# Patient Record
Sex: FEMALE | Race: WHITE | NOT HISPANIC OR LATINO | Employment: OTHER | URBAN - METROPOLITAN AREA
[De-identification: names, ages, dates, MRNs, and addresses within clinical notes are randomized per-mention and may not be internally consistent; named-entity substitution may affect disease eponyms.]

---

## 2017-01-04 ENCOUNTER — OFFICE VISIT (OUTPATIENT)
Dept: UROLOGY | Facility: CLINIC | Age: 52
End: 2017-01-04
Payer: MEDICARE

## 2017-01-04 VITALS
DIASTOLIC BLOOD PRESSURE: 72 MMHG | OXYGEN SATURATION: 100 % | SYSTOLIC BLOOD PRESSURE: 118 MMHG | RESPIRATION RATE: 18 BRPM | HEART RATE: 71 BPM

## 2017-01-04 DIAGNOSIS — R31.29 MICROSCOPIC HEMATURIA: Primary | ICD-10-CM

## 2017-01-04 LAB
ALBUMIN UR-MCNC: 10 MG/DL
APPEARANCE UR: ABNORMAL
BILIRUB UR QL STRIP: NEGATIVE
COLOR UR AUTO: YELLOW
GLUCOSE UR STRIP-MCNC: NEGATIVE MG/DL
HGB UR QL STRIP: ABNORMAL
KETONES UR STRIP-MCNC: 5 MG/DL
LEUKOCYTE ESTERASE UR QL STRIP: NEGATIVE
NITRATE UR QL: NEGATIVE
NON-SQ EPI CELLS #/AREA URNS LPF: ABNORMAL /LPF
PH UR STRIP: 6 PH (ref 5–7)
RBC #/AREA URNS AUTO: ABNORMAL /HPF (ref 0–2)
SP GR UR STRIP: 1.02 (ref 1–1.03)
URN SPEC COLLECT METH UR: ABNORMAL
UROBILINOGEN UR STRIP-MCNC: 4 MG/DL (ref 0–2)
WBC #/AREA URNS AUTO: ABNORMAL /HPF (ref 0–2)

## 2017-01-04 PROCEDURE — 99207 ZZC NO CHARGE LOS: CPT | Performed by: UROLOGY

## 2017-01-04 PROCEDURE — 81001 URINALYSIS AUTO W/SCOPE: CPT | Performed by: UROLOGY

## 2017-01-04 PROCEDURE — 52000 CYSTOURETHROSCOPY: CPT | Performed by: UROLOGY

## 2017-01-04 ASSESSMENT — PAIN SCALES - GENERAL: PAINLEVEL: NO PAIN (0)

## 2017-01-04 NOTE — MR AVS SNAPSHOT
After Visit Summary   1/4/2017    Kareen Hernandez    MRN: [id number]           Patient Information     Date Of Birth          1965        Visit Information        Provider Department      1/4/2017 3:30 PM Blair Garner MD [facility]        Today's Diagnoses     Microscopic hematuria    -  1       Care Instructions    It is recommended for you to have a repeat Urine sample with your primary doctor in 6 months. Call the Urology Clinic at [phone] with any questions.    After Your Cystoscopy    What happens after the exam?    You may go back to your normal diet and activity as you feel ready, unless your doctor tells you not to.    For the next two days, you may notice:    Some blood in your urine  Some burning when you urinate (use the toilet)  An urge to urinate more often  Bladder spasms    These are normal after the procedure and should go away after a day or two.  To relieve these problems drink 6 to 8 large glasses of water each day (includes drinks at meals) as this will help clear the urine.  Take warm baths to relieve pain and bladder spasms.  Do not add anything to the bath water.  You may also take Tylenol (acetaminophen) for pain if needed.    When should I call my doctor?    A fever over 100F (38C) for more than a day. (Before you call the doctor, check your temperature under your tongue)  Chills  Failure to urinate: No urine comes out when you try to use the toilet. (Try soaking in a bathtub full of warm water. If still no urine, call your doctor)  A lot of blood in the urine, or blood clots larger than a nickel  Pain in the back or belly area (abdomen)  Pain or spasms that are not relieved by warm tub baths and pain medicine  Severe pain, burning or other problems while passing urine  Pain that gets worse after two days                    Follow-ups after your visit        Who to contact     If you have questions or need follow up information about today's  clinic visit or your schedule please contact [facility] directly at [phone].  Normal or non-critical lab and imaging results will be communicated to you by SimplyCasthart, letter or phone within 4 business days after the clinic has received the results. If you do not hear from us within 7 days, please contact the clinic through SimplyCasthart or phone. If you have a critical or abnormal lab result, we will notify you by phone as soon as possible.  Submit refill requests through Conjectur or call your pharmacy and they will forward the refill request to us. Please allow 3 business days for your refill to be completed.          Additional Information About Your Visit        Conjectur Information     Conjectur gives you secure access to your electronic health record. If you see a primary care provider, you can also send messages to your care team and make appointments. If you have questions, please call your primary care clinic.  If you do not have a primary care provider, please call [phone] and they will assist you.      Conjectur is an electronic gateway that provides easy, online access to your medical records. With Conjectur, you can request a clinic appointment, read your test results, renew a prescription or communicate with your care team.     To access your existing account, please contact your [facility] or call [phone] for assistance.        Care EveryWhere ID     This is your Care EveryWhere ID. This could be used by other organizations to access your [place] medical records  [id number]        Your Vitals Were     Pulse Respirations Pulse Oximetry             71 18 100%          Blood Pressure from Last 3 Encounters:   01/04/17 118/72   12/12/16 125/73   11/29/16 120/76    Weight from Last 3 Encounters:   11/29/16 66.271 kg (146 lb 1.6 oz)   08/08/16 65.091 kg (143 lb 8 oz)   05/03/16 66.134 kg (145 lb 12.8 oz)              We Performed the Following     UA reflex  to Microscopic and Culture     Urine Microscopic        Primary Care Provider Office Phone # Fax #    Jessi Favian Lozano [phone]       [address] [address]        Thank you!     Thank you for choosing [facility]  for your care. Our goal is always to provide you with excellent care. Hearing back from our patients is one way we can continue to improve our services. Please take a few minutes to complete the written survey that you may receive in the mail after your visit with us. Thank you!             Your Updated Medication List - Protect others around you: Learn how to safely use, store and throw away your medicines at www.disposemymeds.org.          This list is accurate as of: 1/4/17  3:50 PM.  Always use your most recent med list.                   Brand Name Dispense Instructions for use    clonazePAM 0.5 MG tablet    klonoPIN    20 tablet    TAKE 1 TABLET BY MOUTH 3 TIMES A DAY AS NEEDED FOR ANXIETY       cyanocobalamin 1000 MCG/ML injection    VITAMIN B12    1 mL    Inject 1 mL (1,000 mcg) into the muscle every 30 days       diclofenac 75 MG EC tablet    VOLTAREN    60 tablet    Take 1 tablet (75 mg) by mouth 2 times daily as needed (For pain)       FLUoxetine 20 MG capsule    PROzac    90 capsule    TAKE 3 CAPS BY MOUTH DAILY       lamoTRIgine 100 MG tablet    LaMICtal    90 tablet    Take 3 tablets (300 mg) by mouth daily       mometasone 50 MCG/ACT spray    NASONEX    3 Box    Spray 2 sprays into both nostrils daily       naproxen sodium 550 MG tablet    ANAPROX    60 tablet    Take 1 tablet (550 mg) by mouth 2 times daily as needed for moderate pain       nicotine 14 MG/24HR 24 hr patch    NICODERM CQ    30 patch    Place 1 patch onto the skin every 24 hours       omeprazole 20 MG CR capsule    priLOSEC    90 capsule    Take 1 capsule (20 mg) by mouth daily       SUMAtriptan 100 MG tablet    IMITREX    9 tablet     Take 1 tablet (100 mg) by mouth at onset of headache for migraine May repeat in 2 hours if needed: max 2/day; average number of headaches monthly 3       triamcinolone 0.1 % ointment    KENALOG    30 g    Apply to nail beds twice daily       vitamin D 53534 UNIT capsule    ERGOCALCIFEROL    4 capsule    TAKE 1 CAPSULE BY MOUTH EVERY 7 DAYS       zolpidem 10 MG tablet    AMBIEN    30 tablet    TAKE 1 TABLET BY MOUTH EVERY EVENING AS NEEDED

## 2017-01-04 NOTE — PROGRESS NOTES
Pre-procedure diagnosis:  Micro-hematuria   Post-procedure diagnosis: Normal cystoscopy  Procedure performed:  Cystourethroscopy  Surgeon:    Juana TIAN   Anesthesia:    Local    Indications for procedure:   Kareen Hernandez is a 51 year old female with a history of micro-hematuria, kidney stones.  Urinary cytology was no evidence of malignancy. CT-urogram showed bilateral nonobstructing kidney stones, little change from CT 2-3 years prior.    Description of procedure:   After fully informed, voluntary consent was obtained, the patient was brought into the procedure room, identified and placed in a supine position on the cysto table.  The vagina/introitus were prepped and draped in a sterile fashion with betadine.  A 15F flexible cystoscope was inserted into the urethra and the bladder and urethra examined in a systematic manner.  The patient tolerated the procedure well and there were no complications.      Cystoscopic findings:  The urethra was normal without strictures. Normal external genitalia. The bladder was completely surveyed.  There was no trabeculation.  There were no tumors, stones, or diverticula identifed.  The ureteric orifices were normal in position and number and effluxing clear urine.    Assessment/Plan:   Kareen Hernandez is a 51 year old female with a history of micro-hematuria , now with normal  findings on cystoscopy.    Advised UA and urinary cytology in 6 months with PCP.  -discussed option of ureteroscopy for stones, she declines now.  - if symptomatic from stones return to clinic to discuss treatment options.

## 2017-01-04 NOTE — NURSING NOTE
"Kareen Hernandez's goals for this visit include:   Chief Complaint   Patient presents with     Cystoscopy       She requests these members of her care team be copied on today's visit information: Yes, PCP    PCP: Jessi Lozano    Referring Provider:  No referring provider defined for this encounter.    Chief Complaint   Patient presents with     Cystoscopy       Initial There were no vitals taken for this visit. Estimated body mass index is 22.53 kg/(m^2) as calculated from the following:    Height as of 12/12/16: 1.715 m (5' 7.5\").    Weight as of 11/29/16: 66.271 kg (146 lb 1.6 oz).  BP completed using cuff size: regular    Do you need any medication refills at today's visit? None    Maria Acevedo  General Surgery - Urology RN      "

## 2017-01-04 NOTE — PATIENT INSTRUCTIONS
It is recommended for you to have a repeat Urine sample with your primary doctor in 6 months. Call the Urology Clinic at [phone] with any questions.    After Your Cystoscopy    What happens after the exam?    You may go back to your normal diet and activity as you feel ready, unless your doctor tells you not to.    For the next two days, you may notice:    Some blood in your urine  Some burning when you urinate (use the toilet)  An urge to urinate more often  Bladder spasms    These are normal after the procedure and should go away after a day or two.  To relieve these problems drink 6 to 8 large glasses of water each day (includes drinks at meals) as this will help clear the urine.  Take warm baths to relieve pain and bladder spasms.  Do not add anything to the bath water.  You may also take Tylenol (acetaminophen) for pain if needed.    When should I call my doctor?    A fever over 100F (38C) for more than a day. (Before you call the doctor, check your temperature under your tongue)  Chills  Failure to urinate: No urine comes out when you try to use the toilet. (Try soaking in a bathtub full of warm water. If still no urine, call your doctor)  A lot of blood in the urine, or blood clots larger than a nickel  Pain in the back or belly area (abdomen)  Pain or spasms that are not relieved by warm tub baths and pain medicine  Severe pain, burning or other problems while passing urine  Pain that gets worse after two days

## 2017-01-20 DIAGNOSIS — E55.9 VITAMIN D DEFICIENCY DISEASE: ICD-10-CM

## 2017-01-20 DIAGNOSIS — F31.9 BIPOLAR DISORDER, UNSPECIFIED (H): Primary | ICD-10-CM

## 2017-01-23 NOTE — TELEPHONE ENCOUNTER
lamoTRIgine       Last Written Prescription Date:  5/3/16  Last Fill Quantity: 90,   # refills: 5  Last Office Visit with [facility] or  [facility] prescribing provider: 11/29/16  Future Office visit:       Routing refill request to provider for review/approval because:  Drug not on the [facility], [facility] or  [facility] refill protocol or controlled substance    vitamin D      Last Written Prescription Date: 11/15/16  Last Fill Quantity: 4,  # refills: 1   Last Office Visit with [facility], [facility] or  [facility] prescribing provider: 11/29/16

## 2017-01-25 RX ORDER — ERGOCALCIFEROL 1.25 MG/1
CAPSULE, LIQUID FILLED ORAL
Qty: 4 CAPSULE | Refills: 1 | Status: SHIPPED | OUTPATIENT
Start: 2017-01-25 | End: 2017-11-20

## 2017-01-25 RX ORDER — LAMOTRIGINE 100 MG/1
TABLET ORAL
Qty: 90 TABLET | Refills: 5 | Status: SHIPPED | OUTPATIENT
Start: 2017-01-25 | End: 2017-08-20

## 2017-02-28 ENCOUNTER — ALLIED HEALTH/NURSE VISIT (OUTPATIENT)
Dept: NURSING | Facility: CLINIC | Age: 52
End: 2017-02-28
Payer: MEDICARE

## 2017-02-28 DIAGNOSIS — Z98.84 GASTRIC BYPASS STATUS FOR OBESITY: Primary | ICD-10-CM

## 2017-02-28 PROCEDURE — 96372 THER/PROPH/DIAG INJ SC/IM: CPT

## 2017-02-28 PROCEDURE — 99207 ZZC NO CHARGE NURSE ONLY: CPT

## 2017-04-29 DIAGNOSIS — G47.00 PERSISTENT DISORDER OF INITIATING OR MAINTAINING SLEEP: ICD-10-CM

## 2017-05-01 RX ORDER — ZOLPIDEM TARTRATE 10 MG/1
TABLET ORAL
Qty: 30 TABLET | Refills: 0 | Status: SHIPPED | OUTPATIENT
Start: 2017-05-01 | End: 2017-12-07

## 2017-05-01 NOTE — TELEPHONE ENCOUNTER
zolpidem (AMBIEN) 10 MG tablet      Last Written Prescription Date:  10/17/16  Last Fill Quantity: 30,   # refills: 2  Last Office Visit with [facility], [facility] or [facility] prescribing provider: 1/4/17 Dr. Lozano    Future Office visit:       Routing refill request to provider for review/approval because:  Drug not on the [facility], [facility] or [facility] refill protocol or controlled substance    Jocelynn Robertson

## 2017-05-08 DIAGNOSIS — F41.9 ANXIETY: ICD-10-CM

## 2017-05-08 RX ORDER — CLONAZEPAM 0.5 MG/1
0.5 TABLET ORAL 3 TIMES DAILY
Qty: 20 TABLET | Refills: 0 | Status: SHIPPED | OUTPATIENT
Start: 2017-05-08 | End: 2018-08-03

## 2017-05-08 NOTE — TELEPHONE ENCOUNTER
Klonopin 0.5 mg      Last Written Prescription Date: 08/19/16  Last Fill Quantity: 20,  # refills: 0   Last Office Visit with [facility], [facility] or [facility] prescribing provider: 11/29/16    Routing refill request to provider for review/approval because:  Drug not on the [facility] refill protocol   Sirena Bueno RN

## 2017-05-08 NOTE — TELEPHONE ENCOUNTER
Reason for Call:  Medication or medication refill:    Do you use a [facility]?  Name of the pharmacy and phone number for the current request:  [facility] - [phone]    Name of the medication requested: Clonazepam (KLONOPIN) 0.5 MG tablet    Can we leave a detailed message on this number? YES    Phone number patient can be reached at: Home number on file [phone] (home)    Best Time: Anytime    Call taken on 5/8/2017 at 1:44 PM by Vinny Lopes

## 2017-06-07 DIAGNOSIS — F31.9 BIPOLAR DISORDER, UNSPECIFIED (H): ICD-10-CM

## 2017-06-08 NOTE — TELEPHONE ENCOUNTER
FLUoxetine (PROZAC) 20 MG capsule     Last Written Prescription Date: 11/15/16  Last Fill Quantity: 90, # refills: 5  Last Office Visit with [facility] primary care provider:  1/4/17 Dr. Lozano          Last PHQ-9 score on record=   PHQ-9 SCORE 5/3/2016   Total Score -   Total Score 10

## 2017-06-09 NOTE — TELEPHONE ENCOUNTER
Routing refill request to provider for review/approval because:  Drug not on the [facility] refill protocol with associated diagnosis  Sirena Bueno RN

## 2017-06-15 ENCOUNTER — TELEPHONE (OUTPATIENT)
Dept: FAMILY MEDICINE | Facility: CLINIC | Age: 52
End: 2017-06-15

## 2017-06-15 DIAGNOSIS — F31.9 BIPOLAR DISORDER, UNSPECIFIED (H): ICD-10-CM

## 2017-06-15 NOTE — TELEPHONE ENCOUNTER
FLUoxetine (PROZAC) 20 MG capsule not covered by insurance. Please advise.      Neelima Hensley, CMA

## 2017-06-16 NOTE — TELEPHONE ENCOUNTER
This is doubtful since fluoxetine is the oldest and cheapest of these meds.  Maybe a qty override?

## 2017-06-16 NOTE — TELEPHONE ENCOUNTER
Spoke with the pharmacist and he says pt picked up rx 6-13-17 paying a couple bucks for it.    Disregard encounter    Chloe Reese MA

## 2017-06-26 DIAGNOSIS — K21.9 GASTROESOPHAGEAL REFLUX DISEASE WITHOUT ESOPHAGITIS: ICD-10-CM

## 2017-06-26 NOTE — TELEPHONE ENCOUNTER
omeprazole (PRILOSEC) 20 MG capsule      Last Written Prescription Date: 5/23/16  Last Fill Quantity: 90,  # refills: 1   Last Office Visit with [facility], [facility] or [facility] prescribing provider: 11/29/16

## 2017-07-08 DIAGNOSIS — F31.9 BIPOLAR DISORDER, UNSPECIFIED (H): ICD-10-CM

## 2017-07-10 NOTE — TELEPHONE ENCOUNTER
FLUoxetine (PROZAC) 20 MG capsule     Last Written Prescription Date: 6/9/17  Last Fill Quantity: 90, # refills: 1  Last Office Visit with [facility] primary care provider:  11/29/16        Last PHQ-9 score on record=   PHQ-9 SCORE 5/3/2016   Total Score -   Total Score 10

## 2017-07-26 ENCOUNTER — OFFICE VISIT (OUTPATIENT)
Dept: FAMILY MEDICINE | Facility: CLINIC | Age: 52
End: 2017-07-26
Payer: MEDICARE

## 2017-07-26 VITALS
BODY MASS INDEX: 22.6 KG/M2 | RESPIRATION RATE: 16 BRPM | SYSTOLIC BLOOD PRESSURE: 126 MMHG | HEIGHT: 68 IN | WEIGHT: 149.1 LBS | TEMPERATURE: 98.3 F | OXYGEN SATURATION: 96 % | HEART RATE: 86 BPM | DIASTOLIC BLOOD PRESSURE: 82 MMHG

## 2017-07-26 DIAGNOSIS — Z12.31 VISIT FOR SCREENING MAMMOGRAM: ICD-10-CM

## 2017-07-26 DIAGNOSIS — R59.0 POSTERIOR CERVICAL ADENOPATHY: Primary | ICD-10-CM

## 2017-07-26 DIAGNOSIS — E53.8 VITAMIN B12 DEFICIENCY DISEASE: ICD-10-CM

## 2017-07-26 PROCEDURE — 99214 OFFICE O/P EST MOD 30 MIN: CPT | Mod: 25 | Performed by: FAMILY MEDICINE

## 2017-07-26 PROCEDURE — 96372 THER/PROPH/DIAG INJ SC/IM: CPT | Performed by: FAMILY MEDICINE

## 2017-07-26 RX ORDER — CYANOCOBALAMIN 1000 UG/ML
1 INJECTION, SOLUTION INTRAMUSCULAR; SUBCUTANEOUS
Qty: 1 ML | Refills: 11 | Status: SHIPPED | OUTPATIENT
Start: 2017-07-26 | End: 2018-08-01

## 2017-07-26 RX ORDER — CEPHALEXIN 500 MG/1
500 CAPSULE ORAL 3 TIMES DAILY
Qty: 21 CAPSULE | Refills: 0 | Status: SHIPPED | OUTPATIENT
Start: 2017-07-26 | End: 2017-08-02

## 2017-07-26 ASSESSMENT — PAIN SCALES - GENERAL: PAINLEVEL: MODERATE PAIN (5)

## 2017-07-26 NOTE — MR AVS SNAPSHOT
After Visit Summary   7/26/2017    Kareen Hernandez    MRN: [id number]           Patient Information     Date Of Birth          1965        Visit Information        Provider Department      7/26/2017 2:00 PM Jessi Lozano MD [facility]        Today's Diagnoses     Posterior cervical adenopathy    -  1    Vitamin B12 deficiency disease        Visit for screening mammogram           Follow-ups after your visit        Follow-up notes from your care team     Return if symptoms worsen or fail to improve.      Future tests that were ordered for you today     Open Future Orders        Priority Expected Expires Ordered    MA SCREENING DIGITAL BILAT - Future  (s+30) Routine  7/26/2018 7/26/2017            Who to contact     If you have questions or need follow up information about today's clinic visit or your schedule please contact [facility] directly at [phone].  Normal or non-critical lab and imaging results will be communicated to you by iConTexthart, letter or phone within 4 business days after the clinic has received the results. If you do not hear from us within 7 days, please contact the clinic through iConTexthart or phone. If you have a critical or abnormal lab result, we will notify you by phone as soon as possible.  Submit refill requests through StoryWorth or call your pharmacy and they will forward the refill request to us. Please allow 3 business days for your refill to be completed.          Additional Information About Your Visit        MyChart Information     StoryWorth gives you secure access to your electronic health record. If you see a primary care provider, you can also send messages to your care team and make appointments. If you have questions, please call your primary care clinic.  If you do not have a primary care provider, please call [phone] and they will assist you.        Care EveryWhere ID     This is your Care EveryWhere ID. This could be used  "by other organizations to access your [place] medical records  [id number]        Your Vitals Were     Pulse Temperature Respirations Height Pulse Oximetry BMI (Body Mass Index)    86 98.3  F (36.8  C) (Oral) 16 1.715 m (5' 7.5\") 96% 23.01 kg/m2       Blood Pressure from Last 3 Encounters:   07/26/17 126/82   01/04/17 118/72   12/12/16 125/73    Weight from Last 3 Encounters:   07/26/17 67.6 kg (149 lb 1.6 oz)   11/29/16 66.3 kg (146 lb 1.6 oz)   08/08/16 65.1 kg (143 lb 8 oz)              We Performed the Following     INJECTION INTRAMUSCULAR OR SUB-Q          Today's Medication Changes          These changes are accurate as of: 7/26/17  3:22 PM.  If you have any questions, ask your nurse or doctor.               Start taking these medicines.        Dose/Directions    cephALEXin 500 MG capsule   Commonly known as:  KEFLEX   Used for:  Posterior cervical adenopathy   Started by:  Jessi Lozano MD        Dose:  500 mg   Take 1 capsule (500 mg) by mouth 3 times daily for 7 days   Quantity:  21 capsule   Refills:  0            Where to get your medicines      These medications were sent to [facility] [address] [place] [id number] [facility] AT [facility] [address]  [address] [facility], [facility] [address]     Phone:  [phone]     cephALEXin 500 MG capsule    cyanocobalamin 1000 MCG/ML injection                Primary Care Provider Office Phone # Fax #    Jessi Lozano [phone]       [address] [address]        Equal Access to Services     [facility] MIKI AH: Hadii rgoer barkley Socarmenza, waaxda luqadaha, qaybta kaalmada adeegyada, bon barbosa. So [facility] [phone].    ATENCIÓN: Si habla español, tiene a lang disposición servicios gratuitos de asistencia lingüística. Llame al [phone].    We comply with applicable federal civil rights laws and [place] laws. We do not discriminate " on the basis of race, color, national origin, age, disability sex, sexual orientation or gender identity.            Thank you!     Thank you for choosing [facility]  for your care. Our goal is always to provide you with excellent care. Hearing back from our patients is one way we can continue to improve our services. Please take a few minutes to complete the written survey that you may receive in the mail after your visit with us. Thank you!             Your Updated Medication List - Protect others around you: Learn how to safely use, store and throw away your medicines at www.disposemymeds.org.          This list is accurate as of: 7/26/17  3:22 PM.  Always use your most recent med list.                   Brand Name Dispense Instructions for use Diagnosis    cephALEXin 500 MG capsule    KEFLEX    21 capsule    Take 1 capsule (500 mg) by mouth 3 times daily for 7 days    Posterior cervical adenopathy       clonazePAM 0.5 MG tablet    klonoPIN    20 tablet    Take 1 tablet (0.5 mg) by mouth 3 times daily AS NEEDED FOR ANXIETY    Anxiety       cyanocobalamin 1000 MCG/ML injection    VITAMIN B12    1 mL    Inject 1 mL (1,000 mcg) into the muscle every 30 days    Vitamin B12 deficiency disease       diclofenac 75 MG EC tablet    VOLTAREN    60 tablet    Take 1 tablet (75 mg) by mouth 2 times daily as needed (For pain)    Lateral epicondylitis of right elbow, History of kidney stones       FLUoxetine 20 MG capsule    PROzac    90 capsule    TAKE 3 CAPSULES BY MOUTH EVERY DAY    Bipolar disorder, unspecified (H)       lamoTRIgine 100 MG tablet    LaMICtal    90 tablet    TAKE 3 TABS BY MOUTH DAILY    Bipolar disorder, unspecified (H)       mometasone 50 MCG/ACT spray    NASONEX    3 Box    Spray 2 sprays into both nostrils daily    Allergic rhinitis, unspecified allergic rhinitis type       naproxen sodium 550 MG tablet    ANAPROX    60 tablet    Take 1 tablet (550 mg) by mouth 2 times daily as needed for  moderate pain    Right flank pain       nicotine 14 MG/24HR 24 hr patch    NICODERM CQ    30 patch    Place 1 patch onto the skin every 24 hours    Tobacco use disorder       omeprazole 20 MG CR capsule    priLOSEC    90 capsule    Take 1 capsule (20 mg) by mouth daily    Gastroesophageal reflux disease without esophagitis       SUMAtriptan 100 MG tablet    IMITREX    9 tablet    Take 1 tablet (100 mg) by mouth at onset of headache for migraine May repeat in 2 hours if needed: max 2/day; average number of headaches monthly 3    Chronic migraine without aura without status migrainosus, not intractable       triamcinolone 0.1 % ointment    KENALOG    30 g    Apply to nail beds twice daily    Dystrophic nail       vitamin D 79114 UNIT capsule    ERGOCALCIFEROL    4 capsule    TAKE 1 CAPSULE BY MOUTH EVERY 7 DAYS    Vitamin D deficiency disease       zolpidem 10 MG tablet    AMBIEN    30 tablet    TAKE 1 TABLET BY MOUTH EVERY EVENING AS NEEDED    Persistent disorder of initiating or maintaining sleep

## 2017-07-26 NOTE — PROGRESS NOTES
"  SUBJECTIVE:                                                    Kareen Hernandez is a 52 year old female who presents to clinic today for the following health issues:      1. Lump on neck near R jaw -    - noticed 2 days ago   - some pain noted, causing HA's, ear ache  2. B12     SUBJECTIVE:  Here today with the above symptoms. Noted this incidentally couple of days ago when she was looking in the mirror. The lump is mildly tender. Doesn't seem to be changing in size. She has noted some incidental pain around her left ear and some slight fullness. No significant nasal congestion. No cough or recent illness. Doing well on monthly B-12 injection    Review of systems otherwise negative.  Past medical, family, and social history reviewed and updated in chart.    OBJECTIVE:  /82 (BP Location: Right arm, Patient Position: Right side, Cuff Size: Adult Regular)  Pulse 86  Temp 98.3  F (36.8  C) (Oral)  Resp 16  Ht 1.715 m (5' 7.5\")  Wt 67.6 kg (149 lb 1.6 oz)  SpO2 96%  BMI 23.01 kg/m2  Alert, pleasant, upbeat, and in no apparent discomfort.  Left tympanic membrane clear  Right tympanic membrane mildly bulging with a slightly clouded fluid behind  Nose clear  Oropharynx clear  Neck supple and there is a mildly enlarged posterior cervical lymph node on the right  S1 and S2 normal, no murmurs, clicks, gallops or rubs. Regular rate and rhythm. Chest is clear; no wheezes or rales. No edema or JVD.  Past labs reviewed with the patient.     ASSESSMENT / PLAN:  (R59.0) Posterior cervical adenopathy  (primary encounter diagnosis)  Comment: Discussed with patient that this is likely a reactive lymph node. Will treat empirically with a course of antibiotics and expect resolution. If not consider further evaluation  Plan: cephALEXin (KEFLEX) 500 MG capsule        Discussed mechanism of action of the proposed medication, as well as potential effects, both good and bad.  Patient expressed understanding and agreed with " treatment.     (E53.8) Vitamin B12 deficiency disease  Comment:   Plan: cyanocobalamin (VITAMIN B12) 1000 MCG/ML         injection        Refilled     (Z12.31) Visit for screening mammogram  Comment:   Plan: MA SCREENING DIGITAL BILAT - Future  (s+30)            Follow up as needed   SBhumika Lozano MD    (Chart documentation completed in part with Dragon voice-recognition software.  Even though reviewed some grammatical, spelling, and word errors may remain.)

## 2017-07-26 NOTE — NURSING NOTE
"Chief Complaint   Patient presents with     Derm Problem       Initial /82 (BP Location: Right arm, Patient Position: Right side, Cuff Size: Adult Regular)  Pulse 86  Temp 98.3  F (36.8  C) (Oral)  Resp 16  Ht 1.715 m (5' 7.5\")  Wt 67.6 kg (149 lb 1.6 oz)  SpO2 96%  BMI 23.01 kg/m2 Estimated body mass index is 23.01 kg/(m^2) as calculated from the following:    Height as of this encounter: 1.715 m (5' 7.5\").    Weight as of this encounter: 67.6 kg (149 lb 1.6 oz).  Medication Reconciliation: complete     Will Lefty MADISON      "

## 2017-08-04 ENCOUNTER — OFFICE VISIT (OUTPATIENT)
Dept: FAMILY MEDICINE | Facility: CLINIC | Age: 52
End: 2017-08-04
Payer: MEDICARE

## 2017-08-04 ENCOUNTER — TELEPHONE (OUTPATIENT)
Dept: FAMILY MEDICINE | Facility: CLINIC | Age: 52
End: 2017-08-04

## 2017-08-04 VITALS
TEMPERATURE: 97.9 F | OXYGEN SATURATION: 99 % | WEIGHT: 155.8 LBS | HEART RATE: 72 BPM | SYSTOLIC BLOOD PRESSURE: 134 MMHG | DIASTOLIC BLOOD PRESSURE: 84 MMHG | BODY MASS INDEX: 24.45 KG/M2 | HEIGHT: 67 IN

## 2017-08-04 DIAGNOSIS — Z23 NEED FOR PROPHYLACTIC VACCINATION WITH TETANUS-DIPHTHERIA (TD): ICD-10-CM

## 2017-08-04 DIAGNOSIS — S01.91XA LACERATION OF HEAD WITHOUT FOREIGN BODY, UNSPECIFIED PART OF HEAD, INITIAL ENCOUNTER: Primary | ICD-10-CM

## 2017-08-04 PROCEDURE — 90471 IMMUNIZATION ADMIN: CPT | Performed by: NURSE PRACTITIONER

## 2017-08-04 PROCEDURE — 90715 TDAP VACCINE 7 YRS/> IM: CPT | Performed by: NURSE PRACTITIONER

## 2017-08-04 PROCEDURE — 99213 OFFICE O/P EST LOW 20 MIN: CPT | Mod: 25 | Performed by: NURSE PRACTITIONER

## 2017-08-04 ASSESSMENT — PAIN SCALES - GENERAL: PAINLEVEL: SEVERE PAIN (7)

## 2017-08-04 NOTE — PROGRESS NOTES
SUBJECTIVE:                                                    Kareen Hernandez is a 52 year old female who presents to clinic today for the following health issues:      Concern - laceration on forehead  Onset: 3 days    Description:   Hit head on camper window (metal)    Intensity: mild, moderate    Progression of Symptoms:  same    Accompanying Signs & Symptoms:  NONE    Previous history of similar problem:   NO     Precipitating factors:   Worsened by: N/A    Alleviating factors:  Improved by: N/A    Therapies Tried and outcome: antibiotic ointment    No fever/chills. Washed out with hydrogen peroxide. Denies discharge or pus from laceration.    Has appointment Tuesday for mammogram.       Problem list and histories reviewed & adjusted, as indicated.  Additional history: as documented    Patient Active Problem List   Diagnosis     Fibromyalgia     Gastric bypass status for obesity     CARDIOVASCULAR SCREENING; LDL GOAL LESS THAN 130     Anxiety     Vitamin D deficiency disease     Vitamin B12 deficiency disease     Migraine headache     Insomnia     Right maxillary sinusitis, chronic     Bipolar disorder (H)     History of hypertension     Gastroesophageal reflux disease without esophagitis     Chronic migraine without aura without status migrainosus, not intractable     Major depressive disorder, recurrent episode, moderate (H)     History of kidney stones     Tobacco use disorder     Past Surgical History:   Procedure Laterality Date     ABDOMINOPLASTY  12/2/2013     C GASTRIC BYPASS,OBESE<100CM DAYAN-EN-Y  3-25-09    [facility]     C REMOVAL OF KIDNEY STONE      With kidney tents x 5 to 6 procedures.     CL AFF SURGICAL PATHOLOGY  Feb 2007    Redman's neuroma  - Right Foot     LITHOTRIPSY       SURGICAL PATHOLOGY EXAM      Lipoma Removal on her back       Social History   Substance Use Topics     Smoking status: Current Every Day Smoker     Packs/day: 0.50     Types: Cigarettes     Last attempt to quit:  9/22/2013     Smokeless tobacco: Never Used      Comment: 2/6/08     Alcohol use No     Family History   Problem Relation Age of Onset     Asthma Mother      Hypertension Mother      C.A.D. Mother      Genitourinary Problems Mother      kidney failure     DIABETES Father      Hypertension Father      C.A.D. Father      Genitourinary Problems Sister      kidney stones     CEREBROVASCULAR DISEASE No family hx of      Breast Cancer No family hx of      Cancer - colorectal No family hx of      Prostate Cancer No family hx of          Current Outpatient Prescriptions   Medication Sig Dispense Refill     cyanocobalamin (VITAMIN B12) 1000 MCG/ML injection Inject 1 mL (1,000 mcg) into the muscle every 30 days 1 mL 11     FLUoxetine (PROZAC) 20 MG capsule TAKE 3 CAPSULES BY MOUTH EVERY DAY 90 capsule 0     omeprazole (PRILOSEC) 20 MG CR capsule Take 1 capsule (20 mg) by mouth daily 90 capsule 1     clonazePAM (KLONOPIN) 0.5 MG tablet Take 1 tablet (0.5 mg) by mouth 3 times daily AS NEEDED FOR ANXIETY 20 tablet 0     zolpidem (AMBIEN) 10 MG tablet TAKE 1 TABLET BY MOUTH EVERY EVENING AS NEEDED 30 tablet 0     vitamin D (ERGOCALCIFEROL) 61156 UNIT capsule TAKE 1 CAPSULE BY MOUTH EVERY 7 DAYS 4 capsule 1     lamoTRIgine (LAMICTAL) 100 MG tablet TAKE 3 TABS BY MOUTH DAILY 90 tablet 5     diclofenac (VOLTAREN) 75 MG EC tablet Take 1 tablet (75 mg) by mouth 2 times daily as needed (For pain) 60 tablet 0     triamcinolone (KENALOG) 0.1 % ointment Apply to nail beds twice daily 30 g 0     nicotine (NICODERM CQ) 14 MG/24HR patch 2h hr Place 1 patch onto the skin every 24 hours 30 patch 2     mometasone (NASONEX) 50 MCG/ACT nasal spray Spray 2 sprays into both nostrils daily 3 Box 3     naproxen sodium (ANAPROX) 550 MG tablet Take 1 tablet (550 mg) by mouth 2 times daily as needed for moderate pain 60 tablet 0     SUMAtriptan (IMITREX) 100 MG tablet Take 1 tablet (100 mg) by mouth at onset of headache for migraine May repeat in 2  "hours if needed: max 2/day; average number of headaches monthly 3 9 tablet 12     Allergies   Allergen Reactions     Lurasidone      Diarrhea     Morphine Rash         Reviewed and updated as needed this visit by clinical staffTobacco  Allergies  Meds  Med Hx  Surg Hx  Fam Hx  Soc Hx      Reviewed and updated as needed this visit by Provider         ROS:  Constitutional, skin systems are negative, except as otherwise noted.      OBJECTIVE:   /84 (BP Location: Right arm, Patient Position: Chair, Cuff Size: Adult Regular)  Pulse 72  Temp 97.9  F (36.6  C) (Oral)  Ht 1.706 m (5' 7.16\")  Wt 70.7 kg (155 lb 12.8 oz)  SpO2 99%  Breastfeeding? No  BMI 24.28 kg/m2  Body mass index is 24.28 kg/(m^2).  GENERAL: healthy, alert and no distress  SKIN: mid-forehead with vertical laceration-approximated, no drainage  PSYCH: mentation appears normal, affect normal/bright    Diagnostic Test Results:  none     ASSESSMENT/PLAN:         1. Laceration of head without foreign body, unspecified part of head, initial encounter  Use bacitracin BID topically. Should heal on own. Does not appear infected, if you develop fever/chills or pus/drainage call-will need antibiotic at that time.     2. Need for prophylactic vaccination with tetanus-diphtheria (TD)  given  - TDAP VACCINE (ADACEL)  - ADMIN 1st VACCINE    FUTURE APPOINTMENTS:       - Follow-up visit prn    MESHA Padgett, NP-C  [facility]    "

## 2017-08-04 NOTE — TELEPHONE ENCOUNTER
This writer attempted to contact Kareen on 08/04/17      Reason for call need a tetanus and left detailed message.      When patient calls back, please Relay message advised patient last tetanus on file was 2007 and informed she is due . Was window old, dirty? Did it break the skin? , (read verbatim), document that pt called and close encounter.          Sirena Raoms RN

## 2017-08-04 NOTE — MR AVS SNAPSHOT
"              After Visit Summary   8/4/2017    Kareen Hernandez    MRN: [id number]           Patient Information     Date Of Birth          1965        Visit Information        Provider Department      8/4/2017 4:20 PM Apryl Childs NP [facility]        Today's Diagnoses     Laceration of head without foreign body, unspecified part of head, initial encounter    -  1    Need for prophylactic vaccination with tetanus-diphtheria (TD)          Care Instructions    Use Bacitracin twice a day for 5 days. If you develop fever/chills, pus out of laceration: call so we can start an antibiotic.           Follow-ups after your visit        Your next 10 appointments already scheduled     Aug 08, 2017 10:00 AM CDT   MA SCREENING DIGITAL BILATERAL with MGMA1, MG MA TECH   [facility]    [id number] [place] [id number] [phone]           Do not use any powder, lotion or deodorant under your arms or on your breast. If you do, we will ask you to remove it before your exam.  Wear comfortable, two-piece clothing.  If you have any allergies, tell your care team.  Bring any previous mammograms from other facilities or have them mailed to the breast center. Three-dimensional (3D) mammograms are available at [place] locations in [facility], and [place]. [facility] locations include [place] [facility] in [place]. Benefits of 3D mammograms include: - Improved rate of cancer detection - Decreases your chance of having to go back for more tests, which means fewer: - \"False-positive\" results (This means that there is an abnormal area but it isn't cancer.) - Invasive testing procedures, such as a biopsy or surgery - Can provide clearer images of the breast if you have dense breast tissue. 3D mammography is an optional exam that anyone can have with a 2D mammogram. It doesn't replace or take the " "place of a 2D mammogram. 2D mammograms remain an effective screening test for all women.  Not all insurance companies cover the cost of a 3D mammogram. Check with your insurance.              Who to contact     If you have questions or need follow up information about today's clinic visit or your schedule please contact [facility] directly at [phone].  Normal or non-critical lab and imaging results will be communicated to you by YottaMarkhart, letter or phone within 4 business days after the clinic has received the results. If you do not hear from us within 7 days, please contact the clinic through Crowdvancet or phone. If you have a critical or abnormal lab result, we will notify you by phone as soon as possible.  Submit refill requests through Nascentric or call your pharmacy and they will forward the refill request to us. Please allow 3 business days for your refill to be completed.          Additional Information About Your Visit        YottaMarkharSnabboteket Information     Nascentric gives you secure access to your electronic health record. If you see a primary care provider, you can also send messages to your care team and make appointments. If you have questions, please call your primary care clinic.  If you do not have a primary care provider, please call [phone] and they will assist you.        Care EveryWhere ID     This is your Care EveryWhere ID. This could be used by other organizations to access your [facility] medical records  [id number]        Your Vitals Were     Pulse Temperature Height Pulse Oximetry Breastfeeding? BMI (Body Mass Index)    72 97.9  F (36.6  C) (Oral) 1.706 m (5' 7.16\") 99% No 24.28 kg/m2       Blood Pressure from Last 3 Encounters:   08/04/17 134/84   07/26/17 126/82   01/04/17 118/72    Weight from Last 3 Encounters:   08/04/17 70.7 kg (155 lb 12.8 oz)   07/26/17 67.6 kg (149 lb 1.6 oz)   11/29/16 66.3 kg (146 lb 1.6 oz)              We Performed the Following     TDAP VACCINE " (ADACEL)        Primary Care Provider Office Phone # Fax #    Jessi Favian Lozano [phone]       [address] [address]        Equal Access to Services     TRAVIS LIVINGSTON : Hadii roger ang hadgretao Soomaali, waaxda luqadaha, qaybta kaalmada adeegyada, waxdeisy suzannein hayaan deaconvonda calderonismael barbosa. So [facility] [phone].    ATENCIÓN: Si habla español, tiene a lang disposición servicios gratuitos de asistencia lingüística. Llame al [phone].    We comply with applicable federal civil rights laws and [place] laws. We do not discriminate on the basis of race, color, national origin, age, disability sex, sexual orientation or gender identity.            Thank you!     Thank you for choosing [facility]  for your care. Our goal is always to provide you with excellent care. Hearing back from our patients is one way we can continue to improve our services. Please take a few minutes to complete the written survey that you may receive in the mail after your visit with us. Thank you!             Your Updated Medication List - Protect others around you: Learn how to safely use, store and throw away your medicines at www.disposemymeds.org.          This list is accurate as of: 8/4/17  4:39 PM.  Always use your most recent med list.                   Brand Name Dispense Instructions for use Diagnosis    clonazePAM 0.5 MG tablet    klonoPIN    20 tablet    Take 1 tablet (0.5 mg) by mouth 3 times daily AS NEEDED FOR ANXIETY    Anxiety       cyanocobalamin 1000 MCG/ML injection    VITAMIN B12    1 mL    Inject 1 mL (1,000 mcg) into the muscle every 30 days    Vitamin B12 deficiency disease       diclofenac 75 MG EC tablet    VOLTAREN    60 tablet    Take 1 tablet (75 mg) by mouth 2 times daily as needed (For pain)    Lateral epicondylitis of right elbow, History of kidney stones       FLUoxetine 20 MG capsule    PROzac    90 capsule    TAKE 3 CAPSULES BY  MOUTH EVERY DAY    Bipolar disorder, unspecified (H)       lamoTRIgine 100 MG tablet    LaMICtal    90 tablet    TAKE 3 TABS BY MOUTH DAILY    Bipolar disorder, unspecified (H)       mometasone 50 MCG/ACT spray    NASONEX    3 Box    Spray 2 sprays into both nostrils daily    Allergic rhinitis, unspecified allergic rhinitis type       naproxen sodium 550 MG tablet    ANAPROX    60 tablet    Take 1 tablet (550 mg) by mouth 2 times daily as needed for moderate pain    Right flank pain       nicotine 14 MG/24HR 24 hr patch    NICODERM CQ    30 patch    Place 1 patch onto the skin every 24 hours    Tobacco use disorder       omeprazole 20 MG CR capsule    priLOSEC    90 capsule    Take 1 capsule (20 mg) by mouth daily    Gastroesophageal reflux disease without esophagitis       SUMAtriptan 100 MG tablet    IMITREX    9 tablet    Take 1 tablet (100 mg) by mouth at onset of headache for migraine May repeat in 2 hours if needed: max 2/day; average number of headaches monthly 3    Chronic migraine without aura without status migrainosus, not intractable       triamcinolone 0.1 % ointment    KENALOG    30 g    Apply to nail beds twice daily    Dystrophic nail       vitamin D 72071 UNIT capsule    ERGOCALCIFEROL    4 capsule    TAKE 1 CAPSULE BY MOUTH EVERY 7 DAYS    Vitamin D deficiency disease       zolpidem 10 MG tablet    AMBIEN    30 tablet    TAKE 1 TABLET BY MOUTH EVERY EVENING AS NEEDED    Persistent disorder of initiating or maintaining sleep

## 2017-08-04 NOTE — TELEPHONE ENCOUNTER
Reason for Call:  Other     Detailed comments: patient had hit her forehead on a corner of a metal window and asking if she would need to get a tetanus shot for this?    Phone Number Patient can be reached at: Home number on file [phone] (home)    Best Time: any    Can we leave a detailed message on this number? YES    Call taken on 8/4/2017 at 8:35 AM by Lorenza Byrd

## 2017-08-04 NOTE — NURSING NOTE
"Chief Complaint   Patient presents with     Laceration     forehead       Initial /84 (BP Location: Right arm, Patient Position: Chair, Cuff Size: Adult Regular)  Pulse 72  Temp 97.9  F (36.6  C) (Oral)  Ht 1.706 m (5' 7.16\")  Wt 70.7 kg (155 lb 12.8 oz)  SpO2 99%  Breastfeeding? No  BMI 24.28 kg/m2 Estimated body mass index is 24.28 kg/(m^2) as calculated from the following:    Height as of this encounter: 1.706 m (5' 7.16\").    Weight as of this encounter: 70.7 kg (155 lb 12.8 oz).  Medication Reconciliation: complete     PAU Campbell MA        "

## 2017-08-04 NOTE — PATIENT INSTRUCTIONS
Use Bacitracin twice a day for 5 days. If you develop fever/chills, pus out of laceration: call so we can start an antibiotic.

## 2017-08-07 NOTE — TELEPHONE ENCOUNTER
This writer attempted to contact Kareen on 08/07/17      Reason for call needs a tetanus shot and left detailed message.      When patient calls back, please Relay message needs a tetanus shot. Last one 2007, (read verbatim), document that pt called and close encounter.          Sirena Ramos RN

## 2017-08-20 DIAGNOSIS — G43.709 CHRONIC MIGRAINE WITHOUT AURA WITHOUT STATUS MIGRAINOSUS, NOT INTRACTABLE: ICD-10-CM

## 2017-08-20 DIAGNOSIS — F31.9 BIPOLAR DISORDER, UNSPECIFIED (H): ICD-10-CM

## 2017-08-21 NOTE — TELEPHONE ENCOUNTER
SUMAtriptan (IMITREX) 100 MG tablet      Last Written Prescription Date: 7/18/16  Last Fill Quantity: 9 tablet, # refills: 12  Last Office Visit with [facility] or [facility] prescribing provider: 8/4/17 Arpyl Childs NP       BP Readings from Last 3 Encounters:   08/04/17 134/84   07/26/17 126/82   01/04/17 118/72         lamoTRIgine (LAMICTAL) 100 MG tablet      Last Written Prescription Date:  1/25/17  Last Fill Quantity: 90,   # refills: 5  Last Office Visit with [facility] or [facility] prescribing provider: 8/4/17 Apryl Childs NP  Future Office visit:       Routing refill request to provider for review/approval because:  Drug not on the [facility], [facility] or [facility] refill protocol or controlled substance

## 2017-08-22 RX ORDER — LAMOTRIGINE 100 MG/1
TABLET ORAL
Qty: 90 TABLET | Refills: 0 | Status: SHIPPED | OUTPATIENT
Start: 2017-08-22 | End: 2017-09-25

## 2017-08-22 RX ORDER — SUMATRIPTAN 100 MG/1
TABLET, FILM COATED ORAL
Qty: 9 TABLET | Refills: 6 | Status: SHIPPED | OUTPATIENT
Start: 2017-08-22 | End: 2018-09-26

## 2017-08-22 NOTE — TELEPHONE ENCOUNTER
Imitrex - Prescription approved per [facility] Refill Protocol.    Routing Lamictal to provider for review. Diagnosis associated with medication not noted at office visit since 8/2016

## 2017-08-23 DIAGNOSIS — F31.9 BIPOLAR DISORDER, UNSPECIFIED (H): ICD-10-CM

## 2017-08-23 NOTE — TELEPHONE ENCOUNTER
FLUoxetine (PROZAC) 20 MG capsule     Last Written Prescription Date: 07/11/17  Last Fill Quantity: 90, # refills: 0  Last Office Visit with [facility] primary care provider:  08/04/17 Apryl Childs NP        Last PHQ-9 score on record=   PHQ-9 SCORE 5/3/2016   Total Score -   Total Score 10

## 2017-08-28 NOTE — TELEPHONE ENCOUNTER
MA- please update PHQ 9, please make depression follow up appointment.  Sirena Ramos RN.     Routing refill request to provider for review/approval because:  PHQ 9 out of range:  10 (5/2016)  Sirena Ramos RN.

## 2017-09-15 ENCOUNTER — TELEPHONE (OUTPATIENT)
Dept: FAMILY MEDICINE | Facility: CLINIC | Age: 52
End: 2017-09-15

## 2017-09-15 NOTE — TELEPHONE ENCOUNTER
9/15/2017    Call Regarding Preventive Health Screening Mammogram    Attempt 1    Message on voicemail     Comments:       Outreach   Melba Dasilva

## 2017-09-25 DIAGNOSIS — F31.9 BIPOLAR DISORDER, UNSPECIFIED (H): ICD-10-CM

## 2017-09-26 NOTE — TELEPHONE ENCOUNTER
lamoTRIgine (LAMICTAL) 100 MG tablet      Last Written Prescription Date:  08/22/17  Last Fill Quantity: 90,   # refills: 0  Last Office Visit with [facility] or [facility] prescribing provider: 08/24/17 Apryl Childs NP  Future Office visit:       Routing refill request to provider for review/approval because:  Drug not on the [facility], [facility] or [facility] refill protocol or controlled substance

## 2017-09-27 RX ORDER — LAMOTRIGINE 100 MG/1
TABLET ORAL
Qty: 90 TABLET | Refills: 0 | Status: SHIPPED | OUTPATIENT
Start: 2017-09-27 | End: 2017-10-26

## 2017-10-26 DIAGNOSIS — F31.9 BIPOLAR AFFECTIVE DISORDER, REMISSION STATUS UNSPECIFIED (H): ICD-10-CM

## 2017-10-26 NOTE — TELEPHONE ENCOUNTER
lamoTRIgine (LAMICTAL) 100 MG tablet      Last Written Prescription Date: 9/27/17  Last Fill Quantity: 90,  # refills: 0   Last Office Visit with [facility], [facility] or [facility] prescribing provider: 8/4/17

## 2017-11-03 RX ORDER — LAMOTRIGINE 100 MG/1
TABLET ORAL
Qty: 90 TABLET | Refills: 2 | Status: SHIPPED | OUTPATIENT
Start: 2017-11-03 | End: 2018-02-22

## 2017-11-03 NOTE — TELEPHONE ENCOUNTER
Last PHQ-9 score on record=   PHQ-9 SCORE 5/3/2016   Total Score -   Total Score 10             Routing refill request to provider for review/approval because:  PHQ-9 over 4  Sirena Bueno RN

## 2017-11-13 ENCOUNTER — TELEPHONE (OUTPATIENT)
Dept: FAMILY MEDICINE | Facility: CLINIC | Age: 52
End: 2017-11-13

## 2017-11-13 ENCOUNTER — OFFICE VISIT (OUTPATIENT)
Dept: FAMILY MEDICINE | Facility: CLINIC | Age: 52
End: 2017-11-13
Payer: MEDICARE

## 2017-11-13 VITALS
TEMPERATURE: 98.6 F | HEART RATE: 68 BPM | RESPIRATION RATE: 18 BRPM | WEIGHT: 152 LBS | BODY MASS INDEX: 23.86 KG/M2 | DIASTOLIC BLOOD PRESSURE: 80 MMHG | OXYGEN SATURATION: 99 % | SYSTOLIC BLOOD PRESSURE: 128 MMHG | HEIGHT: 67 IN

## 2017-11-13 DIAGNOSIS — F31.9 BIPOLAR DISORDER (H): ICD-10-CM

## 2017-11-13 DIAGNOSIS — Z12.11 SPECIAL SCREENING FOR MALIGNANT NEOPLASMS, COLON: ICD-10-CM

## 2017-11-13 DIAGNOSIS — Z23 NEED FOR PROPHYLACTIC VACCINATION AND INOCULATION AGAINST INFLUENZA: ICD-10-CM

## 2017-11-13 DIAGNOSIS — J01.10 ACUTE NON-RECURRENT FRONTAL SINUSITIS: Primary | ICD-10-CM

## 2017-11-13 PROCEDURE — 90686 IIV4 VACC NO PRSV 0.5 ML IM: CPT | Performed by: NURSE PRACTITIONER

## 2017-11-13 PROCEDURE — 99213 OFFICE O/P EST LOW 20 MIN: CPT | Mod: 25 | Performed by: NURSE PRACTITIONER

## 2017-11-13 PROCEDURE — G0008 ADMIN INFLUENZA VIRUS VAC: HCPCS | Performed by: NURSE PRACTITIONER

## 2017-11-13 RX ORDER — AZITHROMYCIN 250 MG/1
TABLET, FILM COATED ORAL
Qty: 6 TABLET | Refills: 0 | Status: SHIPPED | OUTPATIENT
Start: 2017-11-13 | End: 2017-11-20

## 2017-11-13 ASSESSMENT — PAIN SCALES - GENERAL: PAINLEVEL: EXTREME PAIN (9)

## 2017-11-13 NOTE — TELEPHONE ENCOUNTER
Reason for Call:  Medication or medication refill:    Do you use a [facility]?  Name of the pharmacy and phone number for the current request:      Name of the medication requested: patient states her pharmacy has not received her depression medication yet, and asking for it to be sent. Patient did not know the name of medication    Other request:     Can we leave a detailed message on this number? YES    Phone number patient can be reached at: Home number on file [phone] (home)    Best Time: any    Call taken on 11/13/2017 at 4:03 PM by Lorenza Byrd

## 2017-11-13 NOTE — MR AVS SNAPSHOT
After Visit Summary   11/13/2017    Kareen Hernandez    MRN: [id number]           Patient Information     Date Of Birth          1965        Visit Information        Provider Department      11/13/2017 1:40 PM Apryl Childs NP [facility]        Today's Diagnoses     Special screening for malignant neoplasms, colon    -  1    Acute non-recurrent frontal sinusitis          Care Instructions    At [facility], we strive to deliver an exceptional experience to you, every time we see you.  If you receive a survey in the mail, please send us back your thoughts. We really do value your feedback.    Based on your medical history, these are the current health maintenance/preventive care services that you are due for (some may have been done at this visit.)  Health Maintenance Due   Topic Date Due     URINE DRUG SCREEN Q1 YR  04/17/1982     MEDICARE ANNUAL WELLNESS VISIT  04/17/1985     MAMMO Q2 YR  11/06/2014     DEPRESSION ACTION PLAN Q1 YR  02/24/2016     PHQ-9 Q6 MONTHS  11/03/2016     COLON CANCER SCREEN (SYSTEM ASSIGNED)  04/17/2017     INFLUENZA VACCINE (SYSTEM ASSIGNED)  09/01/2017         Suggested websites for health information:  [employer] : Up to date and easily searchable information on multiple topics.  Www.medlineplus.gov : medication info, interactive tutorials, watch real surgeries online  Www.familydoctor.org : good info from the Academy of Family Physicians  Www.cdc.gov : public health info, travel advisories, epidemics (H1N1)  Www.aap.org : children's health info, normal development, vaccinations  Www.health.[facility].[place].us : [place] dept of health, public health issues in [place], N1N1    Your care team:     Family Medicine   BHAVIN Hardin MD Emily Bunt, APRN CNP   S. MD Sheri Saenz MD Angela Wermerskirchen, MD         Clinic hours: Monday - Wednesday 7 am-7 pm   Thursdays and Fridays 7 am-5 pm.     [facility]: Monday - Friday 11 am-9 pm,   Saturday and Sunday 9 am-5 pm.    [facility]: Monday -Thursday 8 am-8 pm; Friday 8 am-6 pm; Saturday and Sunday 9 am-5 pm.     [facility]: Monday - Thursday 8 am - 7 pm; Friday 8 am - 6 pm    Clinic: [phone]   [facility]: [phone]   [facility]: [phone]            Follow-ups after your visit        Additional Services     GASTROENTEROLOGY ADULT REF PROCEDURE ONLY       Last Lab Result: Creatinine (mg/dL)       Date                     Value                 12/15/2016               0.82             ----------  Body mass index is 23.63 kg/(m^2).     Needed:  No  Language:  English    Patient will be contacted to schedule procedure.     Please be aware that coverage of these services is subject to the terms and limitations of your health insurance plan.  Call member services at your health plan with any benefit or coverage questions.  Any procedures must be performed at a [facility] facility OR coordinated by your clinic's referral office.    Please bring the following with you to your appointment:    (1) Any X-Rays, CTs or MRIs which have been performed.  Contact the facility where they were done to arrange for  prior to your scheduled appointment.    (2) List of current medications   (3) This referral request   (4) Any documents/labs given to you for this referral                  Who to contact     If you have questions or need follow up information about today's clinic visit or your schedule please contact [facility] directly at [phone].  Normal or non-critical lab and imaging results will be communicated to you by MyChart, letter or phone within 4 business days after the clinic has received the results. If you do not hear from us within 7 days, please contact the clinic through MyChart or phone. If you have a critical or abnormal lab result, we will  "notify you by phone as soon as possible.  Submit refill requests through "[employer] or call your pharmacy and they will forward the refill request to us. Please allow 3 business days for your refill to be completed.          Additional Information About Your Visit        MakeblockharNovaPlanner Information     "[employer] gives you secure access to your electronic health record. If you see a primary care provider, you can also send messages to your care team and make appointments. If you have questions, please call your primary care clinic.  If you do not have a primary care provider, please call [phone] and they will assist you.        Care EveryWhere ID     This is your Care EveryWhere ID. This could be used by other organizations to access your [facility] medical records  [id number]        Your Vitals Were     Pulse Temperature Respirations Height Last Period Pulse Oximetry    68 98.6  F (37  C) (Oral) 18 1.708 m (5' 7.25\") (LMP Unknown) 99%    Breastfeeding? BMI (Body Mass Index)                No 23.63 kg/m2           Blood Pressure from Last 3 Encounters:   11/13/17 128/80   08/04/17 134/84   07/26/17 126/82    Weight from Last 3 Encounters:   11/13/17 68.9 kg (152 lb)   08/04/17 70.7 kg (155 lb 12.8 oz)   07/26/17 67.6 kg (149 lb 1.6 oz)              We Performed the Following     GASTROENTEROLOGY ADULT REF PROCEDURE ONLY          Today's Medication Changes          These changes are accurate as of: 11/13/17  2:00 PM.  If you have any questions, ask your nurse or doctor.               Start taking these medicines.        Dose/Directions    azithromycin 250 MG tablet   Commonly known as:  ZITHROMAX   Used for:  Acute non-recurrent frontal sinusitis   Started by:  Apryl Childs NP        Two tablets first day, then one tablet daily for four days.   Quantity:  6 tablet   Refills:  0            Where to get your medicines      These medications were sent to [facility] [address] [place], [place] - [address] [facility] of " [facility] [address]  [address] [address] [facility] [address], [facility] [address]     Phone:  [phone]     azithromycin 250 MG tablet                Primary Care Provider Office Phone # Fax #    Jessi Favian Lozano [phone] [address] [facility] [address]        Equal Access to Services     [facility]: Hadii aad ku hadasho Soomaali, waaxda luqadaha, qaybta kaalmada adeegyada, waxay idiin hayaan adeeg kharash la'aan . So [facility] [phone].    ATENCIÓN: Si habla español, tiene a lang disposición servicios gratuitos de asistencia lingüística. Robert al [phone].    We comply with applicable federal civil rights laws and [place] laws. We do not discriminate on the basis of race, color, national origin, age, disability, sex, sexual orientation, or gender identity.            Thank you!     Thank you for choosing [facility]  for your care. Our goal is always to provide you with excellent care. Hearing back from our patients is one way we can continue to improve our services. Please take a few minutes to complete the written survey that you may receive in the mail after your visit with us. Thank you!             Your Updated Medication List - Protect others around you: Learn how to safely use, store and throw away your medicines at www.disposemymeds.org.          This list is accurate as of: 11/13/17  2:00 PM.  Always use your most recent med list.                   Brand Name Dispense Instructions for use Diagnosis    azithromycin 250 MG tablet    ZITHROMAX    6 tablet    Two tablets first day, then one tablet daily for four days.    Acute non-recurrent frontal sinusitis       clonazePAM 0.5 MG tablet    klonoPIN    20 tablet    Take 1 tablet (0.5 mg) by mouth 3 times daily AS NEEDED FOR ANXIETY    Anxiety       cyanocobalamin 1000 MCG/ML injection    VITAMIN B12    1 mL    Inject 1 mL (1,000 mcg) into the muscle every 30 days    Vitamin B12 deficiency disease       diclofenac  75 MG EC tablet    VOLTAREN    60 tablet    Take 1 tablet (75 mg) by mouth 2 times daily as needed (For pain)    Lateral epicondylitis of right elbow, History of kidney stones       FLUoxetine 20 MG capsule    PROzac    90 capsule    TAKE 3 CAPSULES BY MOUTH EVERY DAY    Bipolar disorder, unspecified       lamoTRIgine 100 MG tablet    LaMICtal    90 tablet    TAKE THREE TABLETS BY MOUTH DAILY    Bipolar affective disorder, remission status unspecified (H)       mometasone 50 MCG/ACT spray    NASONEX    3 Box    Spray 2 sprays into both nostrils daily    Allergic rhinitis, unspecified allergic rhinitis type       naproxen sodium 550 MG tablet    ANAPROX    60 tablet    Take 1 tablet (550 mg) by mouth 2 times daily as needed for moderate pain    Right flank pain       nicotine 14 MG/24HR 24 hr patch    NICODERM CQ    30 patch    Place 1 patch onto the skin every 24 hours    Tobacco use disorder       omeprazole 20 MG CR capsule    priLOSEC    90 capsule    Take 1 capsule (20 mg) by mouth daily    Gastroesophageal reflux disease without esophagitis       SUMAtriptan 100 MG tablet    IMITREX    9 tablet    TAKE 1 TABLET BY MOUTH ON THE ONSET OF HEADACHE, MAY REPEAT IN 2 HOURS..*MAX 2 TABLETS DAILY*    Chronic migraine without aura without status migrainosus, not intractable       triamcinolone 0.1 % ointment    KENALOG    30 g    Apply to nail beds twice daily    Dystrophic nail       vitamin D 74608 UNIT capsule    ERGOCALCIFEROL    4 capsule    TAKE 1 CAPSULE BY MOUTH EVERY 7 DAYS    Vitamin D deficiency disease       zolpidem 10 MG tablet    AMBIEN    30 tablet    TAKE 1 TABLET BY MOUTH EVERY EVENING AS NEEDED    Persistent disorder of initiating or maintaining sleep

## 2017-11-13 NOTE — PATIENT INSTRUCTIONS
At [facility], we strive to deliver an exceptional experience to you, every time we see you.  If you receive a survey in the mail, please send us back your thoughts. We really do value your feedback.    Based on your medical history, these are the current health maintenance/preventive care services that you are due for (some may have been done at this visit.)  Health Maintenance Due   Topic Date Due     URINE DRUG SCREEN Q1 YR  04/17/1982     MEDICARE ANNUAL WELLNESS VISIT  04/17/1985     MAMMO Q2 YR  11/06/2014     DEPRESSION ACTION PLAN Q1 YR  02/24/2016     PHQ-9 Q6 MONTHS  11/03/2016     COLON CANCER SCREEN (SYSTEM ASSIGNED)  04/17/2017     INFLUENZA VACCINE (SYSTEM ASSIGNED)  09/01/2017         Suggested websites for health information:  Www.ClearAccess.org : Up to date and easily searchable information on multiple topics.  Www.[employer]gov : medication info, interactive tutorials, watch real surgeries online  Www.familydoctor.org : good info from the Academy of Family Physicians  Www.cdc.gov : public health info, travel advisories, epidemics (H1N1)  Www.aap.org : children's health info, normal development, vaccinations  Www.health.[facility].mn.us : [place] dept of health, public health issues in [place], N1N1    Your care team:     Family Medicine   BHAVIN Hardin MD Emily Bunt, APRN CNP   S. MD Sheri Saenz MD Angela Wermerskirchen, MD         Clinic hours: Monday - Wednesday 7 am-7 pm   Thursdays and Fridays 7 am-5 pm.     [facility]: Monday - Friday 11 am-9 pm,   Saturday and Sunday 9 am-5 pm.    [facility]: Monday -Thursday 8 am-8 pm; Friday 8 am-6 pm; Saturday and Sunday 9 am-5 pm.     [facility]: Monday - Thursday 8 am - 7 pm; Friday 8 am - 6 pm    Clinic: [phone]   [facility]: [phone]   [facility]: [phone]

## 2017-11-13 NOTE — PROGRESS NOTES
SUBJECTIVE:   Kareen Hernandez is a 52 year old female who presents to clinic today for the following health issues:      Acute Illness   Acute illness concerns: sinus  Onset: 1 week    Fever: no    Chills/Sweats: YES    Headache (location?): YES    Sinus Pressure:YES    Conjunctivitis:  no    Ear Pain: yes    Rhinorrhea: YES    Congestion: YES    Sore Throat: YES     Cough: YES-productive of clear sputum    Wheeze: YES    Decreased Appetite: YES    Nausea: no    Vomiting: no    Diarrhea:  no    Dysuria/Freq.: no    Fatigue/Achiness: YES    Sick/Strep Exposure: no     Therapies Tried and outcome: aleve-not helping    Right nostril plugged, feels like symptoms worsening.      Not using nasonex-nose hurts too much.     Problem list and histories reviewed & adjusted, as indicated.  Additional history: as documented    Patient Active Problem List   Diagnosis     Fibromyalgia     Gastric bypass status for obesity     CARDIOVASCULAR SCREENING; LDL GOAL LESS THAN 130     Anxiety     Vitamin D deficiency disease     Vitamin B12 deficiency disease     Migraine headache     Insomnia     Right maxillary sinusitis, chronic     Bipolar disorder (H)     History of hypertension     Gastroesophageal reflux disease without esophagitis     Chronic migraine without aura without status migrainosus, not intractable     Major depressive disorder, recurrent episode, moderate (H)     History of kidney stones     Tobacco use disorder     Past Surgical History:   Procedure Laterality Date     ABDOMINOPLASTY  12/2/2013     C GASTRIC BYPASS,OBESE<100CM DAYAN-EN-Y  3-25-09    [facility]     C REMOVAL OF KIDNEY STONE      With kidney tents x 5 to 6 procedures.     CL AFF SURGICAL PATHOLOGY  Feb 2007    Redman's neuroma  - Right Foot     LITHOTRIPSY       SURGICAL PATHOLOGY EXAM      Lipoma Removal on her back       Social History   Substance Use Topics     Smoking status: Current Every Day Smoker     Packs/day: 0.50     Types: Cigarettes     Last  attempt to quit: 9/22/2013     Smokeless tobacco: Never Used      Comment: 2/6/08     Alcohol use No     Family History   Problem Relation Age of Onset     Asthma Mother      Hypertension Mother      C.A.D. Mother      Genitourinary Problems Mother      kidney failure     DIABETES Father      Hypertension Father      C.A.D. Father      Genitourinary Problems Sister      kidney stones     CEREBROVASCULAR DISEASE No family hx of      Breast Cancer No family hx of      Cancer - colorectal No family hx of      Prostate Cancer No family hx of          Current Outpatient Prescriptions   Medication Sig Dispense Refill     azithromycin (ZITHROMAX) 250 MG tablet Two tablets first day, then one tablet daily for four days. 6 tablet 0     lamoTRIgine (LAMICTAL) 100 MG tablet TAKE THREE TABLETS BY MOUTH DAILY 90 tablet 2     FLUoxetine (PROZAC) 20 MG capsule TAKE 3 CAPSULES BY MOUTH EVERY DAY 90 capsule 0     SUMAtriptan (IMITREX) 100 MG tablet TAKE 1 TABLET BY MOUTH ON THE ONSET OF HEADACHE, MAY REPEAT IN 2 HOURS..*MAX 2 TABLETS DAILY* 9 tablet 6     cyanocobalamin (VITAMIN B12) 1000 MCG/ML injection Inject 1 mL (1,000 mcg) into the muscle every 30 days 1 mL 11     omeprazole (PRILOSEC) 20 MG CR capsule Take 1 capsule (20 mg) by mouth daily 90 capsule 1     clonazePAM (KLONOPIN) 0.5 MG tablet Take 1 tablet (0.5 mg) by mouth 3 times daily AS NEEDED FOR ANXIETY 20 tablet 0     zolpidem (AMBIEN) 10 MG tablet TAKE 1 TABLET BY MOUTH EVERY EVENING AS NEEDED 30 tablet 0     vitamin D (ERGOCALCIFEROL) 31399 UNIT capsule TAKE 1 CAPSULE BY MOUTH EVERY 7 DAYS 4 capsule 1     diclofenac (VOLTAREN) 75 MG EC tablet Take 1 tablet (75 mg) by mouth 2 times daily as needed (For pain) 60 tablet 0     triamcinolone (KENALOG) 0.1 % ointment Apply to nail beds twice daily 30 g 0     nicotine (NICODERM CQ) 14 MG/24HR patch 2h hr Place 1 patch onto the skin every 24 hours 30 patch 2     mometasone (NASONEX) 50 MCG/ACT nasal spray Spray 2 sprays into  "both nostrils daily 3 Box 3     naproxen sodium (ANAPROX) 550 MG tablet Take 1 tablet (550 mg) by mouth 2 times daily as needed for moderate pain 60 tablet 0     Allergies   Allergen Reactions     Lurasidone      Diarrhea     Morphine Rash         Reviewed and updated as needed this visit by clinical staffTobacco  Allergies  Meds  Problems  Med Hx  Surg Hx  Fam Hx  Soc Hx        Reviewed and updated as needed this visit by Provider  Allergies  Meds  Problems         ROS:  Constitutional, HEENT, cardiovascular, pulmonary systems are negative, except as otherwise noted.      OBJECTIVE:   /80 (BP Location: Right arm, Patient Position: Chair, Cuff Size: Adult Regular)  Pulse 68  Temp 98.6  F (37  C) (Oral)  Resp 18  Ht 1.708 m (5' 7.25\")  Wt 68.9 kg (152 lb)  LMP  (LMP Unknown)  SpO2 99%  Breastfeeding? No  BMI 23.63 kg/m2  Body mass index is 23.63 kg/(m^2).  GENERAL: ill appearing, tired, alert and no distress  EYES: Eyes grossly normal to inspection, PERRL and conjunctivae and sclerae normal  HENT: ear canals and TM's normal, nose and mouth without ulcers or lesions. Posterior pharynx slight erythema, right nostril turbinates quite swollen and red, +sinus pressure in maxillary and frontal  NECK: no adenopathy, no asymmetry, masses, or scars and thyroid normal to palpation  RESP: lungs clear to diminished  auscultation - no rales, rhonchi or wheezes  CV: regular rate and rhythm, normal S1 S2, no S3 or S4, no murmur, click or rub  MS: no gross musculoskeletal defects noted, no edema    Diagnostic Test Results:  none     ASSESSMENT/PLAN:       1. Acute non-recurrent frontal sinusitis  Treat with antibiotic.   - azithromycin (ZITHROMAX) 250 MG tablet; Two tablets first day, then one tablet daily for four days.  Dispense: 6 tablet; Refill: 0    2. Special screening for malignant neoplasms, colon  screen  - GASTROENTEROLOGY ADULT REF PROCEDURE ONLY    3. Need for prophylactic vaccination and " inoculation against influenza  given  - FLU VAC, SPLIT VIRUS IM > 3 YO (QUADRIVALENT) [30772]  - Vaccine Administration, Initial [21133]    FUTURE APPOINTMENTS:       - Follow-up visit if not improving    MESHA Padgett, NP-C  [facility]

## 2017-11-13 NOTE — NURSING NOTE
"Chief Complaint   Patient presents with     Sinus Problem       Initial /80 (BP Location: Right arm, Patient Position: Chair, Cuff Size: Adult Regular)  Pulse 68  Temp 98.6  F (37  C) (Oral)  Resp 18  Ht 1.708 m (5' 7.25\")  Wt 68.9 kg (152 lb)  LMP  (LMP Unknown)  SpO2 99%  Breastfeeding? No  BMI 23.63 kg/m2 Estimated body mass index is 23.63 kg/(m^2) as calculated from the following:    Height as of this encounter: 1.708 m (5' 7.25\").    Weight as of this encounter: 68.9 kg (152 lb).  Medication Reconciliation: complete     Kellie Bradley MA       "

## 2017-11-13 NOTE — TELEPHONE ENCOUNTER
lamoTRIgine (LAMICTAL) 100 MG tablet      Last Written Prescription Date: 11/3/17  Last Fill Quantity: 90,  # refills: 2   Last Office Visit with [facility], [facility] or [facility] prescribing provider: 11/13/17

## 2017-11-13 NOTE — TELEPHONE ENCOUNTER
lamoTRIgine (LAMICTAL) 100 MG tablet 90 tablet 2 11/3/2017  No      Sig: TAKE THREE TABLETS BY MOUTH DAILY     Class: E-Prescribe     Order: [id number]     E-Prescribing Status: Receipt confirmed by pharmacy (11/3/2017  9:43 AM CDT)       Printout Tracking      External Result Report       Pharmacy      [facility] [address] [place] [address] [id number] AT [facility] OF  [address]         Team: please advise Rx was sent per above and patient may need to call her pharmacy and directly speak with them to look for the Rx

## 2017-11-13 NOTE — PROGRESS NOTES
Injectable Influenza Immunization Documentation    1.  Is the person to be vaccinated sick today?  Ok per provider    2. Does the person to be vaccinated have an allergy to a component   of the vaccine?   No  Egg Allergy Algorithm Link    3. Has the person to be vaccinated ever had a serious reaction   to influenza vaccine in the past?   No    4. Has the person to be vaccinated ever had Guillain-Barré syndrome?   No    Form completed by : MICHELLE Bradley MA

## 2017-11-14 NOTE — TELEPHONE ENCOUNTER
Spoke to pharmacist and they did not receive the script, gave verbal to fill rx.  Notified pt that rx was sent over.  Yumiko Gaston MA

## 2017-11-16 RX ORDER — LAMOTRIGINE 100 MG/1
TABLET ORAL
Qty: 90 TABLET | Refills: 0 | OUTPATIENT
Start: 2017-11-16

## 2017-11-20 ENCOUNTER — OFFICE VISIT (OUTPATIENT)
Dept: FAMILY MEDICINE | Facility: CLINIC | Age: 52
End: 2017-11-20
Payer: MEDICARE

## 2017-11-20 VITALS
BODY MASS INDEX: 23.48 KG/M2 | HEIGHT: 67 IN | SYSTOLIC BLOOD PRESSURE: 114 MMHG | TEMPERATURE: 98.5 F | OXYGEN SATURATION: 96 % | DIASTOLIC BLOOD PRESSURE: 60 MMHG | HEART RATE: 76 BPM | RESPIRATION RATE: 16 BRPM | WEIGHT: 149.6 LBS

## 2017-11-20 DIAGNOSIS — F31.9 BIPOLAR AFFECTIVE DISORDER, REMISSION STATUS UNSPECIFIED (H): Primary | ICD-10-CM

## 2017-11-20 DIAGNOSIS — F33.1 MAJOR DEPRESSIVE DISORDER, RECURRENT EPISODE, MODERATE (H): ICD-10-CM

## 2017-11-20 DIAGNOSIS — E55.9 VITAMIN D DEFICIENCY DISEASE: ICD-10-CM

## 2017-11-20 DIAGNOSIS — M79.7 FIBROMYALGIA: ICD-10-CM

## 2017-11-20 PROCEDURE — 99214 OFFICE O/P EST MOD 30 MIN: CPT | Performed by: FAMILY MEDICINE

## 2017-11-20 RX ORDER — ERGOCALCIFEROL 1.25 MG/1
CAPSULE, LIQUID FILLED ORAL
Qty: 12 CAPSULE | Refills: 1 | Status: SHIPPED | OUTPATIENT
Start: 2017-11-20 | End: 2018-11-19

## 2017-11-20 RX ORDER — CYCLOBENZAPRINE HCL 10 MG
10 TABLET ORAL 3 TIMES DAILY PRN
Qty: 60 TABLET | Refills: 2 | Status: SHIPPED | OUTPATIENT
Start: 2017-11-20 | End: 2018-03-08

## 2017-11-20 ASSESSMENT — PATIENT HEALTH QUESTIONNAIRE - PHQ9
SUM OF ALL RESPONSES TO PHQ QUESTIONS 1-9: 16
5. POOR APPETITE OR OVEREATING: MORE THAN HALF THE DAYS

## 2017-11-20 ASSESSMENT — ANXIETY QUESTIONNAIRES
GAD7 TOTAL SCORE: 12
2. NOT BEING ABLE TO STOP OR CONTROL WORRYING: MORE THAN HALF THE DAYS
5. BEING SO RESTLESS THAT IT IS HARD TO SIT STILL: MORE THAN HALF THE DAYS
3. WORRYING TOO MUCH ABOUT DIFFERENT THINGS: MORE THAN HALF THE DAYS
6. BECOMING EASILY ANNOYED OR IRRITABLE: MORE THAN HALF THE DAYS
7. FEELING AFRAID AS IF SOMETHING AWFUL MIGHT HAPPEN: NOT AT ALL
IF YOU CHECKED OFF ANY PROBLEMS ON THIS QUESTIONNAIRE, HOW DIFFICULT HAVE THESE PROBLEMS MADE IT FOR YOU TO DO YOUR WORK, TAKE CARE OF THINGS AT HOME, OR GET ALONG WITH OTHER PEOPLE: SOMEWHAT DIFFICULT
1. FEELING NERVOUS, ANXIOUS, OR ON EDGE: MORE THAN HALF THE DAYS

## 2017-11-20 ASSESSMENT — PAIN SCALES - GENERAL: PAINLEVEL: NO PAIN (0)

## 2017-11-20 NOTE — MR AVS SNAPSHOT
After Visit Summary   11/20/2017    Kareen Hernandez    MRN: [id number]           Patient Information     Date Of Birth          1965        Visit Information        Provider Department      11/20/2017 1:00 PM Jessi Lozano MD [facility]        Today's Diagnoses     Bipolar affective disorder, remission status unspecified (H)    -  1    Major depressive disorder, recurrent episode, moderate (H)        Vitamin D deficiency disease        Fibromyalgia           Follow-ups after your visit        Follow-up notes from your care team     Return in about 3 months (around 2/20/2018).      Who to contact     If you have questions or need follow up information about today's clinic visit or your schedule please contact [facility] directly at [phone].  Normal or non-critical lab and imaging results will be communicated to you by MyChart, letter or phone within 4 business days after the clinic has received the results. If you do not hear from us within 7 days, please contact the clinic through [employer] or phone. If you have a critical or abnormal lab result, we will notify you by phone as soon as possible.  Submit refill requests through [employer] or call your pharmacy and they will forward the refill request to us. Please allow 3 business days for your refill to be completed.          Additional Information About Your Visit        MyChart Information     [employer] gives you secure access to your electronic health record. If you see a primary care provider, you can also send messages to your care team and make appointments. If you have questions, please call your primary care clinic.  If you do not have a primary care provider, please call [phone] and they will assist you.        Care EveryWhere ID     This is your Care EveryWhere ID. This could be used by other organizations to access your [place] medical records  [id number]        Your Vitals Were     Pulse  "Temperature Respirations Height Last Period Pulse Oximetry    76 98.5  F (36.9  C) (Oral) 16 1.708 m (5' 7.25\") (LMP Unknown) 96%    BMI (Body Mass Index)                   23.26 kg/m2            Blood Pressure from Last 3 Encounters:   11/20/17 114/60   11/13/17 128/80   08/04/17 134/84    Weight from Last 3 Encounters:   11/20/17 67.9 kg (149 lb 9.6 oz)   11/13/17 68.9 kg (152 lb)   08/04/17 70.7 kg (155 lb 12.8 oz)              We Performed the Following     DEPRESSION ACTION PLAN (DAP)     Vitamin D Deficiency          Today's Medication Changes          These changes are accurate as of: 11/20/17  1:51 PM.  If you have any questions, ask your nurse or doctor.               Start taking these medicines.        Dose/Directions    cyclobenzaprine 10 MG tablet   Commonly known as:  FLEXERIL   Used for:  Fibromyalgia   Started by:  Jessi Lozano MD        Dose:  10 mg   Take 1 tablet (10 mg) by mouth 3 times daily as needed (Fibromyalgia)   Quantity:  60 tablet   Refills:  2       FLUoxetine 20 MG capsule   Commonly known as:  PROzac   Used for:  Bipolar affective disorder, remission status unspecified (H), Major depressive disorder, recurrent episode, moderate (H)   Started by:  Jessi Lozano MD        Dose:  80 mg   Take 4 capsules (80 mg) by mouth daily   Quantity:  120 capsule   Refills:  5         These medicines have changed or have updated prescriptions.        Dose/Directions    vitamin D 29221 UNIT capsule   Commonly known as:  ERGOCALCIFEROL   This may have changed:  See the new instructions.   Used for:  Vitamin D deficiency disease   Changed by:  Jessi Lozano MD        TAKE 1 CAPSULE BY MOUTH EVERY 7 DAYS   Quantity:  12 capsule   Refills:  1            Where to get your medicines      These medications were sent to [facility] [address] [facility] [address] [address] [facility] [address]  [address] [address], [facility] [address]     Phone:  [phone]    "  cyclobenzaprine 10 MG tablet    FLUoxetine 20 MG capsule    vitamin D 19102 UNIT capsule                Primary Care Provider Office Phone # Fax #    Jessi Favian Lozano [phone] [address] [facility] [address]        Equal Access to Services     TRAVIS LIVINGSTON : Hadii roger ku hadgretao Soomaali, waaxda luqadaha, qaybta kaalmada adeegyada, waxdeisy erica haylissyrubens lynn kvngismael barbosa. So [facility] [phone].    ATENCIÓN: Si habla español, tiene a lang disposición servicios gratuitos de asistencia lingüística. Llame al [phone].    We comply with applicable federal civil rights laws and [place] laws. We do not discriminate on the basis of race, color, national origin, age, disability, sex, sexual orientation, or gender identity.            Thank you!     Thank you for choosing [facility]  for your care. Our goal is always to provide you with excellent care. Hearing back from our patients is one way we can continue to improve our services. Please take a few minutes to complete the written survey that you may receive in the mail after your visit with us. Thank you!             Your Updated Medication List - Protect others around you: Learn how to safely use, store and throw away your medicines at www.disposemymeds.org.          This list is accurate as of: 11/20/17  1:51 PM.  Always use your most recent med list.                   Brand Name Dispense Instructions for use Diagnosis    clonazePAM 0.5 MG tablet    klonoPIN    20 tablet    Take 1 tablet (0.5 mg) by mouth 3 times daily AS NEEDED FOR ANXIETY    Anxiety       cyanocobalamin 1000 MCG/ML injection    VITAMIN B12    1 mL    Inject 1 mL (1,000 mcg) into the muscle every 30 days    Vitamin B12 deficiency disease       cyclobenzaprine 10 MG tablet    FLEXERIL    60 tablet    Take 1 tablet (10 mg) by mouth 3 times daily as needed (Fibromyalgia)    Fibromyalgia       diclofenac 75 MG EC tablet    VOLTAREN    60  tablet    Take 1 tablet (75 mg) by mouth 2 times daily as needed (For pain)    Lateral epicondylitis of right elbow, History of kidney stones       FLUoxetine 20 MG capsule    PROzac    120 capsule    Take 4 capsules (80 mg) by mouth daily    Bipolar affective disorder, remission status unspecified (H), Major depressive disorder, recurrent episode, moderate (H)       lamoTRIgine 100 MG tablet    LaMICtal    90 tablet    TAKE THREE TABLETS BY MOUTH DAILY    Bipolar affective disorder, remission status unspecified (H)       mometasone 50 MCG/ACT spray    NASONEX    3 Box    Spray 2 sprays into both nostrils daily    Allergic rhinitis, unspecified allergic rhinitis type       naproxen sodium 550 MG tablet    ANAPROX    60 tablet    Take 1 tablet (550 mg) by mouth 2 times daily as needed for moderate pain    Right flank pain       nicotine 14 MG/24HR 24 hr patch    NICODERM CQ    30 patch    Place 1 patch onto the skin every 24 hours    Tobacco use disorder       omeprazole 20 MG CR capsule    priLOSEC    90 capsule    Take 1 capsule (20 mg) by mouth daily    Gastroesophageal reflux disease without esophagitis       SUMAtriptan 100 MG tablet    IMITREX    9 tablet    TAKE 1 TABLET BY MOUTH ON THE ONSET OF HEADACHE, MAY REPEAT IN 2 HOURS..*MAX 2 TABLETS DAILY*    Chronic migraine without aura without status migrainosus, not intractable       triamcinolone 0.1 % ointment    KENALOG    30 g    Apply to nail beds twice daily    Dystrophic nail       vitamin D 55260 UNIT capsule    ERGOCALCIFEROL    12 capsule    TAKE 1 CAPSULE BY MOUTH EVERY 7 DAYS    Vitamin D deficiency disease       zolpidem 10 MG tablet    AMBIEN    30 tablet    TAKE 1 TABLET BY MOUTH EVERY EVENING AS NEEDED    Persistent disorder of initiating or maintaining sleep

## 2017-11-20 NOTE — PROGRESS NOTES
SUBJECTIVE:   Kareen Hernandez is a 52 year old female who presents to clinic today for the following health issues:      Hypertension Follow-up      Outpatient blood pressures are not being checked.    Low Salt Diet: low salt    Depression and Anxiety Follow-Up    Status since last visit: same - maybe slight worse    Other associated symptoms:panic, manic, irritable, trouble sleeping    Complicating factors:     Significant life event: No     Current substance abuse: None    PHQ-9 Score and MyChart F/U Questions 5/3/2016   Total Score 10   Q9: Suicide Ideation Not at all     CHARAN-7 SCORE 3/3/2015 8/17/2015 5/3/2016   Total Score - 16 -   Total Score - - 12   Total Score 20 - -       PHQ-9  English  PHQ-9   Any Language  GAD7  Suicide Assessment Five-step Evaluation and Treatment (SAFE-T)        Amount of exercise or physical activity: None    Problems taking medications regularly: No    Medication side effects: none  Diet: regular (no restrictions)    SUBJECTIVE:  Here today in follow-up of depression and bipolar. Patient says she is doing reasonably well despite all of the stressors going on. Feels medication could be a little bit stronger from a depression standpoint.  She and her daughter still do not have a permanent. They have bounced between relatives, and a trailer park. Looking for permanent housing. She says she does have social workers helping her out with this. Last time she saw Dr. Aviles was a couple of years ago and he is the one that recommended her current regimen as well as some vitamin D. Had been fairly low and he thought this might help her feel a little bit better. She's been out of it for a number of months and says that seems to contribute to feeling crappy as well. This all flares up her fibromyalgia and wonders if she can use Flexeril periodically for this    Review of systems otherwise negative.  Past medical, family, and social history reviewed and updated in chart.    OBJECTIVE:  /60  "(BP Location: Right arm, Patient Position: Right side, Cuff Size: Adult Regular)  Pulse 76  Temp 98.5  F (36.9  C) (Oral)  Resp 16  Ht 1.708 m (5' 7.25\")  Wt 67.9 kg (149 lb 9.6 oz)  LMP  (LMP Unknown)  SpO2 96%  BMI 23.26 kg/m2  Alert and pleasant. Well-groomed and well-nourished  Not displaying any manic affect  S1 and S2 normal, no murmurs, clicks, gallops or rubs. Regular rate and rhythm. Chest is clear; no wheezes or rales. No edema or JVD.  Past labs reviewed with the patient.     ASSESSMENT / PLAN:  (F31.9) Bipolar affective disorder, remission status unspecified (H)  (primary encounter diagnosis)  Comment: We will increase her fluoxetine up to a maximum of 80 mg daily. Continue with Lamictal as scheduled  Plan: FLUoxetine (PROZAC) 20 MG capsule,         DISCONTINUED: FLUoxetine (PROZAC) 20 MG capsule            (F33.1) Major depressive disorder, recurrent episode, moderate (H)  Comment: As above  Plan: FLUoxetine (PROZAC) 20 MG capsule,         DISCONTINUED: FLUoxetine (PROZAC) 20 MG capsule            (E55.9) Vitamin D deficiency disease  Comment: Continue with this medication. We'll recheck vitamin D as a baseline  Plan: vitamin D (ERGOCALCIFEROL) 94517 UNIT capsule,         Vitamin D Deficiency            (M79.7) Fibromyalgia  Comment: Discussed mechanism of action of the proposed medication, as well as potential effects, both good and bad.  Patient expressed understanding and agreed with treatment.   Plan: cyclobenzaprine (FLEXERIL) 10 MG tablet            Follow up 2-3 months  PAU Lozano MD    (Chart documentation completed in part with Dragon voice-recognition software.  Even though reviewed some grammatical, spelling, and word errors may remain.)      "

## 2017-11-20 NOTE — NURSING NOTE
"Chief Complaint   Patient presents with     Recheck Medication       Initial /60 (BP Location: Right arm, Patient Position: Right side, Cuff Size: Adult Regular)  Pulse 76  Temp 98.5  F (36.9  C) (Oral)  Resp 16  Ht 1.708 m (5' 7.25\")  Wt 67.9 kg (149 lb 9.6 oz)  LMP  (LMP Unknown)  SpO2 96%  BMI 23.26 kg/m2 Estimated body mass index is 23.26 kg/(m^2) as calculated from the following:    Height as of this encounter: 1.708 m (5' 7.25\").    Weight as of this encounter: 67.9 kg (149 lb 9.6 oz).  Medication Reconciliation: complete     Will Lefty MADISON      "

## 2017-11-21 ASSESSMENT — ANXIETY QUESTIONNAIRES: GAD7 TOTAL SCORE: 12

## 2017-12-01 ENCOUNTER — TELEPHONE (OUTPATIENT)
Dept: FAMILY MEDICINE | Facility: CLINIC | Age: 52
End: 2017-12-01

## 2017-12-01 NOTE — TELEPHONE ENCOUNTER
Called patient -     Pt states letter is for her getting an apartment/housing - through her social security.     Florentino Olea MA

## 2017-12-01 NOTE — LETTER
December 4, 2017        Kareen Hernandez  [address]  [facility] [address]          To whom it may concern:    RE: Kareen Hernandez    I'm the primary physician for Ms. Kareen Hernandez.  She suffers from bipolar depression and anxiety. I feel she would strongly benefit from a rep payee to assist her in obtaining adequate house.    Please contact me for questions or concerns.      Sincerely,        Jessi Lozano MD

## 2017-12-01 NOTE — TELEPHONE ENCOUNTER
Reason for Call:  Other LETTER    Detailed comments:  Needs letter to be faxed to [phone],  to her  Nedra Tirado about depression and housing. If more information is needed please call patient.    Phone Number Patient can be reached at: Home number on file [phone] (home)    Best Time: any    Can we leave a detailed message on this number? YES    Call taken on 12/1/2017 at 10:51 AM by Lorenza Byrd

## 2017-12-04 NOTE — TELEPHONE ENCOUNTER
More than happy to write a letter. But I'm not certain what it is supposed to say.  Should I give a list of her diagnoses or she looking for specific housing or what?

## 2017-12-04 NOTE — TELEPHONE ENCOUNTER
.Reason for Call:  Other Letter    Detailed comments: Patient called again and wondering if the letter was faxed to Claire Tirado to obtain housing. Patient stated that she would like to be notified when the letter is faxed.    Phone Number Patient can be reached at: Cell number on file:    Telephone Information:   Mobile [phone]       Best Time: any    Can we leave a detailed message on this number? YES    Call taken on 12/4/2017 at 1:19 PM by Coleman Bradley

## 2017-12-04 NOTE — TELEPHONE ENCOUNTER
Spoke with pt and she Is looking for an apt in [facility] so yes housing.    Needs to say that pt would benefit from a payee to obtain housing.       Chloe Reese MA

## 2017-12-07 DIAGNOSIS — G47.00 PERSISTENT DISORDER OF INITIATING OR MAINTAINING SLEEP: ICD-10-CM

## 2017-12-07 DIAGNOSIS — F33.1 MAJOR DEPRESSIVE DISORDER, RECURRENT EPISODE, MODERATE (H): ICD-10-CM

## 2017-12-07 DIAGNOSIS — F17.200 TOBACCO USE DISORDER: ICD-10-CM

## 2017-12-07 DIAGNOSIS — F31.9 BIPOLAR AFFECTIVE DISORDER, REMISSION STATUS UNSPECIFIED (H): ICD-10-CM

## 2017-12-07 RX ORDER — ZOLPIDEM TARTRATE 10 MG/1
TABLET ORAL
Qty: 30 TABLET | Refills: 0 | Status: SHIPPED | OUTPATIENT
Start: 2017-12-07 | End: 2018-08-21

## 2017-12-07 NOTE — TELEPHONE ENCOUNTER
nicotine (NICODERM CQ) 14 MG/24HR patch 2h hr     Last Written Prescription Date: 11/29/16  Last Fill Quantity: 30, # refills: 2  Last Office Visit with [facility], [facility] or [facility] prescribing provider: 11/20/17        BP Readings from Last 3 Encounters:   11/20/17 114/60   11/13/17 128/80   08/04/17 134/84       zolpidem (AMBIEN) 10 MG tablet      Last Written Prescription Date:  5/1/17  Last Fill Quantity: 30,   # refills: 0  Last Office Visit: 11/20/17  Future Office visit:       Routing refill request to provider for review/approval because:  Drug not on the [facility], [facility] or [facility] refill protocol or controlled substance        FLUoxetine (PROZAC) 20 MG capsule     Last Written Prescription Date: 11/20/17  Last Fill Quantity: 120, # refills: 5  Last Office Visit with [facility] primary care provider:  11/20/17        Last PHQ-9 score on record=   PHQ-9 SCORE 11/20/2017   Total Score -   Total Score 16

## 2018-02-22 DIAGNOSIS — F41.9 ANXIETY: ICD-10-CM

## 2018-02-22 DIAGNOSIS — F31.9 BIPOLAR AFFECTIVE DISORDER, REMISSION STATUS UNSPECIFIED (H): ICD-10-CM

## 2018-02-22 DIAGNOSIS — F33.1 MAJOR DEPRESSIVE DISORDER, RECURRENT EPISODE, MODERATE (H): ICD-10-CM

## 2018-02-22 RX ORDER — CLONAZEPAM 0.5 MG/1
0.5 TABLET ORAL 3 TIMES DAILY
Qty: 20 TABLET | Refills: 0 | Status: CANCELLED | OUTPATIENT
Start: 2018-02-22

## 2018-02-22 RX ORDER — LAMOTRIGINE 100 MG/1
TABLET ORAL
Qty: 90 TABLET | Refills: 2 | Status: SHIPPED | OUTPATIENT
Start: 2018-02-22 | End: 2018-04-04

## 2018-02-22 NOTE — TELEPHONE ENCOUNTER
"    Lamitctal  Last Written Prescription Date:  11/3/17  Last Fill Quantity: 90,  # refills: 2   Last office visit: 11/20/2017 with prescribing provider:  11/20/17  Future Office Visit:      Requested Prescriptions   Pending Prescriptions Disp Refills     lamoTRIgine (LAMICTAL) 100 MG tablet [Pharmacy Med Name: LAMOTRIGINE 100MG TABLETS] 90 tablet 0     Sig: TAKE 3 TABLETS BY MOUTH DAILY    Anticonvulsants Protocol  Failed    2/22/2018  2:58 PM       Failed - Review Authorizing provider's last note.     Refer to last progress notes: confirm request is for original authorizing provider (cannot be through other providers).         Passed - No active pregnancy on record       Passed - No positive pregnancy test in last 12 months       Passed - Recent or future visit with authorizing provider    Patient had office visit in the last 6 months or has a visit in the next 30 days with authorizing provider.  See \"Patient Info\" tab in inbasket, or \"Choose Columns\" in Meds & Orders section of the refill encounter.            clonazePAM (KLONOPIN) 0.5 MG tablet 20 tablet 0     Sig: Take 1 tablet (0.5 mg) by mouth 3 times daily AS NEEDED FOR ANXIETY    There is no refill protocol information for this order        Routing refill request to provider for review/approval because:  Protocol failed    Sammie Hale RN          "

## 2018-02-22 NOTE — TELEPHONE ENCOUNTER
Reason for Call:  Other prescription    Detailed comments: All out of medication for depression please call when approved.    Phone Number Patient can be reached at: Cell number on file:    Telephone Information:   Mobile [phone]       Best Time: any    Can we leave a detailed message on this number? YES    Call taken on 2/22/2018 at 2:55 PM by Stephie Vincent

## 2018-03-08 DIAGNOSIS — M79.7 FIBROMYALGIA: ICD-10-CM

## 2018-03-08 NOTE — TELEPHONE ENCOUNTER
Requested Prescriptions   Pending Prescriptions Disp Refills     cyclobenzaprine (FLEXERIL) 10 MG tablet [Pharmacy Med Name: CYCLOBENZAPRINE 10MG TABLETS] 60 tablet 0     Sig: TAKE 1 TABLET BY MOUTH THREE TIMES DAILY AS NEEDED    There is no refill protocol information for this order        cyclobenzaprine (FLEXERIL) 10 MG tablet      Last Written Prescription Date:  11/20/17  Last Fill Quantity: 60,   # refills: 2  Last Office Visit: 11/20/17  Future Office visit:       Routing refill request to provider for review/approval because:  Drug not on the [facility], [facility] or [facility] refill protocol or controlled substance

## 2018-03-09 RX ORDER — CYCLOBENZAPRINE HCL 10 MG
TABLET ORAL
Qty: 60 TABLET | Refills: 0 | Status: SHIPPED | OUTPATIENT
Start: 2018-03-09 | End: 2018-05-06

## 2018-04-02 ENCOUNTER — TELEPHONE (OUTPATIENT)
Dept: FAMILY MEDICINE | Facility: CLINIC | Age: 53
End: 2018-04-02

## 2018-04-02 NOTE — TELEPHONE ENCOUNTER
Reason for Call:  Same Day Appointment, Requested Provider:  Jessi Lozano M.D.    PCP: Jessi Lozano    Reason for visit: back pain     Duration of symptoms: on going    Have you been treated for this in the past? Yes    Additional comments: Pt is experiencing a pain that is shooting from her waist down to her ankle and it is not improving.  She would like to see if Dr. Lozano can fit her into his 04/04/18 schedule     Can we leave a detailed message on this number? YES    Phone number patient can be reached at: Home number on file [phone] (home)    Best Time: anytime    Call taken on 4/2/2018 at 9:44 AM by Vinny Lopes

## 2018-04-02 NOTE — TELEPHONE ENCOUNTER
Called and spoke with patient to discuss symptoms. She reports that four days ago she started having a shooting pain on her right side from her low back/waist down to her ankle. She denies any injury or event occurred that precipitated symptoms, they started randomly for her. She reports that when she is sitting with her legs crossed the pain is relieved. But when she uncrosses legs, is in any other position, or when walking this pain occurs again. She reports that she is able to walk well and put weight on both lower extremities, just that there is pain during ambulation. She denies tingling or numbness. She denies swelling. She denies a history of nerve pain/sciatica. She reports that she has not been using any oral medication to relieve symptoms but she has been using Muscle Rub ointment and a heating pad to the area, she reports that this is not bringing much relief.     Patient is scheduled for an appointment with Dr. Lozano on Wednesday 4/4/18. Patient instructed to call with any worsening symptoms or concerns before appointment. She states understanding.     Amirah Poon RN

## 2018-04-04 ENCOUNTER — TELEPHONE (OUTPATIENT)
Dept: FAMILY MEDICINE | Facility: CLINIC | Age: 53
End: 2018-04-04

## 2018-04-04 ENCOUNTER — OFFICE VISIT (OUTPATIENT)
Dept: FAMILY MEDICINE | Facility: CLINIC | Age: 53
End: 2018-04-04
Payer: MEDICARE

## 2018-04-04 VITALS
HEART RATE: 74 BPM | WEIGHT: 157 LBS | DIASTOLIC BLOOD PRESSURE: 80 MMHG | TEMPERATURE: 97.9 F | RESPIRATION RATE: 18 BRPM | SYSTOLIC BLOOD PRESSURE: 116 MMHG | HEIGHT: 68 IN | BODY MASS INDEX: 23.79 KG/M2 | OXYGEN SATURATION: 96 %

## 2018-04-04 DIAGNOSIS — F31.9 BIPOLAR AFFECTIVE DISORDER, REMISSION STATUS UNSPECIFIED (H): ICD-10-CM

## 2018-04-04 DIAGNOSIS — G57.01 PIRIFORMIS SYNDROME, RIGHT: ICD-10-CM

## 2018-04-04 DIAGNOSIS — R30.0 DYSURIA: ICD-10-CM

## 2018-04-04 DIAGNOSIS — G57.01 PIRIFORMIS SYNDROME, RIGHT: Primary | ICD-10-CM

## 2018-04-04 DIAGNOSIS — F33.1 MAJOR DEPRESSIVE DISORDER, RECURRENT EPISODE, MODERATE (H): ICD-10-CM

## 2018-04-04 LAB
ALBUMIN UR-MCNC: ABNORMAL MG/DL
APPEARANCE UR: CLEAR
BACTERIA #/AREA URNS HPF: ABNORMAL /HPF
BILIRUB UR QL STRIP: ABNORMAL
COLOR UR AUTO: YELLOW
GLUCOSE UR STRIP-MCNC: NEGATIVE MG/DL
HGB UR QL STRIP: ABNORMAL
KETONES UR STRIP-MCNC: NEGATIVE MG/DL
LEUKOCYTE ESTERASE UR QL STRIP: ABNORMAL
MUCOUS THREADS #/AREA URNS LPF: PRESENT /LPF
NITRATE UR QL: NEGATIVE
NON-SQ EPI CELLS #/AREA URNS LPF: ABNORMAL /LPF
PH UR STRIP: 6.5 PH (ref 5–7)
RBC #/AREA URNS AUTO: ABNORMAL /HPF
SOURCE: ABNORMAL
SP GR UR STRIP: 1.02 (ref 1–1.03)
UROBILINOGEN UR STRIP-ACNC: 2 EU/DL (ref 0.2–1)
WBC #/AREA URNS AUTO: ABNORMAL /HPF

## 2018-04-04 PROCEDURE — 99214 OFFICE O/P EST MOD 30 MIN: CPT | Performed by: FAMILY MEDICINE

## 2018-04-04 PROCEDURE — 81001 URINALYSIS AUTO W/SCOPE: CPT | Performed by: FAMILY MEDICINE

## 2018-04-04 RX ORDER — MELOXICAM 15 MG/1
15 TABLET ORAL DAILY
Qty: 30 TABLET | Refills: 1 | Status: SHIPPED | OUTPATIENT
Start: 2018-04-04 | End: 2018-04-04

## 2018-04-04 RX ORDER — CEPHALEXIN 500 MG/1
500 CAPSULE ORAL 3 TIMES DAILY
Qty: 9 CAPSULE | Refills: 0 | Status: SHIPPED | OUTPATIENT
Start: 2018-04-04 | End: 2018-04-07

## 2018-04-04 RX ORDER — TRAMADOL HYDROCHLORIDE 50 MG/1
50 TABLET ORAL EVERY 6 HOURS PRN
Qty: 30 TABLET | Refills: 0 | Status: SHIPPED | OUTPATIENT
Start: 2018-04-04 | End: 2018-05-14 | Stop reason: ALTCHOICE

## 2018-04-04 ASSESSMENT — ANXIETY QUESTIONNAIRES
6. BECOMING EASILY ANNOYED OR IRRITABLE: MORE THAN HALF THE DAYS
IF YOU CHECKED OFF ANY PROBLEMS ON THIS QUESTIONNAIRE, HOW DIFFICULT HAVE THESE PROBLEMS MADE IT FOR YOU TO DO YOUR WORK, TAKE CARE OF THINGS AT HOME, OR GET ALONG WITH OTHER PEOPLE: SOMEWHAT DIFFICULT
GAD7 TOTAL SCORE: 14
5. BEING SO RESTLESS THAT IT IS HARD TO SIT STILL: MORE THAN HALF THE DAYS
3. WORRYING TOO MUCH ABOUT DIFFERENT THINGS: MORE THAN HALF THE DAYS
2. NOT BEING ABLE TO STOP OR CONTROL WORRYING: MORE THAN HALF THE DAYS
7. FEELING AFRAID AS IF SOMETHING AWFUL MIGHT HAPPEN: MORE THAN HALF THE DAYS
1. FEELING NERVOUS, ANXIOUS, OR ON EDGE: MORE THAN HALF THE DAYS

## 2018-04-04 ASSESSMENT — PATIENT HEALTH QUESTIONNAIRE - PHQ9: 5. POOR APPETITE OR OVEREATING: MORE THAN HALF THE DAYS

## 2018-04-04 ASSESSMENT — PAIN SCALES - GENERAL: PAINLEVEL: EXTREME PAIN (9)

## 2018-04-04 NOTE — TELEPHONE ENCOUNTER
Plan does not cover nabumetone 750mg tablets  Per RX benefit plan alternative meds include: MELOXICAM, FLURBIPROFEN, IBUPROFEN.  Please fax back with approval.    Jocelynn Robertson

## 2018-04-04 NOTE — PROGRESS NOTES
"  SUBJECTIVE:   Kareen Hernandez is a 52 year old female who presents to clinic today for the following health issues:      Joint Pain    Onset: 1 week ago     Description:   Location: right ankle, right leg, right lower back   Character: Sharp    Intensity: severe    Progression of Symptoms: same    Accompanying Signs & Symptoms:  Other symptoms: none    History:   Previous similar pain: no       Precipitating factors:   Trauma or overuse: no     Alleviating factors:  Improved by: rest/inactivity and crossing of the legs     Therapies Tried and outcome: Medications, not helping     SUBJECTIVE:  Here today with the above symptoms that started about a week ago without any injury.  Patient first noted an aching in her low back that began shooting down the back of her right leg to about the right knee, occasional lateral right calf.  It feels tight in the back of her leg but not swollen.  Her back itself does not seem sore, it seems more in the leg.  Not numb or tingling.  Not week.  Interestingly she notes most relief by crossing her right leg over her left.  Has not had this in the past.  She is also noting some urinary frequency and burning and thinks she might have a urinary infection.  No fevers or chills or nausea.  Urine just seems dark.    Review of systems otherwise negative.  Past medical, family, and social history reviewed and updated in chart.    OBJECTIVE:  /80 (BP Location: Right arm, Patient Position: Sitting, Cuff Size: Adult Regular)  Pulse 74  Temp 97.9  F (36.6  C) (Oral)  Resp 18  Ht 1.715 m (5' 7.52\")  Wt 71.2 kg (157 lb)  LMP  (LMP Unknown)  SpO2 96%  BMI 24.21 kg/m2  Alert, pleasant, upbeat, and in no apparent discomfort.  S1 and S2 normal, no murmurs, clicks, gallops or rubs. Regular rate and rhythm. Chest is clear; no wheezes or rales. No edema or JVD.  BACK - Good range of motion with straight leg raising negative bilaterally.  No significant tenderness to palpation.  CMS intact " lower extremities bilaterally.  Normal deep tendon reflexes bilaterally.   Past labs reviewed with the patient.     UA RESULTS:  Recent Labs   Lab Test  04/04/18   0806   COLOR  Yellow   APPEARANCE  Clear   URINEGLC  Negative   URINEBILI  Small*   URINEKETONE  Negative   SG  1.020   UBLD  Trace*   URINEPH  6.5   PROTEIN  Trace*   UROBILINOGEN  2.0*   NITRITE  Negative   LEUKEST  Trace*   RBCU  5-10*   WBCU  5-10*         ASSESSMENT / PLAN:  (G57.01) Piriformis syndrome, right  (primary encounter diagnosis)  Comment: We discussed how the stretch of her piriformis is helping relieve some of the pressure.  Provided some additional exercises.  Suggested walking as a way to loosen things up as well.  Discussed mechanism of action of the proposed medication, as well as potential effects, both good and bad.  Patient expressed understanding and agreed with treatment.   Plan: Urine Microscopic, nabumetone (RELAFEN) 750 MG         tablet, traMADol (ULTRAM) 50 MG tablet        I counseled the patient on expected course, recovery, and potential setbacks.  I encouraged activity as tolerated once analgesia onboard.  Suggested heat, ice, stretch, massage - whatever it takes to increase motion.  Patient will contact me as needed throughout recovery course.     (R30.0) Dysuria  Comment: Borderline urinalysis.  Will start empiric therapy while awaiting culture  Plan: *UA reflex to Microscopic and Culture ([place]         and [facility] (except [facility] and         [place]), cephALEXin (KEFLEX) 500 MG capsule,         CANCELED: UA reflex to Microscopic and Culture            Follow up based upon results and progress  PAU Lozano MD    (Chart documentation completed in part with Dragon voice-recognition software.  Even though reviewed some grammatical, spelling, and word errors may remain.)

## 2018-04-04 NOTE — MR AVS SNAPSHOT
"              After Visit Summary   4/4/2018    Kareen Hernandez    MRN: [id number]           Patient Information     Date Of Birth          1965        Visit Information        Provider Department      4/4/2018 7:40 AM Jessi Lozano MD [facility]        Today's Diagnoses     Piriformis syndrome, right    -  1    Dysuria           Follow-ups after your visit        Follow-up notes from your care team     Return if symptoms worsen or fail to improve.      Who to contact     If you have questions or need follow up information about today's clinic visit or your schedule please contact [facility] directly at [phone].  Normal or non-critical lab and imaging results will be communicated to you by MyChart, letter or phone within 4 business days after the clinic has received the results. If you do not hear from us within 7 days, please contact the clinic through [facility] or phone. If you have a critical or abnormal lab result, we will notify you by phone as soon as possible.  Submit refill requests through [employer] or call your pharmacy and they will forward the refill request to us. Please allow 3 business days for your refill to be completed.          Additional Information About Your Visit        MyChart Information     [facility] gives you secure access to your electronic health record. If you see a primary care provider, you can also send messages to your care team and make appointments. If you have questions, please call your primary care clinic.  If you do not have a primary care provider, please call [phone] and they will assist you.        Care EveryWhere ID     This is your Care EveryWhere ID. This could be used by other organizations to access your [facility] medical records  [id number]        Your Vitals Were     Pulse Temperature Respirations Height Last Period Pulse Oximetry    74 97.9  F (36.6  C) (Oral) 18 1.715 m (5' 7.52\") (LMP Unknown) 96%    BMI (Body Mass " Index)                   24.21 kg/m2            Blood Pressure from Last 3 Encounters:   04/04/18 116/80   11/20/17 114/60   11/13/17 128/80    Weight from Last 3 Encounters:   04/04/18 71.2 kg (157 lb)   11/20/17 67.9 kg (149 lb 9.6 oz)   11/13/17 68.9 kg (152 lb)              We Performed the Following     *UA reflex to Microscopic and Culture ([place] and [facility] (except [place] and [place])     Urine Microscopic          Today's Medication Changes          These changes are accurate as of 4/4/18  8:35 AM.  If you have any questions, ask your nurse or doctor.               Start taking these medicines.        Dose/Directions    cephALEXin 500 MG capsule   Commonly known as:  KEFLEX   Used for:  Dysuria   Started by:  Jessi Lozano MD        Dose:  500 mg   Take 1 capsule (500 mg) by mouth 3 times daily for 3 days   Quantity:  9 capsule   Refills:  0       nabumetone 750 MG tablet   Commonly known as:  RELAFEN   Used for:  Piriformis syndrome, right   Started by:  Jessi Lozano MD        Dose:  750 mg   Take 1 tablet (750 mg) by mouth 2 times daily as needed for moderate pain   Quantity:  30 tablet   Refills:  1       traMADol 50 MG tablet   Commonly known as:  ULTRAM   Used for:  Piriformis syndrome, right   Started by:  Jessi Lozano MD        Dose:  50 mg   Take 1 tablet (50 mg) by mouth every 6 hours as needed for moderate pain or moderate to severe pain   Quantity:  30 tablet   Refills:  0         Stop taking these medicines if you haven't already. Please contact your care team if you have questions.     diclofenac 75 MG EC tablet   Commonly known as:  VOLTAREN   Stopped by:  Jessi Lozano MD                Where to get your medicines      These medications were sent to [id number] - [place] - [address] AT [facility] & [address]  [address] [place] [address]     Phone:  [phone]     cephALEXin 500 MG  capsule    nabumetone 750 MG tablet         Some of these will need a paper prescription and others can be bought over the counter.  Ask your nurse if you have questions.     Bring a paper prescription for each of these medications     traMADol 50 MG tablet                Primary Care Provider Office Phone # Fax #    Jessi Favian Lozano [phone] [place] [address]        Equal Access to Services     [facility] : Hadii aad ku hadasho Soomaali, waaxda luqadaha, qaybta kaalmada adeegyada, waxay idiin hayaan adeeg kharash laKrisjonelle . So [facility] [phone].    ATENCIÓN: Si sweetie partida, tiene a lang disposición servicios gratuitos de asistencia lingüística. Llame al [phone].    We comply with applicable federal civil rights laws and [place] laws. We do not discriminate on the basis of race, color, national origin, age, disability, sex, sexual orientation, or gender identity.            Thank you!     Thank you for choosing [facility]  for your care. Our goal is always to provide you with excellent care. Hearing back from our patients is one way we can continue to improve our services. Please take a few minutes to complete the written survey that you may receive in the mail after your visit with us. Thank you!             Your Updated Medication List - Protect others around you: Learn how to safely use, store and throw away your medicines at www.disposemymeds.org.          This list is accurate as of 4/4/18  8:35 AM.  Always use your most recent med list.                   Brand Name Dispense Instructions for use Diagnosis    cephALEXin 500 MG capsule    KEFLEX    9 capsule    Take 1 capsule (500 mg) by mouth 3 times daily for 3 days    Dysuria       clonazePAM 0.5 MG tablet    klonoPIN    20 tablet    Take 1 tablet (0.5 mg) by mouth 3 times daily AS NEEDED FOR ANXIETY    Anxiety       cyanocobalamin 1000 MCG/ML injection    VITAMIN B12    1 mL     Inject 1 mL (1,000 mcg) into the muscle every 30 days    Vitamin B12 deficiency disease       cyclobenzaprine 10 MG tablet    FLEXERIL    60 tablet    TAKE 1 TABLET BY MOUTH THREE TIMES DAILY AS NEEDED    Fibromyalgia       FLUoxetine 20 MG capsule    PROzac    120 capsule    Take 4 capsules (80 mg) by mouth daily    Bipolar affective disorder, remission status unspecified (H), Major depressive disorder, recurrent episode, moderate (H)       lamoTRIgine 100 MG tablet    LaMICtal    90 tablet    TAKE 3 TABLETS BY MOUTH DAILY    Bipolar affective disorder, remission status unspecified (H)       mometasone 50 MCG/ACT spray    NASONEX    3 Box    Spray 2 sprays into both nostrils daily    Allergic rhinitis, unspecified allergic rhinitis type       nabumetone 750 MG tablet    RELAFEN    30 tablet    Take 1 tablet (750 mg) by mouth 2 times daily as needed for moderate pain    Piriformis syndrome, right       naproxen sodium 550 MG tablet    ANAPROX    60 tablet    Take 1 tablet (550 mg) by mouth 2 times daily as needed for moderate pain    Right flank pain       NICOTINE STEP 2 14 MG/24HR 24 hr patch   Generic drug:  nicotine     28 patch    PLACE 1 PATCH ONTO THE SKIN EVERY 24 HOURS    Tobacco use disorder       omeprazole 20 MG CR capsule    priLOSEC    90 capsule    Take 1 capsule (20 mg) by mouth daily    Gastroesophageal reflux disease without esophagitis       SUMAtriptan 100 MG tablet    IMITREX    9 tablet    TAKE 1 TABLET BY MOUTH ON THE ONSET OF HEADACHE, MAY REPEAT IN 2 HOURS..*MAX 2 TABLETS DAILY*    Chronic migraine without aura without status migrainosus, not intractable       traMADol 50 MG tablet    ULTRAM    30 tablet    Take 1 tablet (50 mg) by mouth every 6 hours as needed for moderate pain or moderate to severe pain    Piriformis syndrome, right       triamcinolone 0.1 % ointment    KENALOG    30 g    Apply to nail beds twice daily    Dystrophic nail       vitamin D 16651 UNIT capsule    ERGOCALCIFEROL     12 capsule    TAKE 1 CAPSULE BY MOUTH EVERY 7 DAYS    Vitamin D deficiency disease       zolpidem 10 MG tablet    AMBIEN    30 tablet    TAKE 1 TABLET BY MOUTH EVERY EVENING AS NEEDED    Persistent disorder of initiating or maintaining sleep

## 2018-04-04 NOTE — TELEPHONE ENCOUNTER
PA for Nabumetone 750mg tablets    Phone# [phone]    ID# [id number]    PA or alternative    Jocelynn Robertson

## 2018-04-05 ASSESSMENT — ANXIETY QUESTIONNAIRES: GAD7 TOTAL SCORE: 14

## 2018-04-05 ASSESSMENT — PATIENT HEALTH QUESTIONNAIRE - PHQ9: SUM OF ALL RESPONSES TO PHQ QUESTIONS 1-9: 13

## 2018-04-10 RX ORDER — LAMOTRIGINE 100 MG/1
TABLET ORAL
Qty: 270 TABLET | Refills: 1 | Status: SHIPPED | OUTPATIENT
Start: 2018-04-10 | End: 2018-09-17

## 2018-04-10 NOTE — TELEPHONE ENCOUNTER
PHQ-9 SCORE 5/3/2016 11/20/2017 4/4/2018   Total Score - - -   Total Score 10 16 13     Routing refill request to provider for review/approval because:  PHQ-9 is over 5.  Patient requesting 90 day supply of meloxicam-please advise if appropriate at this time. Medication was just started on 4/4/18.    Alexander Yusuf RN, BSN

## 2018-04-11 RX ORDER — MELOXICAM 15 MG/1
TABLET ORAL
Qty: 90 TABLET | Refills: 0 | Status: SHIPPED | OUTPATIENT
Start: 2018-04-11 | End: 2018-05-14

## 2018-04-20 ENCOUNTER — TELEPHONE (OUTPATIENT)
Dept: FAMILY MEDICINE | Facility: CLINIC | Age: 53
End: 2018-04-20

## 2018-04-20 NOTE — TELEPHONE ENCOUNTER
Reason for call:  Patient reporting a symptom    Symptom or request: Wants cortisone shot in leg for sciatica     Duration (how long have symptoms been present): 2 weeks    Have you been treated for this before? Yes    Additional comments: Needs call back asap    Phone Number patient can be reached at:  Cell number on file:    Telephone Information:   Mobile [phone]     Best Time:  any    Can we leave a detailed message on this number:  YES    Call taken on 4/20/2018 at 9:00 AM by Stephie Vincent

## 2018-04-20 NOTE — TELEPHONE ENCOUNTER
Called and spoke with patient. She reports that she was in to see Dr. Lozano for an OV on 4/4/18 and since then her sciatic nerve pain has increased, she also reports lumbar/flank pain and believes she may have a kidney infection. Writer began to attempt to collect more information on symptoms but then patient said she just arrived to the ER with her daughter who drove her and said she was going to get off of the phone. Closing encounter.     Amirah Poon RN

## 2018-04-24 ENCOUNTER — TELEPHONE (OUTPATIENT)
Dept: FAMILY MEDICINE | Facility: CLINIC | Age: 53
End: 2018-04-24

## 2018-04-24 DIAGNOSIS — M79.604 PAIN OF RIGHT LOWER EXTREMITY: Primary | ICD-10-CM

## 2018-04-24 NOTE — TELEPHONE ENCOUNTER
...Reason for Call:  Other     Detailed comments: Patient want to know if a referral can be given for a MRI due to her pinched nerve, please call to discuss.    Phone Number Patient can be reached at: Home number on file [phone] (home)    Best Time: anytime    Can we leave a detailed message on this number? YES    Call taken on 4/24/2018 at 10:14 AM by Aminata Jones

## 2018-04-24 NOTE — TELEPHONE ENCOUNTER
Patient went to the ER for increased pain. It was a 10/10 and she was given prednisone and percocet. She is good when she has pain medication other wise she can not put any pressure on the leg.     She is wanting MRI imaging done to further assess the pain. Patient informed that she may need an appointment for reassessment. Also informed Dr Lozano is not in office today.    Maria Pandya RN, [facility] Triage

## 2018-05-06 DIAGNOSIS — M79.7 FIBROMYALGIA: ICD-10-CM

## 2018-05-06 DIAGNOSIS — F31.9 BIPOLAR AFFECTIVE DISORDER, REMISSION STATUS UNSPECIFIED (H): ICD-10-CM

## 2018-05-06 DIAGNOSIS — F33.1 MAJOR DEPRESSIVE DISORDER, RECURRENT EPISODE, MODERATE (H): ICD-10-CM

## 2018-05-07 ENCOUNTER — TELEPHONE (OUTPATIENT)
Dept: FAMILY MEDICINE | Facility: CLINIC | Age: 53
End: 2018-05-07

## 2018-05-07 ENCOUNTER — NURSE TRIAGE (OUTPATIENT)
Dept: NURSING | Facility: CLINIC | Age: 53
End: 2018-05-07

## 2018-05-07 DIAGNOSIS — M54.40 ACUTE LOW BACK PAIN WITH SCIATICA, SCIATICA LATERALITY UNSPECIFIED, UNSPECIFIED BACK PAIN LATERALITY: Primary | ICD-10-CM

## 2018-05-07 RX ORDER — OXYCODONE AND ACETAMINOPHEN 5; 325 MG/1; MG/1
1 TABLET ORAL 3 TIMES DAILY PRN
Qty: 10 TABLET | Refills: 0 | Status: SHIPPED | OUTPATIENT
Start: 2018-05-07 | End: 2018-05-09

## 2018-05-07 NOTE — TELEPHONE ENCOUNTER
Reason for call:  Patient reporting a symptom    Symptom or request: Needs stronger pain med for sciatic pain MRI tomorrow. Sent High priority due to extreme pain.    Duration (how long have symptoms been present): month and half    Have you been treated for this before? Yes    Additional comments: please call back asap may need to go to ER     Phone Number patient can be reached at:  Cell number on file:    Telephone Information:   Mobile [phone]     Best Time:  any    Can we leave a detailed message on this number:  YES    Call taken on 5/7/2018 at 12:00 PM by Stephie Vincent

## 2018-05-07 NOTE — TELEPHONE ENCOUNTER
"Requested Prescriptions   Pending Prescriptions Disp Refills     cyclobenzaprine (FLEXERIL) 10 MG tablet [Pharmacy Med Name: CYCLOBENZAPRINE 10MG TABLETS] 60 tablet 0     Sig: TAKE 1 TABLET BY MOUTH THREE TIMES DAILY AS NEEDED    There is no refill protocol information for this order        FLUoxetine (PROZAC) 20 MG capsule [Pharmacy Med Name: FLUOXETINE 20MG CAPSULES] 120 capsule 0     Sig: TAKE 4 CAPSULES BY MOUTH DAILY    SSRIs Protocol Failed    5/6/2018 10:17 AM       Failed - PHQ-9 score less than 5 in past 6 months    Please review last PHQ-9 score.          Passed - Patient is age 18 or older       Passed - No active pregnancy on record       Passed - No positive pregnancy test in last 12 months       Passed - Recent (6 mo) or future (30 days) visit within the authorizing provider's specialty    Patient had office visit in the last 6 months or has a visit in the next 30 days with authorizing provider or within the authorizing provider's specialty.  See \"Patient Info\" tab in inbasket, or \"Choose Columns\" in Meds & Orders section of the refill encounter.            cyclobenzaprine (FLEXERIL) 10 MG tablet      Last Written Prescription Date:  3/9/18  Last Fill Quantity: 60,   # refills: 0  Last Office Visit: 4/4/18  Future Office visit:       Routing refill request to provider for review/approval because:  Drug not on the [facility] or [facility] refill protocol or controlled substance      FLUoxetine (PROZAC) 20 MG capsule  Last Written Prescription Date:  4/11/18  Last Fill Quantity: 360,  # refills: 0   Last office visit: 4/4/2018 with prescribing provider:  Dr. Lozano   Future Office Visit:      PHQ-9 SCORE 5/3/2016 11/20/2017 4/4/2018   Total Score - - -   Total Score 10 16 13     CHARAN-7 SCORE 5/3/2016 11/20/2017 4/4/2018   Total Score - - -   Total Score 12 12 14   Total Score - - -             "

## 2018-05-07 NOTE — TELEPHONE ENCOUNTER
Patient said that tramadol is not effective with sciatic pain. Rating it 8/10 and no relieve with the medication.  Patient said that is taking 2 tablets every 4 hours with no relieve.  Patient is requesting stronger pain medication. She has couple of tramadol tablets left.    Sammie Hale RN

## 2018-05-08 ENCOUNTER — TELEPHONE (OUTPATIENT)
Dept: FAMILY MEDICINE | Facility: CLINIC | Age: 53
End: 2018-05-08

## 2018-05-08 ENCOUNTER — RADIANT APPOINTMENT (OUTPATIENT)
Dept: MRI IMAGING | Facility: CLINIC | Age: 53
End: 2018-05-08
Attending: FAMILY MEDICINE
Payer: MEDICARE

## 2018-05-08 DIAGNOSIS — M79.604 PAIN OF RIGHT LOWER EXTREMITY: ICD-10-CM

## 2018-05-08 DIAGNOSIS — M54.40 ACUTE LOW BACK PAIN WITH SCIATICA, SCIATICA LATERALITY UNSPECIFIED, UNSPECIFIED BACK PAIN LATERALITY: ICD-10-CM

## 2018-05-08 DIAGNOSIS — M54.41 ACUTE RIGHT-SIDED LOW BACK PAIN WITH RIGHT-SIDED SCIATICA: Primary | ICD-10-CM

## 2018-05-08 PROCEDURE — 72148 MRI LUMBAR SPINE W/O DYE: CPT | Performed by: RADIOLOGY

## 2018-05-08 NOTE — TELEPHONE ENCOUNTER
Caller states she has been waiting all day for PCP to call her regarding stronger pain medication  Reviewed  Of EMR reveals message routed to PCP  @ 1830 today  Caller informed that  PCP will not be able to call in RX for opioid medications   Caller states she will go to the ED  Advised to proceed but have someone else drive  Reason for Disposition    [1] SEVERE back pain (e.g., excruciating, unable to do any normal activities) AND [2] not improved 2 hours after pain medicine    Protocols used: BACK PAIN-ADULT-AH

## 2018-05-08 NOTE — TELEPHONE ENCOUNTER
Reason for Call:  Other appointment    Detailed comments: Kareen called today to make an appointment with Dr Lozano to follow up on her MRI she just had done. His first opening was 06/11. She said she could not wait that long and asked his nurse to call her back.  Please call her and let her know if she is able to be seen sooner.  Thank you     Phone Number Patient can be reached at: Home number on file [phone] (home)    Best Time: Any    Can we leave a detailed message on this number? YES    Call taken on 5/8/2018 at 4:35 PM by Brendon Turner

## 2018-05-09 ENCOUNTER — TELEPHONE (OUTPATIENT)
Dept: FAMILY MEDICINE | Facility: CLINIC | Age: 53
End: 2018-05-09

## 2018-05-09 RX ORDER — CYCLOBENZAPRINE HCL 10 MG
TABLET ORAL
Qty: 60 TABLET | Refills: 0 | Status: SHIPPED | OUTPATIENT
Start: 2018-05-09 | End: 2018-08-03

## 2018-05-09 RX ORDER — OXYCODONE AND ACETAMINOPHEN 5; 325 MG/1; MG/1
1 TABLET ORAL 3 TIMES DAILY PRN
Qty: 20 TABLET | Refills: 0 | Status: SHIPPED | OUTPATIENT
Start: 2018-05-09 | End: 2018-05-16

## 2018-05-09 NOTE — TELEPHONE ENCOUNTER
Reason for Call:  Other prescription    Detailed comments: Pt attempted to put in a refill request for Percocet but was notified that the only way Pt can receive a refill is if Dr. Lozano changes the Rx and she would like a call back for further explanation.    Phone Number Patient can be reached at: Home number on file [phone] (home)    Best Time: anytime    Can we leave a detailed message on this number? YES    Call taken on 5/9/2018 at 3:44 PM by Vinny Lopes

## 2018-05-09 NOTE — TELEPHONE ENCOUNTER
Please call the patient and assist in scheduling an appointment for a medication adjustment. She could also do this via an e-visit also.    Maria Pandya RN, [facility]

## 2018-05-09 NOTE — TELEPHONE ENCOUNTER
Patient  returned call    Best number to reach caller: Home number on file [phone] (home)    Is it ok to leave a detailed message: Not Applicable Patient transferred to       The name of the neurosurgeon - Tania Miles Dr [place]ard  Appointment is at 3 Pm on Thurs 05-10-18

## 2018-05-09 NOTE — TELEPHONE ENCOUNTER
Patient is inquiring Dr. Lozano about her medication PROZAC 20 MG capsule whether she can take 2 a day.    Please advise.

## 2018-05-09 NOTE — TELEPHONE ENCOUNTER
Patient notified about order.   5 Oxycodone tabs left; requesting for refill.   Patient is taking 1 tab every four hours in a 24 hour day.   Rx written on 5/7/18 for 10 tabs.   She reports pain med is not helping as much. She may have to go to the ER due to pain & get IV Dilaudid, which she has done so in the past.     Hernán Tejeda, Medical Assistant

## 2018-05-09 NOTE — TELEPHONE ENCOUNTER
Routing refill request to provider for review/approval because: Flexeril  Drug not on the [facility] refill protocol     Medication denied patient has requested medication to soon- FLUoxetine (PROZAC) 20 MG capsule  Amie Ritter RN

## 2018-05-09 NOTE — PROGRESS NOTES
Kareen Roberts,  Well, that MRI does, indeed, show a couple of large herniated disks.  I think the thing to do at this oint is get you in with a back specialist - I will have our schedulers contact you to get set up.  PAU Lozano M.D.

## 2018-05-09 NOTE — TELEPHONE ENCOUNTER
Patient does not know the name of the neurosurgeon. She will call back with information. Awaiting response.     Hernán Tejeda, Medical Assistant

## 2018-05-09 NOTE — TELEPHONE ENCOUNTER
MA to call and find out name of the neurosurgeon she is being sent to. Please route to Dr Lozano to address after that.    Maria Pandya RN, [facility]

## 2018-05-09 NOTE — TELEPHONE ENCOUNTER
Patient notified. There's another encounter open for this patient regarding pain med refill.   She would like a response on that by the end of the day. JOVAN Tejeda, Medical Assistant

## 2018-05-09 NOTE — TELEPHONE ENCOUNTER
Reason for Call:  Other appointment    Detailed comments: In ER sent her to Neurosurgeon please call asap. Sent High priority due to needs to know if this neurosurgeon is the one you would recommend.    Phone Number Patient can be reached at: Cell number on file:    Telephone Information:   Mobile [phone]     Best Time: any    Can we leave a detailed message on this number? YES    Call taken on 5/9/2018 at 9:11 AM by Stephie Vincent

## 2018-05-09 NOTE — TELEPHONE ENCOUNTER
I called patient to clarify what was going on. Patient said that the pharmacy wouldn't allow her to fill the script today.   (Allthough she still has 5 tabs left from the previous script). She said that if the pharmacy doesn't allow her to fill the script today she is going to ask another provider to write her a new script. I said, no - she hasn't even picked up the script that Dr. Lozano wrote today. She cannot ask another provider to write out another script for her. Pharmacy advised her to fill Rx in a day or two. She said she will wait to fill Rx per pharmacy. JOVAN Tejeda, Medical Assistant

## 2018-05-10 ENCOUNTER — MEDICAL CORRESPONDENCE (OUTPATIENT)
Dept: HEALTH INFORMATION MANAGEMENT | Facility: CLINIC | Age: 53
End: 2018-05-10

## 2018-05-10 ENCOUNTER — TRANSFERRED RECORDS (OUTPATIENT)
Dept: HEALTH INFORMATION MANAGEMENT | Facility: CLINIC | Age: 53
End: 2018-05-10

## 2018-05-11 NOTE — TELEPHONE ENCOUNTER
...Reason for Call:  Other     Detailed comments: Patient called need to get in right away for a pre-op, first availble she need sooner.    Phone Number Patient can be reached at: Home number on file [phone] (home)    Best Time: anytime    Can we leave a detailed message on this number? YES    Call taken on 5/11/2018 at 10:18 AM by Aminata Jones

## 2018-05-11 NOTE — TELEPHONE ENCOUNTER
Spoke with pt and she insisted her neurosurgeon said pt had to be seen by her pcp and no one else    I informed pt that she can see any provider due to rosana's schedule being full. She was upset.    I told her I would call her neurology clinic to verify if pt has to be seen by only Dr. Lozano for preop.    L/M with [facility] to call back with info [phone].      Chloe Reese MA

## 2018-05-11 NOTE — TELEPHONE ENCOUNTER
Esmer, [facility], [phone] called to advise that patient can see any provider for pre-op.      Thank you,    Arielle Mackay

## 2018-05-14 ENCOUNTER — TELEPHONE (OUTPATIENT)
Dept: FAMILY MEDICINE | Facility: CLINIC | Age: 53
End: 2018-05-14

## 2018-05-14 ENCOUNTER — OFFICE VISIT (OUTPATIENT)
Dept: FAMILY MEDICINE | Facility: CLINIC | Age: 53
End: 2018-05-14
Payer: MEDICARE

## 2018-05-14 VITALS
HEIGHT: 67 IN | DIASTOLIC BLOOD PRESSURE: 82 MMHG | BODY MASS INDEX: 24.8 KG/M2 | WEIGHT: 158 LBS | SYSTOLIC BLOOD PRESSURE: 138 MMHG | TEMPERATURE: 99.1 F | HEART RATE: 82 BPM | OXYGEN SATURATION: 98 %

## 2018-05-14 DIAGNOSIS — K21.9 GASTROESOPHAGEAL REFLUX DISEASE WITHOUT ESOPHAGITIS: ICD-10-CM

## 2018-05-14 DIAGNOSIS — G43.709 CHRONIC MIGRAINE WITHOUT AURA WITHOUT STATUS MIGRAINOSUS, NOT INTRACTABLE: ICD-10-CM

## 2018-05-14 DIAGNOSIS — F31.9 BIPOLAR AFFECTIVE DISORDER, REMISSION STATUS UNSPECIFIED (H): ICD-10-CM

## 2018-05-14 DIAGNOSIS — M51.16 LUMBAR DISC HERNIATION WITH RADICULOPATHY: ICD-10-CM

## 2018-05-14 DIAGNOSIS — E53.8 VITAMIN B12 DEFICIENCY DISEASE: ICD-10-CM

## 2018-05-14 DIAGNOSIS — Z01.818 PREOP GENERAL PHYSICAL EXAM: Primary | ICD-10-CM

## 2018-05-14 LAB
ANION GAP SERPL CALCULATED.3IONS-SCNC: 3 MMOL/L (ref 3–14)
BUN SERPL-MCNC: 10 MG/DL (ref 7–30)
CALCIUM SERPL-MCNC: 8.9 MG/DL (ref 8.5–10.1)
CHLORIDE SERPL-SCNC: 105 MMOL/L (ref 94–109)
CO2 SERPL-SCNC: 33 MMOL/L (ref 20–32)
CREAT SERPL-MCNC: 0.8 MG/DL (ref 0.52–1.04)
ERYTHROCYTE [DISTWIDTH] IN BLOOD BY AUTOMATED COUNT: 12.5 % (ref 10–15)
GFR SERPL CREATININE-BSD FRML MDRD: 76 ML/MIN/1.7M2
GLUCOSE SERPL-MCNC: 124 MG/DL (ref 70–99)
HCT VFR BLD AUTO: 40.6 % (ref 35–47)
HGB BLD-MCNC: 13.6 G/DL (ref 11.7–15.7)
INR PPP: 1 (ref 0.86–1.14)
MCH RBC QN AUTO: 32.3 PG (ref 26.5–33)
MCHC RBC AUTO-ENTMCNC: 33.5 G/DL (ref 31.5–36.5)
MCV RBC AUTO: 96 FL (ref 78–100)
PLATELET # BLD AUTO: 217 10E9/L (ref 150–450)
POTASSIUM SERPL-SCNC: 3.8 MMOL/L (ref 3.4–5.3)
RBC # BLD AUTO: 4.21 10E12/L (ref 3.8–5.2)
SODIUM SERPL-SCNC: 141 MMOL/L (ref 133–144)
WBC # BLD AUTO: 6.5 10E9/L (ref 4–11)

## 2018-05-14 PROCEDURE — 93000 ELECTROCARDIOGRAM COMPLETE: CPT | Performed by: FAMILY MEDICINE

## 2018-05-14 PROCEDURE — 85027 COMPLETE CBC AUTOMATED: CPT | Performed by: FAMILY MEDICINE

## 2018-05-14 PROCEDURE — 85610 PROTHROMBIN TIME: CPT | Performed by: FAMILY MEDICINE

## 2018-05-14 PROCEDURE — 36415 COLL VENOUS BLD VENIPUNCTURE: CPT | Performed by: FAMILY MEDICINE

## 2018-05-14 PROCEDURE — 99214 OFFICE O/P EST MOD 30 MIN: CPT | Performed by: FAMILY MEDICINE

## 2018-05-14 PROCEDURE — 80048 BASIC METABOLIC PNL TOTAL CA: CPT | Performed by: FAMILY MEDICINE

## 2018-05-14 RX ORDER — PREDNISONE 20 MG/1
TABLET ORAL
COMMUNITY
Start: 2018-05-09 | End: 2018-05-23

## 2018-05-14 ASSESSMENT — PAIN SCALES - GENERAL: PAINLEVEL: EXTREME PAIN (8)

## 2018-05-14 NOTE — PATIENT INSTRUCTIONS
Proceed with surgery as planned  Labs today    Refill omeprazole for regular use.        Attempt to discontinue smoking prior to surgery to assist with healing process.        Before Your Surgery      Call your surgeon if there is any change in your health. This includes signs of a cold or flu (such as a sore throat, runny nose, cough, rash or fever).    Do not smoke, drink alcohol or take over the counter medicine (unless your surgeon or primary care doctor tells you to) for the 24 hours before and after surgery.    If you take prescribed drugs: Follow your doctor s orders about which medicines to take and which to stop until after surgery.    Eating and drinking prior to surgery: follow the instructions from your surgeon    Take a shower or bath the night before surgery. Use the soap your surgeon gave you to gently clean your skin. If you do not have soap from your surgeon, use your regular soap. Do not shave or scrub the surgery site.  Wear clean pajamas and have clean sheets on your bed.

## 2018-05-14 NOTE — PROGRESS NOTES
[address] [address]  [phone]  Dept: [phone]    PRE-OP EVALUATION:  Today's date: 2018    Kareen Hernandez (: 1965) presents for pre-operative evaluation assessment as requested by Dr. Clark.  She requires evaluation and anesthesia risk assessment prior to undergoing surgery/procedure for treatment of L5-S1 large right subarticular disc extrusion // Right L5-S1 Hemilaminectomy & Microdisectomy    Proposed Surgery/ Procedure: Right L5-S1 Hemilaminectomy & Microdisectomy  Date of Surgery/ Procedure: 18  Time of Surgery/ Procedure: 10:00AM  Hospital/Surgical Facility: [facility]  Fax number for surgical facility: [phone]  Primary Physician: Jessi Lozano  Type of Anesthesia Anticipated: General    Patient has a Health Care Directive or Living Will:  NO    1. NO - Do you have a history of heart attack, stroke, stent, bypass or surgery on an artery in the head, neck, heart or legs?  2. NO - Do you ever have any pain or discomfort in your chest?  3. NO - Do you have a history of  Heart Failure?  4. NO - Are you troubled by shortness of breath when: walking on the level, up a slight hill or at night?  5. NO - Do you currently have a cold, bronchitis or other respiratory infection?  6. NO - Do you have a cough, shortness of breath or wheezing?  7. NO - Do you sometimes get pains in the calves of your legs when you walk?  8. NO - Do you or anyone in your family have previous history of blood clots?  9. NO - Do you or does anyone in your family have a serious bleeding problem such as prolonged bleeding following surgeries or cuts?  10. YES - HAVE YOU EVER HAD PROBLEMS WITH ANEMIA OR BEEN TOLD TO TAKE IRON PILLS? Not currently treated.    11. NO - Have you had any abnormal blood loss such as black, tarry or bloody stools, or abnormal vaginal bleeding?  12. NO - Have you ever had a blood transfusion?  13. NO - Have you  or any of your relatives ever had problems with anesthesia?  14. NO - Do you have sleep apnea, excessive snoring or daytime drowsiness?  15. NO - Do you have any prosthetic heart valves?  16. NO - Do you have prosthetic joints?  17. NO - Is there any chance that you may be pregnant?      HPI:     HPI related to upcoming procedure: 53 year old with progressive worsening of back pain with right sided sciatica found to have L5-S1 large right subarticular disc extrusion with nerve impingement.        See problem list for active medical problems.  Problems all longstanding and stable, except as noted/documented.  See ROS for pertinent symptoms related to these conditions.                                                                                                                                                          .    MEDICAL HISTORY:     Patient Active Problem List    Diagnosis Date Noted     Acute right-sided low back pain with right-sided sciatica 05/08/2018     Priority: Medium     Bipolar affective disorder, remission status unspecified (H) 11/20/2017     Priority: Medium     History of kidney stones 11/29/2016     Priority: Medium     Tobacco use disorder 11/29/2016     Priority: Medium     Major depressive disorder, recurrent episode, moderate (H) 08/08/2016     Priority: Medium     Chronic migraine without aura without status migrainosus, not intractable 07/18/2016     Priority: Medium     Gastroesophageal reflux disease without esophagitis 05/23/2016     Priority: Medium     History of hypertension 05/03/2016     Priority: Medium     resolved with weight loss 2016       Right maxillary sinusitis, chronic      Priority: Medium     Insomnia      Priority: Medium     Migraine headache      Priority: Medium     Vitamin B12 deficiency disease 08/14/2013     Priority: Medium     Vitamin D deficiency disease 08/13/2013     Priority: Medium     Anxiety 03/21/2012     Priority: Medium     CARDIOVASCULAR  SCREENING; LDL GOAL LESS THAN 130 10/31/2010     Priority: Medium     Gastric bypass status for obesity 04/16/2009     Priority: Medium     Fibromyalgia 11/30/2006     Priority: Medium      Past Medical History:   Diagnosis Date     Anxiety 3/21/2012     Calculus of kidney     Multiple     CARDIOVASCULAR SCREENING; LDL GOAL LESS THAN 130 10/31/2010     CARDIOVASCULAR SCREENING; LDL GOAL LESS THAN 160 10/31/2010     Fibromyalgia 11/30/2006     Gastric bypass status for obesity 4/16/2009     Hypertension goal BP (blood pressure) < 140/90 2/24/2015     Insomnia      Major depressive disorder, single episode, severe, specified as with psychotic behavior      Migraine headache      Moderate major depression (H) 11/30/2006     Myalgia and myositis, unspecified     FIBROMYALGIA     Right maxillary sinusitis, chronic      Tobacco use disorder 7/14/2011    Quit infnrtr94/01/2013     Vitamin B12 deficiency disease 8/14/2013     Vitamin D deficiency disease 8/13/2013     Past Surgical History:   Procedure Laterality Date     ABDOMINOPLASTY  12/2/2013     C GASTRIC BYPASS,OBESE<100CM DAYAN-EN-Y  3-25-09    [facility]     C REMOVAL OF KIDNEY STONE      With kidney tents x 5 to 6 procedures.     CL AFF SURGICAL PATHOLOGY  Feb 2007    Redman's neuroma  - Right Foot     LITHOTRIPSY       SURGICAL PATHOLOGY EXAM      Lipoma Removal on her back     Current Outpatient Prescriptions   Medication Sig Dispense Refill     cyanocobalamin (VITAMIN B12) 1000 MCG/ML injection Inject 1 mL (1,000 mcg) into the muscle every 30 days 1 mL 11     cyclobenzaprine (FLEXERIL) 10 MG tablet TAKE 1 TABLET BY MOUTH THREE TIMES DAILY AS NEEDED 60 tablet 0     FLUoxetine (PROZAC) 20 MG capsule TAKE 4 CAPSULES BY MOUTH DAILY 360 capsule 0     lamoTRIgine (LAMICTAL) 100 MG tablet TAKE 3 TABLETS BY MOUTH DAILY 270 tablet 1     mometasone (NASONEX) 50 MCG/ACT nasal spray Spray 2 sprays into both nostrils daily 3 Box 3     omeprazole (PRILOSEC) 20 MG CR capsule  Take 1 capsule (20 mg) by mouth daily 90 capsule 1     oxyCODONE-acetaminophen (PERCOCET) 5-325 MG per tablet Take 1 tablet by mouth 3 times daily as needed for severe pain 20 tablet 0     predniSONE (DELTASONE) 20 MG tablet Take 3 tabs daily for 4 days, then take 2 tabs daily for 3 days, then take 1 tab daily for 3 days, then take 1/2 tabs daily for 3 days, then stop.       SUMAtriptan (IMITREX) 100 MG tablet TAKE 1 TABLET BY MOUTH ON THE ONSET OF HEADACHE, MAY REPEAT IN 2 HOURS..*MAX 2 TABLETS DAILY* 9 tablet 6     vitamin D (ERGOCALCIFEROL) 32718 UNIT capsule TAKE 1 CAPSULE BY MOUTH EVERY 7 DAYS 12 capsule 1     zolpidem (AMBIEN) 10 MG tablet TAKE 1 TABLET BY MOUTH EVERY EVENING AS NEEDED 30 tablet 0     clonazePAM (KLONOPIN) 0.5 MG tablet Take 1 tablet (0.5 mg) by mouth 3 times daily AS NEEDED FOR ANXIETY (Patient not taking: Reported on 5/14/2018) 20 tablet 0     NICOTINE STEP 2 14 MG/24HR 24 hr patch PLACE 1 PATCH ONTO THE SKIN EVERY 24 HOURS (Patient not taking: Reported on 5/14/2018) 28 patch 0     triamcinolone (KENALOG) 0.1 % ointment Apply to nail beds twice daily (Patient not taking: Reported on 5/14/2018) 30 g 0     OTC products: None, except as noted above    Allergies   Allergen Reactions     Lurasidone      Diarrhea     Morphine Rash      Latex Allergy: NO    Social History   Substance Use Topics     Smoking status: Current Every Day Smoker     Packs/day: 0.50     Types: Cigarettes     Last attempt to quit: 9/22/2013     Smokeless tobacco: Never Used      Comment: 2/6/08     Alcohol use No     History   Drug Use     Yes     Comment: Marijuana - 3 times a week       REVIEW OF SYSTEMS:   CONSTITUTIONAL: NEGATIVE for fever, chills, change in weight  INTEGUMENTARY/SKIN: NEGATIVE for worrisome rashes, moles or lesions  EYES: NEGATIVE for vision changes or irritation  ENT/MOUTH: NEGATIVE for ear, mouth and throat problems  RESP: NEGATIVE for significant cough or SOB  BREAST: NEGATIVE for masses, tenderness  "or discharge  CV: NEGATIVE for chest pain, palpitations or peripheral edema  GI: NEGATIVE for nausea, abdominal pain, heartburn, or change in bowel habits  : NEGATIVE for frequency, dysuria, or hematuria  MUSCULOSKELETAL:see HPI  NEURO: NEGATIVE for weakness, dizziness or paresthesias  ENDOCRINE: NEGATIVE for temperature intolerance, skin/hair changes  HEME: NEGATIVE for bleeding problems  PSYCHIATRIC: NEGATIVE for changes in mood or affect    EXAM:   /82 (BP Location: Right arm, Patient Position: Chair, Cuff Size: Adult Regular)  Pulse 82  Temp 99.1  F (37.3  C) (Oral)  Ht 1.702 m (5' 7\")  Wt 71.7 kg (158 lb)  LMP  (LMP Unknown)  SpO2 98%  Breastfeeding? No  BMI 24.75 kg/m2    GENERAL APPEARANCE: alert, no distress and moderately uncomfortable while seated in chair.      EYES: EOMI, PERRL     HENT: ear canals and TM's normal, nose and mouth without ulcers or lesions and edentulous - dentures upper in place.     NECK: no adenopathy, no asymmetry, masses, or scars and thyroid normal to palpation     RESP: lungs clear to auscultation - no rales, rhonchi or wheezes     CV: regular rates and rhythm, normal S1 S2, no S3 or S4 and no murmur, click or rub     ABDOMEN:  soft, nontender, no HSM or masses and bowel sounds normal     MS: extremities normal- no gross deformities noted and tender to palpation paravertebral muscles lumbar spine, positive SLR right      SKIN: no suspicious lesions or rashes     NEURO: Normal strength and tone, sensory exam grossly normal, mentation intact and speech normal     PSYCH: mentation appears normal. and affect normal/bright     LYMPHATICS: No cervical adenopathy    DIAGNOSTICS:     EKG: appears normal, NSR, normal axis, short NV intervals, no acute ST/T changes c/w ischemia, no LVH by voltage criteria, unchanged from previous tracings.  Labs Resulted Today:   Results for orders placed or performed in visit on 05/14/18   CBC with platelets   Result Value Ref Range    WBC " 6.5 4.0 - 11.0 10e9/L    RBC Count 4.21 3.8 - 5.2 10e12/L    Hemoglobin 13.6 11.7 - 15.7 g/dL    Hematocrit 40.6 35.0 - 47.0 %    MCV 96 78 - 100 fl    MCH 32.3 26.5 - 33.0 pg    MCHC 33.5 31.5 - 36.5 g/dL    RDW 12.5 10.0 - 15.0 %    Platelet Count 217 150 - 450 10e9/L   Basic metabolic panel   Result Value Ref Range    Sodium 141 133 - 144 mmol/L    Potassium 3.8 3.4 - 5.3 mmol/L    Chloride 105 94 - 109 mmol/L    Carbon Dioxide 33 (H) 20 - 32 mmol/L    Anion Gap 3 3 - 14 mmol/L    Glucose 124 (H) 70 - 99 mg/dL    Urea Nitrogen 10 7 - 30 mg/dL    Creatinine 0.80 0.52 - 1.04 mg/dL    GFR Estimate 76 >60 mL/min/1.7m2    GFR Estimate If Black >90 >60 mL/min/1.7m2    Calcium 8.9 8.5 - 10.1 mg/dL   INR   Result Value Ref Range    INR 1.00 0.86 - 1.14       Recent Labs   Lab Test  12/15/16   1319  07/21/15   1528  08/05/14   1006   12/02/13   1444   HGB   --   12.5   --    --   12.6   PLT   --   187   --    --   205   NA  140   --   141   < >   --    POTASSIUM  4.1   --   4.2   < >   --    CR  0.82   --   0.85   < >   --     < > = values in this interval not displayed.        IMPRESSION:   Reason for surgery/procedure: L5-S1 large right subarticular disc extrusion // Right L5-S1 Hemilaminectomy & Microdisectomy  Diagnosis/reason for consult: surgical clearance    The proposed surgical procedure is considered INTERMEDIATE risk.    REVISED CARDIAC RISK INDEX  The patient has the following serious cardiovascular risks for perioperative complications such as (MI, PE, VFib and 3  AV Block):  No serious cardiac risks  INTERPRETATION: 0 risks: Class I (very low risk - 0.4% complication rate)    The patient has the following additional risks for perioperative complications:  No identified additional risks      ICD-10-CM    1. Preop general physical exam Z01.818 EKG 12-lead complete w/read - Clinics     CBC with platelets     Basic metabolic panel     INR   2. Lumbar disc herniation with radiculopathy M51.16 INR   3.  Gastroesophageal reflux disease without esophagitis K21.9 omeprazole (PRILOSEC) 20 MG CR capsule   4. Chronic migraine without aura without status migrainosus, not intractable G43.709    5. Bipolar affective disorder, remission status unspecified (H) F31.9    6. Vitamin B12 deficiency disease E53.8        RECOMMENDATIONS:       Pulmonary Risk  Advised smoking cessation.       --Patient is to take all scheduled medications on the day of surgery     APPROVAL GIVEN to proceed with proposed procedure, without further diagnostic evaluation       Signed Electronically by: Sheri Mccarthy MD    Copy of this evaluation report is provided to requesting physician.    [facility] Preop Guidelines    Revised Cardiac Risk Index

## 2018-05-14 NOTE — TELEPHONE ENCOUNTER
...Reason for Call:  Other     Detailed comments:Silvana called from [facility] he said the patient will be having surgery please fax pre-op to [phone]    Phone Number Patient can be reached [phone]    Best Time: anytime    Can we leave a detailed message on this number? YES    Call taken on 5/14/2018 at 10:54 AM by Aminata Jones

## 2018-05-14 NOTE — LETTER
[address]  [address]  [phone]    May 16, 2018      Kareen Hernandez  [address] [facility] [address]        Dear Kareen    Attached you will find a copy of your recent laboratory report.   Your blood sugar is normal considering you were not fasting for your appointment   Your kidney function is normal.   Your blood clotting testing is normal.   Your blood cell counts are normal.   Please call or MyChart message me if you have any questions.     Sincerely,         Sheri Mccarthy MD

## 2018-05-14 NOTE — MR AVS SNAPSHOT
After Visit Summary   5/14/2018    Kareen Hernandez    MRN: [id number]           Patient Information     Date Of Birth          1965        Visit Information        Provider Department      5/14/2018 12:00 PM Sheri Mccarthy MD [facility]        Today's Diagnoses     Preop general physical exam    -  1    Lumbar disc herniation with radiculopathy        Gastroesophageal reflux disease without esophagitis        Chronic migraine without aura without status migrainosus, not intractable        Bipolar affective disorder, remission status unspecified (H)        Vitamin B12 deficiency disease          Care Instructions    Proceed with surgery as planned  Labs today    Refill omeprazole for regular use.        Attempt to discontinue smoking prior to surgery to assist with healing process.        Before Your Surgery      Call your surgeon if there is any change in your health. This includes signs of a cold or flu (such as a sore throat, runny nose, cough, rash or fever).    Do not smoke, drink alcohol or take over the counter medicine (unless your surgeon or primary care doctor tells you to) for the 24 hours before and after surgery.    If you take prescribed drugs: Follow your doctor s orders about which medicines to take and which to stop until after surgery.    Eating and drinking prior to surgery: follow the instructions from your surgeon    Take a shower or bath the night before surgery. Use the soap your surgeon gave you to gently clean your skin. If you do not have soap from your surgeon, use your regular soap. Do not shave or scrub the surgery site.  Wear clean pajamas and have clean sheets on your bed.           Follow-ups after your visit        Who to contact     If you have questions or need follow up information about today's clinic visit or your schedule please contact [facility] directly at [phone].  Normal or non-critical lab and imaging results will be  "communicated to you by [employer], letter or phone within 4 business days after the clinic has received the results. If you do not hear from us within 7 days, please contact the clinic through [employer] or phone. If you have a critical or abnormal lab result, we will notify you by phone as soon as possible.  Submit refill requests through [employer] or call your pharmacy and they will forward the refill request to us. Please allow 3 business days for your refill to be completed.          Additional Information About Your Visit        [employer] Information     [employer] lets you send messages to your doctor, view your test results, renew your prescriptions, schedule appointments and more. To sign up, go to [email or web address] . Click on \"Log in\" on the left side of the screen, which will take you to the Welcome page. Then click on \"Sign up Now\" on the right side of the page.     You will be asked to enter the access code listed below, as well as some personal information. Please follow the directions to create your username and password.     Your access code is: [email or web address]  Expires: 2018  1:09 PM     Your access code will  in 90 days. If you need help or a new code, please call your [facility] clinic or [phone].        Care EveryWhere ID     This is your Care EveryWhere ID. This could be used by other organizations to access your [facility] medical records  [id number]        Your Vitals Were     Pulse Temperature Height Last Period Pulse Oximetry Breastfeeding?    82 99.1  F (37.3  C) (Oral) 1.702 m (5' 7\") (LMP Unknown) 98% No    BMI (Body Mass Index)                   24.75 kg/m2            Blood Pressure from Last 3 Encounters:   18 138/82   18 116/80   17 114/60    Weight from Last 3 Encounters:   18 71.7 kg (158 lb)   18 71.2 kg (157 lb)   17 67.9 kg (149 lb 9.6 oz)              We Performed the Following     Basic metabolic panel     CBC with platelets     EKG 12-lead " complete w/read - Clinics     INR          Today's Medication Changes          These changes are accurate as of 5/14/18  1:09 PM.  If you have any questions, ask your nurse or doctor.               Stop taking these medicines if you haven't already. Please contact your care team if you have questions.     meloxicam 15 MG tablet   Commonly known as:  MOBIC   Stopped by:  Sheri Mccarthy MD           traMADol 50 MG tablet   Commonly known as:  ULTRAM   Stopped by:  Sheri Mccarthy MD                Where to get your medicines      [facility] [id number] [place] [address] [address] [facility] [address] [place]  [address] [facility] [address]     Phone:  [phone]     omeprazole 20 MG CR capsule                Primary Care Provider Office Phone # Fax #    Jessi Favian Lozano [phone] [address] [facility] [address]        Equal Access to Services     [facility]: Hadii aad ku hadasho Soomaali, waaxda luqadaha, qaybta kaalmada adeegyada, waxay idiin hayaan adeeg kharash la'jonelle . So [facility] [phone].    ATENCIÓN: Si habla español, tiene a lang disposición servicios gratuitos de asistencia lingüística. [facility] [phone].    We comply with applicable federal civil rights laws and [place] laws. We do not discriminate on the basis of race, color, national origin, age, disability, sex, sexual orientation, or gender identity.            Thank you!     Thank you for choosing [facility]  for your care. Our goal is always to provide you with excellent care. Hearing back from our patients is one way we can continue to improve our services. Please take a few minutes to complete the written survey that you may receive in the mail after your visit with us. Thank you!             Your Updated Medication List - Protect others around you: Learn how to safely use, store and throw away your medicines at  www.disposemymeds.org.          This list is accurate as of 5/14/18  1:09 PM.  Always use your most recent med list.                   Brand Name Dispense Instructions for use Diagnosis    clonazePAM 0.5 MG tablet    klonoPIN    20 tablet    Take 1 tablet (0.5 mg) by mouth 3 times daily AS NEEDED FOR ANXIETY    Anxiety       cyanocobalamin 1000 MCG/ML injection    VITAMIN B12    1 mL    Inject 1 mL (1,000 mcg) into the muscle every 30 days    Vitamin B12 deficiency disease       cyclobenzaprine 10 MG tablet    FLEXERIL    60 tablet    TAKE 1 TABLET BY MOUTH THREE TIMES DAILY AS NEEDED    Fibromyalgia       FLUoxetine 20 MG capsule    PROzac    360 capsule    TAKE 4 CAPSULES BY MOUTH DAILY    Bipolar affective disorder, remission status unspecified (H), Major depressive disorder, recurrent episode, moderate (H)       lamoTRIgine 100 MG tablet    LaMICtal    270 tablet    TAKE 3 TABLETS BY MOUTH DAILY    Bipolar affective disorder, remission status unspecified (H)       mometasone 50 MCG/ACT spray    NASONEX    3 Box    Spray 2 sprays into both nostrils daily    Allergic rhinitis, unspecified allergic rhinitis type       NICOTINE STEP 2 14 MG/24HR 24 hr patch   Generic drug:  nicotine     28 patch    PLACE 1 PATCH ONTO THE SKIN EVERY 24 HOURS    Tobacco use disorder       omeprazole 20 MG CR capsule    priLOSEC    90 capsule    Take 1 capsule (20 mg) by mouth daily    Gastroesophageal reflux disease without esophagitis       oxyCODONE-acetaminophen 5-325 MG per tablet    PERCOCET    20 tablet    Take 1 tablet by mouth 3 times daily as needed for severe pain    Acute low back pain with sciatica, sciatica laterality unspecified, unspecified back pain laterality       predniSONE 20 MG tablet    DELTASONE     Take 3 tabs daily for 4 days, then take 2 tabs daily for 3 days, then take 1 tab daily for 3 days, then take 1/2 tabs daily for 3 days, then stop.        SUMAtriptan 100 MG tablet    IMITREX    9 tablet    TAKE 1  TABLET BY MOUTH ON THE ONSET OF HEADACHE, MAY REPEAT IN 2 HOURS..*MAX 2 TABLETS DAILY*    Chronic migraine without aura without status migrainosus, not intractable       triamcinolone 0.1 % ointment    KENALOG    30 g    Apply to nail beds twice daily    Dystrophic nail       vitamin D 02666 UNIT capsule    ERGOCALCIFEROL    12 capsule    TAKE 1 CAPSULE BY MOUTH EVERY 7 DAYS    Vitamin D deficiency disease       zolpidem 10 MG tablet    AMBIEN    30 tablet    TAKE 1 TABLET BY MOUTH EVERY EVENING AS NEEDED    Persistent disorder of initiating or maintaining sleep

## 2018-05-16 ENCOUNTER — NURSE TRIAGE (OUTPATIENT)
Dept: NURSING | Facility: CLINIC | Age: 53
End: 2018-05-16

## 2018-05-16 ENCOUNTER — TELEPHONE (OUTPATIENT)
Dept: FAMILY MEDICINE | Facility: CLINIC | Age: 53
End: 2018-05-16

## 2018-05-16 DIAGNOSIS — M54.40 ACUTE LOW BACK PAIN WITH SCIATICA, SCIATICA LATERALITY UNSPECIFIED, UNSPECIFIED BACK PAIN LATERALITY: ICD-10-CM

## 2018-05-16 NOTE — PROGRESS NOTES
Please print letter and attach results.  PSK    Attached you will find a copy of your recent laboratory report.  Your blood sugar is normal considering you were not fasting for your appointment   Your kidney function is normal.  Your blood clotting testing is normal.  Your blood cell counts are normal.  Please call or MyChart message me if you have any questions.    Sincerely,         Sheri Mccarthy MD

## 2018-05-16 NOTE — TELEPHONE ENCOUNTER
Requested Prescriptions   Pending Prescriptions Disp Refills     oxyCODONE-acetaminophen (PERCOCET) 5-325 MG per tablet 20 tablet 0     Sig: Take 1 tablet by mouth 3 times daily as needed for severe pain    There is no refill protocol information for this order        Routing refill request to provider for review/approval because:  Drug not on the [facility] refill protocol     Alexander uYsuf RN, BSN

## 2018-05-16 NOTE — TELEPHONE ENCOUNTER
Reason for Call:  Medication or medication refill:    Do you use a [facility]?  Name of the pharmacy and phone number for the current request:  Pt will come to clinic to pick  up hard copy of Rx    Name of the medication requested: oxyCODONE-acetaminophen (PERCOCET) 5-325 MG per tablets - Pt calling for she is in excruciating pain, out of medication, and would like for Dr. Lozano to send in Rx's to  as soon as possible.     Can we leave a detailed message on this number? YES    Phone number patient can be reached at: Home number on file [phone] (home)    Best Time: anytime    Call taken on 5/16/2018 at 3:14 PM by Vinny Lopes

## 2018-05-16 NOTE — TELEPHONE ENCOUNTER
Clinic Action Needed:Yes  Reason for Call: Patient says due to increased pain she has been using six pain pills a day and has only one left.  Is requesting a new prescription for her Oxycodone and is willing to come pick it up today before clinic closes at 7pm.  Please call her at [phone].  Routed to: MG SHAMA Max RN  [facility]

## 2018-05-16 NOTE — TELEPHONE ENCOUNTER
Reason for Call:  Other Pre-Op    Detailed comments: [facility] calling for they would also like any additional labs and EKG's faxed with Pre-Op to fax # [phone] as soon as possible.     Phone NumberCharley can be reached at: Other phone number:  [phone]    Best Time: anytime    Can we leave a detailed message on this number? YES    Call taken on 5/16/2018 at 9:24 AM by Vinny Lopes

## 2018-05-16 NOTE — TELEPHONE ENCOUNTER
Reason for Disposition    Caller requesting a NON-URGENT new prescription or refill and triager unable to refill per unit policy    Protocols used: MEDICATION QUESTION CALL-ADULT-  Message routed to provider.  Ayala Max RN  [facility]

## 2018-05-17 ENCOUNTER — TELEPHONE (OUTPATIENT)
Dept: FAMILY MEDICINE | Facility: CLINIC | Age: 53
End: 2018-05-17

## 2018-05-17 RX ORDER — OXYCODONE AND ACETAMINOPHEN 5; 325 MG/1; MG/1
1-2 TABLET ORAL 3 TIMES DAILY PRN
Qty: 20 TABLET | Refills: 0 | Status: SHIPPED | OUTPATIENT
Start: 2018-05-17 | End: 2018-08-03

## 2018-05-17 NOTE — TELEPHONE ENCOUNTER
Patient calling back, getting more impatient and is stating she needs a call back today.  She went to ER last night and was given a shot for pain as she had no pain meds    She cannot go through the weekend with no pain medication    Can you review medication list as the directions are different than her bottle stated

## 2018-05-17 NOTE — TELEPHONE ENCOUNTER
Reason for Call:  Other     Detailed comments: [facility] surgery department called needs to have Pre-op eval and results sent to fax [phone] surgery is 5/21/2018.    Phone Number [facility] can be reached at: [phone]    Best Time: any    Can we leave a detailed message on this number? YES    Call taken on 5/17/2018 at 8:47 AM by Stephie Vincent

## 2018-05-17 NOTE — TELEPHONE ENCOUNTER
Patient walked in to the clinic asking for pain medication.  She is out of her medication and does not think she can get through the weekend without anything.  Surgery is scheduled for Monday.

## 2018-05-17 NOTE — TELEPHONE ENCOUNTER
Reason for Call:  Other prescription    Detailed comments: Kareen calling again still waiting for her oxyCODONE-acetaminophen (PERCOCET) 5-325 MG per tablet.  Refer to previous messages below. She is also needing you to clarify the dosing on this she says her med list has it one way and the prescription bottles have dosing differently.  She needs someone to call her today.  She said she is having surgery on Monday and needs this right away.   Please call her.  Thank you     Phone Number Patient can be reached at: Home number on file [phone] (home)    Best Time: Any    Can we leave a detailed message on this number? YES    Call taken on 5/17/2018 at 2:17 PM by Brendon Turner

## 2018-05-18 NOTE — TELEPHONE ENCOUNTER
Mark is stating they did not get this fax  Can you refax as soon as possible  Thank you    Same number    Silvana [phone]  Call him when done so he can watch for it    Surgery is first thing Monday morning

## 2018-06-04 ENCOUNTER — OFFICE VISIT (OUTPATIENT)
Dept: FAMILY MEDICINE | Facility: CLINIC | Age: 53
End: 2018-06-04
Payer: MEDICARE

## 2018-06-04 VITALS
HEART RATE: 79 BPM | DIASTOLIC BLOOD PRESSURE: 70 MMHG | TEMPERATURE: 98.8 F | RESPIRATION RATE: 16 BRPM | HEIGHT: 67 IN | SYSTOLIC BLOOD PRESSURE: 112 MMHG | WEIGHT: 161 LBS | OXYGEN SATURATION: 98 % | BODY MASS INDEX: 25.27 KG/M2

## 2018-06-04 DIAGNOSIS — Z98.890 S/P LUMBAR LAMINECTOMY: Primary | ICD-10-CM

## 2018-06-04 DIAGNOSIS — Z12.11 SPECIAL SCREENING FOR MALIGNANT NEOPLASMS, COLON: ICD-10-CM

## 2018-06-04 PROCEDURE — 99214 OFFICE O/P EST MOD 30 MIN: CPT | Performed by: FAMILY MEDICINE

## 2018-06-04 RX ORDER — OXYCODONE AND ACETAMINOPHEN 5; 325 MG/1; MG/1
1 TABLET ORAL EVERY 6 HOURS PRN
Qty: 20 TABLET | Refills: 0 | Status: SHIPPED | OUTPATIENT
Start: 2018-06-04 | End: 2018-08-03

## 2018-06-04 RX ORDER — CEPHALEXIN 500 MG/1
500 CAPSULE ORAL 3 TIMES DAILY
Qty: 21 CAPSULE | Refills: 0 | Status: SHIPPED | OUTPATIENT
Start: 2018-06-04 | End: 2018-06-11

## 2018-06-04 NOTE — MR AVS SNAPSHOT
"              After Visit Summary   6/4/2018    Kareen Hernandez    MRN: [id number]           Patient Information     Date Of Birth          1965        Visit Information        Provider Department      6/4/2018 5:40 PM Jessi Lozano MD [facility]        Today's Diagnoses     S/P lumbar laminectomy    -  1    Special screening for malignant neoplasms, colon           Follow-ups after your visit        Follow-up notes from your care team     Return if symptoms worsen or fail to improve.      Future tests that were ordered for you today     Open Future Orders        Priority Expected Expires Ordered    Fecal colorectal cancer screen (FIT) Routine 6/25/2018 8/27/2018 6/4/2018            Who to contact     If you have questions or need follow up information about today's clinic visit or your schedule please contact [facility] directly at [phone].  Normal or non-critical lab and imaging results will be communicated to you by [email or web address], letter or phone within 4 business days after the clinic has received the results. If you do not hear from us within 7 days, please contact the clinic through Firm[employer] or phone. If you have a critical or abnormal lab result, we will notify you by phone as soon as possible.  Submit refill requests through [employer] or call your pharmacy and they will forward the refill request to us. Please allow 3 business days for your refill to be completed.          Additional Information About Your Visit        MyChart Information     [employer] lets you send messages to your doctor, view your test results, renew your prescriptions, schedule appointments and more. To sign up, go to [email or web address] . Click on \"Log in\" on the left side of the screen, which will take you to the Welcome page. Then click on \"Sign up Now\" on the right side of the page.     You will be asked to enter the access code listed below, as well as some personal information. Please " "follow the directions to create your username and password.     Your access code is: [email or web address]  Expires: 2018  1:09 PM     Your access code will  in 90 days. If you need help or a new code, please call your [facility] clinic or [phone].        Care EveryWhere ID     This is your Care EveryWhere ID. This could be used by other organizations to access your [facility] medical records  [id number]        Your Vitals Were     Pulse Temperature Respirations Height Last Period Pulse Oximetry    79 98.8  F (37.1  C) (Oral) 16 1.702 m (5' 7\") (LMP Unknown) 98%    BMI (Body Mass Index)                   25.22 kg/m2            Blood Pressure from Last 3 Encounters:   18 112/70   18 138/82   18 116/80    Weight from Last 3 Encounters:   18 73 kg (161 lb)   18 71.7 kg (158 lb)   18 71.2 kg (157 lb)                 Today's Medication Changes          These changes are accurate as of 18  6:37 PM.  If you have any questions, ask your nurse or doctor.               Start taking these medicines.        Dose/Directions    cephALEXin 500 MG capsule   Commonly known as:  KEFLEX   Used for:  S/P lumbar laminectomy   Started by:  Jessi Lozano MD        Dose:  500 mg   Take 1 capsule (500 mg) by mouth 3 times daily for 7 days   Quantity:  21 capsule   Refills:  0         These medicines have changed or have updated prescriptions.        Dose/Directions    * oxyCODONE-acetaminophen 5-325 MG per tablet   Commonly known as:  PERCOCET   This may have changed:  Another medication with the same name was added. Make sure you understand how and when to take each.   Used for:  Acute low back pain with sciatica, sciatica laterality unspecified, unspecified back pain laterality   Changed by:  Jessi Lozano MD        Dose:  1-2 tablet   Take 1-2 tablets by mouth 3 times daily as needed for severe pain Max 6 tabs per day   Quantity:  20 tablet   Refills:  0       * " oxyCODONE-acetaminophen 5-325 MG per tablet   Commonly known as:  PERCOCET   This may have changed:  You were already taking a medication with the same name, and this prescription was added. Make sure you understand how and when to take each.   Used for:  S/P lumbar laminectomy   Changed by:  Jessi Lozano MD        Dose:  1 tablet   Take 1 tablet by mouth every 6 hours as needed for moderate to severe pain   Quantity:  20 tablet   Refills:  0       * Notice:  This list has 2 medication(s) that are the same as other medications prescribed for you. Read the directions carefully, and ask your doctor or other care provider to review them with you.         Where to get your medicines      These medications were sent to [email or web address] [id number]  [place] - [address] AT [facility] & [place]  [address] [facility] [place] [address]     Phone:  [phone]     cephALEXin 500 MG capsule         Some of these will need a paper prescription and others can be bought over the counter.  Ask your nurse if you have questions.     Bring a paper prescription for each of these medications     oxyCODONE-acetaminophen 5-325 MG per tablet               Information about OPIOIDS     PRESCRIPTION OPIOIDS: WHAT YOU NEED TO KNOW   You have a prescription for an opioid (narcotic) pain medicine. Opioids can cause addiction. If you have a history of chemical dependency of any type, you are at a higher risk of becoming addicted to opioids. Only take this medicine after all other options have been tried. Take it for as short a time and as few doses as possible.     Do not:    Drive. If you drive while taking these medicines, you could be arrested for driving under the influence (DUI).    Operate heavy machinery    Do any other dangerous activities while taking these medicines.     Drink any alcohol while taking these medicines.      Take with any other medicines that contain acetaminophen. Read all  labels carefully. Look for the word  acetaminophen  or  Tylenol.  Ask your pharmacist if you have questions or are unsure.    Store your pills in a secure place, locked if possible. We will not replace any lost or stolen medicine. If you don t finish your medicine, please throw away (dispose) as directed by your pharmacist. The [place] Pollution Control Agency has more information about safe disposal: https://www.pca.[facility].us/living-green/managing-unwanted-medications    All opioids tend to cause constipation. Drink plenty of water and eat foods that have a lot of fiber, such as fruits, vegetables, prune juice, apple juice and high-fiber cereal. Take a laxative (Miralax, milk of magnesia, Colace, Senna) if you don t move your bowels at least every other day.          Primary Care Provider Office Phone # Fax #    Jessi Favian Lozano [phone] [address] [facility] [address]        Equal Access to Services     [facility] Center[facility]ZEL : Hadii aad ku hadasho Soomaali, waaxda luqadaha, qaybta kaalmada adeegyada, waxay idiin haylissyn shane carballo . So [facility] [phone].    ATENCIÓN: Si habla español, tiene a lang disposición servicios gratuitos de asistencia lingüística. Llame al [phone].    We comply with applicable federal civil rights laws and [place] laws. We do not discriminate on the basis of race, color, national origin, age, disability, sex, sexual orientation, or gender identity.            Thank you!     Thank you for choosing [facility]  for your care. Our goal is always to provide you with excellent care. Hearing back from our patients is one way we can continue to improve our services. Please take a few minutes to complete the written survey that you may receive in the mail after your visit with us. Thank you!             Your Updated Medication List - Protect others around you: Learn how to safely use, store and throw away your medicines at  www.disposemymeds.org.          This list is accurate as of 6/4/18  6:37 PM.  Always use your most recent med list.                   Brand Name Dispense Instructions for use Diagnosis    cephALEXin 500 MG capsule    KEFLEX    21 capsule    Take 1 capsule (500 mg) by mouth 3 times daily for 7 days    S/P lumbar laminectomy       clonazePAM 0.5 MG tablet    klonoPIN    20 tablet    Take 1 tablet (0.5 mg) by mouth 3 times daily AS NEEDED FOR ANXIETY    Anxiety       cyanocobalamin 1000 MCG/ML injection    VITAMIN B12    1 mL    Inject 1 mL (1,000 mcg) into the muscle every 30 days    Vitamin B12 deficiency disease       cyclobenzaprine 10 MG tablet    FLEXERIL    60 tablet    TAKE 1 TABLET BY MOUTH THREE TIMES DAILY AS NEEDED    Fibromyalgia       FLUoxetine 20 MG capsule    PROzac    360 capsule    TAKE 4 CAPSULES BY MOUTH DAILY    Bipolar affective disorder, remission status unspecified (H), Major depressive disorder, recurrent episode, moderate (H)       lamoTRIgine 100 MG tablet    LaMICtal    270 tablet    TAKE 3 TABLETS BY MOUTH DAILY    Bipolar affective disorder, remission status unspecified (H)       mometasone 50 MCG/ACT spray    NASONEX    3 Box    Spray 2 sprays into both nostrils daily    Allergic rhinitis, unspecified allergic rhinitis type       NICOTINE STEP 2 14 MG/24HR 24 hr patch   Generic drug:  nicotine     28 patch    PLACE 1 PATCH ONTO THE SKIN EVERY 24 HOURS    Tobacco use disorder       omeprazole 20 MG CR capsule    priLOSEC    90 capsule    Take 1 capsule (20 mg) by mouth daily    Gastroesophageal reflux disease without esophagitis       * oxyCODONE-acetaminophen 5-325 MG per tablet    PERCOCET    20 tablet    Take 1-2 tablets by mouth 3 times daily as needed for severe pain Max 6 tabs per day    Acute low back pain with sciatica, sciatica laterality unspecified, unspecified back pain laterality       * oxyCODONE-acetaminophen 5-325 MG per tablet    PERCOCET    20 tablet    Take 1 tablet by  mouth every 6 hours as needed for moderate to severe pain    S/P lumbar laminectomy       SUMAtriptan 100 MG tablet    IMITREX    9 tablet    TAKE 1 TABLET BY MOUTH ON THE ONSET OF HEADACHE, MAY REPEAT IN 2 HOURS..*MAX 2 TABLETS DAILY*    Chronic migraine without aura without status migrainosus, not intractable       triamcinolone 0.1 % ointment    KENALOG    30 g    Apply to nail beds twice daily    Dystrophic nail       vitamin D 33139 UNIT capsule    ERGOCALCIFEROL    12 capsule    TAKE 1 CAPSULE BY MOUTH EVERY 7 DAYS    Vitamin D deficiency disease       zolpidem 10 MG tablet    AMBIEN    30 tablet    TAKE 1 TABLET BY MOUTH EVERY EVENING AS NEEDED    Persistent disorder of initiating or maintaining sleep       * Notice:  This list has 2 medication(s) that are the same as other medications prescribed for you. Read the directions carefully, and ask your doctor or other care provider to review them with you.

## 2018-06-04 NOTE — PROGRESS NOTES
SUBJECTIVE:   Kareen Hernandez is a 53 year old female who presents to clinic today for the following health issues:          Hospital Follow-up Visit:    Hospital/Nursing Home/ Rehab Facility: [facility]  Date of Admission: 5/21/18  Date of Discharge: 5/22/18  Reason(s) for Admission: Back/Spine surgery             Problems taking medications regularly:  None       Medication changes since discharge: None       Problems adhering to non-medication therapy:  None    Summary of hospitalization:  CareEverywhere information obtained and reviewed  Diagnostic Tests/Treatments reviewed.  Follow up needed: none  Other Healthcare Providers Involved in Patient s Care:         None  Update since discharge: improved.     Post Discharge Medication Reconciliation: discharge medications reconciled and changed, per note/orders (see AVS).  Plan of care communicated with patient     Coding guidelines for this visit:  Type of Medical   Decision Making Face-to-Face Visit       within 7 Days of discharge Face-to-Face Visit        within 14 days of discharge   Moderate Complexity [id number]   High Complexity [id number]          SUBJECTIVE:  Here today in follow-up of recent lumbar laminectomy performed on May 21.  L5-S1 right-sided disc.  Patient's follow-up with her surgeon is not for another week or 2 and is concerned because she has a large tender lump over her scar.  No fluid drainage.  The more she is on her feet the bigger it gets.  Is quite painful.  Still having some numbness into her right foot and lateral calf.  No weakness.  No bowel or bladder symptoms.  Full records reviewed through care everywhere including follow-up ER visit a week ago.    Review of systems otherwise negative.  Past medical, family, and social history reviewed and updated in chart.    OBJECTIVE:  /70 (BP Location: Right arm, Patient Position: Sitting, Cuff Size: Adult Regular)  Pulse 79  Temp 98.8  F (37.1  C) (Oral)  Resp 16  Ht  "1.702 m (5' 7\")  Wt 73 kg (161 lb)  LMP  (LMP Unknown)  SpO2 98%  BMI 25.22 kg/m2  Alert, pleasant, upbeat, and in no apparent discomfort.  S1 and S2 normal, no murmurs, clicks, gallops or rubs. Regular rate and rhythm. Chest is clear; no wheezes or rales. No edema or JVD.  Back -there is a healed incision over her lower lumbar vertebrae.  Quite a bit of soft tissue swelling without obvious fluid collection.  The lower half of the incision is somewhat erythematous and warm  Lower extremities -straight leg raising negative bilaterally.  Normal muscle mass, tone, deep tendon reflexes  Past labs reviewed with the patient.     ASSESSMENT / PLAN:  (Z98.890) S/P lumbar laminectomy  (primary encounter diagnosis)  Comment: I think this is most likely just some postoperative swelling that is somewhat dependent on how much she is on her feet or moves.  I do not really suspect a seroma or anything that would require drainage.  The patient is concerned that something more is going on and I think would be reasonable to treat with a course of antibiotics somewhat presumptively and may gain a little bit of anti-inflammatory effect.  But I discussed with the patient and her daughter what to watch out for.  Plan: cephALEXin (KEFLEX) 500 MG capsule,         oxyCODONE-acetaminophen (PERCOCET) 5-325 MG per        Tablet    Counseled patient that she is due for upcoming screening for Pap smear, mammogram, colon cancer            Follow up as needed   S. Favian Lozano MD    (Chart documentation completed in part with Dragon voice-recognition software.  Even though reviewed some grammatical, spelling, and word errors may remain.)     "

## 2018-06-18 DIAGNOSIS — F33.1 MAJOR DEPRESSIVE DISORDER, RECURRENT EPISODE, MODERATE (H): ICD-10-CM

## 2018-06-18 DIAGNOSIS — F31.9 BIPOLAR AFFECTIVE DISORDER, REMISSION STATUS UNSPECIFIED (H): ICD-10-CM

## 2018-06-19 NOTE — TELEPHONE ENCOUNTER
Routing refill request to provider for review/approval because:  For Lamictal: Labs not current:  ALT, AST    For fluoxetine: PHQ-9 is 5 or more. Per protocol, RN cannot refill if PHQ-9 is over 4.    Alexander Yusuf RN, BSN

## 2018-06-19 NOTE — TELEPHONE ENCOUNTER
"Requested Prescriptions   Pending Prescriptions Disp Refills     FLUoxetine (PROZAC) 20 MG capsule [Pharmacy Med Name: FLUOXETINE 20MG CAPSULES]  Last Written Prescription Date:  4/11/18  Last Fill Quantity: 360 capsule,  # refills: 0   Last office visit: 6/4/2018 with prescribing provider:  Dr. Lozano   Future Office Visit:   120 capsule 0     Sig: TAKE 4 CAPSULES BY MOUTH DAILY    SSRIs Protocol Failed    6/18/2018  7:41 PM       Failed - PHQ-9 score less than 5 in past 6 months    Please review last PHQ-9 score.   PHQ-9 SCORE 5/3/2016 11/20/2017 4/4/2018   Total Score - - -   Total Score 10 16 13     CHARAN-7 SCORE 5/3/2016 11/20/2017 4/4/2018   Total Score - - -   Total Score 12 12 14   Total Score - - -          Passed - Patient is age 18 or older       Passed - No active pregnancy on record       Passed - No positive pregnancy test in last 12 months       Passed - Recent (6 mo) or future (30 days) visit within the authorizing provider's specialty    Patient had office visit in the last 6 months or has a visit in the next 30 days with authorizing provider or within the authorizing provider's specialty.  See \"Patient Info\" tab in inbasket, or \"Choose Columns\" in Meds & Orders section of the refill encounter.                  lamoTRIgine (LAMICTAL) 100 MG tablet [Pharmacy Med Name: LAMOTRIGINE 100MG TABLETS]  Last Written Prescription Date:  4/10/18  Last Fill Quantity: 270 tablet,  # refills: 1   Last office visit: 6/4/2018 with prescribing provider:  Dr. Lozano  Future Office Visit:   90 tablet 0     Sig: TAKE 3 TABLETS BY MOUTH DAILY    Anti-Seizure Meds Protocol  Failed    6/18/2018  7:41 PM       Failed - Review Authorizing provider's last note.     Refer to last progress notes: confirm request is for original authorizing provider (cannot be through other providers).         Failed - Normal ALT or AST on file in past 26 months    Recent Labs   Lab Test  08/05/14   1006   ALT  27     Recent Labs   Lab Test  " "08/05/14   1006   AST  23            Passed - Recent (12 mo) or future (30 days) visit within the authorizing provider's specialty    Patient had office visit in the last 12 months or has a visit in the next 30 days with authorizing provider or within the authorizing provider's specialty.  See \"Patient Info\" tab in inbasket, or \"Choose Columns\" in Meds & Orders section of the refill encounter.           Passed - Normal CBC on file in past 26 months    Recent Labs   Lab Test  05/14/18   1305   WBC  6.5   RBC  4.21   HGB  13.6   HCT  40.6   PLT  217       For [facility] ONLY: [id number] = WBC, [id number] = RBC         Passed - Normal platelet count on file in past 26 months    Recent Labs   Lab Test  05/14/18   1305   PLT  217              Passed - No active pregnancy on record       Passed - No positive pregnancy test in last 12 months          "

## 2018-06-20 RX ORDER — LAMOTRIGINE 100 MG/1
TABLET ORAL
Qty: 90 TABLET | Refills: 0 | Status: SHIPPED | OUTPATIENT
Start: 2018-06-20 | End: 2018-07-26

## 2018-06-28 ENCOUNTER — TRANSFERRED RECORDS (OUTPATIENT)
Dept: HEALTH INFORMATION MANAGEMENT | Facility: CLINIC | Age: 53
End: 2018-06-28

## 2018-07-26 DIAGNOSIS — F33.1 MAJOR DEPRESSIVE DISORDER, RECURRENT EPISODE, MODERATE (H): ICD-10-CM

## 2018-07-26 DIAGNOSIS — F31.9 BIPOLAR AFFECTIVE DISORDER, REMISSION STATUS UNSPECIFIED (H): ICD-10-CM

## 2018-07-26 NOTE — LETTER
August 1, 2018      Kareen Hernandez  [address] [facility] [address]        Dear Kareen,     Please complete the attached form regarding your Anxiety and Depression assessment.   Call us or mail form back to update. I have given you a refill on your Fluoxetine.       Sincerely,        Jessi Lozano MD

## 2018-07-26 NOTE — TELEPHONE ENCOUNTER
"Requested Prescriptions   Pending Prescriptions Disp Refills     lamoTRIgine (LAMICTAL) 100 MG tablet [Pharmacy Med Name: LAMOTRIGINE 100MG TABLETS]  Last Written Prescription Date:  6/20/18  Last Fill Quantity: 90 tablet,  # refills: 0   Last office visit: 6/4/2018 with prescribing provider:  Dr. Lozano   Future Office Visit:   90 tablet 0     Sig: TAKE 3 TABLETS BY MOUTH DAILY    Anti-Seizure Meds Protocol  Failed    7/26/2018 10:26 AM       Failed - Review Authorizing provider's last note.     Refer to last progress notes: confirm request is for original authorizing provider (cannot be through other providers).         Failed - Normal ALT or AST on file in past 26 months    Recent Labs   Lab Test  08/05/14   1006   ALT  27     Recent Labs   Lab Test  08/05/14   1006   AST  23            Passed - Recent (12 mo) or future (30 days) visit within the authorizing provider's specialty    Patient had office visit in the last 12 months or has a visit in the next 30 days with authorizing provider or within the authorizing provider's specialty.  See \"Patient Info\" tab in inbasket, or \"Choose Columns\" in Meds & Orders section of the refill encounter.           Passed - Normal CBC on file in past 26 months    Recent Labs   Lab Test  05/14/18   1305   WBC  6.5   RBC  4.21   HGB  13.6   HCT  40.6   PLT  217       For [facility] ONLY: [id number] = WBC, [id number] = RBC         Passed - Normal platelet count on file in past 26 months    Recent Labs   Lab Test  05/14/18   1305   PLT  217              Passed - No active pregnancy on record       Passed - No positive pregnancy test in last 12 months          "

## 2018-07-26 NOTE — TELEPHONE ENCOUNTER
"Requested Prescriptions   Pending Prescriptions Disp Refills     FLUoxetine (PROZAC) 20 MG capsule [Pharmacy Med Name: FLUOXETINE 20MG CAPSULES]  Last Written Prescription Date:  6/20/18  Last Fill Quantity: 120 capsule,  # refills: 0   Last office visit: 6/4/2018 with prescribing provider:  Dr. Lozano   Future Office Visit:   120 capsule 0     Sig: TAKE 4 CAPSULES BY MOUTH DAILY    SSRIs Protocol Failed    7/26/2018  2:01 PM       Failed - PHQ-9 score less than 5 in past 6 months    Please review last PHQ-9 score.   PHQ-9 SCORE 5/3/2016 11/20/2017 4/4/2018   Total Score - - -   Total Score 10 16 13     CHARAN-7 SCORE 5/3/2016 11/20/2017 4/4/2018   Total Score - - -   Total Score 12 12 14   Total Score - - -          Passed - Patient is age 18 or older       Passed - No active pregnancy on record       Passed - No positive pregnancy test in last 12 months       Passed - Recent (6 mo) or future (30 days) visit within the authorizing provider's specialty    Patient had office visit in the last 6 months or has a visit in the next 30 days with authorizing provider or within the authorizing provider's specialty.  See \"Patient Info\" tab in inbasket, or \"Choose Columns\" in Meds & Orders section of the refill encounter.              "

## 2018-07-30 NOTE — TELEPHONE ENCOUNTER
Routing refill request to provider for review/approval because:  Last PHQ9 greater than 4  Amie Ritter RN

## 2018-07-30 NOTE — TELEPHONE ENCOUNTER
Routing refill request to provider for review/approval because:  Labs not current:  ALT, AST  Amie Ritter RN

## 2018-07-31 RX ORDER — LAMOTRIGINE 100 MG/1
TABLET ORAL
Qty: 90 TABLET | Refills: 2 | Status: SHIPPED | OUTPATIENT
Start: 2018-07-31 | End: 2018-09-17

## 2018-08-01 ENCOUNTER — TELEPHONE (OUTPATIENT)
Dept: FAMILY MEDICINE | Facility: CLINIC | Age: 53
End: 2018-08-01

## 2018-08-01 DIAGNOSIS — E53.8 VITAMIN B12 DEFICIENCY DISEASE: ICD-10-CM

## 2018-08-01 RX ORDER — CYANOCOBALAMIN 1000 UG/ML
1 INJECTION, SOLUTION INTRAMUSCULAR; SUBCUTANEOUS
Qty: 1 ML | Refills: 11 | OUTPATIENT
Start: 2018-08-01 | End: 2018-08-03

## 2018-08-01 ASSESSMENT — ANXIETY QUESTIONNAIRES
GAD7 TOTAL SCORE: 14
5. BEING SO RESTLESS THAT IT IS HARD TO SIT STILL: MORE THAN HALF THE DAYS
7. FEELING AFRAID AS IF SOMETHING AWFUL MIGHT HAPPEN: MORE THAN HALF THE DAYS
3. WORRYING TOO MUCH ABOUT DIFFERENT THINGS: MORE THAN HALF THE DAYS
6. BECOMING EASILY ANNOYED OR IRRITABLE: MORE THAN HALF THE DAYS
1. FEELING NERVOUS, ANXIOUS, OR ON EDGE: MORE THAN HALF THE DAYS
2. NOT BEING ABLE TO STOP OR CONTROL WORRYING: MORE THAN HALF THE DAYS
IF YOU CHECKED OFF ANY PROBLEMS ON THIS QUESTIONNAIRE, HOW DIFFICULT HAVE THESE PROBLEMS MADE IT FOR YOU TO DO YOUR WORK, TAKE CARE OF THINGS AT HOME, OR GET ALONG WITH OTHER PEOPLE: VERY DIFFICULT

## 2018-08-01 ASSESSMENT — PATIENT HEALTH QUESTIONNAIRE - PHQ9: 5. POOR APPETITE OR OVEREATING: MORE THAN HALF THE DAYS

## 2018-08-01 NOTE — TELEPHONE ENCOUNTER
This writer attempted to contact 1 on 08/01/18      Reason for call Rx refill and left detailed message.      If patient calls back: NEED TO OBTAIN PHQ9    Patient contacted by [facility] Team (MA/TC). Inform patient that someone from the team will contact them, document that pt called and route to care team.     Hernán Tejeda, Medical Assistant

## 2018-08-02 ASSESSMENT — ANXIETY QUESTIONNAIRES: GAD7 TOTAL SCORE: 14

## 2018-08-02 ASSESSMENT — PATIENT HEALTH QUESTIONNAIRE - PHQ9: SUM OF ALL RESPONSES TO PHQ QUESTIONS 1-9: 15

## 2018-08-03 ENCOUNTER — OFFICE VISIT (OUTPATIENT)
Dept: FAMILY MEDICINE | Facility: CLINIC | Age: 53
End: 2018-08-03
Payer: MEDICARE

## 2018-08-03 VITALS
OXYGEN SATURATION: 97 % | DIASTOLIC BLOOD PRESSURE: 74 MMHG | BODY MASS INDEX: 24.92 KG/M2 | TEMPERATURE: 98.7 F | RESPIRATION RATE: 18 BRPM | HEIGHT: 67 IN | WEIGHT: 158.8 LBS | SYSTOLIC BLOOD PRESSURE: 115 MMHG | HEART RATE: 82 BPM

## 2018-08-03 DIAGNOSIS — F31.9 BIPOLAR AFFECTIVE DISORDER, REMISSION STATUS UNSPECIFIED (H): ICD-10-CM

## 2018-08-03 DIAGNOSIS — R07.0 THROAT PAIN: ICD-10-CM

## 2018-08-03 DIAGNOSIS — J01.00 ACUTE MAXILLARY SINUSITIS, RECURRENCE NOT SPECIFIED: Primary | ICD-10-CM

## 2018-08-03 DIAGNOSIS — F41.9 ANXIETY: ICD-10-CM

## 2018-08-03 DIAGNOSIS — F33.1 MAJOR DEPRESSIVE DISORDER, RECURRENT EPISODE, MODERATE (H): ICD-10-CM

## 2018-08-03 DIAGNOSIS — E53.8 VITAMIN B12 DEFICIENCY DISEASE: ICD-10-CM

## 2018-08-03 LAB
DEPRECATED S PYO AG THROAT QL EIA: NORMAL
SPECIMEN SOURCE: NORMAL

## 2018-08-03 PROCEDURE — 96372 THER/PROPH/DIAG INJ SC/IM: CPT | Performed by: PHYSICIAN ASSISTANT

## 2018-08-03 PROCEDURE — 99214 OFFICE O/P EST MOD 30 MIN: CPT | Mod: 25 | Performed by: PHYSICIAN ASSISTANT

## 2018-08-03 PROCEDURE — 87880 STREP A ASSAY W/OPTIC: CPT | Performed by: PHYSICIAN ASSISTANT

## 2018-08-03 PROCEDURE — 87081 CULTURE SCREEN ONLY: CPT | Performed by: PHYSICIAN ASSISTANT

## 2018-08-03 RX ORDER — MOMETASONE FUROATE MONOHYDRATE 50 UG/1
2 SPRAY, METERED NASAL DAILY
Qty: 1 BOX | Refills: 3 | Status: SHIPPED | OUTPATIENT
Start: 2018-08-03 | End: 2018-08-03

## 2018-08-03 RX ORDER — MOMETASONE FUROATE MONOHYDRATE 50 UG/1
2 SPRAY, METERED NASAL DAILY
Qty: 1 BOX | Refills: 3 | Status: SHIPPED | OUTPATIENT
Start: 2018-08-03 | End: 2019-05-28

## 2018-08-03 RX ORDER — CYANOCOBALAMIN 1000 UG/ML
1 INJECTION, SOLUTION INTRAMUSCULAR; SUBCUTANEOUS
Qty: 1 ML | Refills: 11 | Status: SHIPPED | OUTPATIENT
Start: 2018-08-03 | End: 2019-10-09

## 2018-08-03 ASSESSMENT — PAIN SCALES - GENERAL: PAINLEVEL: MODERATE PAIN (5)

## 2018-08-03 NOTE — PATIENT INSTRUCTIONS
Take augmentin twice a day for 10 days  Use nasonex nasal spray  Return urgently if any change in symptoms like fever, increasing cough, or other change in symptoms.   Keep follow up with Dr. Lozano  As previously scheduled  Resume the prozac and lamictal wean back up to previous dose

## 2018-08-03 NOTE — MR AVS SNAPSHOT
After Visit Summary   8/3/2018    Kareen Hernandez    MRN: [id number]           Patient Information     Date Of Birth          1965        Visit Information        Provider Department      8/3/2018 1:00 PM Elen Gentile PA-C [facility]        Today's Diagnoses     Throat pain    -  1    Acute maxillary sinusitis, recurrence not specified          Care Instructions    Take augmentin twice a day for 10 days  Use nasonex nasal spray  Return urgently if any change in symptoms like fever, increasing cough, or other change in symptoms.   Keep follow up with Dr. Lozano  As previously scheduled  Resume the prozac and lamictal wean back up to previous dose           Follow-ups after your visit        Additional Services     OTOLARYNGOLOGY REFERRAL       Your provider has referred you to: [facility]: [facility] [phone]   [email or web address]    Please be aware that coverage of these services is subject to the terms and limitations of your health insurance plan.  Call member services at your health plan with any benefit or coverage questions.      Please bring the following with you to your appointment:    (1) Any X-Rays, CTs or MRIs which have been performed.  Contact the facility where they were done to arrange for  prior to your scheduled appointment.   (2) List of current medications  (3) This referral request   (4) Any documents/labs given to you for this referral                  Your next 10 appointments already scheduled     Sep 17, 2018 12:20 PM CDT   [facility] with Jessi [facility] MD   [facility]    [address] [address] [phone]           Bring a current list of meds and any records pertaining to this visit. For Physicals, please bring immunization records and any forms needing to be filled out.  "Please arrive 10 minutes early to complete paperwork.              Who to contact     If you have questions or need follow up information about today's clinic visit or your schedule please contact [facility] directly at [phone].  Normal or non-critical lab and imaging results will be communicated to you by MyChart, letter or phone within 4 business days after the clinic has received the results. If you do not hear from us within 7 days, please contact the clinic through MyChart or phone. If you have a critical or abnormal lab result, we will notify you by phone as soon as possible.  Submit refill requests through [facility] or call your pharmacy and they will forward the refill request to us. Please allow 3 business days for your refill to be completed.          Additional Information About Your Visit        [employer] Information     [facility] lets you send messages to your doctor, view your test results, renew your prescriptions, schedule appointments and more. To sign up, go to [email or web address] . Click on \"Log in\" on the left side of the screen, which will take you to the Welcome page. Then click on \"Sign up Now\" on the right side of the page.     You will be asked to enter the access code listed below, as well as some personal information. Please follow the directions to create your username and password.     Your access code is: [email or web address]  Expires: 2018  1:09 PM     Your access code will  in 90 days. If you need help or a new code, please call your [facility] or [phone].        Care EveryWhere ID     This is your Care EveryWhere ID. This could be used by other organizations to access your [place] medical records  [id number]        Your Vitals Were     Pulse Temperature Respirations Height Last Period Pulse Oximetry    82 98.7  F (37.1  C) (Oral) 18 1.702 m (5' 7\") (LMP Unknown) 97%    BMI (Body Mass Index)                   24.87 kg/m2            Blood Pressure " from Last 3 Encounters:   08/03/18 115/74   06/04/18 112/70   05/14/18 138/82    Weight from Last 3 Encounters:   08/03/18 72 kg (158 lb 12.8 oz)   06/04/18 73 kg (161 lb)   05/14/18 71.7 kg (158 lb)              We Performed the Following     Beta strep group A culture     OTOLARYNGOLOGY REFERRAL     Strep, Rapid Screen          Today's Medication Changes          These changes are accurate as of 8/3/18  1:44 PM.  If you have any questions, ask your nurse or doctor.               Start taking these medicines.        Dose/Directions    amoxicillin-clavulanate 875-125 MG per tablet   Commonly known as:  AUGMENTIN   Used for:  Acute maxillary sinusitis, recurrence not specified   Started by:  Elen Gentile PA-C        Dose:  1 tablet   Take 1 tablet by mouth 2 times daily   Quantity:  20 tablet   Refills:  0            Where to get your medicines      These medications were sent to [facility] [address] [place] [address] AT [facility] & [address] [facility] [address]     Phone:  [phone]     amoxicillin-clavulanate 875-125 MG per tablet    mometasone 50 MCG/ACT spray                Primary Care Provider Office Phone # Fax #    Jessi Favian Lozano [phone] [address] [place] [address]        Equal Access to Services     [facility] AH: Hadii aad ku hadasho Soomaali, waaxda luqadaha, qaybta kaalmada adeegyada, bon maldonado hayjonelle barbosa. So [facility] [phone].    ATENCIÓN: Si habla español, tiene a lang disposición servicios gratuitos de asistencia lingüística. Robert al [phone].    We comply with applicable federal civil rights laws and [place] laws. We do not discriminate on the basis of race, color, national origin, age, disability, sex, sexual orientation, or gender identity.            Thank you!     Thank you for choosing [facility]  for your care. Our goal is always to  provide you with excellent care. Hearing back from our patients is one way we can continue to improve our services. Please take a few minutes to complete the written survey that you may receive in the mail after your visit with us. Thank you!             Your Updated Medication List - Protect others around you: Learn how to safely use, store and throw away your medicines at www.disposemymeds.org.          This list is accurate as of 8/3/18  1:44 PM.  Always use your most recent med list.                   Brand Name Dispense Instructions for use Diagnosis    amoxicillin-clavulanate 875-125 MG per tablet    AUGMENTIN    20 tablet    Take 1 tablet by mouth 2 times daily    Acute maxillary sinusitis, recurrence not specified       cyanocobalamin 1000 MCG/ML injection    VITAMIN B12    1 mL    Inject 1 mL (1,000 mcg) into the muscle every 30 days    Vitamin B12 deficiency disease       FLUoxetine 20 MG capsule    PROzac    120 capsule    TAKE 4 CAPSULES BY MOUTH DAILY    Bipolar affective disorder, remission status unspecified (H), Major depressive disorder, recurrent episode, moderate (H)       * lamoTRIgine 100 MG tablet    LaMICtal    270 tablet    TAKE 3 TABLETS BY MOUTH DAILY    Bipolar affective disorder, remission status unspecified (H)       * lamoTRIgine 100 MG tablet    LaMICtal    90 tablet    TAKE 3 TABLETS BY MOUTH DAILY    Bipolar affective disorder, remission status unspecified (H)       mometasone 50 MCG/ACT spray    NASONEX    1 Box    Spray 2 sprays into both nostrils daily    Acute maxillary sinusitis, recurrence not specified       omeprazole 20 MG CR capsule    priLOSEC    90 capsule    Take 1 capsule (20 mg) by mouth daily    Gastroesophageal reflux disease without esophagitis       SUMAtriptan 100 MG tablet    IMITREX    9 tablet    TAKE 1 TABLET BY MOUTH ON THE ONSET OF HEADACHE, MAY REPEAT IN 2 HOURS..*MAX 2 TABLETS DAILY*    Chronic migraine without aura without status migrainosus, not  intractable       vitamin D 49253 UNIT capsule    ERGOCALCIFEROL    12 capsule    TAKE 1 CAPSULE BY MOUTH EVERY 7 DAYS    Vitamin D deficiency disease       zolpidem 10 MG tablet    AMBIEN    30 tablet    TAKE 1 TABLET BY MOUTH EVERY EVENING AS NEEDED    Persistent disorder of initiating or maintaining sleep       * Notice:  This list has 2 medication(s) that are the same as other medications prescribed for you. Read the directions carefully, and ask your doctor or other care provider to review them with you.

## 2018-08-03 NOTE — PROGRESS NOTES
SUBJECTIVE:   Kareen Hernandez is a 53 year old female who presents to clinic today for the following health issues:    Acute Illness   Acute illness concerns: sinus infection   Onset: 3 weeks ago     Fever: no    Chills/Sweats: YES    Headache (location?): YES    Sinus Pressure:YES    Conjunctivitis:  no    Ear Pain: YES: both but mostly right     Rhinorrhea: no    Congestion: YES    Sore Throat: YES     Cough: YES-productive of green sputum    Wheeze: no    Decreased Appetite: no    Nausea: no    Vomiting: no    Diarrhea:  no    Dysuria/Freq.: no    Fatigue/Achiness: YES    Sick/Strep Exposure: no     Therapies Tried and outcome: aleve; no changes       Reports she has Horrible depression, mom  recently- she had kidney failure and stopped dialysis 80 year old  Reports mom was her Best friend     Medications not working so stopped taking them.  I know I need b12 shot.  Only taking ambien to sleep at night.  Is interested in restarting prozac  Still has some lamictal - plans to start that as well.     Picked up one med called in yesterday- figured should start taking again    Just plans to do those two and B12 shot and   Vitamin D   And then follow up with Dr. Lozano  - klonopin doesn't work for my anxiety so no sense in taking it if not going to do something  Sleeps with ambien    Sinuses bothering her for 3 weeks.   Using ecig.  No smoking.  Sinuses and right ear plugged for 3 weeks.    Right worse than left. Cough productive with green sputum.  Chest hurts.  Has some shortness of breath.       Problem list and histories reviewed & adjusted, as indicated.  Additional history: as documented    Patient Active Problem List   Diagnosis     Fibromyalgia     Gastric bypass status for obesity     CARDIOVASCULAR SCREENING; LDL GOAL LESS THAN 130     Anxiety     Vitamin D deficiency disease     Vitamin B12 deficiency disease     Migraine headache     Insomnia     Right maxillary sinusitis, chronic     History of  hypertension     Gastroesophageal reflux disease without esophagitis     Chronic migraine without aura without status migrainosus, not intractable     Major depressive disorder, recurrent episode, moderate (H)     History of kidney stones     Tobacco use disorder     Bipolar affective disorder, remission status unspecified (H)     Acute right-sided low back pain with right-sided sciatica     S/P lumbar laminectomy     Past Surgical History:   Procedure Laterality Date     ABDOMINOPLASTY  2013     C GASTRIC BYPASS,OBESE<100CM DAYAN-EN-Y  3-25-09    [facility]     C REMOVAL OF KIDNEY STONE      With kidney tents x 5 to 6 procedures.     CL AFF SURGICAL PATHOLOGY  2007    Feroz's neuroma  - Right Foot     LITHOTRIPSY       SURGICAL PATHOLOGY EXAM      Lipoma Removal on her back       Social History   Substance Use Topics     Smoking status: Former Smoker     Packs/day: 0.50     Types: Cigarettes     Quit date: 2013     Smokeless tobacco: Current User      Comment: Vape e-cig     Alcohol use No     Family History   Problem Relation Age of Onset     Asthma Mother      Hypertension Mother      C.A.D. Mother      Genitourinary Problems Mother      kidney failure   age 80     Chronic Obstructive Pulmonary Disease Mother      Diabetes Father      Hypertension Father      C.A.D. Father      Genitourinary Problems Sister      kidney stones     Cerebrovascular Disease No family hx of      Breast Cancer No family hx of      Cancer - colorectal No family hx of      Prostate Cancer No family hx of          Current Outpatient Prescriptions   Medication Sig Dispense Refill            cyanocobalamin (VITAMIN B12) 1000 MCG/ML injection Inject 1 mL (1,000 mcg) into the muscle every 30 days 1 mL 11     mometasone (NASONEX) 50 MCG/ACT spray Spray 2 sprays into both nostrils daily 1 Box 3     FLUoxetine (PROZAC) 20 MG capsule TAKE 4 CAPSULES BY MOUTH DAILY 120 capsule 2     lamoTRIgine (LAMICTAL) 100 MG tablet TAKE 3  "TABLETS BY MOUTH DAILY 90 tablet 2     lamoTRIgine (LAMICTAL) 100 MG tablet TAKE 3 TABLETS BY MOUTH DAILY 270 tablet 1     omeprazole (PRILOSEC) 20 MG CR capsule Take 1 capsule (20 mg) by mouth daily 90 capsule 1     SUMAtriptan (IMITREX) 100 MG tablet TAKE 1 TABLET BY MOUTH ON THE ONSET OF HEADACHE, MAY REPEAT IN 2 HOURS..*MAX 2 TABLETS DAILY* 9 tablet 6     vitamin D (ERGOCALCIFEROL) 02358 UNIT capsule TAKE 1 CAPSULE BY MOUTH EVERY 7 DAYS 12 capsule 1     zolpidem (AMBIEN) 10 MG tablet TAKE 1 TABLET BY MOUTH EVERY EVENING AS NEEDED 30 tablet 0     Allergies   Allergen Reactions     Lurasidone Diarrhea     Diarrhea     Morphine Rash       Reviewed and updated as needed this visit by clinical staff  Tobacco  Allergies  Meds  Problems  Med Hx  Surg Hx  Fam Hx  Soc Hx        Reviewed and updated as needed this visit by Provider  Tobacco  Allergies  Meds  Problems  Med Hx  Surg Hx  Fam Hx  Soc Hx          ROS:  Constitutional, HEENT, cardiovascular, pulmonary, gi and gu systems are negative, except as otherwise noted.    OBJECTIVE:     /74 (BP Location: Left arm, Patient Position: Sitting, Cuff Size: Adult Regular)  Pulse 82  Temp 98.7  F (37.1  C) (Oral)  Resp 18  Ht 1.702 m (5' 7\")  Wt 72 kg (158 lb 12.8 oz)  LMP  (LMP Unknown)  SpO2 97%  BMI 24.87 kg/m2  Body mass index is 24.87 kg/(m^2).  GENERAL: healthy, alert and no distress  EYES: Eyes grossly normal to inspection, PERRL and conjunctivae and sclerae normal  HENT: normal cephalic/atraumatic, ear canals and TM's normal, nose and mouth without ulcers or lesions, oropharynx clear, oral mucous membranes moist and sinuses: maxillary tenderness on bilaterally  NECK: no adenopathy, no asymmetry, masses, or scars and thyroid normal to palpation  RESP: lungs clear to auscultation - no rales, rhonchi or wheezes  CV: regular rate and rhythm, normal S1 S2, no S3 or S4, no murmur, click or rub, no peripheral edema and peripheral pulses strong  MS: " no gross musculoskeletal defects noted, no edema  PSYCH: mentation appears normal, affect flat, judgement and insight intact and appearance well groomed    Diagnostic Test Results:  none     ASSESSMENT/PLAN:             1. Acute maxillary sinusitis, recurrence not specified  Will treat with augmentin.  Lungs clear  - amoxicillin-clavulanate (AUGMENTIN) 875-125 MG per tablet; Take 1 tablet by mouth 2 times daily  Dispense: 20 tablet; Refill: 0  - OTOLARYNGOLOGY REFERRAL  - mometasone (NASONEX) 50 MCG/ACT spray; Spray 2 sprays into both nostrils daily  Dispense: 1 Box; Refill: 3    2. Throat pain  Negative strep test   - Strep, Rapid Screen  - Beta strep group A culture    3. Vitamin B12 deficiency disease  Requests her monthly B12   - cyanocobalamin (VITAMIN B12) 1000 MCG/ML injection; Inject 1 mL (1,000 mcg) into the muscle every 30 days  Dispense: 1 mL; Refill: 11  - B12 - 1000 MCG  - INJECTION INTRAMUSCULAR OR SUB-Q    3.major depressive disorder recurrent moderate  Worse since mom's death Plans to restart her antidepressant (prozac) and lamictal.  Has follow up with PCP scheduled 9/17/18    4.  Bipolar affective disorder  Currently depressed. No bruna. Plans to restart her lamictal and follow up with PCP 9/17/18    5. Anxiety  Not controlled. Klonopin not effective.  Plans to restart prozac and lamictal    Elen Gentile PA-C  [facility]

## 2018-08-04 LAB
BACTERIA SPEC CULT: NORMAL
SPECIMEN SOURCE: NORMAL

## 2018-08-17 ENCOUNTER — TELEPHONE (OUTPATIENT)
Dept: FAMILY MEDICINE | Facility: CLINIC | Age: 53
End: 2018-08-17

## 2018-08-17 NOTE — TELEPHONE ENCOUNTER
PT WOULD LIKE A REFERRAL FOR ENT  WOULD ALSO LIKE A MEDICATION FOR PLUGGED EARS LAST MED DIDN'T WORK

## 2018-08-20 NOTE — TELEPHONE ENCOUNTER
This writer attempted to contact Kareen on 08/20/18      Reason for call triage plugged ears/pt requesting ENT referral it appears and left message.      If patient calls back:   Patient contacted by a Registered Nurse. Inform patient that someone from the RN group will contact them, document that pt called and route to P DYAD 3 RN POOL [[id number]]        Amirah Poon RN

## 2018-08-21 ENCOUNTER — TELEPHONE (OUTPATIENT)
Dept: FAMILY MEDICINE | Facility: CLINIC | Age: 53
End: 2018-08-21

## 2018-08-21 DIAGNOSIS — G47.00 PERSISTENT DISORDER OF INITIATING OR MAINTAINING SLEEP: ICD-10-CM

## 2018-08-21 DIAGNOSIS — M79.7 FIBROMYALGIA: ICD-10-CM

## 2018-08-21 RX ORDER — ZOLPIDEM TARTRATE 10 MG/1
TABLET ORAL
Qty: 30 TABLET | Refills: 0 | Status: SHIPPED | OUTPATIENT
Start: 2018-08-21 | End: 2018-09-17

## 2018-08-21 RX ORDER — CYCLOBENZAPRINE HCL 10 MG
TABLET ORAL
Qty: 60 TABLET | Refills: 0 | Status: SHIPPED | OUTPATIENT
Start: 2018-08-21 | End: 2018-11-19

## 2018-08-21 NOTE — TELEPHONE ENCOUNTER
ambien      Last Written Prescription Date:  12-7-17  Last Fill Quantity: 30,   # refills: 0  Last Office Visit: 8-3-18  Future Office visit:    Next 5 appointments (look out 90 days)     Sep 17, 2018 12:20 PM CDT   Office [facility] with Jessi [facility]ano MD   [facility]    [address] [place] [address]   [phone]                   Routing refill request to provider for review/approval because:  Drug not on the [facility], [facility] or [facility] refill protocol or controlled substance    Flexeril      Last Written Prescription Date:  HISt  Last Fill Quantity: hist,   # refills: hist  Last Office Visit: 8-3-18  Future Office visit:    Next 5 appointments (look out 90 days)     Sep 17, 2018 12:20 PM CDT   Office Visit with Jessi Lozano MD   [facility]    [address] [place] [id number]   [phone]

## 2018-08-21 NOTE — TELEPHONE ENCOUNTER
Patient was in for office visit on 8/3/18 for Acute maxillary sinusitis, recurrence not specified. Patient is now calling for a referral for ENT and stated in phone message that ears are plugged and last medication did not work.  RN tried contacting patient by phone 2 x to triage with no success. Upon Chart review plan states to follow up with Dr. Lozano and patient is scheduled for follow up appointment on 9/17/18.    Provider please review and advise on request for ENT referral.  Amie Ritter RN

## 2018-08-21 NOTE — TELEPHONE ENCOUNTER
returned call    Best number to reach caller: Cell number on file:    Telephone Information:   Mobile [phone]       Is it ok to leave a detailed message: YES

## 2018-08-21 NOTE — TELEPHONE ENCOUNTER
See messages below- patient called back    Looks like YAIMA Whitfield had given referral to ent at [facility]- please give this info

## 2018-09-17 ENCOUNTER — OFFICE VISIT (OUTPATIENT)
Dept: FAMILY MEDICINE | Facility: CLINIC | Age: 53
End: 2018-09-17
Payer: MEDICARE

## 2018-09-17 VITALS
TEMPERATURE: 98.5 F | BODY MASS INDEX: 25.58 KG/M2 | OXYGEN SATURATION: 97 % | HEIGHT: 67 IN | RESPIRATION RATE: 16 BRPM | SYSTOLIC BLOOD PRESSURE: 112 MMHG | HEART RATE: 72 BPM | DIASTOLIC BLOOD PRESSURE: 78 MMHG | WEIGHT: 163 LBS

## 2018-09-17 DIAGNOSIS — Z12.31 VISIT FOR SCREENING MAMMOGRAM: ICD-10-CM

## 2018-09-17 DIAGNOSIS — Z12.11 SCREEN FOR COLON CANCER: ICD-10-CM

## 2018-09-17 DIAGNOSIS — G47.00 PERSISTENT DISORDER OF INITIATING OR MAINTAINING SLEEP: ICD-10-CM

## 2018-09-17 DIAGNOSIS — F33.1 MAJOR DEPRESSIVE DISORDER, RECURRENT EPISODE, MODERATE (H): ICD-10-CM

## 2018-09-17 DIAGNOSIS — F31.9 BIPOLAR AFFECTIVE DISORDER, REMISSION STATUS UNSPECIFIED (H): Primary | ICD-10-CM

## 2018-09-17 DIAGNOSIS — F31.9 BIPOLAR AFFECTIVE DISORDER, REMISSION STATUS UNSPECIFIED (H): ICD-10-CM

## 2018-09-17 DIAGNOSIS — Z23 NEED FOR PROPHYLACTIC VACCINATION AND INOCULATION AGAINST INFLUENZA: ICD-10-CM

## 2018-09-17 PROCEDURE — G0008 ADMIN INFLUENZA VIRUS VAC: HCPCS | Performed by: FAMILY MEDICINE

## 2018-09-17 PROCEDURE — 96372 THER/PROPH/DIAG INJ SC/IM: CPT | Mod: 59 | Performed by: FAMILY MEDICINE

## 2018-09-17 PROCEDURE — 99214 OFFICE O/P EST MOD 30 MIN: CPT | Mod: 25 | Performed by: FAMILY MEDICINE

## 2018-09-17 PROCEDURE — 90686 IIV4 VACC NO PRSV 0.5 ML IM: CPT | Performed by: FAMILY MEDICINE

## 2018-09-17 RX ORDER — LORAZEPAM 1 MG/1
1 TABLET ORAL 2 TIMES DAILY PRN
Qty: 20 TABLET | Refills: 0 | Status: SHIPPED | OUTPATIENT
Start: 2018-09-17 | End: 2018-12-19

## 2018-09-17 RX ORDER — LAMOTRIGINE 100 MG/1
TABLET ORAL
Qty: 270 TABLET | Refills: 1 | Status: SHIPPED | OUTPATIENT
Start: 2018-09-17 | End: 2019-05-28

## 2018-09-17 RX ORDER — ARIPIPRAZOLE 5 MG/1
5 TABLET ORAL AT BEDTIME
Qty: 30 TABLET | Refills: 0 | Status: SHIPPED | OUTPATIENT
Start: 2018-09-17 | End: 2018-09-17

## 2018-09-17 RX ORDER — ZOLPIDEM TARTRATE 10 MG/1
TABLET ORAL
Qty: 30 TABLET | Refills: 0 | Status: SHIPPED | OUTPATIENT
Start: 2018-09-17 | End: 2018-12-26

## 2018-09-17 ASSESSMENT — PAIN SCALES - GENERAL: PAINLEVEL: NO PAIN (0)

## 2018-09-17 NOTE — PATIENT INSTRUCTIONS
-Continue the Lamictal and fluoxetine as you currently are taking them.  (We may make adjustments on this over time)    - Start Abilify 1 tablet at bedtime.  Let me know in a few weeks how this is working.  This is a low dose and we can always increase it        At [facility], we strive to deliver an exceptional experience to you, every time we see you.  If you receive a survey in the mail, please send us back your thoughts. We really do value your feedback.    Based on your medical history, these are the current health maintenance/preventive care services that you are due for (some may have been done at this visit.)  Health Maintenance Due   Topic Date Due     MEDICARE ANNUAL WELLNESS VISIT  04/17/1985     HIV SCREEN (SYSTEM ASSIGNED)  04/17/1985     MAMMO Q2 YR  11/06/2014     COLON CANCER SCREEN (SYSTEM ASSIGNED)  04/17/2017     INFLUENZA VACCINE (1) 09/01/2018         Suggested websites for health information:  Www.[place].org : Up to date and easily searchable information on multiple topics.  Www.medlineplus.gov : medication info, interactive tutorials, watch real surgeries online  Www.familydoctor.org : good info from the Academy of Family Physicians  Www.cdc.gov : public health info, travel advisories, epidemics (H1N1)  Www.aap.org : children's health info, normal development, vaccinations  Www.health.[facility].us : [place] dept of health, public health issues in [place], N1N1    Your care team:     Family Medicine   BHAVIN Hardin MD Emily Bunt, APRN CNP   S. MD Sheri Saenz MD Angela Wermerskirchen, MD         Clinic hours: Monday - Wednesday 7 am-7 pm   Thursdays and Fridays 7 am-5 pm.     [facility]: Monday - Friday 11 am-9 pm,   Saturday and Sunday 9 am-5 pm.    [facility]: Monday -Thursday 8 am-8 pm; Friday 8 am-6 pm; Saturday and Sunday 9 am-5 pm.     [facility]: Monday - Thursday 8 am - 7 pm; Friday 8  am - 6 pm    Clinic: [phone]   [facility]: [phone]   [facility]: [phone]

## 2018-09-17 NOTE — PROGRESS NOTES

## 2018-09-17 NOTE — PROGRESS NOTES
"  SUBJECTIVE:   Kareen Hernandez is a 53 year old female who presents to clinic today for the following health issues:      Medication Followup of All    Taking Medication as prescribed: yes    Side Effects:  None    Medication Helping Symptoms:  NO     SUBJECTIVE:  Here today in follow-up of bipolar disorder and depression.  Patient thinks it is time to change her medicines around.  Is just feeling very down and depressed.  No self harmful ideation.  Mother passed away earlier this summer and she realizes that factors into it but thinks this is beyond that.  She has been on a variety of antidepressants in the past and at one point was even on Latuda.  Her daughter is with her and feels that her bipolar is stable from the standpoint of that she is not manic at all and we discussed that oftentimes folks with bipolar disorder have very difficult to treat depression and we need some other strategies other than simply antidepressants.  I discussed her need for mammogram and colon cancer screening and she agrees to set these up.    Review of systems otherwise negative.  Past medical, family, and social history reviewed and updated in chart.    OBJECTIVE:  /78 (BP Location: Right arm, Patient Position: Chair, Cuff Size: Adult Regular)  Pulse 72  Temp 98.5  F (36.9  C) (Oral)  Resp 16  Ht 1.702 m (5' 7\")  Wt 73.9 kg (163 lb)  LMP  (LMP Unknown)  SpO2 97%  Breastfeeding? No  BMI 25.53 kg/m2  Alert, pleasant, upbeat, and in no apparent discomfort.  S1 and S2 normal, no murmurs, clicks, gallops or rubs. Regular rate and rhythm. Chest is clear; no wheezes or rales. No edema or JVD.  Past labs reviewed with the patient.     ASSESSMENT / PLAN:  (F31.9) Bipolar affective disorder, remission status unspecified (H)  (primary encounter diagnosis)  Comment: We will continue her Prozac at her current dosage as well as her Lamictal and add Abilify 5 mg at bedtime.  Plan to follow-up within the month and can maintain or " increase dosage.  Plan: lamoTRIgine (LAMICTAL) 100 MG tablet,         FLUoxetine (PROZAC) 20 MG capsule, ARIPiprazole        (ABILIFY) 5 MG tablet        Discussed mechanism of action of the proposed medication, as well as potential effects, both good and bad.  Patient expressed understanding and agreed with treatment.     (F33.1) Major depressive disorder, recurrent episode, moderate (H)  Comment: as above   Plan: FLUoxetine (PROZAC) 20 MG capsule            (G47.00) Persistent disorder of initiating or maintaining sleep  Comment: Stable.  Refilled.  Plan: zolpidem (AMBIEN) 10 MG tablet            (Z12.11) Screen for colon cancer  Comment: Patient has a FIT kit and needs to turn this in  Plan:     (Z12.31) Visit for screening mammogram  Comment: Patient will set this up  Plan:     Follow up as above   PAU Lozano MD    (Chart documentation completed in part with Dragon voice-recognition software.  Even though reviewed some grammatical, spelling, and word errors may remain.)

## 2018-09-17 NOTE — NURSING NOTE
The following medication was given:     MEDICATION: Vitamin B12  1000mcg  ROUTE: IM  SITE: Vastus Lateralis - Right  DOSE: 1 mL  LOT #: [id number]  :  [place]ie  EXPIRATION DATE:  10/2019  NDC#: 9674-8818-75    Prior to injection verified patient identity using patient's name and date of birth.  Due to injection administration, patient instructed to remain in clinic for 15 minutes  afterwards, and to report any adverse reaction to me immediately.    Diamante Black MA

## 2018-09-17 NOTE — MR AVS SNAPSHOT
After Visit Summary   9/17/2018    Kareen Hernandez    MRN: [id number]           Patient Information     Date Of Birth          1965        Visit Information        Provider Department      9/17/2018 12:20 PM Jessi Lozano MD [facility]        Today's Diagnoses     Bipolar affective disorder, remission status unspecified (H)    -  1    Major depressive disorder, recurrent episode, moderate (H)        Persistent disorder of initiating or maintaining sleep        Screen for colon cancer        Visit for screening mammogram          Care Instructions    -Continue the Lamictal and fluoxetine as you currently are taking them.  (We may make adjustments on this over time)    - Start Abilify 1 tablet at bedtime.  Let me know in a few weeks how this is working.  This is a low dose and we can always increase it        At [facility], we strive to deliver an exceptional experience to you, every time we see you.  If you receive a survey in the mail, please send us back your thoughts. We really do value your feedback.    Based on your medical history, these are the current health maintenance/preventive care services that you are due for (some may have been done at this visit.)  Health Maintenance Due   Topic Date Due     MEDICARE ANNUAL WELLNESS VISIT  04/17/1985     HIV SCREEN (SYSTEM ASSIGNED)  04/17/1985     MAMMO Q2 YR  11/06/2014     COLON CANCER SCREEN (SYSTEM ASSIGNED)  04/17/2017     INFLUENZA VACCINE (1) 09/01/2018         Suggested websites for health information:  Www.IntelliWheels.org : Up to date and easily searchable information on multiple topics.  Www.medlineplus.gov : medication info, interactive tutorials, watch real surgeries online  Www.familydoctor.org : good info from the Academy of Family Physicians  Www.cdc.gov : public health info, travel advisories, epidemics (H1N1)  Www.aap.org : children's health info, normal development,  vaccinations  Www.health.[facility].[place].us : [place] dept of health, public health issues in [place], N1N1    Your care team:     Family Medicine   BHAVIN Hardin MD Emily Bunt, APRN CNP S. Matthew Hockett, MD Pamela Kolacz, MD Angela Wermerskirchen, MD         Clinic hours: Monday - Wednesday 7 am-7 pm   Thursdays and Fridays 7 am-5 pm.     [facility]: Monday - Friday 11 am-9 pm,   Saturday and Sunday 9 am-5 pm.    [facility]: Monday -Thursday 8 am-8 pm; Friday 8 am-6 pm; Saturday and Sunday 9 am-5 pm.     [facility]: Monday - Thursday 8 am - 7 pm; Friday 8 am - 6 pm    Clinic: [phone]   [facility]: [phone]   [facility]: [phone]                  Follow-ups after your visit        Follow-up notes from your care team     Return in about 1 month (around 10/17/2018) for Contact me with progress.      Who to contact     If you have questions or need follow up information about today's clinic visit or your schedule please contact [facility] directly at [phone].  Normal or non-critical lab and imaging results will be communicated to you by MyChart, letter or phone within 4 business days after the clinic has received the results. If you do not hear from us within 7 days, please contact the clinic through MyChart or phone. If you have a critical or abnormal lab result, we will notify you by phone as soon as possible.  Submit refill requests through [employer] or call your pharmacy and they will forward the refill request to us. Please allow 3 business days for your refill to be completed.          Additional Information About Your Visit        Care EveryWhere ID     This is your Care EveryWhere ID. This could be used by other organizations to access your [place] medical records  [id number]        Your Vitals Were     Pulse Temperature Respirations Height Last Period Pulse Oximetry     "72 98.5  F (36.9  C) (Oral) 16 1.702 m (5' 7\") (LMP Unknown) 97%    Breastfeeding? BMI (Body Mass Index)                No 25.53 kg/m2           Blood Pressure from Last 3 Encounters:   09/17/18 112/78   08/03/18 115/74   06/04/18 112/70    Weight from Last 3 Encounters:   09/17/18 73.9 kg (163 lb)   08/03/18 72 kg (158 lb 12.8 oz)   06/04/18 73 kg (161 lb)              Today, you had the following     No orders found for display         Today's Medication Changes          These changes are accurate as of 9/17/18  1:18 PM.  If you have any questions, ask your nurse or doctor.               Start taking these medicines.        Dose/Directions    ARIPiprazole 5 MG tablet   Commonly known as:  ABILIFY   Used for:  Bipolar affective disorder, remission status unspecified (H)   Started by:  Jessi Lozano MD        Dose:  5 mg   Take 1 tablet (5 mg) by mouth At Bedtime   Quantity:  30 tablet   Refills:  0         These medicines have changed or have updated prescriptions.        Dose/Directions    lamoTRIgine 100 MG tablet   Commonly known as:  LaMICtal   This may have changed:  Another medication with the same name was removed. Continue taking this medication, and follow the directions you see here.   Used for:  Bipolar affective disorder, remission status unspecified (H)   Changed by:  Jessi Lozano MD        TAKE 3 TABLETS BY MOUTH DAILY   Quantity:  270 tablet   Refills:  1         Stop taking these medicines if you haven't already. Please contact your care team if you have questions.     amoxicillin-clavulanate 875-125 MG per tablet   Commonly known as:  AUGMENTIN   Stopped by:  Jessi Lozano MD                Where to get your medicines      These medications were sent to [id number] - [place], [place] - [address] AT [place] [address], [facility] [address]     Phone:  [phone]     ARIPiprazole 5 MG tablet    FLUoxetine 20 MG capsule    " lamoTRIgine 100 MG tablet         Some of these will need a paper prescription and others can be bought over the counter.  Ask your nurse if you have questions.     Bring a paper prescription for each of these medications     zolpidem 10 MG tablet                Primary Care Provider Office Phone # Fax #    Jessi Favian Lozano [phone] [address] [facility] [address]        Equal Access to Services     TRAVIS LIVINGSTON : Hadii aad ku hadasho Soomaali, waaxda luqadaha, qaybta kaalmada adeegyada, waxay idiin hayaan adeeg khromainesh laarmani . So [facility] [phone].    ATENCIÓN: Si gaylela espkyara, tiene a lang disposición servicios gratuitos de asistencia lingüística. Llame al [phone].    We comply with applicable federal civil rights laws and [place] laws. We do not discriminate on the basis of race, color, national origin, age, disability, sex, sexual orientation, or gender identity.            Thank you!     Thank you for choosing [facility]  for your care. Our goal is always to provide you with excellent care. Hearing back from our patients is one way we can continue to improve our services. Please take a few minutes to complete the written survey that you may receive in the mail after your visit with us. Thank you!             Your Updated Medication List - Protect others around you: Learn how to safely use, store and throw away your medicines at www.disposemymeds.org.          This list is accurate as of 9/17/18  1:18 PM.  Always use your most recent med list.                   Brand Name Dispense Instructions for use Diagnosis    ARIPiprazole 5 MG tablet    ABILIFY    30 tablet    Take 1 tablet (5 mg) by mouth At Bedtime    Bipolar affective disorder, remission status unspecified (H)       cyanocobalamin 1000 MCG/ML injection    VITAMIN B12    1 mL    Inject 1 mL (1,000 mcg) into the muscle every 30 days    Vitamin B12 deficiency disease       cyclobenzaprine  10 MG tablet    FLEXERIL    60 tablet    TAKE 1 TABLET BY MOUTH THREE TIMES DAILY AS NEEDED    Fibromyalgia       FLUoxetine 20 MG capsule    PROzac    360 capsule    TAKE 4 CAPSULES BY MOUTH DAILY    Bipolar affective disorder, remission status unspecified (H), Major depressive disorder, recurrent episode, moderate (H)       lamoTRIgine 100 MG tablet    LaMICtal    270 tablet    TAKE 3 TABLETS BY MOUTH DAILY    Bipolar affective disorder, remission status unspecified (H)       mometasone 50 MCG/ACT spray    NASONEX    1 Box    Spray 2 sprays into both nostrils daily    Acute maxillary sinusitis, recurrence not specified       omeprazole 20 MG CR capsule    priLOSEC    90 capsule    Take 1 capsule (20 mg) by mouth daily    Gastroesophageal reflux disease without esophagitis       SUMAtriptan 100 MG tablet    IMITREX    9 tablet    TAKE 1 TABLET BY MOUTH ON THE ONSET OF HEADACHE, MAY REPEAT IN 2 HOURS..*MAX 2 TABLETS DAILY*    Chronic migraine without aura without status migrainosus, not intractable       vitamin D 21601 UNIT capsule    ERGOCALCIFEROL    12 capsule    TAKE 1 CAPSULE BY MOUTH EVERY 7 DAYS    Vitamin D deficiency disease       zolpidem 10 MG tablet    AMBIEN    30 tablet    TAKE 1 TABLET BY MOUTH EVERY EVENING AS NEEDED    Persistent disorder of initiating or maintaining sleep

## 2018-09-18 RX ORDER — ARIPIPRAZOLE 5 MG/1
TABLET ORAL
Qty: 90 TABLET | Refills: 0 | Status: SHIPPED | OUTPATIENT
Start: 2018-09-18 | End: 2019-05-28

## 2018-09-18 NOTE — TELEPHONE ENCOUNTER
Requested Prescriptions   Pending Prescriptions Disp Refills     ARIPiprazole (ABILIFY) 5 MG tablet [Pharmacy Med Name: ARIPIPRAZOLE 5MG TABLETS]  May be duplicate request. Filled on 9/17/18.  Last office visit: 9/17/2018 with prescribing provider:  Dr. Lozano   Future Office Visit:   90 tablet 0     Sig: TAKE 1 TABLET BY MOUTH EVERY NIGHT AT BEDTIME    Antipsychotic Medications Failed    9/17/2018  1:45 PM       Failed - Lipid panel on file within the past 12 months    Recent Labs   Lab Test  08/05/14   1006   CHOL  170   TRIG  80   HDL  88   LDL  66   VLDL  16   CHOLHDLRATIO  1.9              Passed - Blood pressure under 140/90 in past 12 months    BP Readings from Last 3 Encounters:   09/17/18 112/78   08/03/18 115/74   06/04/18 112/70                Passed - Patient is 12 years of age or older       Passed - CBC on file in past 12 months    Recent Labs   Lab Test  05/14/18   1305   WBC  6.5   RBC  4.21   HGB  13.6   HCT  40.6   PLT  217       For [facility] ONLY: SHTN475 = WBC, MXUS829 = RBC         Passed - Heart Rate on file within past 12 months    Pulse Readings from Last 3 Encounters:   09/17/18 72   08/03/18 82   06/04/18 79              Passed - A1c or Glucose on file in past 12 months    Recent Labs   Lab Test  05/14/18   1305   GLC  124*       Please review patients last 3 weights. If a weight gain of >10 lbs exists, you may refill the prescription once after instructing the patient to schedule an appointment within the next 30 days.    Wt Readings from Last 3 Encounters:   09/17/18 73.9 kg (163 lb)   08/03/18 72 kg (158 lb 12.8 oz)   06/04/18 73 kg (161 lb)            Passed - Patient is not pregnant       Passed - No positve pregnancy test on file in past 12 months       Passed - Recent (6 mo) or future (30 days) visit within the authorizing provider's specialty    Patient had office visit in the last 6 months or has a visit in the next 30 days with authorizing provider or within the authorizing  "provider's specialty.  See \"Patient Info\" tab in inbasket, or \"Choose Columns\" in Meds & Orders section of the refill encounter.              "

## 2018-09-26 DIAGNOSIS — G43.709 CHRONIC MIGRAINE WITHOUT AURA WITHOUT STATUS MIGRAINOSUS, NOT INTRACTABLE: ICD-10-CM

## 2018-09-26 NOTE — TELEPHONE ENCOUNTER
"Requested Prescriptions   Pending Prescriptions Disp Refills     SUMAtriptan (IMITREX) 100 MG tablet 9 tablet 6    Serotonin Agonists Failed    9/26/2018  3:38 PM       Failed - Serotonin Agonist request needs review.    Please review patient's record. If patient has had 8 or more treatments in the past month, please forward to provider.         Passed - Blood pressure under 140/90 in past 12 months    BP Readings from Last 3 Encounters:   09/17/18 112/78   08/03/18 115/74   06/04/18 112/70                Passed - Recent (12 mo) or future (30 days) visit within the authorizing provider's specialty    Patient had office visit in the last 12 months or has a visit in the next 30 days with authorizing provider or within the authorizing provider's specialty.  See \"Patient Info\" tab in inbasket, or \"Choose Columns\" in Meds & Orders section of the refill encounter.           Passed - Patient is age 18 or older       Passed - No active pregnancy on record       Passed - No positive pregnancy test in past 12 months      SUMAtriptan (IMITREX) 100 MG tablet  Last Written Prescription Date:  8/22/17  Last Fill Quantity: 9,  # refills: 6   Last office visit: 9/17/2018 with prescribing provider:  Dr. Lozano   Future Office Visit:      "

## 2018-09-26 NOTE — TELEPHONE ENCOUNTER
Reason for Call:  Medication or medication refill:    Do you use a [facility]?  Name of the pharmacy and phone number for the current request:  [facility] [address] [place] [address] AVE [address] AT [address]. [phone]    Name of the medication requested: SUMAtriptan (IMITREX) 100 MG tablet    Can we leave a detailed message on this number? YES    Phone number patient can be reached at: Home number on file [phone] (home)    Best Time: anytime    Call taken on 9/26/2018 at 3:36 PM by Vinny Lopes

## 2018-09-27 RX ORDER — SUMATRIPTAN 100 MG/1
TABLET, FILM COATED ORAL
Qty: 9 TABLET | Refills: 6 | Status: SHIPPED | OUTPATIENT
Start: 2018-09-27 | End: 2019-05-28

## 2018-09-27 NOTE — TELEPHONE ENCOUNTER
Prescription approved per [facility] Refill Protocol.    Yasmine Tejada RN  [facility]

## 2018-10-22 DIAGNOSIS — F31.9 BIPOLAR AFFECTIVE DISORDER, REMISSION STATUS UNSPECIFIED (H): ICD-10-CM

## 2018-10-22 RX ORDER — ARIPIPRAZOLE 5 MG/1
TABLET ORAL
Qty: 30 TABLET | Refills: 0 | OUTPATIENT
Start: 2018-10-22

## 2018-11-07 DIAGNOSIS — E53.8 VITAMIN B12 DEFICIENCY DISEASE: ICD-10-CM

## 2018-11-07 NOTE — TELEPHONE ENCOUNTER
"Requested Prescriptions   Pending Prescriptions Disp Refills     cyanocobalamin (VITAMIN B12) 1000 MCG/ML injection [Pharmacy Med Name: CYANOCOBALAMIN 1000MCG/ML INJ, 1ML] 1 mL 0     Sig: ADMINISTER 1 ML(1000 MCG) IN THE MUSCLE EVERY 30 DAYS    Vitamin Supplements (Adult) Protocol Passed    11/7/2018  3:01 PM       Passed - High dose Vitamin D not ordered       Passed - Recent (12 mo) or future (30 days) visit within the authorizing provider's specialty    Patient had office visit in the last 12 months or has a visit in the next 30 days with authorizing provider or within the authorizing provider's specialty.  See \"Patient Info\" tab in inbasket, or \"Choose Columns\" in Meds & Orders section of the refill encounter.              cyanocobalamin (VITAMIN B12) 1000 MCG/ML injection  Last Written Prescription Date:  8/3/18  Last Fill Quantity: 1ml,  # refills: 11   Last office visit: 9/17/2018 with prescribing provider:  Dr. Lozano   Future Office Visit:      "

## 2018-11-09 ENCOUNTER — TELEPHONE (OUTPATIENT)
Dept: FAMILY MEDICINE | Facility: CLINIC | Age: 53
End: 2018-11-09

## 2018-11-09 DIAGNOSIS — E53.8 VITAMIN B12 DEFICIENCY DISEASE: ICD-10-CM

## 2018-11-09 RX ORDER — CYANOCOBALAMIN 1000 UG/ML
INJECTION, SOLUTION INTRAMUSCULAR; SUBCUTANEOUS
Qty: 1 ML | Refills: 0 | OUTPATIENT
Start: 2018-11-09

## 2018-11-09 RX ORDER — SYRINGE-NEEDLE,INSULIN,0.5 ML 27GX1/2"
SYRINGE, EMPTY DISPOSABLE MISCELLANEOUS
Qty: 10 EACH | Refills: 1 | Status: SHIPPED | OUTPATIENT
Start: 2018-11-09 | End: 2020-08-17

## 2018-11-09 NOTE — TELEPHONE ENCOUNTER
Patient advised to call preferred pharmacy and have B12 tansfer. Patient needs supplies for B12 injections.   Requesting Rx for needles/syringe.   LXIONG3, MEDICAL ASSISTANT

## 2018-11-09 NOTE — TELEPHONE ENCOUNTER
"Requested Prescriptions   Pending Prescriptions Disp Refills     cyanocobalamin (VITAMIN B12) 1000 MCG/ML injection [Pharmacy Med Name: CYANOCOBALAMIN 1000MCG/ML INJ, 1ML]  Last Written Prescription Date:  8/3/18  Last Fill Quantity: 1 mL,  # refills: 11   Last office visit: 9/17/2018 with prescribing provider:Dr. Lozano  Future Office Visit:   1 mL 0     Sig: ADMINISTER 1 ML(1000 MCG) IN THE MUSCLE EVERY 30 DAYS    Vitamin Supplements (Adult) Protocol Passed    11/9/2018  1:48 PM       Passed - High dose Vitamin D not ordered       Passed - Recent (12 mo) or future (30 days) visit within the authorizing provider's specialty    Patient had office visit in the last 12 months or has a visit in the next 30 days with authorizing provider or within the authorizing provider's specialty.  See \"Patient Info\" tab in inbasket, or \"Choose Columns\" in Meds & Orders section of the refill encounter.                "

## 2018-11-09 NOTE — TELEPHONE ENCOUNTER
Medication Detail      Disp Refills Start End JASON   cyanocobalamin (VITAMIN B12) 1000 MCG/ML injection 1 mL 11 8/3/2018  No   Sig - Route: Inject 1 mL (1,000 mcg) into the muscle every 30 days - Intramuscular   Class: E-Prescribe   Order: [id number]   E-Prescribing Status: Receipt confirmed by pharmacy (8/3/2018  1:47 PM CDT)     A year supply sent in August 2018 to [facility]. Team, please contact patient and advise them that if they wish to have their Rx filled elsewhere they will need to call preferred pharmacy to request transfer of Rx.     Amirah Poon RN

## 2018-11-09 NOTE — TELEPHONE ENCOUNTER
Routing refill request to provider for review/approval because:  Drug not active on patient's medication list-supplies monthly for B-12 injections      Alexander Yusuf RN, BSN

## 2018-11-13 RX ORDER — CYANOCOBALAMIN 1000 UG/ML
INJECTION, SOLUTION INTRAMUSCULAR; SUBCUTANEOUS
Qty: 1 ML | Refills: 0 | OUTPATIENT
Start: 2018-11-13

## 2018-11-13 NOTE — TELEPHONE ENCOUNTER
This writer attempted to contact pt on 11/13/18      Reason for call vit b12 and left detailed message.      If patient calls back:   Inform pt of message below per RN        Chloe Reese, CMA

## 2018-11-13 NOTE — TELEPHONE ENCOUNTER
Medication Detail      Disp Refills Start End JASON   cyanocobalamin (VITAMIN B12) 1000 MCG/ML injection 1 mL 11 8/3/2018  No   Sig - Route: Inject 1 mL (1,000 mcg) into the muscle every 30 days - Intramuscular   Class: E-Prescribe   Order: [id number]   E-Prescribing Status: Receipt confirmed by pharmacy (8/3/2018  1:47 PM CDT)     A year supply of Rx sent to [facility] in [place] in August 2018.     Team, please contact patient and advise if she wishes to have Rx filled elsewhere she will need to call preferred pharmacy and request a transfer of Rx.     Amirah Poon RN

## 2018-11-19 DIAGNOSIS — E55.9 VITAMIN D DEFICIENCY DISEASE: ICD-10-CM

## 2018-11-19 DIAGNOSIS — M79.7 FIBROMYALGIA: ICD-10-CM

## 2018-11-20 RX ORDER — CYCLOBENZAPRINE HCL 10 MG
TABLET ORAL
Qty: 60 TABLET | Refills: 0 | Status: SHIPPED | OUTPATIENT
Start: 2018-11-20 | End: 2018-12-19

## 2018-11-20 RX ORDER — ERGOCALCIFEROL 1.25 MG/1
CAPSULE, LIQUID FILLED ORAL
Qty: 12 CAPSULE | Refills: 0 | Status: SHIPPED | OUTPATIENT
Start: 2018-11-20 | End: 2019-10-06

## 2018-11-20 NOTE — TELEPHONE ENCOUNTER
Requested Prescriptions   Pending Prescriptions Disp Refills     vitamin D2 (ERGOCALCIFEROL) 50079 UNIT capsule [Pharmacy Med Name: VITAMIN D2 50,000IU (ERGO) CAP RX] 12 capsule 0     Sig: TAKE 1 CAPSULE BY MOUTH EVERY 7 DAYS    There is no refill protocol information for this order        cyclobenzaprine (FLEXERIL) 10 MG tablet [Pharmacy Med Name: CYCLOBENZAPRINE 10MG TABLETS] 60 tablet 0     Sig: TAKE 1 TABLET BY MOUTH THREE TIMES DAILY AS NEEDED    There is no refill protocol information for this order        vitamin D (ERGOCALCIFEROL) 02775 UNIT capsule  Last Written Prescription Date:  11/20/17  Last Fill Quantity: 12,  # refills: 1   Last office visit: 9/17/2018 with prescribing provider:  Dr. Lozano   Future Office Visit:      cyclobenzaprine (FLEXERIL) 10 MG tablet      Last Written Prescription Date:  8/21/18  Last Fill Quantity: 60,   # refills: 0  Last Office Visit: 9/17/18  Future Office visit:       Routing refill request to provider for review/approval because:  Drug not on the [facility], P or [facility] refill protocol or controlled substance

## 2018-12-19 DIAGNOSIS — M79.7 FIBROMYALGIA: ICD-10-CM

## 2018-12-19 DIAGNOSIS — K21.9 GASTROESOPHAGEAL REFLUX DISEASE WITHOUT ESOPHAGITIS: ICD-10-CM

## 2018-12-19 DIAGNOSIS — F31.9 BIPOLAR AFFECTIVE DISORDER, REMISSION STATUS UNSPECIFIED (H): ICD-10-CM

## 2018-12-20 NOTE — TELEPHONE ENCOUNTER
Requested Prescriptions         LORazepam (ATIVAN) 1 MG tablet      Last Written Prescription Date:  9/17/18  Last Fill Quantity: 20,   # refills: 0  Last Office Visit: Blake Melchor Office visit:       Routing refill request to provider for review/approval because:  Drug not on the [facility], [facility] or [facility] refill protocol or controlled substance                     cyclobenzaprine (FLEXERIL) 10 MG tablet [Pharmacy Med Name: CYCLOBENZAPRINE 10MG TABLETS]  Last Written Prescription Date:  11/20/18  Last Fill Quantity: 60,  # refills: 0   Last office visit: 9/17/2018 with prescribing provider:  Blake   Future Office Visit:     60 tablet 0     Sig: TAKE 1 TABLET BY MOUTH THREE TIMES DAILY AS NEEDED    There is no refill protocol information for this order        ARIPiprazole (ABILIFY) 5 MG tablet [Pharmacy Med Name: ARIPIPRAZOLE 5MG TABLETS]  Last Written Prescription Date:  9/18/18  Last Fill Quantity: 90,  # refills: 0   Last office visit: 9/17/2018 with prescribing provider:  Blake   Future Office Visit:     30 tablet 0     Sig: TAKE 1 TABLET BY MOUTH EVERY NIGHT AT BEDTIME    Antipsychotic Medications Failed - 12/19/2018  8:04 PM       Failed - Lipid panel on file within the past 12 months    Recent Labs   Lab Test 08/05/14  1006   CHOL 170   TRIG 80   HDL 88   LDL 66   VLDL 16   CHOLHDLRATIO 1.9              Passed - Blood pressure under 140/90 in past 12 months    BP Readings from Last 3 Encounters:   09/17/18 112/78   08/03/18 115/74   06/04/18 112/70                Passed - Patient is 12 years of age or older       Passed - CBC on file in past 12 months    Recent Labs   Lab Test 05/14/18  1305   WBC 6.5   RBC 4.21   HGB 13.6   HCT 40.6                   Passed - Heart Rate on file within past 12 months    Pulse Readings from Last 3 Encounters:   09/17/18 72   08/03/18 82   06/04/18 79              Passed - A1c or Glucose on file in past 12 months    Recent Labs   Lab Test 05/14/18  1305   *  "      Please review patients last 3 weights. If a weight gain of >10 lbs exists, you may refill the prescription once after instructing the patient to schedule an appointment within the next 30 days.    Wt Readings from Last 3 Encounters:   09/17/18 73.9 kg (163 lb)   08/03/18 72 kg (158 lb 12.8 oz)   06/04/18 73 kg (161 lb)            Passed - Patient is not pregnant       Passed - No positve pregnancy test on file in past 12 months       Passed - Recent (6 mo) or future (30 days) visit within the authorizing provider's specialty    Patient had office visit in the last 6 months or has a visit in the next 30 days with authorizing provider or within the authorizing provider's specialty.  See \"Patient Info\" tab in inbasket, or \"Choose Columns\" in Meds & Orders section of the refill encounter.              "

## 2018-12-20 NOTE — TELEPHONE ENCOUNTER
"Requested Prescriptions   Pending Prescriptions Disp Refills     omeprazole (PRILOSEC) 20 MG DR capsule [Pharmacy Med Name: OMEPRAZOLE 20MG CAPSULES]  Last Written Prescription Date:  5/14/18  Last Fill Quantity: 90,  # refills: 1   Last office visit: 9/17/2018 with prescribing provider:  Blake   Future Office Visit:     90 capsule 0     Sig: TAKE 1 CAPSULE(20 MG) BY MOUTH DAILY    PPI Protocol Passed - 12/19/2018  8:04 PM       Passed - Not on Clopidogrel (unless Pantoprazole ordered)       Passed - No diagnosis of osteoporosis on record       Passed - Recent (12 mo) or future (30 days) visit within the authorizing provider's specialty    Patient had office visit in the last 12 months or has a visit in the next 30 days with authorizing provider or within the authorizing provider's specialty.  See \"Patient Info\" tab in inbasket, or \"Choose Columns\" in Meds & Orders section of the refill encounter.             Passed - Patient is age 18 or older       Passed - No active pregnacy on record       Passed - No positive pregnancy test in past 12 months          "

## 2018-12-26 DIAGNOSIS — G47.00 PERSISTENT DISORDER OF INITIATING OR MAINTAINING SLEEP: ICD-10-CM

## 2018-12-26 RX ORDER — LORAZEPAM 1 MG/1
TABLET ORAL
Qty: 20 TABLET | Refills: 0 | Status: SHIPPED | OUTPATIENT
Start: 2018-12-26 | End: 2019-05-28

## 2018-12-26 RX ORDER — ARIPIPRAZOLE 5 MG/1
TABLET ORAL
Qty: 30 TABLET | Refills: 0 | Status: SHIPPED | OUTPATIENT
Start: 2018-12-26 | End: 2019-05-28

## 2018-12-26 RX ORDER — CYCLOBENZAPRINE HCL 10 MG
TABLET ORAL
Qty: 60 TABLET | Refills: 0 | Status: SHIPPED | OUTPATIENT
Start: 2018-12-26 | End: 2019-02-02

## 2018-12-26 NOTE — TELEPHONE ENCOUNTER
Routing refill request to provider for review/approval because:  Drug not on the [facility] refill protocol   Labs not current:  Lipid    Maria Pandya RN, [facility]

## 2018-12-26 NOTE — TELEPHONE ENCOUNTER
Prescription approved per [facility] Refill Protocol.    Maria Pandya RN, [facility]

## 2018-12-26 NOTE — TELEPHONE ENCOUNTER
Requested Prescriptions   Pending Prescriptions Disp Refills     zolpidem (AMBIEN) 10 MG tablet [Pharmacy Med Name: ZOLPIDEM 10MG TABLETS] 30 tablet 0     Sig: TAKE 1 TABLET BY MOUTH EVERY EVENING AS NEEDED    There is no refill protocol information for this order          zolpidem (AMBIEN) 10 MG tablet      Last Written Prescription Date:  9/17/18  Last Fill Quantity: 30,   # refills: 0  Last Office Visit: 9/17/18  Future Office visit:       Routing refill request to provider for review/approval because:  Drug not on the [facility], P or [facility] refill protocol or controlled substance

## 2018-12-27 RX ORDER — ZOLPIDEM TARTRATE 10 MG/1
TABLET ORAL
Qty: 30 TABLET | Refills: 0 | Status: SHIPPED | OUTPATIENT
Start: 2018-12-27 | End: 2019-02-28

## 2018-12-27 NOTE — TELEPHONE ENCOUNTER
Routing refill request to provider for review/approval because:  Drug not on the [facility], P or [facility] refill protocol or controlled substance  Amie Ritter RN

## 2019-02-02 DIAGNOSIS — M79.7 FIBROMYALGIA: ICD-10-CM

## 2019-02-03 RX ORDER — CYCLOBENZAPRINE HCL 10 MG
TABLET ORAL
Qty: 60 TABLET | Refills: 0 | Status: SHIPPED | OUTPATIENT
Start: 2019-02-03 | End: 2019-05-28

## 2019-02-03 NOTE — TELEPHONE ENCOUNTER
Routing refill request to provider for review/approval because:  Drug not on the [facility] refill protocol   Macy Pendelton RN

## 2019-02-28 DIAGNOSIS — G47.00 PERSISTENT DISORDER OF INITIATING OR MAINTAINING SLEEP: ICD-10-CM

## 2019-02-28 NOTE — TELEPHONE ENCOUNTER
Requested Prescriptions   Pending Prescriptions Disp Refills     zolpidem (AMBIEN) 10 MG tablet [Pharmacy Med Name: ZOLPIDEM 10MG TABLETS] 30 tablet 0     Sig: TAKE 1 TABLET BY MOUTH EVERY EVENING AS NEEDED    There is no refill protocol information for this order          zolpidem (AMBIEN) 10 MG tablet      Last Written Prescription Date:  12/27/18  Last Fill Quantity: 30,   # refills: 0  Last Office Visit: 9/17/18  Future Office visit:       Routing refill request to provider for review/approval because:  Drug not on the [facility], P or [facility] refill protocol or controlled substance

## 2019-03-01 RX ORDER — ZOLPIDEM TARTRATE 10 MG/1
TABLET ORAL
Qty: 30 TABLET | Refills: 0 | Status: SHIPPED | OUTPATIENT
Start: 2019-03-01 | End: 2019-05-28

## 2019-03-01 NOTE — TELEPHONE ENCOUNTER
Routing refill request to provider for review/approval because:  Drug not on the [facility] refill protocol     Amirah Poon RN

## 2019-04-11 DIAGNOSIS — M79.7 FIBROMYALGIA: ICD-10-CM

## 2019-04-11 DIAGNOSIS — G47.00 PERSISTENT DISORDER OF INITIATING OR MAINTAINING SLEEP: ICD-10-CM

## 2019-04-12 RX ORDER — CYCLOBENZAPRINE HCL 10 MG
TABLET ORAL
Qty: 60 TABLET | Refills: 0 | OUTPATIENT
Start: 2019-04-12

## 2019-04-12 RX ORDER — ZOLPIDEM TARTRATE 10 MG/1
TABLET ORAL
Qty: 30 TABLET | Refills: 0 | OUTPATIENT
Start: 2019-04-12

## 2019-04-12 NOTE — TELEPHONE ENCOUNTER
Refill denied to the pharmacy.   If patient schedules an appointment we can provide a haseeb refill.

## 2019-04-12 NOTE — TELEPHONE ENCOUNTER
Requested Prescriptions   Pending Prescriptions Disp Refills     zolpidem (AMBIEN) 10 MG tablet [Pharmacy Med Name: ZOLPIDEM 10MG TABLETS]  Last Written Prescription Date:  3./1/19  Last Fill Quantity: 30 tablet,  # refills: 0   Last office visit: 9/17/2018 with prescribing provider:  Dr. Lozano   Future Office Visit:      Routing refill request to provider for review/approval because:  Drug not on the [facility], P or  Health refill protocol or controlled substance   30 tablet 0     Sig: TAKE 1 TABLET BY MOUTH AT BEDTIME AS NEEDED.       There is no refill protocol information for this order

## 2019-04-12 NOTE — TELEPHONE ENCOUNTER
Requested Prescriptions   Pending Prescriptions Disp Refills     cyclobenzaprine (FLEXERIL) 10 MG tablet [Pharmacy Med Name: CYCLOBENZAPRINE 10MG TABLETS]  Last Written Prescription Date:  2/3/19  Last Fill Quantity: 60 tablet,  # refills: 0   Last office visit: 9/17/2018 with prescribing provider:  Dr. Mccarthy   Future Office Visit:    Routing refill request to provider for review/approval because:  Drug not on the [facility], P or  Health refill protocol or controlled substance   60 tablet 0     Sig: TAKE 1 TABLET BY MOUTH THREE TIMES DAILY AS NEEDED       There is no refill protocol information for this order

## 2019-04-12 NOTE — TELEPHONE ENCOUNTER
Routing refill request to provider for review/approval because:  Drug not on the [facility] refill protocol     Requested Prescriptions   Pending Prescriptions Disp Refills     cyclobenzaprine (FLEXERIL) 10 MG tablet [Pharmacy Med Name: CYCLOBENZAPRINE 10MG TABLETS] 60 tablet 0     Sig: TAKE 1 TABLET BY MOUTH THREE TIMES DAILY AS NEEDED       There is no refill protocol information for this order        Amirah Poon RN

## 2019-05-28 ENCOUNTER — ANCILLARY PROCEDURE (OUTPATIENT)
Dept: GENERAL RADIOLOGY | Facility: CLINIC | Age: 54
End: 2019-05-28
Attending: NURSE PRACTITIONER
Payer: MEDICARE

## 2019-05-28 ENCOUNTER — OFFICE VISIT (OUTPATIENT)
Dept: FAMILY MEDICINE | Facility: CLINIC | Age: 54
End: 2019-05-28
Payer: MEDICARE

## 2019-05-28 VITALS
HEART RATE: 77 BPM | SYSTOLIC BLOOD PRESSURE: 102 MMHG | WEIGHT: 163 LBS | TEMPERATURE: 98.5 F | DIASTOLIC BLOOD PRESSURE: 68 MMHG | OXYGEN SATURATION: 95 % | HEIGHT: 67 IN | BODY MASS INDEX: 25.58 KG/M2

## 2019-05-28 DIAGNOSIS — J01.00 ACUTE MAXILLARY SINUSITIS, RECURRENCE NOT SPECIFIED: ICD-10-CM

## 2019-05-28 DIAGNOSIS — F33.1 MAJOR DEPRESSIVE DISORDER, RECURRENT EPISODE, MODERATE (H): ICD-10-CM

## 2019-05-28 DIAGNOSIS — M79.644 CHRONIC PAIN OF RIGHT THUMB: ICD-10-CM

## 2019-05-28 DIAGNOSIS — F41.9 ANXIETY: Primary | ICD-10-CM

## 2019-05-28 DIAGNOSIS — F31.9 BIPOLAR AFFECTIVE DISORDER, REMISSION STATUS UNSPECIFIED (H): ICD-10-CM

## 2019-05-28 DIAGNOSIS — M79.7 FIBROMYALGIA: ICD-10-CM

## 2019-05-28 DIAGNOSIS — K21.9 GASTROESOPHAGEAL REFLUX DISEASE WITHOUT ESOPHAGITIS: ICD-10-CM

## 2019-05-28 DIAGNOSIS — G89.29 CHRONIC PAIN OF RIGHT THUMB: ICD-10-CM

## 2019-05-28 DIAGNOSIS — G47.00 PERSISTENT DISORDER OF INITIATING OR MAINTAINING SLEEP: ICD-10-CM

## 2019-05-28 PROCEDURE — 73140 X-RAY EXAM OF FINGER(S): CPT | Mod: RT

## 2019-05-28 PROCEDURE — 99214 OFFICE O/P EST MOD 30 MIN: CPT | Performed by: NURSE PRACTITIONER

## 2019-05-28 RX ORDER — CYCLOBENZAPRINE HCL 10 MG
10 TABLET ORAL 3 TIMES DAILY PRN
Qty: 60 TABLET | Refills: 0 | Status: SHIPPED | OUTPATIENT
Start: 2019-05-28 | End: 2019-07-08

## 2019-05-28 RX ORDER — LAMOTRIGINE 100 MG/1
TABLET ORAL
Qty: 270 TABLET | Refills: 1 | Status: SHIPPED | OUTPATIENT
Start: 2019-05-28 | End: 2019-10-24

## 2019-05-28 RX ORDER — MOMETASONE FUROATE MONOHYDRATE 50 UG/1
2 SPRAY, METERED NASAL DAILY
Qty: 1 BOX | Refills: 3 | Status: SHIPPED | OUTPATIENT
Start: 2019-05-28 | End: 2019-10-24

## 2019-05-28 RX ORDER — ZOLPIDEM TARTRATE 10 MG/1
TABLET ORAL
Qty: 30 TABLET | Refills: 0 | Status: SHIPPED | OUTPATIENT
Start: 2019-05-28 | End: 2019-07-29

## 2019-05-28 RX ORDER — HYDROXYZINE HYDROCHLORIDE 25 MG/1
25-50 TABLET, FILM COATED ORAL 3 TIMES DAILY PRN
Qty: 30 TABLET | Refills: 1 | Status: SHIPPED | OUTPATIENT
Start: 2019-05-28 | End: 2019-10-24

## 2019-05-28 ASSESSMENT — ANXIETY QUESTIONNAIRES
2. NOT BEING ABLE TO STOP OR CONTROL WORRYING: MORE THAN HALF THE DAYS
6. BECOMING EASILY ANNOYED OR IRRITABLE: MORE THAN HALF THE DAYS
GAD7 TOTAL SCORE: 13
3. WORRYING TOO MUCH ABOUT DIFFERENT THINGS: MORE THAN HALF THE DAYS
IF YOU CHECKED OFF ANY PROBLEMS ON THIS QUESTIONNAIRE, HOW DIFFICULT HAVE THESE PROBLEMS MADE IT FOR YOU TO DO YOUR WORK, TAKE CARE OF THINGS AT HOME, OR GET ALONG WITH OTHER PEOPLE: SOMEWHAT DIFFICULT
7. FEELING AFRAID AS IF SOMETHING AWFUL MIGHT HAPPEN: MORE THAN HALF THE DAYS
5. BEING SO RESTLESS THAT IT IS HARD TO SIT STILL: SEVERAL DAYS
1. FEELING NERVOUS, ANXIOUS, OR ON EDGE: MORE THAN HALF THE DAYS

## 2019-05-28 ASSESSMENT — PATIENT HEALTH QUESTIONNAIRE - PHQ9
5. POOR APPETITE OR OVEREATING: MORE THAN HALF THE DAYS
SUM OF ALL RESPONSES TO PHQ QUESTIONS 1-9: 13

## 2019-05-28 ASSESSMENT — MIFFLIN-ST. JEOR: SCORE: 1381.99

## 2019-05-28 NOTE — PROGRESS NOTES
Subjective     Kareen Hernandez is a 54 year old female who presents to clinic today for the following health issues:    HPI   Depression and Anxiety Follow-Up    How are you doing with your depression since your last visit? No change    How are you doing with your anxiety since your last visit?  No change     Are you having other symptoms that might be associated with depression or anxiety? Yes:  fatigue     Have you had a significant life event? No     Do you have any concerns with your use of alcohol or other drugs? No    Social History     Tobacco Use     Smoking status: Former Smoker     Packs/day: 0.50     Types: Cigarettes     Last attempt to quit: 2013     Years since quittin.6     Smokeless tobacco: Current User     Tobacco comment: Vape e-cig   Substance Use Topics     Alcohol use: No     Drug use: Yes     Comment: Marijuana - 3 times a week     PHQ 2018   PHQ-9 Total Score 13 15 13   Q9: Thoughts of better off dead/self-harm past 2 weeks Not at all Not at all Not at all     CHARAN-7 SCORE 2018   Total Score - - -   Total Score 14 14 13   Total Score - - -     No flowsheet data found.  In the past two weeks have you had thoughts of suicide or self-harm?  No.    Do you have concerns about your personal safety or the safety of others?   No    Suicide Assessment Five-step Evaluation and Treatment (SAFE-T)    prozac 80 mg daily, lamictal 300 mg daily  Doesn't do therapy or medication. Has cats she likes and takes for walks.  Needs a refill of her medications.  She states the lorazepam is not helping her anxiety.  She is not tried hydroxyzine since , will try that again.  Advised that it can cause sedation.  She does not drive, her daughter is here and drives her around.          Migraine     Since your last clinic visit, how have your headaches changed?  Improved slightly with help of medication    How often are you getting headaches or migraines? Twice a week  "    Are you able to do normal daily activities when you have a migraine? No    Are you taking rescue/relief medications? (Select all that apply) sumatriptan (Imitrex)    How helpful is your rescue/relief medication?  I get total relief    Are you taking any medications to prevent migraines? (Select all that apply)  No    In the past 4 weeks, how often have you gone to urgent care or the emergency room because of your headaches?  0      Amount of exercise or physical activity: 4-5 days/week for an average of 15-30 minutes    Problems taking medications regularly: No    Medication side effects: Abilify and Lorazepam; patient states they are not working and stopped taking them    Diet: regular (no restrictions)  Migraines are stable.    History of bipolar, on Lamictal, no concerns.    States her right thumb keeps \"clicking\".  It then causes pain.  Has been ongoing for a couple weeks.    She needs refills of all her medications    Patient Active Problem List   Diagnosis     Fibromyalgia     Gastric bypass status for obesity     CARDIOVASCULAR SCREENING; LDL GOAL LESS THAN 130     Anxiety     Vitamin D deficiency disease     Vitamin B12 deficiency disease     Migraine headache     Insomnia     Right maxillary sinusitis, chronic     History of hypertension     Gastroesophageal reflux disease without esophagitis     Chronic migraine without aura without status migrainosus, not intractable     Major depressive disorder, recurrent episode, moderate (H)     History of kidney stones     Tobacco use disorder     Bipolar affective disorder, remission status unspecified (H)     Acute right-sided low back pain with right-sided sciatica     S/P lumbar laminectomy     Past Surgical History:   Procedure Laterality Date     ABDOMINOPLASTY  12/2/2013     C GASTRIC BYPASS,OBESE<100CM DAYAN-EN-Y  3-25-09     [facility]     C REMOVAL OF KIDNEY STONE      With kidney tents x 5 to 6 procedures.     CL AFF SURGICAL PATHOLOGY  Feb 2007    " "Feroz's neuroma  - Right Foot     LITHOTRIPSY       SURGICAL PATHOLOGY EXAM      Lipoma Removal on her back       Social History     Tobacco Use     Smoking status: Former Smoker     Packs/day: 0.50     Types: Cigarettes     Last attempt to quit: 2013     Years since quittin.6     Smokeless tobacco: Current User     Tobacco comment: Vape e-cig   Substance Use Topics     Alcohol use: No     Family History   Problem Relation Age of Onset     Asthma Mother      Hypertension Mother      C.A.D. Mother      Genitourinary Problems Mother         kidney failure   age 80     Chronic Obstructive Pulmonary Disease Mother      Diabetes Father      Hypertension Father      C.A.D. Father      Genitourinary Problems Sister         kidney stones     Cerebrovascular Disease No family hx of      Breast Cancer No family hx of      Cancer - colorectal No family hx of      Prostate Cancer No family hx of          Current Outpatient Medications   Medication Sig Dispense Refill     cyanocobalamin (VITAMIN B12) 1000 MCG/ML injection Inject 1 mL (1,000 mcg) into the muscle every 30 days 1 mL 11     cyclobenzaprine (FLEXERIL) 10 MG tablet Take 1 tablet (10 mg) by mouth 3 times daily as needed for muscle spasms 60 tablet 0     FLUoxetine (PROZAC) 20 MG capsule TAKE 4 CAPSULES BY MOUTH DAILY 360 capsule 1     hydrOXYzine (ATARAX) 25 MG tablet Take 1-2 tablets (25-50 mg) by mouth 3 times daily as needed for anxiety 30 tablet 1     insulin syringe-needle U-100 (BD INSULIN SYRINGE ULTRAFINE) 30G X 1/2\" 1 ML Use one syringe once a month for B-12 injections. 10 each 1     lamoTRIgine (LAMICTAL) 100 MG tablet TAKE 3 TABLETS BY MOUTH DAILY 270 tablet 1     mometasone (NASONEX) 50 MCG/ACT nasal spray Spray 2 sprays into both nostrils daily 1 Box 3     omeprazole (PRILOSEC) 20 MG DR capsule Take 1 capsule (20 mg) by mouth daily 90 capsule 1     order for DME Equipment being ordered: Splint right thumb 1 Units 0     SUMAtriptan (IMITREX) " "100 MG tablet TAKE 1 TABLET BY MOUTH ON THE ONSET OF HEADACHE, MAY REPEAT IN 2 HOURS..*MAX 2 TABLETS DAILY* 9 tablet 3     vitamin D2 (ERGOCALCIFEROL) 19256 UNIT capsule TAKE 1 CAPSULE BY MOUTH EVERY 7 DAYS 12 capsule 0     zolpidem (AMBIEN) 10 MG tablet Take 5-10 mg as needed at night for sleep 30 tablet 0     Allergies   Allergen Reactions     Lurasidone Diarrhea     Diarrhea     Morphine Rash         Reviewed and updated as needed this visit by Provider  Tobacco  Allergies  Meds  Problems  Med Hx  Surg Hx  Fam Hx         Review of Systems   ROS COMP: Constitutional, HEENT, cardiovascular, pulmonary, gi and gu, MS as above, Psych as above, systems are negative, except as otherwise noted.      Objective    /68 (BP Location: Right arm, Patient Position: Chair, Cuff Size: Adult Regular)   Pulse 77   Temp 98.5  F (36.9  C) (Oral)   Ht 1.702 m (5' 7\")   Wt 73.9 kg (163 lb)   LMP  (LMP Unknown)   SpO2 95%   Breastfeeding? No   BMI 25.53 kg/m    Body mass index is 25.53 kg/m .  Physical Exam   GENERAL: appears older than stated age, alert and no distress  EYES: Eyes grossly normal to inspection, PERRL and conjunctivae and sclerae normal  HENT: ear canals and TM's normal, nose and mouth without ulcers or lesions  NECK: no adenopathy, no asymmetry, masses, or scars and thyroid normal to palpation  RESP: lungs clear to auscultation - no rales, rhonchi or wheezes  CV: regular rate and rhythm, normal S1 S2, no S3 or S4, no murmur, click or rub  ABDOMEN: soft, nontender, no hepatosplenomegaly, no masses and bowel sounds normal  MS: Right thumb clicking noted   PSYCH: mentation appears normal, affect normal    Diagnostic Test Results:  Labs reviewed in Epic  Results for orders placed or performed in visit on 05/28/19 (from the past 24 hour(s))   XR Finger Right G/E 2 Views    Narrative    FINGER RIGHT TWO OR MORE VIEWS    5/28/2019 4:33 PM     HISTORY: Chronic pain of right thumb.    COMPARISON: None.       " Impression    IMPRESSION: Two views of the right thumb. No fracture or dislocation.  No evidence of arthritis.    EVERETT DOE MD           Assessment & Plan     1. Anxiety  Lorazepam and Abilify not working for patient.  Has not tried hydroxyzine since 2011 we will try it again.  Advised can cause sedation no driving when first taking.  Patient and daughter verbalized understanding  - hydrOXYzine (ATARAX) 25 MG tablet; Take 1-2 tablets (25-50 mg) by mouth 3 times daily as needed for anxiety  Dispense: 30 tablet; Refill: 1    2. Fibromyalgia  Patient needs refill, will send 1 refill  - cyclobenzaprine (FLEXERIL) 10 MG tablet; Take 1 tablet (10 mg) by mouth 3 times daily as needed for muscle spasms  Dispense: 60 tablet; Refill: 0    3. Bipolar affective disorder, remission status unspecified (H)  Refills given, stable  - FLUoxetine (PROZAC) 20 MG capsule; TAKE 4 CAPSULES BY MOUTH DAILY  Dispense: 360 capsule; Refill: 1  - lamoTRIgine (LAMICTAL) 100 MG tablet; TAKE 3 TABLETS BY MOUTH DAILY  Dispense: 270 tablet; Refill: 1    4. Major depressive disorder, recurrent episode, moderate (H)  Refills given, stable   - FLUoxetine (PROZAC) 20 MG capsule; TAKE 4 CAPSULES BY MOUTH DAILY  Dispense: 360 capsule; Refill: 1    5. Acute maxillary sinusitis, recurrence not specified  Refills given  - mometasone (NASONEX) 50 MCG/ACT nasal spray; Spray 2 sprays into both nostrils daily  Dispense: 1 Box; Refill: 3    6. Gastroesophageal reflux disease without esophagitis  Refilled  - omeprazole (PRILOSEC) 20 MG DR capsule; Take 1 capsule (20 mg) by mouth daily  Dispense: 90 capsule; Refill: 1    7. Persistent disorder of initiating or maintaining sleep  Refilled, but no additional refills.  Patient states she sometimes seems much more sleepy in the morning.  Advised that she should be taking 5 mg instead of the 10.  We will adjust this prescription to say 5-10.  Patient and daughter verbalized understanding  - zolpidem (AMBIEN) 10 MG  "tablet; Take 5-10 mg as needed at night for sleep  Dispense: 30 tablet; Refill: 0    8. Chronic pain of right thumb  X-rays negative for thumb.  Likely trigger finger.  Splint given, use throughout the day, pain should improve over the next couple weeks.  Could also consider sending to sports medicine as needed for possible steroid injection if not improving  - XR Finger Right G/E 2 Views  - order for DME; Equipment being ordered: Splint right thumb  Dispense: 1 Units; Refill: 0     BMI:   Estimated body mass index is 25.53 kg/m  as calculated from the following:    Height as of this encounter: 1.702 m (5' 7\").    Weight as of this encounter: 73.9 kg (163 lb).       Return in about 3 months (around 8/28/2019).      Will also send to PCP as MESHA Recio, NP-C  [facility]    This chart was documented by provider using a voice activated software called Dragon in addition to manual typing. There may be vocabulary errors or other grammatical errors due to this.       "

## 2019-05-28 NOTE — LETTER
[address]  [address]  [phone]    May 29, 2019      Kareen Hernandez  [address] [facility] [address]        Ms. Hernandez,     The x-ray of your  thumb was negative.  No arthritis.  It is likely trigger finger, use the brace for the next couple weeks, call if things are not improving.     Please contact the clinic if you have additional questions.  Thank you.     Sincerely,     MESHA Padgett, NP-C   [facility]

## 2019-05-29 ASSESSMENT — ANXIETY QUESTIONNAIRES: GAD7 TOTAL SCORE: 13

## 2019-07-08 DIAGNOSIS — E53.8 VITAMIN B12 DEFICIENCY DISEASE: ICD-10-CM

## 2019-07-08 DIAGNOSIS — M79.7 FIBROMYALGIA: ICD-10-CM

## 2019-07-08 RX ORDER — CYCLOBENZAPRINE HCL 10 MG
10 TABLET ORAL 3 TIMES DAILY PRN
Qty: 60 TABLET | Refills: 3 | Status: SHIPPED | OUTPATIENT
Start: 2019-07-08 | End: 2019-10-24

## 2019-07-08 NOTE — TELEPHONE ENCOUNTER
Routing refill request to provider for review/approval because:  Drug not on the [facility] refill protocol   Amie Ritter RN

## 2019-07-08 NOTE — TELEPHONE ENCOUNTER
Requested Prescriptions   Pending Prescriptions Disp Refills     cyclobenzaprine (FLEXERIL) 10 MG tablet 60 tablet 0     Sig: Take 1 tablet (10 mg) by mouth 3 times daily as needed for muscle spasms       There is no refill protocol information for this order        cyclobenzaprine (FLEXERIL) 10 MG tablet      Last Written Prescription Date:  5/28/19  Last Fill Quantity: 60,   # refills: 0  Last Office Visit: 5/28/19  Future Office visit:       Routing refill request to provider for review/approval because:  Drug not on the G, P or [facility] refill protocol or controlled substance

## 2019-07-11 DIAGNOSIS — K21.9 GASTROESOPHAGEAL REFLUX DISEASE WITHOUT ESOPHAGITIS: ICD-10-CM

## 2019-07-11 NOTE — TELEPHONE ENCOUNTER
"Requested Prescriptions   Pending Prescriptions Disp Refills     omeprazole (PRILOSEC) 20 MG DR capsule [Pharmacy Med Name: OMEPRAZOLE 20MG CAPSULES] 90 capsule 0     Sig: TAKE 1 CAPSULE(20 MG) BY MOUTH DAILY       PPI Protocol Passed - 7/11/2019  5:12 AM        Passed - Not on Clopidogrel (unless Pantoprazole ordered)        Passed - No diagnosis of osteoporosis on record        Passed - Recent (12 mo) or future (30 days) visit within the authorizing provider's specialty     Patient had office visit in the last 12 months or has a visit in the next 30 days with authorizing provider or within the authorizing provider's specialty.  See \"Patient Info\" tab in inbasket, or \"Choose Columns\" in Meds & Orders section of the refill encounter.              Passed - Medication is active on med list        Passed - Patient is age 18 or older        Passed - No active pregnacy on record        Passed - No positive pregnancy test in past 12 months        omeprazole (PRILOSEC) 20 MG DR capsule  Last Written Prescription Date:  5/28/19  Last Fill Quantity: 90,  # refills: 1   Last office visit: 5/28/2019 with prescribing provider:  Dr. Mccarthy   Future Office Visit:      "

## 2019-07-12 NOTE — TELEPHONE ENCOUNTER
Outpatient Medication Detail      Disp Refills Start End JASON   omeprazole (PRILOSEC) 20 MG DR capsule 90 capsule 1 5/28/2019  No   Sig - Route: Take 1 capsule (20 mg) by mouth daily - Oral   Sent to pharmacy as: omeprazole (PRILOSEC) 20 MG DR capsule   Class: E-Prescribe   Order: [id number]   E-Prescribing Status: Receipt confirmed by pharmacy (5/28/2019  4:39 PM CDT)   Printout Tracking     External Result Report   Pharmacy     [facility] [place] [address] AT [facility] [address]     Rx filled for a 6 month supply in May 2019 to requesting pharmacy. Too soon to refill.    Amirah Hinojosa, BSN, RN, PHN

## 2019-07-29 DIAGNOSIS — G47.00 PERSISTENT DISORDER OF INITIATING OR MAINTAINING SLEEP: ICD-10-CM

## 2019-07-29 RX ORDER — ZOLPIDEM TARTRATE 10 MG/1
TABLET ORAL
Qty: 30 TABLET | Refills: 0 | Status: SHIPPED | OUTPATIENT
Start: 2019-07-29 | End: 2019-10-24

## 2019-07-29 NOTE — TELEPHONE ENCOUNTER
Script printed, please fax. Patient due for follow up with provider end of August.   MESHA Padgett, NP-C  [facility]

## 2019-07-29 NOTE — TELEPHONE ENCOUNTER
Requested Prescriptions   Pending Prescriptions Disp Refills     zolpidem (AMBIEN) 10 MG tablet 30 tablet 0     Sig: Take 5-10 mg as needed at night for sleep       There is no refill protocol information for this order          zolpidem (AMBIEN) 10 MG tablet      Last Written Prescription Date:  5/28/19  Last Fill Quantity: 30,   # refills: 0  Last Office Visit: 5/28/19  Future Office visit:       Routing refill request to provider for review/approval because:  Drug not on the G, P or [facility] refill protocol or controlled substance

## 2019-10-05 DIAGNOSIS — G47.00 PERSISTENT DISORDER OF INITIATING OR MAINTAINING SLEEP: ICD-10-CM

## 2019-10-06 DIAGNOSIS — E53.8 VITAMIN B12 DEFICIENCY DISEASE: ICD-10-CM

## 2019-10-06 DIAGNOSIS — E55.9 VITAMIN D DEFICIENCY DISEASE: ICD-10-CM

## 2019-10-06 DIAGNOSIS — F33.1 MAJOR DEPRESSIVE DISORDER, RECURRENT EPISODE, MODERATE (H): ICD-10-CM

## 2019-10-06 DIAGNOSIS — F31.9 BIPOLAR AFFECTIVE DISORDER, REMISSION STATUS UNSPECIFIED (H): ICD-10-CM

## 2019-10-07 RX ORDER — ZOLPIDEM TARTRATE 10 MG/1
TABLET ORAL
Qty: 30 TABLET | Refills: 0 | OUTPATIENT
Start: 2019-10-07

## 2019-10-07 NOTE — TELEPHONE ENCOUNTER
"Requested Prescriptions   Pending Prescriptions Disp Refills     ARIPiprazole (ABILIFY) 5 MG tablet [Pharmacy Med Name: ARIPIPRAZOLE 5MG TABLETS] 30 tablet 0     Sig: TAKE 1 TABLET BY MOUTH EVERY NIGHT AT BEDTIME       Antipsychotic Medications Failed - 10/6/2019  4:00 AM        Failed - Lipid panel on file within the past 12 months     Recent Labs   Lab Test 08/05/14  1006   CHOL 170   TRIG 80   HDL 88   LDL 66   VLDL 16   CHOLHDLRATIO 1.9               Failed - CBC on file in past 12 months     Recent Labs   Lab Test 05/14/18  1305   WBC 6.5   RBC 4.21   HGB 13.6   HCT 40.6                    Failed - A1c or Glucose on file in past 12 months     Recent Labs   Lab Test 05/14/18  1305   *       Please review patients last 3 weights. If a weight gain of >10 lbs exists, you may refill the prescription once after instructing the patient to schedule an appointment within the next 30 days.    Wt Readings from Last 3 Encounters:   05/28/19 73.9 kg (163 lb)   09/17/18 73.9 kg (163 lb)   08/03/18 72 kg (158 lb 12.8 oz)             Failed - Medication is active on med list        Passed - Blood pressure under 140/90 in past 12 months     BP Readings from Last 3 Encounters:   05/28/19 102/68   09/17/18 112/78   08/03/18 115/74                 Passed - Patient is 12 years of age or older        Passed - Heart Rate on file within past 12 months     Pulse Readings from Last 3 Encounters:   05/28/19 77   09/17/18 72   08/03/18 82               Passed - Patient is not pregnant        Passed - No positve pregnancy test on file in past 12 months        Passed - Recent (6 mo) or future (30 days) visit within the authorizing provider's specialty     Patient had office visit in the last 6 months or has a visit in the next 30 days with authorizing provider or within the authorizing provider's specialty.  See \"Patient Info\" tab in inbasket, or \"Choose Columns\" in Meds & Orders section of the refill encounter.            " "lamoTRIgine (LAMICTAL) 100 MG tablet [Pharmacy Med Name: LAMOTRIGINE 100MG TABLETS] 90 tablet 0     Sig: TAKE 3 TABLETS BY MOUTH DAILY       Anti-Seizure Meds Protocol  Failed - 10/6/2019  4:00 AM        Failed - Review Authorizing provider's last note.      Refer to last progress notes: confirm request is for original authorizing provider (cannot be through other providers).          Failed - Normal ALT or AST on file in past 26 months     Recent Labs   Lab Test 08/05/14  1006   ALT 27     Recent Labs   Lab Test 08/05/14  1006   AST 23             Passed - Recent (12 mo) or future (30 days) visit within the authorizing provider's specialty     Patient has had an office visit with the authorizing provider or a provider within the authorizing providers department within the previous 12 mos or has a future within next 30 days. See \"Patient Info\" tab in inbasket, or \"Choose Columns\" in Meds & Orders section of the refill encounter.              Passed - Normal CBC on file in past 26 months     Recent Labs   Lab Test 05/14/18  1305   WBC 6.5   RBC 4.21   HGB 13.6   HCT 40.6                    Passed - Normal platelet count on file in past 26 months     Recent Labs   Lab Test 05/14/18  1305                  Passed - Medication is active on med list        Passed - No active pregnancy on record        Passed - No positive pregnancy test in last 12 months        FLUoxetine (PROZAC) 20 MG capsule [Pharmacy Med Name: FLUOXETINE 20MG CAPSULES] 120 capsule 0     Sig: TAKE 4 CAPSULES BY MOUTH DAILY       SSRIs Protocol Failed - 10/6/2019  4:00 AM        Failed - PHQ-9 score less than 5 in past 6 months     Please review last PHQ-9 score.           Passed - Medication is active on med list        Passed - Patient is age 18 or older        Passed - No active pregnancy on record        Passed - No positive pregnancy test in last 12 months        Passed - Recent (6 mo) or future (30 days) visit within the authorizing " "provider's specialty     Patient had office visit in the last 6 months or has a visit in the next 30 days with authorizing provider or within the authorizing provider's specialty.  See \"Patient Info\" tab in inbasket, or \"Choose Columns\" in Meds & Orders section of the refill encounter.            vitamin D2 (ERGOCALCIFEROL) 38211 units (1250 mcg) capsule [Pharmacy Med Name: VITAMIN D2 50,000IU (ERGO) CAP RX] 12 capsule 0     Sig: TAKE 1 CAPSULE BY MOUTH EVERY 7 DAYS       There is no refill protocol information for this order          "

## 2019-10-07 NOTE — TELEPHONE ENCOUNTER
Requested Prescriptions   Pending Prescriptions Disp Refills     zolpidem (AMBIEN) 10 MG tablet [Pharmacy Med Name: ZOLPIDEM 10MG TABLETS]  Last Written Prescription Date:  7/29/19  Last Fill Quantity: 30 tablet,  # refills: 0   Last office visit: 5/28/2019 with prescribing provider:  Apryl Childs NP   Future Office Visit:      Routing refill request to provider for review/approval because:  Drug not on the G, P or [facility] refill protocol or controlled substance   30 tablet 0     Sig: TAKE 1 TABLET BY MOUTH AT BEDTIME AS NEEDED.       There is no refill protocol information for this order

## 2019-10-08 NOTE — TELEPHONE ENCOUNTER
Patient needs to be seen if she wants further refills of ambien. May not be appropriate for her health to continue prescribing.   MESHA Padgett, NP-C  [facility]

## 2019-10-08 NOTE — TELEPHONE ENCOUNTER
This writer attempted to contact pt on 10/08/19      Reason for call needs OV, prior to refill and left message.      If patient calls back:   Schedule Office Visit appointment within 1 week with primary care, document that pt called and close encounter         Janet Kitchen MA

## 2019-10-09 RX ORDER — CYANOCOBALAMIN 1000 UG/ML
1 INJECTION, SOLUTION INTRAMUSCULAR; SUBCUTANEOUS
Qty: 1 ML | Refills: 0 | Status: SHIPPED | OUTPATIENT
Start: 2019-10-09 | End: 2020-02-17

## 2019-10-09 RX ORDER — ARIPIPRAZOLE 5 MG/1
TABLET ORAL
Qty: 30 TABLET | Refills: 0 | OUTPATIENT
Start: 2019-10-09

## 2019-10-09 RX ORDER — ERGOCALCIFEROL 1.25 MG/1
CAPSULE, LIQUID FILLED ORAL
Qty: 4 CAPSULE | Refills: 0 | Status: SHIPPED | OUTPATIENT
Start: 2019-10-09 | End: 2020-02-17

## 2019-10-09 RX ORDER — LAMOTRIGINE 100 MG/1
TABLET ORAL
Qty: 30 TABLET | Refills: 0 | Status: SHIPPED | OUTPATIENT
Start: 2019-10-09 | End: 2019-10-24

## 2019-10-09 NOTE — TELEPHONE ENCOUNTER
PHQ-9 score:    PHQ-9 SCORE 5/28/2019   PHQ-9 Total Score -   PHQ-9 Total Score 13     Routing refill request to provider for review/approval because:  Labs not current:  ALT, AST  High dose Vitamin D not on protocol, usually only given short term.  Unclear of plan with Vitamin B12. Level last checked in 2015.  Elevated PHQ-9 score, can not fill if 5 or greater.    Yasmine Tejada RN

## 2019-10-24 ENCOUNTER — OFFICE VISIT (OUTPATIENT)
Dept: FAMILY MEDICINE | Facility: CLINIC | Age: 54
End: 2019-10-24
Payer: MEDICARE

## 2019-10-24 VITALS
BODY MASS INDEX: 25.84 KG/M2 | SYSTOLIC BLOOD PRESSURE: 129 MMHG | OXYGEN SATURATION: 97 % | DIASTOLIC BLOOD PRESSURE: 82 MMHG | TEMPERATURE: 98 F | HEART RATE: 90 BPM | WEIGHT: 165 LBS

## 2019-10-24 DIAGNOSIS — F33.1 MAJOR DEPRESSIVE DISORDER, RECURRENT EPISODE, MODERATE (H): ICD-10-CM

## 2019-10-24 DIAGNOSIS — J30.9 ALLERGIC RHINITIS, UNSPECIFIED SEASONALITY, UNSPECIFIED TRIGGER: ICD-10-CM

## 2019-10-24 DIAGNOSIS — Z00.00 ENCOUNTER FOR MEDICARE ANNUAL WELLNESS EXAM: Primary | ICD-10-CM

## 2019-10-24 DIAGNOSIS — Z23 NEED FOR PROPHYLACTIC VACCINATION AND INOCULATION AGAINST INFLUENZA: ICD-10-CM

## 2019-10-24 DIAGNOSIS — Z12.31 ENCOUNTER FOR SCREENING MAMMOGRAM FOR BREAST CANCER: ICD-10-CM

## 2019-10-24 DIAGNOSIS — Z13.1 SCREENING FOR DIABETES MELLITUS: ICD-10-CM

## 2019-10-24 DIAGNOSIS — Z12.4 ENCOUNTER FOR SCREENING FOR CERVICAL CANCER: ICD-10-CM

## 2019-10-24 DIAGNOSIS — M79.7 FIBROMYALGIA: ICD-10-CM

## 2019-10-24 DIAGNOSIS — Z11.4 ENCOUNTER FOR SCREENING FOR HIV: ICD-10-CM

## 2019-10-24 DIAGNOSIS — Z12.11 SCREENING FOR MALIGNANT NEOPLASM OF COLON: ICD-10-CM

## 2019-10-24 DIAGNOSIS — G47.00 PERSISTENT DISORDER OF INITIATING OR MAINTAINING SLEEP: ICD-10-CM

## 2019-10-24 DIAGNOSIS — F31.9 BIPOLAR AFFECTIVE DISORDER, REMISSION STATUS UNSPECIFIED (H): ICD-10-CM

## 2019-10-24 DIAGNOSIS — Z13.6 SCREENING FOR HEART DISEASE: ICD-10-CM

## 2019-10-24 LAB
CHOLEST SERPL-MCNC: 195 MG/DL
GLUCOSE SERPL-MCNC: 95 MG/DL (ref 70–99)
HDLC SERPL-MCNC: 73 MG/DL
LDLC SERPL CALC-MCNC: 108 MG/DL
NONHDLC SERPL-MCNC: 122 MG/DL
TRIGL SERPL-MCNC: 71 MG/DL

## 2019-10-24 PROCEDURE — 90682 RIV4 VACC RECOMBINANT DNA IM: CPT | Performed by: FAMILY MEDICINE

## 2019-10-24 PROCEDURE — G0476 HPV COMBO ASSAY CA SCREEN: HCPCS | Performed by: FAMILY MEDICINE

## 2019-10-24 PROCEDURE — G0145 SCR C/V CYTO,THINLAYER,RESCR: HCPCS | Performed by: FAMILY MEDICINE

## 2019-10-24 PROCEDURE — 87624 HPV HI-RISK TYP POOLED RSLT: CPT | Performed by: FAMILY MEDICINE

## 2019-10-24 PROCEDURE — 80061 LIPID PANEL: CPT | Performed by: FAMILY MEDICINE

## 2019-10-24 PROCEDURE — 82947 ASSAY GLUCOSE BLOOD QUANT: CPT | Performed by: FAMILY MEDICINE

## 2019-10-24 PROCEDURE — G0438 PPPS, INITIAL VISIT: HCPCS | Performed by: FAMILY MEDICINE

## 2019-10-24 PROCEDURE — 99213 OFFICE O/P EST LOW 20 MIN: CPT | Mod: 25 | Performed by: FAMILY MEDICINE

## 2019-10-24 PROCEDURE — G0008 ADMIN INFLUENZA VIRUS VAC: HCPCS | Performed by: FAMILY MEDICINE

## 2019-10-24 PROCEDURE — 36415 COLL VENOUS BLD VENIPUNCTURE: CPT | Performed by: FAMILY MEDICINE

## 2019-10-24 PROCEDURE — 87389 HIV-1 AG W/HIV-1&-2 AB AG IA: CPT | Performed by: FAMILY MEDICINE

## 2019-10-24 RX ORDER — LAMOTRIGINE 100 MG/1
TABLET ORAL
Qty: 270 TABLET | Refills: 1 | Status: SHIPPED | OUTPATIENT
Start: 2019-10-24 | End: 2020-12-03

## 2019-10-24 RX ORDER — CYCLOBENZAPRINE HCL 10 MG
10 TABLET ORAL 3 TIMES DAILY PRN
Qty: 60 TABLET | Refills: 3 | Status: SHIPPED | OUTPATIENT
Start: 2019-10-24 | End: 2020-12-03

## 2019-10-24 RX ORDER — MOMETASONE FUROATE MONOHYDRATE 50 UG/1
2 SPRAY, METERED NASAL DAILY
Qty: 1 BOX | Refills: 3 | Status: ON HOLD | OUTPATIENT
Start: 2019-10-24 | End: 2022-11-19

## 2019-10-24 RX ORDER — ZOLPIDEM TARTRATE 10 MG/1
TABLET ORAL
Qty: 30 TABLET | Refills: 0 | Status: SHIPPED | OUTPATIENT
Start: 2019-10-24 | End: 2020-02-17

## 2019-10-24 SDOH — ECONOMIC STABILITY: FOOD INSECURITY: WITHIN THE PAST 12 MONTHS, THE FOOD YOU BOUGHT JUST DIDN'T LAST AND YOU DIDN'T HAVE MONEY TO GET MORE.: NEVER TRUE

## 2019-10-24 SDOH — ECONOMIC STABILITY: FOOD INSECURITY: WITHIN THE PAST 12 MONTHS, YOU WORRIED THAT YOUR FOOD WOULD RUN OUT BEFORE YOU GOT MONEY TO BUY MORE.: NEVER TRUE

## 2019-10-24 ASSESSMENT — ENCOUNTER SYMPTOMS
MYALGIAS: 1
DIZZINESS: 0
CHILLS: 0
FEVER: 0
PALPITATIONS: 0
NERVOUS/ANXIOUS: 1
WEAKNESS: 0
DYSURIA: 0
HEADACHES: 0
SORE THROAT: 0
SHORTNESS OF BREATH: 0
HEARTBURN: 0
HEMATURIA: 0
COUGH: 0
FREQUENCY: 0
NAUSEA: 0
ARTHRALGIAS: 0
ABDOMINAL PAIN: 0
BREAST MASS: 0
JOINT SWELLING: 0
DIARRHEA: 0
EYE PAIN: 0
HEMATOCHEZIA: 0
CONSTIPATION: 0
PARESTHESIAS: 0

## 2019-10-24 ASSESSMENT — PAIN SCALES - GENERAL: PAINLEVEL: NO PAIN (0)

## 2019-10-24 ASSESSMENT — ACTIVITIES OF DAILY LIVING (ADL): CURRENT_FUNCTION: NO ASSISTANCE NEEDED

## 2019-10-24 NOTE — PROGRESS NOTES
The patient was provided with suggestions to help her develop a healthy physical lifestyle.  The patient was counseled and encouraged to consider modifying their diet and eating habits. She was provided with information on recommended healthy diet options.  The patient was provided with suggestions to help her develop a healthy emotional lifestyle.

## 2019-10-24 NOTE — PATIENT INSTRUCTIONS
Patient Education   Personalized Prevention Plan  You are due for the preventive services outlined below.  Your care team is available to assist you in scheduling these services.  If you have already completed any of these items, please share that information with your care team to update in your medical record.  Health Maintenance Due   Topic Date Due     Colonoscopy  04/17/1977     HIV Screening  04/17/1982     Mammogram  11/06/2014     Annual Wellness Visit  02/24/2016     Zoster (Shingles) Vaccine (1 of 2) 04/17/2017     Cholesterol Lab  08/05/2019     Flu Vaccine (1) 09/01/2019     HPV Screening  02/24/2020     PAP Smear  02/24/2020     Your Health Risk Assessment indicates you feel you are not in good health    A healthy lifestyle helps keep the body fit and the mind alert. It helps protect you from disease, helps you fight disease, and helps prevent chronic disease (disease that doesn't go away) from getting worse. This is important as you get older and begin to notice twinges in muscles and joints and a decline in the strength and stamina you once took for granted. A healthy lifestyle includes good healthcare, good nutrition, weight control, recreation, and regular exercise. Avoid harmful substances and do what you can to keep safe. Another part of a healthy lifestyle is stay mentally active and socially involved.    Good healthcare     Have a wellness visit every year.     If you have new symptoms, let us know right away. Don't wait until the next checkup.     Take medicines exactly as prescribed and keep your medicines in a safe place. Tell us if your medicine causes problems.   Healthy diet and weight control     Eat 3 or 4 small, nutritious, low-fat, high-fiber meals a day. Include a variety of fruits, vegetables, and whole-grain foods.     Make sure you get enough calcium in your diet. Calcium, vitamin D, and exercise help prevent osteoporosis (bone thinning).     If you live alone, try eating with  others when you can. That way you get a good meal and have company while you eat it.     Try to keep a healthy weight. If you eat more calories than your body uses for energy, it will be stored as fat and you will gain weight.     Recreation   Recreation is not limited to sports and team events. It includes any activity that provides relaxation, interest, enjoyment, and exercise. Recreation provides an outlet for physical, mental, and social energy. It can give a sense of worth and achievement. It can help you stay healthy.    Mental Exercise and Social Involvement  Mental and emotional health is as important as physical health. Keep in touch with friends and family. Stay as active as possible. Continue to learn and challenge yourself.   Things you can do to stay mentally active are:    Learn something new, like a foreign language or musical instrument.     Play SCRABBLE or do crossword puzzles. If you cannot find people to play these games with you at home, you can play them with others on your computer through the Internet.     Join a games club--anything from card games to chess or checkers or lawn bowling.     Start a new hobby.     Go back to school.     Volunteer.     Read.   Keep up with world events.    Understanding USDA MyPlate  The USDA (U.S. Department of Agriculture) has guidelines to help you make healthy food choices. These are called MyPlate. MyPlate shows the food groups that make up healthy meals using the image of a place setting. Before you eat, think about the healthiest choices for what to put onto your plate or into your cup or bowl. To learn more about building a healthy plate, visit www.choosemyplate.gov.    The food groups    Fruits. Any fruit or 100% fruit juice counts as part of the Fruit Group. Fruits may be fresh, canned, frozen, or dried, and may be whole, cut-up, or pureed. Make half your plate fruits and vegetables.    Vegetables. Any vegetable or 100% vegetable juice counts as a  member of the Vegetable Group. Vegetables may be fresh, frozen, canned, or dried. They can be served raw or cooked and may be whole, cut-up, or mashed. Make half your plate fruits and vegetables.    Grains. All foods made from grains are part of the Grains Group. These include wheat, rice, oats, cornmeal, and barley such as bread, pasta, oatmeal, cereal, tortillas, and grits. Grains should be no more than a quarter of your plate. At least half of your grains should be whole grains.    Protein. This group includes meat, poultry, seafood, beans and peas, eggs, processed soy products (like tofu), nuts (including nut butters), and seeds. Make protein choices no more than a quarter of your plate. Meat and poultry choices should be lean or low fat.    Dairy. All fluid milk products and foods made from milk that contain calcium, like yogurt and cheese, are part of the Dairy Group. (Foods that have little calcium, such as cream, butter, and cream cheese, are not part of the group.) Most dairy choices should be low-fat or fat-free.    Oils. These are fats that are liquid at room temperature. They include canola, corn, olive, soybean, and sunflower oil. Foods that are mainly oil include mayonnaise, certain salad dressings, and soft margarines. You should have only 5 to 7 teaspoons of oils a day. You probably already get this much from the food you eat.  Date Last Reviewed: 8/1/2017 2000-2018 [employer]. [address] [place] [address]. All rights reserved. This information is not intended as a substitute for professional medical care. Always follow your healthcare professional's instructions.        Your Health Risk Assessment indicates you feel you are not in good emotional health.    Recreation   Recreation is not limited to sports and team events. It includes any activity that provides relaxation, interest, enjoyment, and exercise. Recreation provides an outlet for physical, mental, and social  energy. It can give a sense of worth and achievement. It can help you stay healthy.    Mental Exercise and Social Involvement  Mental and emotional health is as important as physical health. Keep in touch with friends and family. Stay as active as possible. Continue to learn and challenge yourself.   Things you can do to stay mentally active are:    Learn something new, like a foreign language or musical instrument.     Play SCRABBLE or do crossword puzzles. If you cannot find people to play these games with you at home, you can play them with others on your computer through the Internet.     Join a games club--anything from card games to chess or checkers or lawn bowling.     Start a new hobby.     Go back to school.     Volunteer.     Read.   Keep up with world events.
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
## 2019-10-24 NOTE — LETTER
November 1, 2019    Kareen Hernandez  [address] [place] [id number]    Dear ,  This letter is regarding your recent Pap smear (cervical cancer screening) and Human Papillomavirus (HPV) test.  We are happy to inform you that your Pap smear result is normal. Cervical cancer is closely linked with certain types of HPV. Your results showed no evidence of high-risk HPV.  We recommend you have your next PAP smear and HPV test in 5 years.  You will still need to return to the clinic every year for an annual exam and other preventive tests.  If you have additional questions regarding this result, please call our registered nurse, Marj at [phone].  Sincerely,    Leno Luis MD/yoandy

## 2019-10-24 NOTE — LETTER
[facility]   [address] [place]  [address]  [phone]                                   October 28, 2019    Kareen Hernandez  [address] [facility] [address]        Dear Kareen,    Blood sugar screening for diabetes and HIV testing were both normal.  Your cholesterol elevation is mild and medication for that would not be recommended at this time.  Pap smear results will be coming separately.  Please let me know if you have any questions about these test results.    Results for orders placed or performed in visit on 10/24/19   HIV Screening   Result Value Ref Range    HIV Antigen Antibody Combo Nonreactive NR^Nonreactive       Lipid panel reflex to direct LDL Fasting   Result Value Ref Range    Cholesterol 195 <200 mg/dL    Triglycerides 71 <150 mg/dL    HDL Cholesterol 73 >49 mg/dL    LDL Cholesterol Calculated 108 (H) <100 mg/dL    Non HDL Cholesterol 122 <130 mg/dL   Glucose   Result Value Ref Range    Glucose 95 70 - 99 mg/dL       If you have any questions please call the clinic at [phone]    Sincerely,    Leno Luis MD  bmd

## 2019-10-25 LAB — HIV 1+2 AB+HIV1 P24 AG SERPL QL IA: NONREACTIVE

## 2019-10-28 LAB
COPATH REPORT: NORMAL
PAP: NORMAL

## 2019-10-30 LAB
FINAL DIAGNOSIS: NORMAL
HPV HR 12 DNA CVX QL NAA+PROBE: NEGATIVE
HPV16 DNA SPEC QL NAA+PROBE: NEGATIVE
HPV18 DNA SPEC QL NAA+PROBE: NEGATIVE
SPECIMEN DESCRIPTION: NORMAL
SPECIMEN SOURCE CVX/VAG CYTO: NORMAL

## 2019-12-02 ENCOUNTER — ANCILLARY PROCEDURE (OUTPATIENT)
Dept: MAMMOGRAPHY | Facility: CLINIC | Age: 54
End: 2019-12-02
Attending: FAMILY MEDICINE
Payer: MEDICARE

## 2019-12-02 PROCEDURE — 77067 SCR MAMMO BI INCL CAD: CPT | Mod: TC

## 2019-12-02 PROCEDURE — 77063 BREAST TOMOSYNTHESIS BI: CPT | Mod: TC

## 2019-12-04 ENCOUNTER — TELEPHONE (OUTPATIENT)
Dept: FAMILY MEDICINE | Facility: CLINIC | Age: 54
End: 2019-12-04

## 2019-12-04 NOTE — LETTER
December 19, 2019    Kareen Hernandez  [address] [facility] [address]      Dear Kareen Hernandez,     We have tried to contact you about your health, but have been unable to reach you.  Please call us as soon as possible so we can provide you with the best care possible.  We will continue to check in with you throughout the year to complete these items of care, if you are not able to complete these items at this time.  If you would like to complete the missing items for your care, please contact us at [phone].    We recommend the following:  -schedule a COLONOSCOPY to look for colon cancer (due every 10 years or 5 years in higher risk situations.)   Colon cancer is now the second leading cause of death in the [place] for both men and women and there are over 130,000 new cases and 50,000 deaths per year from colon cancer.  Colonoscopies can prevent 90-95% of these deaths.  Problem lesions can be removed before they ever become cancer.  This test is not only looking for cancer, but also getting rid of precancerious lesions.  If you do not wish to do a colonoscopy or cannot afford to do one, at this time, there is another option. It is called a FIT test.  -complete a FIT TEST a FIT test or Fecal Immunochemical Occult Blood Test is a take home stool sample kit.  It does not replace the colonoscopy for colorectal cancer screening, but it can detect hidden bleeding in the lower colon.  It does need to be repeated every year and if a positive result is obtained, you would be referred for a colonoscopy.  If you have completed either one of these tests at another facility, please have the records sent to our clinic so that we can best coordinate your care.        Sincerely,     Your Care Team at [facility]

## 2019-12-04 NOTE — LETTER
December 4, 2019    Kareen Hernandez  [address] [facility] [address]    Dear Kareen    We care about your health and have reviewed your health plan. We have reviewed your medical conditions, medication list, and lab results and are making recommendations based on this review, to better manage your health.    You are in particular need of attention regarding:  - Scheduling a Colon Cancer Screening (Colonoscopy only) [phone]  - Completing a Colon Cancer Screening (FIT) - Please complete FIT test which can be picked up at the clinic and mail completed test to clinic.      Here is a list of Health Maintenance topics that are due now or due soon:  Health Maintenance Due   Topic Date Due     COLONOSCOPY  04/17/1977     ZOSTER IMMUNIZATION (1 of 2) 04/17/2017       Please call us at [phone] (or use [employer]) to address the above recommendations. If we do not hear from you in the next couple of weeks we will be reaching out to you again.    Thank you for trusting [facility] and we appreciate the opportunity to serve you.  We look forward to supporting your healthcare needs in the future.    Healthy Regards,    Your Care Team

## 2019-12-04 NOTE — TELEPHONE ENCOUNTER
Panel Management Review      Patient has the following on her problem list:     Hypertension   Last three blood pressure readings:  BP Readings from Last 3 Encounters:   10/24/19 129/82   05/28/19 102/68   09/17/18 112/78     Blood pressure: Passed    HTN Guidelines:  Less than 140/90      Composite cancer screening  Chart review shows that this patient is due/due soon for the following Colonoscopy and Fecal Colorectal (FIT)  Summary:    Patient is due/failing the following:   COLONOSCOPY and FIT    Action needed:   Colonoscopy or FIT    Type of outreach:    Sent letter.    Questions for provider review:    None                                                                                                                                    Adelina Duff MA       Chart routed to Care Team .

## 2019-12-19 NOTE — TELEPHONE ENCOUNTER
12/19/19-  Panel Management Review  Summary:    Type of outreach:    Sent letter. and Final    Encounter routed to No Action Needed.                                                                                                                               Adelina Duff MA

## 2019-12-19 NOTE — TELEPHONE ENCOUNTER
12/19/19-  Panel Management Review  Summary:    Type of outreach:    Call attempt. FINAL    Encounter routed to No Action Needed.                                                                                                                               Adelina Duff MA

## 2020-01-07 DIAGNOSIS — K21.9 GASTROESOPHAGEAL REFLUX DISEASE WITHOUT ESOPHAGITIS: ICD-10-CM

## 2020-01-07 NOTE — TELEPHONE ENCOUNTER
"Requested Prescriptions   Pending Prescriptions Disp Refills     omeprazole (PRILOSEC) 20 MG DR capsule  Last Written Prescription Date:  5/28/19  Last Fill Quantity: 90 capsule,  # refills: 1   Last office visit: 10/24/2019 with prescribing provider:  Apryl Childs NP   Future Office Visit:     90 capsule 1     Sig: Take 1 capsule (20 mg) by mouth daily       PPI Protocol Passed - 1/7/2020  8:16 AM        Passed - Not on Clopidogrel (unless Pantoprazole ordered)        Passed - No diagnosis of osteoporosis on record        Passed - Recent (12 mo) or future (30 days) visit within the authorizing provider's specialty     Patient has had an office visit with the authorizing provider or a provider within the authorizing providers department within the previous 12 mos or has a future within next 30 days. See \"Patient Info\" tab in inbasket, or \"Choose Columns\" in Meds & Orders section of the refill encounter.              Passed - Medication is active on med list        Passed - Patient is age 18 or older        Passed - No active pregnacy on record        Passed - No positive pregnancy test in past 12 months          "

## 2020-01-08 DIAGNOSIS — F31.9 BIPOLAR AFFECTIVE DISORDER, REMISSION STATUS UNSPECIFIED (H): ICD-10-CM

## 2020-01-08 RX ORDER — LAMOTRIGINE 100 MG/1
TABLET ORAL
Qty: 270 TABLET | Refills: 1 | Status: CANCELLED | OUTPATIENT
Start: 2020-01-08

## 2020-01-08 NOTE — TELEPHONE ENCOUNTER
"Requested Prescriptions   Pending Prescriptions Disp Refills     lamoTRIgine (LAMICTAL) 100 MG tablet 270 tablet 1     Sig: TAKE 3 TABLETS BY MOUTH DAILY       Anti-Seizure Meds Protocol  Failed - 1/8/2020  8:12 AM        Failed - Review Authorizing provider's last note.      Refer to last progress notes: confirm request is for original authorizing provider (cannot be through other providers).          Failed - Normal ALT or AST on file in past 26 months     Recent Labs   Lab Test 08/05/14  1006   ALT 27     Recent Labs   Lab Test 08/05/14  1006   AST 23             Passed - Recent (12 mo) or future (30 days) visit within the authorizing provider's specialty     Patient has had an office visit with the authorizing provider or a provider within the authorizing providers department within the previous 12 mos or has a future within next 30 days. See \"Patient Info\" tab in inbasket, or \"Choose Columns\" in Meds & Orders section of the refill encounter.              Passed - Normal CBC on file in past 26 months     Recent Labs   Lab Test 05/14/18  1305   WBC 6.5   RBC 4.21   HGB 13.6   HCT 40.6                    Passed - Normal platelet count on file in past 26 months     Recent Labs   Lab Test 05/14/18  1305                  Passed - Medication is active on med list        Passed - No active pregnancy on record        Passed - No positive pregnancy test in last 12 months        lamoTRIgine (LAMICTAL) 100 MG tablet  Last Written Prescription Date:  10/24/19  Last Fill Quantity: 270,  # refills: 1   Last office visit: 10/24/2019 with prescribing provider:  Dr. Lozano   Future Office Visit:      "

## 2020-01-09 NOTE — TELEPHONE ENCOUNTER
Prescription approved per [facility] Refill Protocol.  Esperanza Mackay RN  [facility] / [facility]

## 2020-01-09 NOTE — TELEPHONE ENCOUNTER
Refill not appropriate. Medication was refilled by provider at [facility] during time of OV for annual physical on 10/24/19. 3 month supply with 1 refill was given. Should have pills available till April 2020.    Yasmine Tejada RN  [facility]/ [facility]

## 2020-02-17 DIAGNOSIS — G47.00 PERSISTENT DISORDER OF INITIATING OR MAINTAINING SLEEP: ICD-10-CM

## 2020-02-17 DIAGNOSIS — E53.8 VITAMIN B12 DEFICIENCY DISEASE: ICD-10-CM

## 2020-02-17 DIAGNOSIS — E55.9 VITAMIN D DEFICIENCY DISEASE: ICD-10-CM

## 2020-02-17 RX ORDER — CYANOCOBALAMIN 1000 UG/ML
1 INJECTION, SOLUTION INTRAMUSCULAR; SUBCUTANEOUS
Qty: 1 ML | Refills: 11 | Status: SHIPPED | OUTPATIENT
Start: 2020-02-17 | End: 2021-03-01

## 2020-02-17 RX ORDER — ERGOCALCIFEROL 1.25 MG/1
50000 CAPSULE, LIQUID FILLED ORAL WEEKLY
Qty: 4 CAPSULE | Refills: 1 | Status: SHIPPED | OUTPATIENT
Start: 2020-02-17 | End: 2020-12-03

## 2020-02-17 RX ORDER — ZOLPIDEM TARTRATE 10 MG/1
TABLET ORAL
Qty: 30 TABLET | Refills: 0 | Status: SHIPPED | OUTPATIENT
Start: 2020-02-17 | End: 2020-04-16

## 2020-02-17 NOTE — TELEPHONE ENCOUNTER
We are the patient's new preferred pharmacy.  Thanks!  Noemí Wild CPhT  [facility]  [phone]

## 2020-02-17 NOTE — TELEPHONE ENCOUNTER
"Requested Prescriptions   Pending Prescriptions Disp Refills     zolpidem (AMBIEN) 10 MG tablet 30 tablet 0     Sig: Take 5-10 mg as needed at night for sleep       There is no refill protocol information for this order         Last Written Prescription Date: 10/24/19  Last Fill Quantity: 30,   # refills: 0  Last Office Visit: 10/24/19  Future Office visit:       Routing refill request to provider for review/approval because:  Drug not on the [facility], [facility] or  Health refill protocol or controlled substance       vitamin D2 (ERGOCALCIFEROL) 66507 units (1250 mcg) capsule 4 capsule 0       There is no refill protocol information for this order         Last Written Prescription Date:  10/9/19  Last Fill Quantity: 4,   # refills: 0  Last Office Visit: 10/9/19  Future Office visit:       Routing refill request to provider for review/approval because:  Drug not on the [facility], [facility] or  Health refill protocol or controlled substance       cyanocobalamin (CYANOCOBALAMIN) 1000 MCG/ML injection 1 mL 0     Sig: Inject 1 mL (1,000 mcg) into the muscle every 30 days   Last Written Prescription Date:  10/9/19  Last Fill Quantity: 1ml,  # refills: 0   Last office visit: 10/24/2019 with prescribing provider:     Future Office Visit:        Vitamin Supplements (Adult) Protocol Failed - 2/17/2020  9:10 AM        Failed - High dose Vitamin D not ordered        Passed - Recent (12 mo) or future (30 days) visit within the authorizing provider's specialty     Patient has had an office visit with the authorizing provider or a provider within the authorizing providers department within the previous 12 mos or has a future within next 30 days. See \"Patient Info\" tab in inbasket, or \"Choose Columns\" in Meds & Orders section of the refill encounter.              Passed - Medication is active on med list          "

## 2020-04-16 DIAGNOSIS — G47.00 PERSISTENT DISORDER OF INITIATING OR MAINTAINING SLEEP: ICD-10-CM

## 2020-04-16 RX ORDER — ZOLPIDEM TARTRATE 10 MG/1
TABLET ORAL
Qty: 30 TABLET | Refills: 3 | Status: SHIPPED | OUTPATIENT
Start: 2020-04-16 | End: 2020-12-03

## 2020-04-16 NOTE — TELEPHONE ENCOUNTER
Requested Prescriptions   Pending Prescriptions Disp Refills     zolpidem (AMBIEN) 10 MG tablet 30 tablet 0     Sig: Take 5-10 mg as needed at night for sleep       There is no refill protocol information for this order         Last Written Prescription Date:  2-  Last Fill Quantity: 30,   # refills: 0  Last Office Visit: 10-24-19  Future Office visit:       Routing refill request to provider for review/approval because:  Drug not on the G, P or [facility] refill protocol or controlled substance

## 2020-06-26 ENCOUNTER — VIRTUAL VISIT (OUTPATIENT)
Dept: FAMILY MEDICINE | Facility: CLINIC | Age: 55
End: 2020-06-26
Payer: MEDICARE

## 2020-06-26 DIAGNOSIS — R09.81 CONGESTION OF PARANASAL SINUS: ICD-10-CM

## 2020-06-26 DIAGNOSIS — J01.10 ACUTE NON-RECURRENT FRONTAL SINUSITIS: Primary | ICD-10-CM

## 2020-06-26 PROCEDURE — 99441 ZZC PHYSICIAN TELEPHONE EVALUATION 5-10 MIN: CPT | Performed by: FAMILY MEDICINE

## 2020-06-26 RX ORDER — AMOXICILLIN 875 MG
875 TABLET ORAL 2 TIMES DAILY
Qty: 20 TABLET | Refills: 0 | Status: SHIPPED | OUTPATIENT
Start: 2020-06-26 | End: 2020-07-06

## 2020-06-26 RX ORDER — PSEUDOEPHEDRINE HCL 30 MG
30 TABLET ORAL EVERY 6 HOURS PRN
Qty: 30 TABLET | Refills: 0 | Status: SHIPPED | OUTPATIENT
Start: 2020-06-26 | End: 2021-06-09

## 2020-06-26 NOTE — PROGRESS NOTES
"Kareen Hernandez is a 55 year old female who is being evaluated via a billable telephone visit.      The patient has been notified of following:     \"This telephone visit will be conducted via a call between you and your physician/provider. We have found that certain health care needs can be provided without the need for a physical exam.  This service lets us provide the care you need with a short phone conversation.  If a prescription is necessary we can send it directly to your pharmacy.  If lab work is needed we can place an order for that and you can then stop by our lab to have the test done at a later time.    Telephone visits are billed at different rates depending on your insurance coverage. During this emergency period, for some insurers they may be billed the same as an in-person visit.  Please reach out to your insurance provider with any questions.    If during the course of the call the physician/provider feels a telephone visit is not appropriate, you will not be charged for this service.\"    Patient has given verbal consent for Telephone visit?  Yes    What phone number would you like to be contacted at? [phone]    How would you like to obtain your AVS? Mail a copy    Subjective     Kareen Hernandez is a 55 year old female who presents via phone visit today for the following health issues:    HPI  RESPIRATORY SYMPTOMS      Duration: 1 months    Description  cough and mostly left ear plugged    Severity: 6/10    Accompanying signs and symptoms: None    History (predisposing factors):  Sinus infection    Precipitating or alleviating factors: None    Therapies tried and outcome:  Acetaminophen not helping        Patient Active Problem List   Diagnosis     Fibromyalgia     Gastric bypass status for obesity     CARDIOVASCULAR SCREENING; LDL GOAL LESS THAN 130     Anxiety     Vitamin D deficiency disease     Vitamin B12 deficiency disease     Migraine headache     Insomnia     Right maxillary sinusitis, chronic "     History of hypertension     Gastroesophageal reflux disease without esophagitis     Chronic migraine without aura without status migrainosus, not intractable     Major depressive disorder, recurrent episode, moderate (H)     History of kidney stones     Tobacco use disorder     Bipolar affective disorder, remission status unspecified (H)     Acute right-sided low back pain with right-sided sciatica     S/P lumbar laminectomy     Past Surgical History:   Procedure Laterality Date     ABDOMINOPLASTY  2013     C GASTRIC BYPASS,OBESE<100CM DAYAN-EN-Y  3-25-09    [facility]     C REMOVAL OF KIDNEY STONE      With kidney tents x 5 to 6 procedures.     CL AFF SURGICAL PATHOLOGY  2007    Feroz's neuroma  - Right Foot     LITHOTRIPSY       SURGICAL PATHOLOGY EXAM      Lipoma Removal on her back       Social History     Tobacco Use     Smoking status: Former Smoker     Packs/day: 0.50     Types: Cigarettes     Last attempt to quit: 2013     Years since quittin.7     Smokeless tobacco: Never Used     Tobacco comment: Vape e-cig   Substance Use Topics     Alcohol use: No     Family History   Problem Relation Age of Onset     Asthma Mother      Hypertension Mother      C.A.D. Mother      Genitourinary Problems Mother         kidney failure   age 80     Chronic Obstructive Pulmonary Disease Mother      Diabetes Father      Hypertension Father      C.A.D. Father      Chronic Obstructive Pulmonary Disease Father      Dementia Father      Genitourinary Problems Sister         kidney stones     Obesity Daughter         gastric sleeve     Cerebrovascular Disease No family hx of      Breast Cancer No family hx of      Cancer - colorectal No family hx of      Prostate Cancer No family hx of          Current Outpatient Medications   Medication Sig Dispense Refill     amoxicillin (AMOXIL) 875 MG tablet Take 1 tablet (875 mg) by mouth 2 times daily for 10 days 20 tablet 0     cyanocobalamin (CYANOCOBALAMIN) 1000  "MCG/ML injection Inject 1 mL (1,000 mcg) into the muscle every 30 days 1 mL 11     cyclobenzaprine (FLEXERIL) 10 MG tablet Take 1 tablet (10 mg) by mouth 3 times daily as needed for muscle spasms 60 tablet 3     FLUoxetine (PROZAC) 20 MG capsule TAKE 4 CAPSULES BY MOUTH DAILY 360 capsule 1     insulin syringe-needle U-100 (BD INSULIN SYRINGE ULTRAFINE) 30G X 1/2\" 1 ML Use one syringe once a month for B-12 injections. 10 each 1     lamoTRIgine (LAMICTAL) 100 MG tablet TAKE 3 TABLETS BY MOUTH DAILY 270 tablet 1     mometasone (NASONEX) 50 MCG/ACT nasal spray Spray 2 sprays into both nostrils daily 1 Box 3     omeprazole (PRILOSEC) 20 MG DR capsule Take 1 capsule (20 mg) by mouth daily 90 capsule 2     pseudoePHEDrine (SUDAFED) 30 MG tablet Take 1 tablet (30 mg) by mouth every 6 hours as needed for congestion 30 tablet 0     SUMAtriptan (IMITREX) 100 MG tablet TAKE 1 TABLET BY MOUTH ON THE ONSET OF HEADACHE, MAY REPEAT IN 2 HOURS..*MAX 2 TABLETS DAILY* 9 tablet 3     vitamin D2 (ERGOCALCIFEROL) 95734 units (1250 mcg) capsule Take 1 capsule (50,000 Units) by mouth once a week 4 capsule 1     zolpidem (AMBIEN) 10 MG tablet Take 5-10 mg as needed at night for sleep 30 tablet 3     order for DME Equipment being ordered: Splint right thumb (Patient not taking: Reported on 10/24/2019) 1 Units 0     Allergies   Allergen Reactions     Lurasidone Diarrhea     Diarrhea     Morphine Rash       Reviewed and updated as needed this visit by Provider         Review of Systems   Constitutional, HEENT, cardiovascular, pulmonary, gi and gu systems are negative, except as otherwise noted.       Objective   Reported vitals:  LMP  (LMP Unknown)    healthy, alert and no distress  PSYCH: Alert and oriented times 3; coherent speech, normal   rate and volume, able to articulate logical thoughts, able   to abstract reason, no tangential thoughts, no hallucinations   or delusions  Her affect is normal and pleasant  RESP: No cough, no audible " wheezing, able to talk in full sentences  Remainder of exam unable to be completed due to telephone visits    Diagnostic Test Results:  Labs reviewed in Epic  none         Assessment/Plan:  1. Acute non-recurrent frontal sinusitis    - amoxicillin (AMOXIL) 875 MG tablet; Take 1 tablet (875 mg) by mouth 2 times daily for 10 days  Dispense: 20 tablet; Refill: 0  - pseudoePHEDrine (SUDAFED) 30 MG tablet; Take 1 tablet (30 mg) by mouth every 6 hours as needed for congestion  Dispense: 30 tablet; Refill: 0    2. Congestion of paranasal sinus    - pseudoePHEDrine (SUDAFED) 30 MG tablet; Take 1 tablet (30 mg) by mouth every 6 hours as needed for congestion  Dispense: 30 tablet; Refill: 0    No follow-ups on file.      Phone call duration:  7 minutes    Skye Gómez MD

## 2020-08-17 ENCOUNTER — TELEPHONE (OUTPATIENT)
Dept: FAMILY MEDICINE | Facility: CLINIC | Age: 55
End: 2020-08-17

## 2020-08-17 DIAGNOSIS — E53.8 VITAMIN B12 DEFICIENCY DISEASE: ICD-10-CM

## 2020-08-17 RX ORDER — PEN NEEDLE, DIABETIC 29 G X1/2"
NEEDLE, DISPOSABLE MISCELLANEOUS
Qty: 10 EACH | Refills: 3 | Status: SHIPPED | OUTPATIENT
Start: 2020-08-17 | End: 2020-08-25

## 2020-08-17 NOTE — TELEPHONE ENCOUNTER
Patient and Tae giving themselves monthly Cyanocobalamine injections IM (leg) We need an order for the syringes;. She is out.    [facility] Blankenship  [facility]   Ext #[phone], [phone]  #2

## 2020-08-25 ENCOUNTER — TELEPHONE (OUTPATIENT)
Dept: FAMILY MEDICINE | Facility: CLINIC | Age: 55
End: 2020-08-25
Payer: MEDICARE

## 2020-08-25 DIAGNOSIS — E53.8 VITAMIN B12 DEFICIENCY DISEASE: Primary | ICD-10-CM

## 2020-08-25 RX ORDER — SYRINGE WITH NEEDLE, 1 ML 25GX5/8"
1 SYRINGE, EMPTY DISPOSABLE MISCELLANEOUS
Qty: 12 EACH | Refills: 1 | Status: SHIPPED | OUTPATIENT
Start: 2020-08-25 | End: 2022-03-16

## 2020-08-25 NOTE — TELEPHONE ENCOUNTER
"Patient is in need of a syringe for IM injections. We have given her a few BD Syringe 25g x 1-1/2\" needle.  Does not need insulin syringes.  I am not able to load a medication item request unfortunately.     [facility]  [facility]   Ext #[phone], [phone]  #2    "

## 2020-09-17 DIAGNOSIS — F31.9 BIPOLAR AFFECTIVE DISORDER, REMISSION STATUS UNSPECIFIED (H): ICD-10-CM

## 2020-09-17 DIAGNOSIS — F33.1 MAJOR DEPRESSIVE DISORDER, RECURRENT EPISODE, MODERATE (H): ICD-10-CM

## 2020-09-21 RX ORDER — FLUOXETINE 40 MG/1
CAPSULE ORAL
Qty: 180 CAPSULE | Refills: 1 | Status: SHIPPED | OUTPATIENT
Start: 2020-09-21 | End: 2021-02-11

## 2020-09-21 NOTE — TELEPHONE ENCOUNTER
"Requested Prescriptions   Pending Prescriptions Disp Refills    FLUoxetine (PROZAC) 40 MG capsule [Pharmacy Med Name: FLUOXETINE HCL 40MG CAPS] 180 capsule 1     Sig: TAKE TWO CAPSULES BY MOUTH ONCE DAILY       SSRIs Protocol Failed - 9/17/2020  7:39 AM        Failed - PHQ-9 score less than 5 in past 6 months     Please review last PHQ-9 score.           Passed - Medication is active on med list        Passed - Patient is age 18 or older        Passed - No active pregnancy on record        Passed - No positive pregnancy test in last 12 months        Passed - Recent (6 mo) or future (30 days) visit within the authorizing provider's specialty     Patient had office visit in the last 6 months or has a visit in the next 30 days with authorizing provider or within the authorizing provider's specialty.  See \"Patient Info\" tab in inbasket, or \"Choose Columns\" in Meds & Orders section of the refill encounter.               PHQ-9 score:    PHQ 5/28/2019   PHQ-9 Total Score 13   Q9: Thoughts of better off dead/self-harm past 2 weeks Not at all         Routing refill request to provider for review/approval because:  Failed protocol.  Aure Bender RN,BSN  [facility]    "

## 2020-11-03 ENCOUNTER — OFFICE VISIT (OUTPATIENT)
Dept: FAMILY MEDICINE | Facility: CLINIC | Age: 55
End: 2020-11-03
Payer: MEDICARE

## 2020-11-03 VITALS
WEIGHT: 160.03 LBS | SYSTOLIC BLOOD PRESSURE: 124 MMHG | DIASTOLIC BLOOD PRESSURE: 70 MMHG | BODY MASS INDEX: 25.06 KG/M2 | HEART RATE: 78 BPM | OXYGEN SATURATION: 98 % | TEMPERATURE: 97.6 F

## 2020-11-03 DIAGNOSIS — F33.1 MAJOR DEPRESSIVE DISORDER, RECURRENT EPISODE, MODERATE (H): ICD-10-CM

## 2020-11-03 DIAGNOSIS — J32.9 CHRONIC SINUSITIS, UNSPECIFIED LOCATION: Primary | ICD-10-CM

## 2020-11-03 DIAGNOSIS — Z23 NEED FOR SHINGLES VACCINE: ICD-10-CM

## 2020-11-03 DIAGNOSIS — F31.9 BIPOLAR AFFECTIVE DISORDER, REMISSION STATUS UNSPECIFIED (H): ICD-10-CM

## 2020-11-03 PROCEDURE — 99214 OFFICE O/P EST MOD 30 MIN: CPT | Performed by: FAMILY MEDICINE

## 2020-11-03 RX ORDER — BUPROPION HYDROCHLORIDE 150 MG/1
150 TABLET ORAL EVERY MORNING
Qty: 30 TABLET | Refills: 1 | Status: SHIPPED | OUTPATIENT
Start: 2020-11-03 | End: 2021-01-07

## 2020-11-03 ASSESSMENT — PATIENT HEALTH QUESTIONNAIRE - PHQ9
5. POOR APPETITE OR OVEREATING: MORE THAN HALF THE DAYS
SUM OF ALL RESPONSES TO PHQ QUESTIONS 1-9: 11

## 2020-11-03 ASSESSMENT — ANXIETY QUESTIONNAIRES
1. FEELING NERVOUS, ANXIOUS, OR ON EDGE: MORE THAN HALF THE DAYS
2. NOT BEING ABLE TO STOP OR CONTROL WORRYING: MORE THAN HALF THE DAYS
GAD7 TOTAL SCORE: 14
5. BEING SO RESTLESS THAT IT IS HARD TO SIT STILL: MORE THAN HALF THE DAYS
7. FEELING AFRAID AS IF SOMETHING AWFUL MIGHT HAPPEN: MORE THAN HALF THE DAYS
IF YOU CHECKED OFF ANY PROBLEMS ON THIS QUESTIONNAIRE, HOW DIFFICULT HAVE THESE PROBLEMS MADE IT FOR YOU TO DO YOUR WORK, TAKE CARE OF THINGS AT HOME, OR GET ALONG WITH OTHER PEOPLE: SOMEWHAT DIFFICULT
6. BECOMING EASILY ANNOYED OR IRRITABLE: MORE THAN HALF THE DAYS
3. WORRYING TOO MUCH ABOUT DIFFERENT THINGS: MORE THAN HALF THE DAYS

## 2020-11-03 ASSESSMENT — PAIN SCALES - GENERAL: PAINLEVEL: NO PAIN (0)

## 2020-11-03 NOTE — NURSING NOTE
Prior to immunization administration, verified patients identity using patient s name and date of birth. Please see Immunization Activity for additional information.     Screening Questionnaire for Adult Immunization    Are you sick today?   No   Do you have allergies to medications, food, a vaccine component or latex?   No   Have you ever had a serious reaction after receiving a vaccination?   No   Do you have a long-term health problem with heart, lung, kidney, or metabolic disease (e.g., diabetes), asthma, a blood disorder, no spleen, complement component deficiency, a cochlear implant, or a spinal fluid leak?  Are you on long-term aspirin therapy?   No   Do you have cancer, leukemia, HIV/AIDS, or any other immune system problem?   No   Do you have a parent, brother, or sister with an immune system problem?   No   In the past 3 months, have you taken medications that affect  your immune system, such as prednisone, other steroids, or anticancer drugs; drugs for the treatment of rheumatoid arthritis, Crohn s disease, or psoriasis; or have you had radiation treatments?   No   Have you had a seizure, or a brain or other nervous system problem?   No   During the past year, have you received a transfusion of blood or blood    products, or been given immune (gamma) globulin or antiviral drug?   No   For women: Are you pregnant or is there a chance you could become       pregnant during the next month?   No   Have you received any vaccinations in the past 4 weeks?   No     Immunization questionnaire answers were all negative.        Per orders of Dr. Luis, injection of Shingrix given by Adelina Duff. Patient instructed to remain in clinic for 15 minutes afterwards, and to report any adverse reaction to me immediately.    Vaccine information supplied.       Screening performed by Adelina Duff on 11/3/2020 at 11:18 AM.

## 2020-11-03 NOTE — PROGRESS NOTES
Subjective     Kareen Hernandez is a 55 year old female who presents to clinic today for the following health issues:    HPI         Concern - Sinus  Onset: A couple of months  Description: Constant congestion  Intensity: mild  Progression of Symptoms:  same and constant  Accompanying Signs & Symptoms: Plugged nose in the AM, ears  Previous history of similar problem: Sinus infections in the past  Precipitating factors:        Worsened by: N/A  Alleviating factors:        Improved by: Aleve  Therapies tried and outcome: Aleve twice  Day- has taken this for years.    Adelina Duff MA    Patient is here today concerned about some chronic sinus issues.  She says for years she has had chronic plugging.  She will get antibiotics periodically and those will help for about a month.  Drainage fluctuates but is usually mostly clear.  She feels some pressure around and behind her eyes.  Nasonex has been prescribed in the past but did not work.  She just always feels plugged up.  She is reluctant to consider surgery as her sister had some chronic sinus issues and a lot of pain as well as a relatively poor outcome with surgery.    She also feels her depression has worsened.  Cannot really pick a particular aggravating factor or timeframe.    Depression and Anxiety Follow-Up    How are you doing with your depression since your last visit? Worsened not sure why or exactly how long, but at least months    How are you doing with your anxiety since your last visit?  Worsened as above    Are you having other symptoms that might be associated with depression or anxiety? No    Have you had a significant life event? No     Do you have any concerns with your use of alcohol or other drugs? No    Social History     Tobacco Use     Smoking status: Former Smoker     Packs/day: 0.50     Types: Cigarettes     Quit date: 2013     Years since quittin.1     Smokeless tobacco: Never Used     Tobacco comment: Vape e-cig   Substance Use  Topics     Alcohol use: No     Drug use: Yes     Comment: Marijuana - 3 times a week     PHQ 8/1/2018 5/28/2019 11/3/2020   PHQ-9 Total Score 15 13 11   Q9: Thoughts of better off dead/self-harm past 2 weeks Not at all Not at all Not at all     CHARAN-7 SCORE 8/1/2018 5/28/2019 11/3/2020   Total Score - - -   Total Score 14 13 14   Total Score - - -     Last PHQ-9 11/3/2020   1.  Little interest or pleasure in doing things 2   2.  Feeling down, depressed, or hopeless 2   3.  Trouble falling or staying asleep, or sleeping too much 0   4.  Feeling tired or having little energy 2   5.  Poor appetite or overeating 2   6.  Feeling bad about yourself 1   7.  Trouble concentrating 2   8.  Moving slowly or restless 0   Q9: Thoughts of better off dead/self-harm past 2 weeks 0   PHQ-9 Total Score 11   Difficulty at work, home, or with people Somewhat difficult     CHARAN-7  11/3/2020   1. Feeling nervous, anxious, or on edge 2   2. Not being able to stop or control worrying 2   3. Worrying too much about different things 2   4. Trouble relaxing 2   5. Being so restless that it is hard to sit still 2   6. Becoming easily annoyed or irritable 2   7. Feeling afraid, as if something awful might happen 2   CHARAN-7 Total Score 14   If you checked any problems, how difficult have they made it for you to do your work, take care of things at home, or get along with other people? Somewhat difficult       Suicide Assessment Five-step Evaluation and Treatment (SAFE-T)      Review of Systems   Constitutional, HEENT, cardiovascular, pulmonary, gi and gu systems are negative, except as otherwise noted.      Objective    /70 (BP Location: Right arm, Patient Position: Chair, Cuff Size: Adult Regular)   Pulse 78   Temp 97.6  F (36.4  C) (Oral)   Wt 72.6 kg (160 lb 0.5 oz)   LMP  (LMP Unknown)   SpO2 98%   BMI 25.06 kg/m    Body mass index is 25.06 kg/m .  Physical Exam   GENERAL: healthy, alert and no distress  EYES: Eyes grossly normal to  inspection, PERRL and conjunctivae and sclerae normal  HENT: normal cephalic/atraumatic, ear canals and TM's normal, nose and mouth without ulcers or lesions, nasal mucosa edematous , oropharynx clear, oral mucous membranes moist and sinuses: not tender  NECK: no adenopathy, no asymmetry, masses, or scars and thyroid normal to palpation  RESP: lungs clear to auscultation - no rales, rhonchi or wheezes  CV: regular rate and rhythm, normal S1 S2, no S3 or S4, no murmur, click or rub, no peripheral edema and peripheral pulses strong  MS: no gross musculoskeletal defects noted, no edema  NEURO: Normal strength and tone, mentation intact and speech normal  PSYCH: mentation appears normal, affect flat and judgement and insight intact            Assessment & Plan     Chronic sinusitis, unspecified location  On exam today she appears to have some right-sided septal deviation but I did not appreciate any obvious findings of nasal polyps or acute sinus issues at this time.  I think a CT of her sinuses would be appropriate for further evaluation at this time to help determine if medical or surgical referral would be appropriate.  - CT Sinus w/o Contrast; Future    Need for shingles vaccine  She was interested in shingles vaccine today.  This was sent to the pharmacy because she is on Medicare and was provided in the office today.  - zoster vaccine recombinant adjuvanted (SHINGRIX) injection; Inject 0.5 mLs into the muscle once for 1 dose    Major depressive disorder, recurrent episode, moderate (H)  Options for management were reviewed.   start Wellbutrin at 150 mg daily at this time.  Short-term follow-up in 3 to 4 weeks for recheck.  Risk of triggering a manic episode was reviewed with the patient.  - buPROPion (WELLBUTRIN XL) 150 MG 24 hr tablet; Take 1 tablet (150 mg) by mouth every morning    Bipolar affective disorder, remission status unspecified (H)  As above.              Return in about 4 weeks (around 12/1/2020) for  Vidal Mancuso.    Leno Luis MD  [employer]

## 2020-11-04 ASSESSMENT — ANXIETY QUESTIONNAIRES: GAD7 TOTAL SCORE: 14

## 2020-11-10 ENCOUNTER — ANCILLARY PROCEDURE (OUTPATIENT)
Dept: CT IMAGING | Facility: CLINIC | Age: 55
End: 2020-11-10
Attending: FAMILY MEDICINE
Payer: MEDICARE

## 2020-11-10 DIAGNOSIS — J32.9 CHRONIC SINUSITIS, UNSPECIFIED LOCATION: ICD-10-CM

## 2020-11-10 PROCEDURE — 70486 CT MAXILLOFACIAL W/O DYE: CPT | Mod: GC | Performed by: RADIOLOGY

## 2020-11-30 ENCOUNTER — OFFICE VISIT (OUTPATIENT)
Dept: FAMILY MEDICINE | Facility: CLINIC | Age: 55
End: 2020-11-30
Payer: MEDICARE

## 2020-11-30 VITALS
WEIGHT: 162.03 LBS | TEMPERATURE: 98.1 F | DIASTOLIC BLOOD PRESSURE: 82 MMHG | OXYGEN SATURATION: 98 % | BODY MASS INDEX: 25.38 KG/M2 | SYSTOLIC BLOOD PRESSURE: 135 MMHG | HEART RATE: 83 BPM

## 2020-11-30 DIAGNOSIS — Z12.11 SPECIAL SCREENING FOR MALIGNANT NEOPLASMS, COLON: ICD-10-CM

## 2020-11-30 DIAGNOSIS — J32.9 CHRONIC SINUSITIS, UNSPECIFIED LOCATION: Primary | ICD-10-CM

## 2020-11-30 DIAGNOSIS — M79.7 FIBROMYALGIA: ICD-10-CM

## 2020-11-30 PROCEDURE — 99214 OFFICE O/P EST MOD 30 MIN: CPT | Performed by: FAMILY MEDICINE

## 2020-11-30 ASSESSMENT — PAIN SCALES - GENERAL: PAINLEVEL: MODERATE PAIN (5)

## 2020-11-30 NOTE — PROGRESS NOTES
Subjective     Kareen Hernandez is a 55 year old female who presents to clinic today for the following health issues:    HPI         Patient is here to follow up on CT sinus.  Has medical marijuana questions.    Adelina Duff MA    Patient is here for follow-up.  We  reviewed the findings of the sinus CT scan.  I have offered referrals to both allergy and ENT to see what options she might have.  Intermittent use only of the nasal steroid spray but she does get relief with use of Sudafed and Aleve.  Pain is primarily behind the eyes.  If they do not feel it is sinus related it might be a primary headache disorder and I be happy to see her back.  She also had some additional questions today about use of medical cannabis for fibromyalgia which is a source of chronic pain.  I think she is a reasonable candidate for it in an effort to avoid more significant pain medication use and we went through the certification process today.            Review of Systems   Constitutional, HEENT, cardiovascular, pulmonary, gi and gu systems are negative, except as otherwise noted.      Objective    /82 (BP Location: Right arm, Patient Position: Chair, Cuff Size: Adult Regular)   Pulse 83   Temp 98.1  F (36.7  C) (Oral)   Wt 73.5 kg (162 lb 0.5 oz)   LMP  (LMP Unknown)   SpO2 98%   BMI 25.38 kg/m    Body mass index is 25.38 kg/m .  Physical Exam   GENERAL: healthy, alert and no distress  EYES: Eyes grossly normal to inspection, PERRL and conjunctivae and sclerae normal  NEURO: Normal strength and tone, mentation intact and speech normal  PSYCH: mentation appears normal, affect normal/bright            Assessment & Plan     Chronic sinusitis, unspecified location  Reviewed findings of the CT scan.  Discussed medical management.  She was interested in referrals to ENT and allergy for further evaluation so those were provided today.  - OTOLARYNGOLOGY REFERRAL  - ALLERGY/ASTHMA ADULT REFERRAL    Fibromyalgia  Patient was certified  for medical cannabis under a diagnosis of chronic pain.  Her PEG score today was 6.    Special screening for malignant neoplasms, colon  She has been unable to complete colonoscopy in the past as she cannot drink the prep due to surgery she has had.  Order will be placed for Cologuard after discussion of options.              Return in about 3 months (around 2/28/2021) for Routine Visit, Pain Recheck.    Leno Luis MD  [facility]

## 2020-12-03 DIAGNOSIS — M79.7 FIBROMYALGIA: ICD-10-CM

## 2020-12-03 DIAGNOSIS — F31.9 BIPOLAR AFFECTIVE DISORDER, REMISSION STATUS UNSPECIFIED (H): ICD-10-CM

## 2020-12-03 DIAGNOSIS — E55.9 VITAMIN D DEFICIENCY DISEASE: ICD-10-CM

## 2020-12-03 DIAGNOSIS — G47.00 PERSISTENT DISORDER OF INITIATING OR MAINTAINING SLEEP: ICD-10-CM

## 2020-12-03 RX ORDER — LAMOTRIGINE 100 MG/1
TABLET ORAL
Qty: 270 TABLET | Refills: 1 | Status: SHIPPED | OUTPATIENT
Start: 2020-12-03 | End: 2021-05-26

## 2020-12-03 RX ORDER — CYCLOBENZAPRINE HCL 10 MG
TABLET ORAL
Qty: 60 TABLET | Refills: 3 | Status: SHIPPED | OUTPATIENT
Start: 2020-12-03 | End: 2021-07-07

## 2020-12-03 RX ORDER — ERGOCALCIFEROL 1.25 MG/1
CAPSULE, LIQUID FILLED ORAL
Qty: 4 CAPSULE | Refills: 1 | Status: SHIPPED | OUTPATIENT
Start: 2020-12-03 | End: 2021-04-02

## 2020-12-03 RX ORDER — ZOLPIDEM TARTRATE 10 MG/1
TABLET ORAL
Qty: 30 TABLET | Refills: 3 | Status: SHIPPED | OUTPATIENT
Start: 2020-12-03 | End: 2022-01-06

## 2020-12-14 ENCOUNTER — TELEPHONE (OUTPATIENT)
Dept: FAMILY MEDICINE | Facility: CLINIC | Age: 55
End: 2020-12-14

## 2020-12-14 NOTE — TELEPHONE ENCOUNTER
Reason for call:  Other   Patient called regarding (reason for call): call back  Additional comments: Patient is calling because she said paperwork that got filled out for the state had her wrong email, please call back to discuss    Phone number to reach patient:  Home number on file [phone] (home)    Best Time:  any    Can we leave a detailed message on this number?  YES    Travel screening: Not Applicable

## 2020-12-14 NOTE — TELEPHONE ENCOUNTER
New certification sent with corrected email for patient.  Please call to let her know.    Leno Luis MD

## 2020-12-14 NOTE — TELEPHONE ENCOUNTER
Called and spoke with patient. Medical kailee certification done on 11/30, and patient never received the email.    Please try again to certify patient using email: [email or web address]      Patient also wanted to let Dr. Luis know that she called [facility], and they were unable to locate him in Epic and were unable to tell her what clinic he is working out of. (TC advised patient provider is at [place]).    Nate Waters

## 2021-01-06 DIAGNOSIS — F33.1 MAJOR DEPRESSIVE DISORDER, RECURRENT EPISODE, MODERATE (H): ICD-10-CM

## 2021-01-07 RX ORDER — BUPROPION HYDROCHLORIDE 150 MG/1
150 TABLET ORAL EVERY MORNING
Qty: 30 TABLET | Refills: 0 | Status: SHIPPED | OUTPATIENT
Start: 2021-01-07 | End: 2021-02-05

## 2021-01-28 ENCOUNTER — OFFICE VISIT (OUTPATIENT)
Dept: NEUROSURGERY | Facility: CLINIC | Age: 56
End: 2021-01-28
Payer: MEDICARE

## 2021-01-28 ENCOUNTER — ANCILLARY PROCEDURE (OUTPATIENT)
Dept: GENERAL RADIOLOGY | Facility: CLINIC | Age: 56
End: 2021-01-28
Attending: PHYSICIAN ASSISTANT
Payer: MEDICARE

## 2021-01-28 ENCOUNTER — OFFICE VISIT (OUTPATIENT)
Dept: FAMILY MEDICINE | Facility: CLINIC | Age: 56
End: 2021-01-28
Payer: MEDICARE

## 2021-01-28 VITALS
HEART RATE: 88 BPM | HEIGHT: 67 IN | WEIGHT: 158 LBS | DIASTOLIC BLOOD PRESSURE: 70 MMHG | SYSTOLIC BLOOD PRESSURE: 124 MMHG | RESPIRATION RATE: 20 BRPM | TEMPERATURE: 97.8 F | OXYGEN SATURATION: 100 % | BODY MASS INDEX: 24.8 KG/M2

## 2021-01-28 VITALS
HEART RATE: 66 BPM | DIASTOLIC BLOOD PRESSURE: 56 MMHG | WEIGHT: 159 LBS | SYSTOLIC BLOOD PRESSURE: 102 MMHG | BODY MASS INDEX: 24.96 KG/M2 | OXYGEN SATURATION: 99 % | HEIGHT: 67 IN

## 2021-01-28 DIAGNOSIS — M54.42 CHRONIC LEFT-SIDED LOW BACK PAIN WITH LEFT-SIDED SCIATICA: Primary | ICD-10-CM

## 2021-01-28 DIAGNOSIS — G89.29 CHRONIC LEFT-SIDED LOW BACK PAIN WITH LEFT-SIDED SCIATICA: Primary | ICD-10-CM

## 2021-01-28 DIAGNOSIS — M54.16 LUMBAR RADICULOPATHY: ICD-10-CM

## 2021-01-28 DIAGNOSIS — M54.16 LUMBAR RADICULOPATHY: Primary | ICD-10-CM

## 2021-01-28 PROCEDURE — 99214 OFFICE O/P EST MOD 30 MIN: CPT | Performed by: PHYSICIAN ASSISTANT

## 2021-01-28 PROCEDURE — 72100 X-RAY EXAM L-S SPINE 2/3 VWS: CPT | Performed by: RADIOLOGY

## 2021-01-28 PROCEDURE — 99203 OFFICE O/P NEW LOW 30 MIN: CPT | Performed by: PHYSICIAN ASSISTANT

## 2021-01-28 RX ORDER — PREDNISONE 20 MG/1
TABLET ORAL
Qty: 20 TABLET | Refills: 0 | Status: SHIPPED | OUTPATIENT
Start: 2021-01-28 | End: 2021-06-09

## 2021-01-28 ASSESSMENT — MIFFLIN-ST. JEOR
SCORE: 1355.69
SCORE: 1358.85

## 2021-01-28 ASSESSMENT — PAIN SCALES - GENERAL: PAINLEVEL: EXTREME PAIN (8)

## 2021-01-28 NOTE — NURSING NOTE
"January 28, 2021 10:35 AM   Kareen Hernandez is a 55 year old female who presents for:    Chief Complaint   Patient presents with     Consult     lumbar radiculopathy      Initial Vitals: /56   Pulse 66   Ht 5' 7\" (1.702 m)   Wt 159 lb (72.1 kg)   LMP  (LMP Unknown)   SpO2 99%   BMI 24.90 kg/m   Estimated body mass index is 24.9 kg/m  as calculated from the following:    Height as of this encounter: 5' 7\" (1.702 m).    Weight as of this encounter: 159 lb (72.1 kg). Body surface area is 1.85 meters squared.  Extreme Pain (8) Comment: Data Unavailable       Clinical concerns: Kareen Hernandez is here today for a consult for lumbar radiculopathy.    Lele Chinchilla MA  "

## 2021-01-28 NOTE — PROGRESS NOTES
"  Assessment & Plan     Lumbar radiculopathy  - NEUROSURGERY REFERRAL  - predniSONE (DELTASONE) 20 MG tablet; Take 3 tabs by mouth daily x 3 days, then 2 tabs daily x 3 days, then 1 tab daily x 3 days, then 1/2 tab daily x 3 days.  - XR Lumbar Spine 2/3 Views; Future        Return Per Neurosurgery recommendation.    BHAVIN Reilly [facility] HARMEET Mathur is a 55 year old who presents to clinic today for the following health issues  accompanied by her self:    HPI       Patient presents with:  Referral: for back pain.     Spent 5 min in CareEverywhere looking at Op notes and Imaging results.    Patient PMHx of Laminectomy L4-5 on R presents with repeat sx on the left of pain and radiating leg pain.  Difficulty sitting.  No bowel or bladder problems.  Notes her Bipolar sx are well controlled.       Review of Systems   Constitutional, HEENT, cardiovascular, pulmonary, gi and gu systems are negative, except as otherwise noted.      Objective    /70   Pulse 88   Temp 97.8  F (36.6  C) (Oral)   Resp 20   Ht 1.704 m (5' 7.09\")   Wt 71.7 kg (158 lb)   LMP  (LMP Unknown)   SpO2 100%   BMI 24.68 kg/m    Body mass index is 24.68 kg/m .     Physical Exam   GENERAL: healthy, alert and no distress  MS: no gross musculoskeletal defects noted, no edema  SKIN: no suspicious lesions or rashes  Comprehensive back pain exam:  Tenderness of bilat gluteus medii, Pain limits the following motions: Extension and foward flexion, Lower extremity strength functional and equal on both sides, Lower extremity reflexes within normal limits bilaterally and Straight leg positive on  left, indicating possible ipsilateral radiculopathy                "

## 2021-01-28 NOTE — PROGRESS NOTES
Dr. Eugenio Cash  [facility]  Neurosurgery Clinic Visit      CC: back and left leg pain    Primary care Provider: Jessi Lozano    Referring Provider:  Jeanine Masters PA-C      Reason For Visit:   I was asked to consult on the patient for back and left leg pain.      HPI: Kareen Hernandez is a 55 year old female who presents for evaluation of her chief complaint of left-sided low back and left leg pain.  She has a history of several years ago of right-sided sciatica, resulting in an L4-5 laminectomy.  She now states that she has very similar symptoms but now on the left.  She describes shooting pain down the left side of her low back, and into the left posterior thigh as far as the knee.  This is been present for 1 month, with no event or injury.  She has not had any conservative treatment.  No anti-inflammatories and no physical therapy.  No bowel or bladder changes.    Past Medical History:   Diagnosis Date     Anxiety 3/21/2012     Calculus of kidney     Multiple     CARDIOVASCULAR SCREENING; LDL GOAL LESS THAN 130 10/31/2010     CARDIOVASCULAR SCREENING; LDL GOAL LESS THAN 160 10/31/2010     Fibromyalgia 11/30/2006     Gastric bypass status for obesity 4/16/2009     Hypertension goal BP (blood pressure) < 140/90 2/24/2015     Insomnia      Major depressive disorder, single episode, severe, specified as with psychotic behavior      Migraine headache      Moderate major depression (H) 11/30/2006     Myalgia and myositis, unspecified     FIBROMYALGIA     Right maxillary sinusitis, chronic      Tobacco use disorder 7/14/2011    Quit [id number]/01/2013     Vitamin B12 deficiency disease 8/14/2013     Vitamin D deficiency disease 8/13/2013       Past Medical History reviewed with patient during visit.    Past Surgical History:   Procedure Laterality Date     ABDOMINOPLASTY  12/2/2013     C GASTRIC BYPASS,OBESE<100CM DAYAN-EN-Y  3-25-09    MARLENE HENAO REMOVAL OF KIDNEY STONE       "With kidney tents x 5 to 6 procedures.     CL AFF SURGICAL PATHOLOGY  2007    Feroz's neuroma  - Right Foot     LITHOTRIPSY       SURGICAL PATHOLOGY EXAM      Lipoma Removal on her back     Past Surgical History reviewed with patient during visit.    Current Outpatient Medications   Medication     Black Cohosh-SoyIsoflav-C Quad 40- MG CAPS     buPROPion (WELLBUTRIN XL) 150 MG 24 hr tablet     cyanocobalamin (CYANOCOBALAMIN) 1000 MCG/ML injection     cyclobenzaprine (FLEXERIL) 10 MG tablet     FLUoxetine (PROZAC) 40 MG capsule     lamoTRIgine (LAMICTAL) 100 MG tablet     medical cannabis (Patient's own supply)     mometasone (NASONEX) 50 MCG/ACT nasal spray     omeprazole (PRILOSEC) 20 MG DR capsule     order for DME     predniSONE (DELTASONE) 20 MG tablet     pseudoePHEDrine (SUDAFED) 30 MG tablet     SUMAtriptan (IMITREX) 100 MG tablet     Syringe/Needle, Disp, (BD LUER-BLAYNE SYRINGE) 25G X 1-1/2\" 3 ML MISC     vitamin D2 (ERGOCALCIFEROL) 56278 units (1250 mcg) capsule     zolpidem (AMBIEN) 10 MG tablet     No current facility-administered medications for this visit.        Allergies   Allergen Reactions     Lurasidone Diarrhea     Diarrhea     Morphine Rash       Social History     Socioeconomic History     Marital status:      Spouse name: None     Number of children: 4     Years of education: None     Highest education level: None   Occupational History     Occupation: Mental health and Fibromyalgia     Employer: UNEMPLOYED     Comment: On disability     Employer: DISABLED   Social Needs     Financial resource strain: None     Food insecurity     Worry: Never true     Inability: Never true     Transportation needs     Medical: None     Non-medical: None   Tobacco Use     Smoking status: Former Smoker     Packs/day: 0.50     Types: Cigarettes     Quit date: 2013     Years since quittin.3     Smokeless tobacco: Never Used     Tobacco comment: Vape e-cig   Substance and Sexual Activity     " Alcohol use: No     Drug use: Yes     Comment: Marijuana - 3 times a week     Sexual activity: Not Currently     Partners: Male   Lifestyle     Physical activity     Days per week: None     Minutes per session: None     Stress: None   Relationships     Social connections     Talks on phone: None     Gets together: None     Attends Jainism service: None     Active member of club or organization: None     Attends meetings of clubs or organizations: None     Relationship status: None     Intimate partner violence     Fear of current or ex partner: None     Emotionally abused: None     Physically abused: None     Forced sexual activity: None   Other Topics Concern      Service No     Blood Transfusions No     Caffeine Concern No     Comment: Pop - 1 to 2 a day     Occupational Exposure No     Hobby Hazards No     Sleep Concern Yes     Comment: trouble falling asleep     Stress Concern No     Weight Concern Yes     Special Diet No     Back Care No     Exercise No     Comment: No regular exercise program     Bike Helmet Yes     Seat Belt Yes     Self-Exams Yes     Parent/sibling w/ CABG, MI or angioplasty before 65F 55M? No   Social History Narrative    Live with daughter.       Family History   Problem Relation Age of Onset     Asthma Mother      Hypertension Mother      C.A.D. Mother      Genitourinary Problems Mother         kidney failure   age 80     Chronic Obstructive Pulmonary Disease Mother      Diabetes Father      Hypertension Father      C.A.D. Father      Chronic Obstructive Pulmonary Disease Father      Dementia Father      Genitourinary Problems Sister         kidney stones     Obesity Daughter         gastric sleeve     Cerebrovascular Disease No family hx of      Breast Cancer No family hx of      Cancer - colorectal No family hx of      Prostate Cancer No family hx of           ROS: 10 point ROS neg other than the symptoms noted above in the HPI.    Vital Signs: /56   Pulse 66   Ht  "1.702 m (5' 7\")   Wt 72.1 kg (159 lb)   LMP  (LMP Unknown)   SpO2 99%   BMI 24.90 kg/m      Examination:  Constitutional:  Alert, well nourished, NAD.  HEENT: Normocephalic, atraumatic.   Pulmonary:  Without shortness of breath, normal effort.   Lymph: no lymphadenopathy to low back or LE.   Integumentary: Skin is free of rashes or lesions.   Cardiovascular:  No pitting edema of BLE.    Psych: Normal affect, no apparent distress    Neurological:  Awake  Alert  Oriented x 3  Speech clear  Cranial nerves II - XII grossly intact  Motor exam   Hip Flexor:                Right: 5/5  Left:  5/5  Hip Adductor:             Right:  5/5  Left:  5/5  Hip Abductor:             Right:  5/5  Left:  5/5  Gastroc Soleus:        Right:  5/5  Left:  5/5  Tib/Ant:                      Right:  5/5  Left:  5/5  EHL:                          Right:  5/5  Left:  5/5       Sensation normal to bilateral upper and lower extremities.    Reflexes are 2+ in the patellar and Achilles. There is no clonus. Downgoing Babinski.    Musculoskeletal:  Gait: Able to stand from a seated position. Normal non-antalgic, non-myelopathic gait.    Imaging:   xr lumbar spine:  IMPRESSION: Six nonrib-bearing, lumbar type vertebrae. Normal  alignment. Vertebral body heights are normal. No fractures.  Degenerative endplate spurring at L4-L5. Facet arthropathy at L4-L5,  L5-L6 and L6-S1.    Assessment/Plan:     Low back pain   Left leg pain    Kareen Hernandez is a 55 year old female.  I did have a discussion with the patient regarding her symptoms.  She has had 1 month of low back and radicular type left leg pain.  She feels this is very similar to symptoms she experienced several years ago, ultimately resulting in surgery.  She has not had any conservative treatment, no physical therapy and no injections.  No focal weaknesses.  Her x-rays show some disc degeneration at L4-5 and L5-S1.  We will go ahead and obtain a lumbar spine MRI for further evaluation.  We " anticipate referring her for some physical therapy and/or injections.  She voiced agreement and understanding.  She will follow-up with us once the MRI is completed.          Barrignton Watt PA-C  [facility] Neurosurgery  [address] [address]    Tel [phone]  Pager [phone]

## 2021-01-28 NOTE — LETTER
1/28/2021         RE: Kareen Hernandez  [address] [facility] [address]        Dear Colleague,    Thank you for referring your patient, Kareen Hernandez, to the [facility]. Please see a copy of my visit note below.    Dr. Eugenio Cash  [facility]  Neurosurgery Clinic Visit      CC: back and left leg pain    Primary care Provider: Jessi Lozano    Referring Provider:  Jeanine Masters PA-C      Reason For Visit:   I was asked to consult on the patient for back and left leg pain.      HPI: Kareen Hernandez is a 55 year old female who presents for evaluation of her chief complaint of left-sided low back and left leg pain.  She has a history of several years ago of right-sided sciatica, resulting in an L4-5 laminectomy.  She now states that she has very similar symptoms but now on the left.  She describes shooting pain down the left side of her low back, and into the left posterior thigh as far as the knee.  This is been present for 1 month, with no event or injury.  She has not had any conservative treatment.  No anti-inflammatories and no physical therapy.  No bowel or bladder changes.    Past Medical History:   Diagnosis Date     Anxiety 3/21/2012     Calculus of kidney     Multiple     CARDIOVASCULAR SCREENING; LDL GOAL LESS THAN 130 10/31/2010     CARDIOVASCULAR SCREENING; LDL GOAL LESS THAN 160 10/31/2010     Fibromyalgia 11/30/2006     Gastric bypass status for obesity 4/16/2009     Hypertension goal BP (blood pressure) < 140/90 2/24/2015     Insomnia      Major depressive disorder, single episode, severe, specified as with psychotic behavior      Migraine headache      Moderate major depression (H) 11/30/2006     Myalgia and myositis, unspecified     FIBROMYALGIA     Right maxillary sinusitis, chronic      Tobacco use disorder 7/14/2011    Quit elkxosi59/01/2013     Vitamin B12 deficiency disease 8/14/2013     Vitamin D deficiency disease  "8/13/2013       Past Medical History reviewed with patient during visit.    Past Surgical History:   Procedure Laterality Date     ABDOMINOPLASTY  12/2/2013     C GASTRIC BYPASS,OBESE<100CM DAYAN-EN-Y  3-25-09    [facility]     C REMOVAL OF KIDNEY STONE      With kidney tents x 5 to 6 procedures.     CL AFF SURGICAL PATHOLOGY  Feb 2007    Feroz's neuroma  - Right Foot     LITHOTRIPSY       SURGICAL PATHOLOGY EXAM      Lipoma Removal on her back     Past Surgical History reviewed with patient during visit.    Current Outpatient Medications   Medication     Black Cohosh-SoyIsoflav-C Quad 40- MG CAPS     buPROPion (WELLBUTRIN XL) 150 MG 24 hr tablet     cyanocobalamin (CYANOCOBALAMIN) 1000 MCG/ML injection     cyclobenzaprine (FLEXERIL) 10 MG tablet     FLUoxetine (PROZAC) 40 MG capsule     lamoTRIgine (LAMICTAL) 100 MG tablet     medical cannabis (Patient's own supply)     mometasone (NASONEX) 50 MCG/ACT nasal spray     omeprazole (PRILOSEC) 20 MG DR capsule     order for DME     predniSONE (DELTASONE) 20 MG tablet     pseudoePHEDrine (SUDAFED) 30 MG tablet     SUMAtriptan (IMITREX) 100 MG tablet     Syringe/Needle, Disp, (BD LUER-BLAYNE SYRINGE) 25G X 1-1/2\" 3 ML MISC     vitamin D2 (ERGOCALCIFEROL) 35773 units (1250 mcg) capsule     zolpidem (AMBIEN) 10 MG tablet     No current facility-administered medications for this visit.        Allergies   Allergen Reactions     Lurasidone Diarrhea     Diarrhea     Morphine Rash       Social History     Socioeconomic History     Marital status:      Spouse name: None     Number of children: 4     Years of education: None     Highest education level: None   Occupational History     Occupation: Mental health and Fibromyalgia     Employer: UNEMPLOYED     Comment: On disability     Employer: DISABLED   Social Needs     Financial resource strain: None     Food insecurity     Worry: Never true     Inability: Never true     Transportation needs     Medical: None     " Non-medical: None   Tobacco Use     Smoking status: Former Smoker     Packs/day: 0.50     Types: Cigarettes     Quit date: 2013     Years since quittin.3     Smokeless tobacco: Never Used     Tobacco comment: Vape e-cig   Substance and Sexual Activity     Alcohol use: No     Drug use: Yes     Comment: Marijuana - 3 times a week     Sexual activity: Not Currently     Partners: Male   Lifestyle     Physical activity     Days per week: None     Minutes per session: None     Stress: None   Relationships     Social connections     Talks on phone: None     Gets together: None     Attends Holiness service: None     Active member of club or organization: None     Attends meetings of clubs or organizations: None     Relationship status: None     Intimate partner violence     Fear of current or ex partner: None     Emotionally abused: None     Physically abused: None     Forced sexual activity: None   Other Topics Concern      Service No     Blood Transfusions No     Caffeine Concern No     Comment: Pop - 1 to 2 a day     Occupational Exposure No     Hobby Hazards No     Sleep Concern Yes     Comment: trouble falling asleep     Stress Concern No     Weight Concern Yes     Special Diet No     Back Care No     Exercise No     Comment: No regular exercise program     Bike Helmet Yes     Seat Belt Yes     Self-Exams Yes     Parent/sibling w/ CABG, MI or angioplasty before 65F 55M? No   Social History Narrative    Live with daughter.       Family History   Problem Relation Age of Onset     Asthma Mother      Hypertension Mother      C.A.D. Mother      Genitourinary Problems Mother         kidney failure   age 80     Chronic Obstructive Pulmonary Disease Mother      Diabetes Father      Hypertension Father      C.A.D. Father      Chronic Obstructive Pulmonary Disease Father      Dementia Father      Genitourinary Problems Sister         kidney stones     Obesity Daughter         gastric sleeve      "Cerebrovascular Disease No family hx of      Breast Cancer No family hx of      Cancer - colorectal No family hx of      Prostate Cancer No family hx of           ROS: 10 point ROS neg other than the symptoms noted above in the HPI.    Vital Signs: /56   Pulse 66   Ht 1.702 m (5' 7\")   Wt 72.1 kg (159 lb)   LMP  (LMP Unknown)   SpO2 99%   BMI 24.90 kg/m      Examination:  Constitutional:  Alert, well nourished, NAD.  HEENT: Normocephalic, atraumatic.   Pulmonary:  Without shortness of breath, normal effort.   Lymph: no lymphadenopathy to low back or LE.   Integumentary: Skin is free of rashes or lesions.   Cardiovascular:  No pitting edema of BLE.    Psych: Normal affect, no apparent distress    Neurological:  Awake  Alert  Oriented x 3  Speech clear  Cranial nerves II - XII grossly intact  Motor exam   Hip Flexor:                Right: 5/5  Left:  5/5  Hip Adductor:             Right:  5/5  Left:  5/5  Hip Abductor:             Right:  5/5  Left:  5/5  Gastroc Soleus:        Right:  5/5  Left:  5/5  Tib/Ant:                      Right:  5/5  Left:  5/5  EHL:                          Right:  5/5  Left:  5/5       Sensation normal to bilateral upper and lower extremities.    Reflexes are 2+ in the patellar and Achilles. There is no clonus. Downgoing Babinski.    Musculoskeletal:  Gait: Able to stand from a seated position. Normal non-antalgic, non-myelopathic gait.    Imaging:   xr lumbar spine:  IMPRESSION: Six nonrib-bearing, lumbar type vertebrae. Normal  alignment. Vertebral body heights are normal. No fractures.  Degenerative endplate spurring at L4-L5. Facet arthropathy at L4-L5,  L5-L6 and L6-S1.    Assessment/Plan:     Low back pain   Left leg pain    Kareen Hernandez is a 55 year old female.  I did have a discussion with the patient regarding her symptoms.  She has had 1 month of low back and radicular type left leg pain.  She feels this is very similar to symptoms she experienced several years ago, " ultimately resulting in surgery.  She has not had any conservative treatment, no physical therapy and no injections.  No focal weaknesses.  Her x-rays show some disc degeneration at L4-5 and L5-S1.  We will go ahead and obtain a lumbar spine MRI for further evaluation.  We anticipate referring her for some physical therapy and/or injections.  She voiced agreement and understanding.  She will follow-up with us once the MRI is completed.          Barrington Watt PA-C  [facility]  [address] [address]    Tel [phone]  Pager [phone]        Again, thank you for allowing me to participate in the care of your patient.        Sincerely,        Barrington Watt PA-C

## 2021-01-29 ENCOUNTER — ANCILLARY PROCEDURE (OUTPATIENT)
Dept: MRI IMAGING | Facility: CLINIC | Age: 56
End: 2021-01-29
Attending: PHYSICIAN ASSISTANT
Payer: MEDICARE

## 2021-01-29 DIAGNOSIS — M54.42 CHRONIC LEFT-SIDED LOW BACK PAIN WITH LEFT-SIDED SCIATICA: ICD-10-CM

## 2021-01-29 DIAGNOSIS — G89.29 CHRONIC LEFT-SIDED LOW BACK PAIN WITH LEFT-SIDED SCIATICA: ICD-10-CM

## 2021-01-29 PROCEDURE — G1004 CDSM NDSC: HCPCS | Mod: TC | Performed by: RADIOLOGY

## 2021-01-29 PROCEDURE — A9585 GADOBUTROL INJECTION: HCPCS | Performed by: RADIOLOGY

## 2021-01-29 PROCEDURE — 72158 MRI LUMBAR SPINE W/O & W/DYE: CPT | Mod: TC | Performed by: RADIOLOGY

## 2021-01-29 RX ORDER — GADOBUTROL 604.72 MG/ML
7.5 INJECTION INTRAVENOUS ONCE
Status: COMPLETED | OUTPATIENT
Start: 2021-01-29 | End: 2021-01-29

## 2021-01-29 RX ADMIN — GADOBUTROL 7.5 ML: 604.72 INJECTION INTRAVENOUS at 10:29

## 2021-02-01 ENCOUNTER — TELEPHONE (OUTPATIENT)
Dept: FAMILY MEDICINE | Facility: CLINIC | Age: 56
End: 2021-02-01

## 2021-02-01 ENCOUNTER — TELEPHONE (OUTPATIENT)
Dept: NEUROSURGERY | Facility: CLINIC | Age: 56
End: 2021-02-01

## 2021-02-01 NOTE — TELEPHONE ENCOUNTER
"Per pt \" I went to ED over weekend, percocet was described. They told me to call doctor office for refill.\" Suggested pt to reach out her PCP for refill since she has not had surgery with our clinic yet.  "

## 2021-02-01 NOTE — TELEPHONE ENCOUNTER
Reason for Call:  Other     Detailed comments: Patient was in severe back pain she went to the [facility] and she was give pain medications. Would like provider to look at Xray and MRI completed and possible send a script to [facility]     Phone Number Patient can be reached at: Home number on file [phone] (home)    Best Time:     Can we leave a detailed message on this number? YES    Call taken on 2/1/2021 at 1:23 PM by Ashley Rubi

## 2021-02-01 NOTE — TELEPHONE ENCOUNTER
Reason For Call: Patient is requesting a call back with results from her Lumbar  MRI performed on 1/29/21, she would also like to request a medication refill, she can be reached at [phone].

## 2021-02-02 DIAGNOSIS — M54.42 CHRONIC LEFT-SIDED LOW BACK PAIN WITH LEFT-SIDED SCIATICA: Primary | ICD-10-CM

## 2021-02-02 DIAGNOSIS — G89.29 CHRONIC LEFT-SIDED LOW BACK PAIN WITH LEFT-SIDED SCIATICA: Primary | ICD-10-CM

## 2021-02-02 NOTE — TELEPHONE ENCOUNTER
Called patient and discussed MRI and symptoms. She will start PT and f/u with surgeon after she has had a few sessions if no improvement. Not interested in [facility].     Barrington Watt PA-C

## 2021-02-03 ENCOUNTER — TELEPHONE (OUTPATIENT)
Dept: FAMILY MEDICINE | Facility: CLINIC | Age: 56
End: 2021-02-03

## 2021-02-03 DIAGNOSIS — M54.16 LUMBAR RADICULOPATHY: Primary | ICD-10-CM

## 2021-02-03 RX ORDER — OXYCODONE AND ACETAMINOPHEN 5; 325 MG/1; MG/1
1 TABLET ORAL EVERY 6 HOURS PRN
Qty: 12 TABLET | Refills: 0 | Status: SHIPPED | OUTPATIENT
Start: 2021-02-03 | End: 2021-02-08

## 2021-02-03 NOTE — TELEPHONE ENCOUNTER
Called patient and notified her of message from provider. She verbalized understanding and had no further questions.

## 2021-02-03 NOTE — TELEPHONE ENCOUNTER
Short course written and sent.  Patient will need to get additional refills from Neurosurgery.  Campbell DELCID

## 2021-02-03 NOTE — TELEPHONE ENCOUNTER
Patient is requesting a prescription for Percocet from Jeanine Masters who she saw on 01/28. She states that she got her MRI on Friday and then ended up in the ER on Saturday 01/30/2021 for severe pain. She was given a prescription for Percocet at the ER. States that she cannot get a hold of her doctor and she tried calling our clinic and never received a call back, pt was upset. She used to go to [place] and see Dr. Luis, states that he is too far away now for her to see. She is aware that [place] clinic closed. States that she already called her back surgeon and she is seeing him on 02/11 and is picking up her MRI disc at [place]. She will be seeing a doctor named Rock at [facility] in [place]. States that she saw the Neurosurgeon, Shukri DELCID and he was very rude, she will not continue seeing him. States that he told her that he could not provide narcotic pain medications until he has seen her and she has had the surgery. Pt is doing taking flexeril that they gave her at ER. She is not doing any OTC pain medications other than Aleve which she takes for her sinuses every other day. She rates her pain as 7/10, same location as at visit on 01/28. For the Percocet, she has 1 left and took one an hour ago.

## 2021-02-05 DIAGNOSIS — F33.1 MAJOR DEPRESSIVE DISORDER, RECURRENT EPISODE, MODERATE (H): ICD-10-CM

## 2021-02-05 RX ORDER — BUPROPION HYDROCHLORIDE 150 MG/1
150 TABLET ORAL EVERY MORNING
Qty: 30 TABLET | Refills: 0 | Status: SHIPPED | OUTPATIENT
Start: 2021-02-05 | End: 2021-02-07

## 2021-02-11 DIAGNOSIS — F33.1 MAJOR DEPRESSIVE DISORDER, RECURRENT EPISODE, MODERATE (H): ICD-10-CM

## 2021-02-11 DIAGNOSIS — F31.9 BIPOLAR AFFECTIVE DISORDER, REMISSION STATUS UNSPECIFIED (H): ICD-10-CM

## 2021-02-11 RX ORDER — FLUOXETINE 40 MG/1
CAPSULE ORAL
Qty: 180 CAPSULE | Refills: 0 | Status: SHIPPED | OUTPATIENT
Start: 2021-02-11 | End: 2021-05-10

## 2021-02-27 DIAGNOSIS — E53.8 VITAMIN B12 DEFICIENCY DISEASE: ICD-10-CM

## 2021-03-01 RX ORDER — CYANOCOBALAMIN 1000 UG/ML
INJECTION, SOLUTION INTRAMUSCULAR; SUBCUTANEOUS
Qty: 1 ML | Refills: 11 | Status: SHIPPED | OUTPATIENT
Start: 2021-03-01 | End: 2022-03-16

## 2021-03-25 ENCOUNTER — TRANSFERRED RECORDS (OUTPATIENT)
Dept: HEALTH INFORMATION MANAGEMENT | Facility: CLINIC | Age: 56
End: 2021-03-25

## 2021-04-01 DIAGNOSIS — E55.9 VITAMIN D DEFICIENCY DISEASE: ICD-10-CM

## 2021-04-02 RX ORDER — ERGOCALCIFEROL 1.25 MG/1
CAPSULE, LIQUID FILLED ORAL
Qty: 4 CAPSULE | Refills: 1 | Status: SHIPPED | OUTPATIENT
Start: 2021-04-02 | End: 2021-05-27

## 2021-05-10 DIAGNOSIS — F33.1 MAJOR DEPRESSIVE DISORDER, RECURRENT EPISODE, MODERATE (H): ICD-10-CM

## 2021-05-10 DIAGNOSIS — F31.9 BIPOLAR AFFECTIVE DISORDER, REMISSION STATUS UNSPECIFIED (H): ICD-10-CM

## 2021-05-10 RX ORDER — FLUOXETINE 40 MG/1
CAPSULE ORAL
Qty: 180 CAPSULE | Refills: 0 | Status: SHIPPED | OUTPATIENT
Start: 2021-05-10 | End: 2021-08-09

## 2021-05-12 ENCOUNTER — TRANSFERRED RECORDS (OUTPATIENT)
Dept: HEALTH INFORMATION MANAGEMENT | Facility: CLINIC | Age: 56
End: 2021-05-12

## 2021-05-12 ENCOUNTER — TRANSFERRED RECORDS (OUTPATIENT)
Dept: MULTI SPECIALTY CLINIC | Facility: CLINIC | Age: 56
End: 2021-05-12

## 2021-05-12 LAB — COLOGUARD-ABSTRACT: NEGATIVE

## 2021-05-14 ENCOUNTER — OFFICE VISIT (OUTPATIENT)
Dept: FAMILY MEDICINE | Facility: CLINIC | Age: 56
End: 2021-05-14
Payer: MEDICARE

## 2021-05-14 ENCOUNTER — TELEPHONE (OUTPATIENT)
Dept: FAMILY MEDICINE | Facility: CLINIC | Age: 56
End: 2021-05-14

## 2021-05-14 ENCOUNTER — ANCILLARY PROCEDURE (OUTPATIENT)
Dept: GENERAL RADIOLOGY | Facility: CLINIC | Age: 56
End: 2021-05-14
Attending: NURSE PRACTITIONER
Payer: MEDICARE

## 2021-05-14 VITALS
WEIGHT: 164 LBS | DIASTOLIC BLOOD PRESSURE: 75 MMHG | OXYGEN SATURATION: 100 % | RESPIRATION RATE: 18 BRPM | BODY MASS INDEX: 25.74 KG/M2 | SYSTOLIC BLOOD PRESSURE: 119 MMHG | HEART RATE: 88 BPM | HEIGHT: 67 IN | TEMPERATURE: 98.6 F

## 2021-05-14 DIAGNOSIS — Z11.59 NEED FOR HEPATITIS C SCREENING TEST: ICD-10-CM

## 2021-05-14 DIAGNOSIS — M25.561 ACUTE PAIN OF RIGHT KNEE: Primary | ICD-10-CM

## 2021-05-14 DIAGNOSIS — Z12.31 ENCOUNTER FOR SCREENING MAMMOGRAM FOR BREAST CANCER: ICD-10-CM

## 2021-05-14 PROCEDURE — 86803 HEPATITIS C AB TEST: CPT | Performed by: NURSE PRACTITIONER

## 2021-05-14 PROCEDURE — 99000 SPECIMEN HANDLING OFFICE-LAB: CPT | Performed by: NURSE PRACTITIONER

## 2021-05-14 PROCEDURE — 99213 OFFICE O/P EST LOW 20 MIN: CPT | Performed by: NURSE PRACTITIONER

## 2021-05-14 PROCEDURE — 87522 HEPATITIS C REVRS TRNSCRPJ: CPT | Performed by: NURSE PRACTITIONER

## 2021-05-14 PROCEDURE — 36415 COLL VENOUS BLD VENIPUNCTURE: CPT | Performed by: NURSE PRACTITIONER

## 2021-05-14 PROCEDURE — 87902 NFCT AGT GNTYP ALYS HEP C: CPT | Mod: 90 | Performed by: NURSE PRACTITIONER

## 2021-05-14 PROCEDURE — 73562 X-RAY EXAM OF KNEE 3: CPT | Mod: RT | Performed by: RADIOLOGY

## 2021-05-14 ASSESSMENT — MIFFLIN-ST. JEOR: SCORE: 1376.53

## 2021-05-14 ASSESSMENT — PAIN SCALES - GENERAL: PAINLEVEL: SEVERE PAIN (7)

## 2021-05-14 NOTE — TELEPHONE ENCOUNTER
Pt called and said she was in earlier today and she was told she would get a knee brace/compressor or something like that?? She didnt see it at pharmacy so she isn't sure where or how to get it. Can a nurse please call her and let her know about that.

## 2021-05-14 NOTE — PATIENT INSTRUCTIONS
Patient Education     Knee Pain  Knee pain is very common. It s especially common in active people who put a lot of pressure on their knees, like runners. It affects women more often than men.  Your kneecap (patella) is a thick, round bone. It covers and protects the front portion of your knee joint. It moves along a groove in your thighbone (femur) as part of the patellofemoral joint. A layer of cartilage surrounds the underside of your kneecap. This layer protects it from grinding against your femur.  When this cartilage softens and breaks down, it can cause knee pain. This is partly because of repetitive stress. The stress irritates the lining of the joint. This causes pain in the underlying bone.  What causes knee pain?  Many things can cause knee pain. You may have more than one cause. Some of these include:    Overuse of the knee joint    The kneecap doesn t line up with the tissue around it    Damage to small nerves in the area    Damage to the ligament-like structure that holds the kneecap in place (retinaculum)    Breakdown of the bone under the cartilage    Swelling in the soft tissues around the kneecap    Injury  You might be more likely to have knee pain if you:    Exercise a lot    Recently increased the intensity of your workouts    Have a body mass index (BMI) greater than 25    Have poor alignment of your kneecap    Walk with your feet turned overly outward or inward    Have weakness in surrounding muscle groups (inner quad or hip adductor muscles)    Have too much tightness in surrounding muscle groups (hamstrings or iliotibial band)    Have a recent history of injury to the area    Are female  Symptoms of knee pain  This type of knee pain is a dull, aching pain in the front of the knee in the area under and around the kneecap. This pain may start quickly or slowly. Your pain might be worse when you squat, run, or sit for a long time. Climbing stairs may be painful or hard to do. You might also  sometimes feel like your knee is giving out. You may have symptoms in one or both of your knees.  Diagnosing knee pain  Your healthcare provider will ask about your medical history and your symptoms. Be sure to describe any activities that make your knee pain worse. He or she will look at your knee. This will include tests of your range of motion, strength, and areas of pain of your knee. Your knee alignment will be checked.  Your healthcare provider will need to rule out other causes of your knee pain, such as arthritis. You may need an imaging test, such as an X-ray or MRI.  Treatment for knee pain  Treatments that can help ease your symptoms may include:    Avoiding activities for a while that make your pain worse, returning to activity over time    Icing the outside of your knee when it causes you pain    Taking over-the-counter pain medicine such as NSAIDs    Wearing a knee brace or taping your knee to support it    Compression to help prevent swelling    Wearing special shoe inserts to help keep your feet in the proper alignment    Elevating your knee    Doing special exercises to stretch and strengthen the muscles around your hip and your knee  These steps help most people manage knee pain. But some cases of knee pain need to be treated with surgery. You rarely need surgery right away. You may need it later if other treatments don t work. Your healthcare provider may refer you to an orthopedic surgeon. He or she will talk with you about your choices.  Preventing knee pain  Losing weight and correcting excess muscle tightness or muscle weakness may help lower your risk.  In some cases, you can prevent knee pain. To help prevent a flare-up of knee pain, do these things:    Regularly do all the exercises your doctor or physical therapist advises    Warm up fully before exercising    Support your knee as advised by your doctor or physical therapist    Increase training gradually, and ease up on training when  needed    Have an expert check your gait for running or other sporting activities    Stretch properly before and after exercise    Replace your running shoes regularly    Lose excess weight  When to call your healthcare provider  Call your healthcare provider right away if:    Your symptoms don t get better after a few weeks of treatment    You have any new symptoms  Avel last reviewed this educational content on 6/1/2019 2000-2021 [employer]. All rights reserved. This information is not intended as a substitute for professional medical care. Always follow your healthcare professional's instructions.

## 2021-05-14 NOTE — LETTER
May 14, 2021      Kareen Hernandez  [address] [facility] [address]        Dear ,    We are writing to inform you of your test results.    Here are your recent results which are within the expected range. Please continue with your current plan of care and let us know if you have any questions or concerns.       Resulted Orders   XR Knee Right 3 Views    Narrative    XR KNEE RIGHT 3 VIEWS 5/14/2021 9:58 AM     HISTORY: Acute pain of right knee      Impression    IMPRESSION: Negative exam.    CRISTIANA BRANNON MD       If you have any questions or concerns, please call the clinic at the number listed above.       Sincerely,      MESHA Walker CNP/nobles

## 2021-05-14 NOTE — PROGRESS NOTES
"    Assessment & Plan     (M25.561) Acute pain of right knee  (primary encounter diagnosis)  Comment: Counseled on self-care measures including: ice/heat, OTC pain medications, compression, elevation, rest; and warning signs of when to seek urgent medical care including: new numbness/tingling/weakness, worsening symptoms. Offered PT, patient declines at this time.   Plan: XR Knee Right 3 Views, Knee Supplies Order for         DME - ONLY FOR DME          (Z11.59) Need for hepatitis C screening test  Comment: recommended  Plan: Hepatitis C Screen Reflex to HCV RNA Quant and         Genotype          (Z12.31) Encounter for screening mammogram for breast cancer  Comment: recommended  Plan: MA SCREENING DIGITAL BILAT - Future  (s+30)          BMI:   Estimated body mass index is 25.69 kg/m  as calculated from the following:    Height as of this encounter: 1.702 m (5' 7\").    Weight as of this encounter: 74.4 kg (164 lb).     Return in about 4 weeks (around 6/11/2021), or if symptoms worsen or fail to improve.    Monika Luna, MESHA CNP  [facility] HARMEET Mathur is a 56 year old who presents for the following health issues     HPI     Musculoskeletal problem/pain  Onset/Duration: 1 month   Description  Location: right knee   Joint Swelling: no  Redness: no  Pain: YES  Warmth: no  Intensity:  moderate  Progression of Symptoms:  same  Accompanying signs and symptoms:   Fevers: no  Numbness/tingling/weakness: YES, numbness   History  Trauma to the area: YES- fell a month ago and landed on her knee \"hard\" was bruised and swollen for several weeks  Recent illness:  YES- patient reported positive for COVID 19 at [facility] in April.   Previous similar problem: no  Previous evaluation:  no  Precipitating or alleviating factors:  Aggravating factors include: walking or bending   Therapies tried and outcome: nothing    Patient declines colonoscopy screening    Review of Systems   Constitutional, " "HEENT, cardiovascular, pulmonary, gi and gu systems are negative, except as otherwise noted.      Objective    /75 (BP Location: Right arm, Patient Position: Sitting, Cuff Size: Adult Large)   Pulse 88   Temp 98.6  F (37  C) (Oral)   Resp 18   Ht 1.702 m (5' 7\")   Wt 74.4 kg (164 lb)   LMP  (LMP Unknown)   SpO2 100%   BMI 25.69 kg/m    Body mass index is 25.69 kg/m .  Physical Exam   GENERAL: healthy, alert and no distress  EYES: Eyes grossly normal to inspection, PERRL and conjunctivae and sclerae normal  MS: no gross musculoskeletal defects noted, no edema  NEURO: Normal strength and tone, mentation intact and speech normal  PSYCH: mentation appears normal, affect normal/bright    Xray - Reviewed and interpreted by me.  No obvious fracture. Awaiting final radiology results.             "

## 2021-05-15 LAB — HCV AB SERPL QL IA: REACTIVE

## 2021-05-17 LAB
HCV RNA SERPL NAA+PROBE-ACNC: ABNORMAL [IU]/ML
HCV RNA SERPL NAA+PROBE-LOG IU: 5.7 LOG IU/ML

## 2021-05-18 ENCOUNTER — TELEPHONE (OUTPATIENT)
Dept: FAMILY MEDICINE | Facility: CLINIC | Age: 56
End: 2021-05-18

## 2021-05-18 NOTE — TELEPHONE ENCOUNTER
Patient called and scheduled for appointment tomorrow. Will discuss lab results.   Monika Luna, APRN, CNP

## 2021-05-19 ENCOUNTER — ANCILLARY PROCEDURE (OUTPATIENT)
Dept: MAMMOGRAPHY | Facility: CLINIC | Age: 56
End: 2021-05-19
Attending: NURSE PRACTITIONER
Payer: MEDICARE

## 2021-05-19 ENCOUNTER — OFFICE VISIT (OUTPATIENT)
Dept: FAMILY MEDICINE | Facility: CLINIC | Age: 56
End: 2021-05-19
Payer: MEDICARE

## 2021-05-19 VITALS
BODY MASS INDEX: 25.68 KG/M2 | OXYGEN SATURATION: 98 % | DIASTOLIC BLOOD PRESSURE: 79 MMHG | WEIGHT: 163.6 LBS | HEART RATE: 87 BPM | TEMPERATURE: 98.5 F | RESPIRATION RATE: 18 BRPM | SYSTOLIC BLOOD PRESSURE: 134 MMHG | HEIGHT: 67 IN

## 2021-05-19 DIAGNOSIS — M25.561 ACUTE PAIN OF RIGHT KNEE: ICD-10-CM

## 2021-05-19 DIAGNOSIS — B17.10 ACUTE HEPATITIS C VIRUS INFECTION WITHOUT HEPATIC COMA: Primary | ICD-10-CM

## 2021-05-19 DIAGNOSIS — Z12.31 ENCOUNTER FOR SCREENING MAMMOGRAM FOR BREAST CANCER: ICD-10-CM

## 2021-05-19 PROBLEM — B18.2 HEPATITIS C, CHRONIC (H): Status: ACTIVE | Noted: 2021-05-19

## 2021-05-19 PROCEDURE — 99213 OFFICE O/P EST LOW 20 MIN: CPT | Performed by: NURSE PRACTITIONER

## 2021-05-19 PROCEDURE — 77067 SCR MAMMO BI INCL CAD: CPT | Mod: TC | Performed by: RADIOLOGY

## 2021-05-19 ASSESSMENT — PAIN SCALES - GENERAL: PAINLEVEL: NO PAIN (0)

## 2021-05-19 ASSESSMENT — MIFFLIN-ST. JEOR: SCORE: 1374.71

## 2021-05-19 NOTE — PATIENT INSTRUCTIONS
Patient Education     Understanding Hepatitis C Virus (HCV)  Hepatitis means inflammation of the liver. There are many kinds of hepatitis. Some are from infections and can be spread. Others are not. Hepatitis C virus (HCV) can be spread to other people. It can lead to lifelong liver disease. This includes chronic hepatitis, cirrhosis, liver failure, and liver cancer.  Symptoms of hepatitis C  Most people have no symptoms until they develop liver disease years later. Symptoms can include:    Flulike symptoms such as fatigue, nausea, vomiting, diarrhea, and sore muscles and joints    Sore feeling in the upper right abdomen    Yellow color in skin and eyes (jaundice)    Swelling in the belly    Itching    Confusion    Abnormal bleeding    Dark yellow to brown urine    Light-colored stool (gray or frederick color)  How HCV spreads  HCV spreads through contact with an infected person s blood and body fluids. This is most likely to happen if:    You used an infected needle with IV drug use, tattoos, acupuncture, or body piercing    You had a needle stick injury in the hospital    You shared personal care items such as razors    You had sex without a condom with an infected person (a less common cause)    You had a blood transfusion several years ago (blood is now screened for HCV)    You shared drug tools (like snorting straws)    You have ever been in assisted  Many people do not know how they were exposed to HCV. The CDC advises people born between 1945 and 1965 to have 1 screening test. Screening is also advised for people born to mothers with HCV.  Prevent the spread  No vaccine can prevent the spread of HCV and hepatitis C. If you have HCV, it s up to you to protect other people from the virus.  Do's:    Cover all of your skin breaks and sores. If you need help, the person helping you should wear latex gloves.    Use condoms during sex.  Don ts:    Don t donate blood, plasma, body organs, other body tissue, or  sperm.    Don t share needles.    Don t share razors, toothbrushes, manicure tools, or other personal items.  [employer] last reviewed this educational content on 2/1/2020 2000-2021 [employer]. All rights reserved. This information is not intended as a substitute for professional medical care. Always follow your healthcare professional's instructions.           Patient Education     Treating Hepatitis C Virus (HCV)    It s likely that the hepatitis C virus (HCV) was found when you had routine liver tests on your blood, or after you donated blood. Once hepatitis C is found, you need to be checked for liver disease. You may also have a small liver sample (biopsy) taken. It can see if medicines may help.    Hepatitis C can be a short-term (acute) or long-term (chronic) illness. Acute hepatitis C often goes away without treatment. With new treatments, chronic hepatitis C can be cured in most people.    Take these steps  To help keep your body strong and possibly ease symptoms:    Don't stress your liver.  Don t use alcohol or any unneeded medicines. Your healthcare provider may advise not taking or taking a lower dose of acetaminophen or a nonsteroidal anti-inflammatory drug (NSAID). Sometimes, you can still use these medicines safely. Always check with your healthcare provider first before taking any over-the-counter medicines or supplements.    Eat a balanced diet.  A diet low in fat, high in fiber, and full of fresh fruits and vegetables helps you stay healthy.    Stay at a healthy weight and control any other health problems.  You have a higher risk for a fatty liver if you are overweight or obese. That's also the case if you have high cholesterol, high blood pressure, or diabetes. A fatty liver can make HCV liver disease worse.    Take prescribed medicines.  Your provider will talk with you about the types of medicines that will work best for you. You may need to take medicines to treat hepatitis C  for several months. Compared with past treatments, new medicines are very good at curing the disease. They have fewer side effects. They are also taken by mouth and for less time.  Follow up regularly  Hepatitis C can get worse and hurt your liver without your knowing it. Stay in touch with your provider and healthcare team. They can watch your condition. They can tell you about any new research and types of treatment for hepatitis C.   How to prevent the spread of HCV  No vaccine or medicine can prevent the spread of HCV and hepatitis C. It s up to you to keep others safe.     Cover all skin breaks and sores yourself. If you need help, the person treating you should wear latex gloves.    Use condoms during sex.    Don t donate blood, plasma, other body tissue, or sperm.    Don t share needles, razors, toothbrushes, manicure tools, or other personal items.    Hepatitis C can live on surfaces outside the body at room temperature for up to 4 days. Clean any blood spills using 1 part household bleach with 10 parts water. Wear gloves when cleaning.    Talk with your healthcare provider about joining a support group.  [employer] last reviewed this educational content on 1/1/2020 2000-2021 [employer]. All rights reserved. This information is not intended as a substitute for professional medical care. Always follow your healthcare professional's instructions.

## 2021-05-19 NOTE — PROGRESS NOTES
"    Assessment & Plan     (B17.10) Acute hepatitis C virus infection without hepatic coma  (primary encounter diagnosis)  Comment: Counseled on self-care measures including: prevent of spread, avoiding alcohol, eating a healthy diet; and warning signs of when to seek urgent medical care including: abdominal swelling, confusion, yellow skin/eyes dark urine.  See AVS.   Plan: GASTROENTEROLOGY ADULT REF CONSULT ONLY          (M25.561) Acute pain of right knee  Comment: She did not receive her knee sleeve at clinic visit last week.  Plan: Provided with knee sleeve today.     Return in about 4 weeks (around 6/16/2021), or if symptoms worsen or fail to improve.    Monika Luna, MESHA CNP  [facility] HARMEET Mathur is a 56 year old who presents for the following health issues     HPI     Patient presents today to discuss 5/14/2021 lab results with provider.     Hepatitis C screen shows hepatitis C infection.     Patient did not get her knee brace at her clinic appointment last week.    Review of Systems   Constitutional, HEENT, cardiovascular, pulmonary, gi and gu systems are negative, except as otherwise noted.      Objective    /79 (BP Location: Right arm, Patient Position: Sitting, Cuff Size: Adult Regular)   Pulse 87   Temp 98.5  F (36.9  C) (Oral)   Resp 18   Ht 1.702 m (5' 7\")   Wt 74.2 kg (163 lb 9.6 oz)   LMP  (LMP Unknown)   SpO2 98%   BMI 25.62 kg/m    Body mass index is 25.62 kg/m .  Physical Exam   GENERAL: healthy, alert and no distress  EYES: Eyes grossly normal to inspection, PERRL and conjunctivae and sclerae normal  PSYCH: mentation appears normal, affect normal/bright    Office Visit on 05/14/2021   Component Date Value Ref Range Status     Hepatitis C Antibody 05/14/2021 Reactive* NR^Nonreactive Final    Comment: A reactive result indicates one of the following 1) current HCV infection 2)   past HCV infection that has resolved or 3) false positivity. The " CDC   recommends that a reactive result should be followed by Nucleic acid testing   for HCV RNA.   If HCV RNA is detected, that indicates current HCV infection. If HCV RNA is   not detected, that indicates either past, resolved HCV infection, or false HCV   antibody positivity.  Assay performance characteristics have not been established for newborns,   infants, and children       HCV RNA Quant IU/ml 05/14/2021 470,535* HCVND^HCV RNA Not Detected [IU]/mL Final    Comment: The LAKE[employer]HA AmpliPrep/LAKEISHA TaqMan HCV Test is an FDA-approved in vitro nucleic   acid amplification test for the quantitation of HCV RNA in human plasma (ETDA   plasma) or serum using the [employer] AmpliPrep Instrument for automated viral   nucleic acid extraction and the [employer]KE[employer]HA TaqMan Analyzer or LAKEISHA TaqMan for   automated Real Time PCR amplification and detection of the viral nucleic acid   target.  Titer results are reported in International Units/mL (IU/mL) using the 1st WHO   International standard for HCV for Nucleic Acid Amplification based assays.       Log of HCV RNA Qt 05/14/2021 5.7* <1.2 Log IU/mL Final

## 2021-05-21 NOTE — TELEPHONE ENCOUNTER
RECORDS RECEIVED FROM: Internal   Appt Date: 06.01.2021   NOTES STATUS DETAILS   OFFICE NOTE from referring provider Internal 05.19.2021 Consult Monika Luna APRN CNP   OFFICE NOTES from other specialists N/A    DISCHARGE SUMMARY from hospital N/A    MEDICATION LIST Internal    LIVER BIOSPY (IF APPLICABLE)      PATHOLOGY REPORTS  N/A    IMAGING     ENDOSCOPY (IF AVAILABLE) N/A    COLONOSCOPY (IF AVAILABLE) N/A    ULTRASOUND LIVER N/A    CT OF ABDOMEN N/A    MRI OF LIVER N/A    FIBROSCAN, US ELASTOGRAPHY, FIBROSIS SCAN, MR ELASTOGRAPHY N/A    LABS     HEPATIC PANEL (LIVER PANEL) N/A    BASIC METABOLIC PANEL Internal 05.14.2018   COMPLETE METABOLIC PANEL Internal 05.14.2018   COMPLETE BLOOD COUNT (CBC) Internal 05.14.2018   INTERNATIONAL NORMALIZED RATIO (INR) Internal 05.14.2018   HEPATITIS C ANTIBODY Internal 05.14.2021   HEPATITIS C VIRAL LOAD/PCR N/A    HEPATITIS C GENOTYPE N/A    HEPATITIS B SURFACE ANTIGEN N/A    HEPATITIS B SURFACE ANTIBODY N/A    HEPATITIS B DNA QUANT LEVEL N/A    HEPATITIS B CORE ANTIBODY N/A

## 2021-05-25 LAB — HCV GENTYP SERPL NAA+PROBE: NORMAL

## 2021-05-26 DIAGNOSIS — F31.9 BIPOLAR AFFECTIVE DISORDER, REMISSION STATUS UNSPECIFIED (H): ICD-10-CM

## 2021-05-26 DIAGNOSIS — E55.9 VITAMIN D DEFICIENCY DISEASE: ICD-10-CM

## 2021-05-26 RX ORDER — LAMOTRIGINE 100 MG/1
TABLET ORAL
Qty: 270 TABLET | Refills: 1 | Status: SHIPPED | OUTPATIENT
Start: 2021-05-26 | End: 2021-11-22

## 2021-05-26 NOTE — TELEPHONE ENCOUNTER
Requested Prescriptions   Pending Prescriptions Disp Refills     vitamin D2 (ERGOCALCIFEROL) 58995 units (1250 mcg) capsule [Pharmacy Med Name: VITAMIN D (ERGOCALCIFE 1.25 MG CAPS] 4 capsule 1     Sig: TAKE ONE CAPSULE BY MOUTH ONCE WEEKLY       There is no refill protocol information for this order        Routing refill request to provider for review/approval because:  Drug not on the [facility] refill protocol

## 2021-05-27 RX ORDER — ERGOCALCIFEROL 1.25 MG/1
CAPSULE, LIQUID FILLED ORAL
Qty: 4 CAPSULE | Refills: 1 | Status: SHIPPED | OUTPATIENT
Start: 2021-05-27 | End: 2021-07-28

## 2021-05-28 ENCOUNTER — TELEPHONE (OUTPATIENT)
Dept: FAMILY MEDICINE | Facility: CLINIC | Age: 56
End: 2021-05-28

## 2021-05-28 DIAGNOSIS — B18.2 CHRONIC HEPATITIS C WITHOUT HEPATIC COMA (H): Primary | ICD-10-CM

## 2021-05-28 DIAGNOSIS — Z11.59 ENCOUNTER FOR SCREENING FOR OTHER VIRAL DISEASES: ICD-10-CM

## 2021-05-28 NOTE — TELEPHONE ENCOUNTER
Reason for Call:  Other call back    Detailed comments: Patient wants to know if she needs labs done before her appt on 6/1/21. Someone had told her they would be sending over orders to do them she has not received a call.    Phone Number Patient can be reached at: Cell number on file:    Telephone Information:   Mobile [phone]       Best Time: Anytime    Can we leave a detailed message on this number? YES    Call taken on 5/28/2021 at 3:47 PM by Maile Conte

## 2021-05-28 NOTE — TELEPHONE ENCOUNTER
Called patient. Notified her lab orders are in place. She has questions about timing of labs and her gastroenterology appointment. Advised she contact gastroenterology to clarify.     Esperanza Crespo RN

## 2021-06-01 ENCOUNTER — PRE VISIT (OUTPATIENT)
Dept: GASTROENTEROLOGY | Facility: CLINIC | Age: 56
End: 2021-06-01

## 2021-06-03 DIAGNOSIS — Z11.59 ENCOUNTER FOR SCREENING FOR OTHER VIRAL DISEASES: ICD-10-CM

## 2021-06-03 DIAGNOSIS — B18.2 CHRONIC HEPATITIS C WITHOUT HEPATIC COMA (H): ICD-10-CM

## 2021-06-03 LAB
ALBUMIN SERPL-MCNC: 3.4 G/DL (ref 3.4–5)
ALP SERPL-CCNC: 97 U/L (ref 40–150)
ALT SERPL W P-5'-P-CCNC: 22 U/L (ref 0–50)
ANION GAP SERPL CALCULATED.3IONS-SCNC: 2 MMOL/L (ref 3–14)
AST SERPL W P-5'-P-CCNC: 24 U/L (ref 0–45)
BILIRUB DIRECT SERPL-MCNC: 0.1 MG/DL (ref 0–0.2)
BILIRUB SERPL-MCNC: 0.3 MG/DL (ref 0.2–1.3)
BUN SERPL-MCNC: 6 MG/DL (ref 7–30)
CALCIUM SERPL-MCNC: 8.5 MG/DL (ref 8.5–10.1)
CHLORIDE SERPL-SCNC: 106 MMOL/L (ref 94–109)
CO2 SERPL-SCNC: 31 MMOL/L (ref 20–32)
CREAT SERPL-MCNC: 0.91 MG/DL (ref 0.52–1.04)
ERYTHROCYTE [DISTWIDTH] IN BLOOD BY AUTOMATED COUNT: 13.1 % (ref 10–15)
GFR SERPL CREATININE-BSD FRML MDRD: 71 ML/MIN/{1.73_M2}
GLUCOSE SERPL-MCNC: 95 MG/DL (ref 70–99)
HBV CORE AB SERPL QL IA: NONREACTIVE
HBV SURFACE AB SERPL IA-ACNC: 0 M[IU]/ML
HBV SURFACE AG SERPL QL IA: NONREACTIVE
HCT VFR BLD AUTO: 38.4 % (ref 35–47)
HGB BLD-MCNC: 12.4 G/DL (ref 11.7–15.7)
INR PPP: 0.99 (ref 0.86–1.14)
MCH RBC QN AUTO: 31.5 PG (ref 26.5–33)
MCHC RBC AUTO-ENTMCNC: 32.3 G/DL (ref 31.5–36.5)
MCV RBC AUTO: 98 FL (ref 78–100)
PLATELET # BLD AUTO: 208 10E9/L (ref 150–450)
POTASSIUM SERPL-SCNC: 3.6 MMOL/L (ref 3.4–5.3)
PROT SERPL-MCNC: 6.9 G/DL (ref 6.8–8.8)
RBC # BLD AUTO: 3.94 10E12/L (ref 3.8–5.2)
SODIUM SERPL-SCNC: 139 MMOL/L (ref 133–144)
WBC # BLD AUTO: 4.1 10E9/L (ref 4–11)

## 2021-06-03 PROCEDURE — 85610 PROTHROMBIN TIME: CPT | Performed by: PHYSICIAN ASSISTANT

## 2021-06-03 PROCEDURE — 85027 COMPLETE CBC AUTOMATED: CPT | Performed by: PHYSICIAN ASSISTANT

## 2021-06-03 PROCEDURE — 86706 HEP B SURFACE ANTIBODY: CPT | Performed by: PHYSICIAN ASSISTANT

## 2021-06-03 PROCEDURE — 80076 HEPATIC FUNCTION PANEL: CPT | Performed by: PHYSICIAN ASSISTANT

## 2021-06-03 PROCEDURE — 86704 HEP B CORE ANTIBODY TOTAL: CPT | Performed by: PHYSICIAN ASSISTANT

## 2021-06-03 PROCEDURE — 80048 BASIC METABOLIC PNL TOTAL CA: CPT | Performed by: PHYSICIAN ASSISTANT

## 2021-06-03 PROCEDURE — 36415 COLL VENOUS BLD VENIPUNCTURE: CPT | Performed by: PHYSICIAN ASSISTANT

## 2021-06-03 PROCEDURE — 87340 HEPATITIS B SURFACE AG IA: CPT | Performed by: PHYSICIAN ASSISTANT

## 2021-06-09 ENCOUNTER — VIRTUAL VISIT (OUTPATIENT)
Dept: GASTROENTEROLOGY | Facility: CLINIC | Age: 56
End: 2021-06-09
Attending: NURSE PRACTITIONER
Payer: MEDICARE

## 2021-06-09 DIAGNOSIS — B18.2 CHRONIC HEPATITIS C WITHOUT HEPATIC COMA (H): Primary | ICD-10-CM

## 2021-06-09 PROCEDURE — 99442 PR PHYSICIAN TELEPHONE EVALUATION 11-20 MIN: CPT | Mod: 95 | Performed by: PHYSICIAN ASSISTANT

## 2021-06-09 ASSESSMENT — PAIN SCALES - GENERAL: PAINLEVEL: NO PAIN (0)

## 2021-06-09 NOTE — LETTER
6/9/2021         RE: Kareen Hernandez  [address]  [place] [address]        Dear Colleague,    Thank you for referring your patient, Kareen Hernandez, to the [facility] [place]. Please see a copy of my visit note below.    Kareen is a 56 year old who is being evaluated via a billable telephone visit.      What phone number would you like to be contacted at? [phone]    How would you like to obtain your AVS? Mail a copy  Phone call duration: 20 minutes    Hepatology Clinic Note  Kareen Hernandez   Date of Birth 1965  Date of Service 6/9/2021    REASON FOR CONSULTATION: Hepatitis C  REFERRING PROVIDER: MESHA Carroll          Assessment/plan:   Kareen Hernandez is a 56 year old female with chronic Hepatitis C, genotype indeterminate, treatment naive. She currently has normal transaminases and normal liver function including normal liver function.  Currently there are no biochemical or physical signs of cirrhosis, but we discussed importance of evaluating fibrosis to determine need for long term follow up and HCC screening.     We discussed the natural course of Hepatitis C virus and the benefits of treating the disease. We discussed the treatment regimen and medication side effects. Patient would be an excellent candidate for Hepatitis C treatment to prevent worsening fibrosis and to prevent extrahepatic manifestations of the disease.     - US elastography to evaluate for any degree of fibrosis   - Will plan to send prescription for Hepatitis C: Mavyret x 8 weeks or Epclusa x 12 weeks   - Repeat HCV RNA at the end of treatment and 12-weeks after finishing treatment to determine SVR  - If patient achieves SVR and Fibrosis Stage 2 or less, she does not need regular follow-up in Hepatology Clinic.   - Follow-up in Hepatology Clinic as needed    Jerry Espinoza PA-C   [facility]    -----------------------------------------------------       HPI:   Kareen Hernandez  is a 56 year old female  presenting for evaluation and treatment of Hepatitis C.     Hepatitis C   -Genotype indeterminate  -Diagnosed: May 2021  -History: Hx of tattoos >20 years ago  -Prior biopsy: No   -Prior treatments: naive    Patient states she was diagnosed with Hep C with routine screening labs. She is not sure how she acquired the virus. She does admit to a history of tattoos more than 20 years ago.     Good appetite . Her weight went up over the past year to 160 lbs, typically around 150 lbs. Notes some bloating in abdomen. Usually have BM every other day.     Patient denies jaundice, lower extremity edema, abdominal distension or confusion. Patient also denies melena, hematochezia or hematemesis. Patient denies weight loss, fevers, sweats or chills.    PMH: anxiety, migraine, bipolar depression, fibromyalgia, nephrolithiasis    SMH: s/p rouxeny gastric bypass, abdominoplasty, history of lithotripsy     Medications: See below      No previous tobacco use, smoking E-cig. May have a drink a few times a day. No previous IV/IN drug use. Tattoo 20 years ago. No history of blood transfusion. No currently working. Patient currently lives with daughter. No known famiy history of liver disease or liver cancer.     Lab work-up thus far:   HCV ,535   HBV SAb 0.00  HBV SAg nonreactive  HBV CAb nonreactive  HIV nonreactive    Medical hx Surgical hx   Past Medical History:   Diagnosis Date     Anxiety 3/21/2012     Calculus of kidney      CARDIOVASCULAR SCREENING; LDL GOAL LESS THAN 130 10/31/2010     CARDIOVASCULAR SCREENING; LDL GOAL LESS THAN 160 10/31/2010     Fibromyalgia 11/30/2006     Gastric bypass status for obesity 4/16/2009     Hypertension goal BP (blood pressure) < 140/90 2/24/2015     Insomnia      Major depressive disorder, single episode, severe, specified as with psychotic behavior      Migraine headache      Moderate major depression (H) 11/30/2006     Myalgia and myositis, unspecified       "Right maxillary sinusitis, chronic      Tobacco use disorder 7/14/2011     Vitamin B12 deficiency disease 8/14/2013     Vitamin D deficiency disease 8/13/2013    Past Surgical History:   Procedure Laterality Date     ABDOMINOPLASTY  12/2/2013     C GASTRIC BYPASS,OBESE<100CM DAYAN-EN-Y  3-25-09    [facility]     C REMOVAL OF KIDNEY STONE      With kidney tents x 5 to 6 procedures.     CL AFF SURGICAL PATHOLOGY  Feb 2007    Redman's neuroma  - Right Foot     LITHOTRIPSY       SURGICAL PATHOLOGY EXAM      Lipoma Removal on her back                 Medications:     Current Outpatient Medications   Medication     buPROPion (WELLBUTRIN XL) 150 MG 24 hr tablet     cyanocobalamin (CYANOCOBALAMIN) 1000 MCG/ML injection     cyclobenzaprine (FLEXERIL) 10 MG tablet     FLUoxetine (PROZAC) 40 MG capsule     lamoTRIgine (LAMICTAL) 100 MG tablet     medical cannabis (Patient's own supply)     mometasone (NASONEX) 50 MCG/ACT nasal spray     SUMAtriptan (IMITREX) 100 MG tablet     Syringe/Needle, Disp, (BD LUER-BLAYNE SYRINGE) 25G X 1-1/2\" 3 ML MISC     vitamin D2 (ERGOCALCIFEROL) 27143 units (1250 mcg) capsule     zolpidem (AMBIEN) 10 MG tablet     Black Cohosh-SoyIsoflav-C Quad 40- MG CAPS     omeprazole (PRILOSEC) 20 MG DR capsule     order for DME     predniSONE (DELTASONE) 20 MG tablet     pseudoePHEDrine (SUDAFED) 30 MG tablet     No current facility-administered medications for this visit.             Allergies:     Allergies   Allergen Reactions     Lurasidone Diarrhea     Diarrhea     Morphine Rash            Social History:     Social History     Socioeconomic History     Marital status:      Spouse name: Not on file     Number of children: 4     Years of education: Not on file     Highest education level: Not on file   Occupational History     Occupation: Mental health and Fibromyalgia     Employer: UNEMPLOYED     Comment: On disability     Employer: DISABLED   Social Needs     Financial resource strain: Not " on file     Food insecurity     Worry: Never true     Inability: Never true     Transportation needs     Medical: Not on file     Non-medical: Not on file   Tobacco Use     Smoking status: Former Smoker     Packs/day: 0.50     Types: Cigarettes     Quit date: 2013     Years since quittin.7     Smokeless tobacco: Never Used     Tobacco comment: Vape e-cig   Substance and Sexual Activity     Alcohol use: No     Drug use: Yes     Comment: Marijuana - 3 times a week     Sexual activity: Not Currently     Partners: Male   Lifestyle     Physical activity     Days per week: Not on file     Minutes per session: Not on file     Stress: Not on file   Relationships     Social connections     Talks on phone: Not on file     Gets together: Not on file     Attends Jew service: Not on file     Active member of club or organization: Not on file     Attends meetings of clubs or organizations: Not on file     Relationship status: Not on file     Intimate partner violence     Fear of current or ex partner: Not on file     Emotionally abused: Not on file     Physically abused: Not on file     Forced sexual activity: Not on file   Other Topics Concern      Service No     Blood Transfusions No     Caffeine Concern No     Comment: Pop - 1 to 2 a day     Occupational Exposure No     Hobby Hazards No     Sleep Concern Yes     Comment: trouble falling asleep     Stress Concern No     Weight Concern Yes     Special Diet No     Back Care No     Exercise No     Comment: No regular exercise program     Bike Helmet Yes     Seat Belt Yes     Self-Exams Yes     Parent/sibling w/ CABG, MI or angioplasty before 65F 55M? No   Social History Narrative    Live with daughter.            Family History:     Family History   Problem Relation Age of Onset     Asthma Mother      Hypertension Mother      C.A.D. Mother      Genitourinary Problems Mother         kidney failure   age 80     Chronic Obstructive Pulmonary Disease Mother       Diabetes Father      Hypertension Father      C.A.D. Father      Chronic Obstructive Pulmonary Disease Father      Dementia Father      Genitourinary Problems Sister         kidney stones     Obesity Daughter         gastric sleeve     Cerebrovascular Disease No family hx of      Breast Cancer No family hx of      Cancer - colorectal No family hx of      Prostate Cancer No family hx of             Review of Systems:   GEN: See HPI  HEENT: No change in vision or hearing, mouth sores, dysphagia, lymph nodes  Resp: No shortness of breath, coughing, hx of asthma  CV: No chest pain, palpitations, syncope   GI: See HPI  : No dysuria, history of stones, urine color    Skin: No rash; no pruritus or psoriasis  MS: No arthralgias, myalgias, joint swelling  Neuro: No memory changes, confusion, numbness    Heme: No difficulty clotting, bruising, bleeding  Psych:  No anxiety, depression, agitation          Physical Exam:     PSYCH: Alert and oriented times 3; coherent speech, normal   rate and volume, able to articulate logical thoughts, able   to abstract reason, no tangential thoughts, no hallucinations   or delusions  His affect is normal  RESP: No cough, no audible wheezing, able to talk in full sentences  Remainder of exam unable to be completed due to telephone visits         Data:   Reviewed in person and significant for:    Lab Results   Component Value Date     06/03/2021      Lab Results   Component Value Date    POTASSIUM 3.6 06/03/2021     Lab Results   Component Value Date    CHLORIDE 106 06/03/2021     Lab Results   Component Value Date    CO2 31 06/03/2021     Lab Results   Component Value Date    BUN 6 06/03/2021     Lab Results   Component Value Date    CR 0.91 06/03/2021       Lab Results   Component Value Date    WBC 4.1 06/03/2021     Lab Results   Component Value Date    HGB 12.4 06/03/2021     Lab Results   Component Value Date    HCT 38.4 06/03/2021     Lab Results   Component Value Date    MCV  98 06/03/2021     Lab Results   Component Value Date     06/03/2021       Lab Results   Component Value Date    AST 24 06/03/2021     Lab Results   Component Value Date    ALT 22 06/03/2021     No results found for: BILICONJ   Lab Results   Component Value Date    BILITOTAL 0.3 06/03/2021       Lab Results   Component Value Date    ALBUMIN 3.4 06/03/2021     Lab Results   Component Value Date    PROTTOTAL 6.9 06/03/2021      Lab Results   Component Value Date    ALKPHOS 97 06/03/2021       Lab Results   Component Value Date    INR 0.99 06/03/2021     No recent abdominal imaging        Again, thank you for allowing me to participate in the care of your patient.        Sincerely,        Jerry Espinoza PA-C

## 2021-06-09 NOTE — Clinical Note
Needs US elastography within next month.   Still waiting to see if more labs are needed. Will let her know.   Thanks, Jerry

## 2021-06-09 NOTE — PROGRESS NOTES
Kareen is a 56 year old who is being evaluated via a billable telephone visit.      What phone number would you like to be contacted at? [phone]    How would you like to obtain your AVS? Mail a copy  Phone call duration: 20 minutes    Hepatology Clinic Note  Kareen Hernandez   Date of Birth 1965  Date of Service 6/9/2021    REASON FOR CONSULTATION: Hepatitis C  REFERRING PROVIDER: MESHA Carroll          Assessment/plan:   Kareen Hernandez is a 56 year old female with chronic Hepatitis C, genotype indeterminate, treatment naive. She currently has normal transaminases and normal liver function including normal liver function.  Currently there are no biochemical or physical signs of cirrhosis, but we discussed importance of evaluating fibrosis to determine need for long term follow up and HCC screening.     We discussed the natural course of Hepatitis C virus and the benefits of treating the disease. We discussed the treatment regimen and medication side effects. Patient would be an excellent candidate for Hepatitis C treatment to prevent worsening fibrosis and to prevent extrahepatic manifestations of the disease.     - US elastography to evaluate for any degree of fibrosis   - Will plan to send prescription for Hepatitis C: Mavyret x 8 weeks or Epclusa x 12 weeks   - Repeat HCV RNA at the end of treatment and 12-weeks after finishing treatment to determine SVR  - If patient achieves SVR and Fibrosis Stage 2 or less, she does not need regular follow-up in Hepatology Clinic.   - Follow-up in Hepatology Clinic as needed    Jerry Espinoza PA-C   [facility]    -----------------------------------------------------       HPI:   Kareen Hernandez is a 56 year old female  presenting for evaluation and treatment of Hepatitis C.     Hepatitis C   -Genotype indeterminate  -Diagnosed: May 2021  -History: Hx of tattoos >20 years ago  -Prior biopsy: No   -Prior treatments: naive    Patient states she was  diagnosed with Hep C with routine screening labs. She is not sure how she acquired the virus. She does admit to a history of tattoos more than 20 years ago.     Good appetite . Her weight went up over the past year to 160 lbs, typically around 150 lbs. Notes some bloating in abdomen. Usually have BM every other day.     Patient denies jaundice, lower extremity edema, abdominal distension or confusion. Patient also denies melena, hematochezia or hematemesis. Patient denies weight loss, fevers, sweats or chills.    PMH: anxiety, migraine, bipolar depression, fibromyalgia, nephrolithiasis    SMH: s/p rouxeny gastric bypass, abdominoplasty, history of lithotripsy     Medications: See below      No previous tobacco use, smoking E-cig. May have a drink a few times a day. No previous IV/IN drug use. Tattoo 20 years ago. No history of blood transfusion. No currently working. Patient currently lives with daughter. No known famiy history of liver disease or liver cancer.     Lab work-up thus far:   HCV ,535   HBV SAb 0.00  HBV SAg nonreactive  HBV CAb nonreactive  HIV nonreactive    Medical hx Surgical hx   Past Medical History:   Diagnosis Date     Anxiety 3/21/2012     Calculus of kidney      CARDIOVASCULAR SCREENING; LDL GOAL LESS THAN 130 10/31/2010     CARDIOVASCULAR SCREENING; LDL GOAL LESS THAN 160 10/31/2010     Fibromyalgia 11/30/2006     Gastric bypass status for obesity 4/16/2009     Hypertension goal BP (blood pressure) < 140/90 2/24/2015     Insomnia      Major depressive disorder, single episode, severe, specified as with psychotic behavior      Migraine headache      Moderate major depression (H) 11/30/2006     Myalgia and myositis, unspecified      Right maxillary sinusitis, chronic      Tobacco use disorder 7/14/2011     Vitamin B12 deficiency disease 8/14/2013     Vitamin D deficiency disease 8/13/2013    Past Surgical History:   Procedure Laterality Date     ABDOMINOPLASTY  12/2/2013     C GASTRIC  "BYPASS,OBESE<100CM DAYAN-EN-Y  3-25-09    [facility]     C REMOVAL OF KIDNEY STONE      With kidney tents x 5 to 6 procedures.     CL AFF SURGICAL PATHOLOGY  Feb 2007    Feroz's neuroma  - Right Foot     LITHOTRIPSY       SURGICAL PATHOLOGY EXAM      Lipoma Removal on her back                 Medications:     Current Outpatient Medications   Medication     buPROPion (WELLBUTRIN XL) 150 MG 24 hr tablet     cyanocobalamin (CYANOCOBALAMIN) 1000 MCG/ML injection     cyclobenzaprine (FLEXERIL) 10 MG tablet     FLUoxetine (PROZAC) 40 MG capsule     lamoTRIgine (LAMICTAL) 100 MG tablet     medical cannabis (Patient's own supply)     mometasone (NASONEX) 50 MCG/ACT nasal spray     SUMAtriptan (IMITREX) 100 MG tablet     Syringe/Needle, Disp, (BD LUER-BLAYNE SYRINGE) 25G X 1-1/2\" 3 ML MISC     vitamin D2 (ERGOCALCIFEROL) 77016 units (1250 mcg) capsule     zolpidem (AMBIEN) 10 MG tablet     Black Cohosh-SoyIsoflav-C Quad 40- MG CAPS     omeprazole (PRILOSEC) 20 MG DR capsule     order for DME     predniSONE (DELTASONE) 20 MG tablet     pseudoePHEDrine (SUDAFED) 30 MG tablet     No current facility-administered medications for this visit.             Allergies:     Allergies   Allergen Reactions     Lurasidone Diarrhea     Diarrhea     Morphine Rash            Social History:     Social History     Socioeconomic History     Marital status:      Spouse name: Not on file     Number of children: 4     Years of education: Not on file     Highest education level: Not on file   Occupational History     Occupation: Mental health and Fibromyalgia     Employer: UNEMPLOYED     Comment: On disability     Employer: DISABLED   Social Needs     Financial resource strain: Not on file     Food insecurity     Worry: Never true     Inability: Never true     Transportation needs     Medical: Not on file     Non-medical: Not on file   Tobacco Use     Smoking status: Former Smoker     Packs/day: 0.50     Types: Cigarettes     Quit " date: 2013     Years since quittin.7     Smokeless tobacco: Never Used     Tobacco comment: Vape e-cig   Substance and Sexual Activity     Alcohol use: No     Drug use: Yes     Comment: Marijuana - 3 times a week     Sexual activity: Not Currently     Partners: Male   Lifestyle     Physical activity     Days per week: Not on file     Minutes per session: Not on file     Stress: Not on file   Relationships     Social connections     Talks on phone: Not on file     Gets together: Not on file     Attends Confucianism service: Not on file     Active member of club or organization: Not on file     Attends meetings of clubs or organizations: Not on file     Relationship status: Not on file     Intimate partner violence     Fear of current or ex partner: Not on file     Emotionally abused: Not on file     Physically abused: Not on file     Forced sexual activity: Not on file   Other Topics Concern      Service No     Blood Transfusions No     Caffeine Concern No     Comment: Pop - 1 to 2 a day     Occupational Exposure No     Hobby Hazards No     Sleep Concern Yes     Comment: trouble falling asleep     Stress Concern No     Weight Concern Yes     Special Diet No     Back Care No     Exercise No     Comment: No regular exercise program     Bike Helmet Yes     Seat Belt Yes     Self-Exams Yes     Parent/sibling w/ CABG, MI or angioplasty before 65F 55M? No   Social History Narrative    Live with daughter.            Family History:     Family History   Problem Relation Age of Onset     Asthma Mother      Hypertension Mother      C.A.D. Mother      Genitourinary Problems Mother         kidney failure   age 80     Chronic Obstructive Pulmonary Disease Mother      Diabetes Father      Hypertension Father      C.A.D. Father      Chronic Obstructive Pulmonary Disease Father      Dementia Father      Genitourinary Problems Sister         kidney stones     Obesity Daughter         gastric sleeve      Cerebrovascular Disease No family hx of      Breast Cancer No family hx of      Cancer - colorectal No family hx of      Prostate Cancer No family hx of             Review of Systems:   GEN: See HPI  HEENT: No change in vision or hearing, mouth sores, dysphagia, lymph nodes  Resp: No shortness of breath, coughing, hx of asthma  CV: No chest pain, palpitations, syncope   GI: See HPI  : No dysuria, history of stones, urine color    Skin: No rash; no pruritus or psoriasis  MS: No arthralgias, myalgias, joint swelling  Neuro: No memory changes, confusion, numbness    Heme: No difficulty clotting, bruising, bleeding  Psych:  No anxiety, depression, agitation          Physical Exam:     PSYCH: Alert and oriented times 3; coherent speech, normal   rate and volume, able to articulate logical thoughts, able   to abstract reason, no tangential thoughts, no hallucinations   or delusions  His affect is normal  RESP: No cough, no audible wheezing, able to talk in full sentences  Remainder of exam unable to be completed due to telephone visits         Data:   Reviewed in person and significant for:    Lab Results   Component Value Date     06/03/2021      Lab Results   Component Value Date    POTASSIUM 3.6 06/03/2021     Lab Results   Component Value Date    CHLORIDE 106 06/03/2021     Lab Results   Component Value Date    CO2 31 06/03/2021     Lab Results   Component Value Date    BUN 6 06/03/2021     Lab Results   Component Value Date    CR 0.91 06/03/2021       Lab Results   Component Value Date    WBC 4.1 06/03/2021     Lab Results   Component Value Date    HGB 12.4 06/03/2021     Lab Results   Component Value Date    HCT 38.4 06/03/2021     Lab Results   Component Value Date    MCV 98 06/03/2021     Lab Results   Component Value Date     06/03/2021       Lab Results   Component Value Date    AST 24 06/03/2021     Lab Results   Component Value Date    ALT 22 06/03/2021     No results found for: MOISES   Lab  Results   Component Value Date    BILITOTAL 0.3 06/03/2021       Lab Results   Component Value Date    ALBUMIN 3.4 06/03/2021     Lab Results   Component Value Date    PROTTOTAL 6.9 06/03/2021      Lab Results   Component Value Date    ALKPHOS 97 06/03/2021       Lab Results   Component Value Date    INR 0.99 06/03/2021     No recent abdominal imaging

## 2021-06-09 NOTE — LETTER
6/9/2021      RE: Kareen Hernandez  [address]  [facility] [address]       Kareen is a 56 year old who is being evaluated via a billable telephone visit.      What phone number would you like to be contacted at? [phone]    How would you like to obtain your AVS? Mail a copy  Phone call duration: 20 minutes    Hepatology Clinic Note  Kareen Hernandez   Date of Birth 1965  Date of Service 6/9/2021    REASON FOR CONSULTATION: Hepatitis C  REFERRING PROVIDER: MESHA Carroll          Assessment/plan:   Kareen Hernandez is a 56 year old female with chronic Hepatitis C, genotype indeterminate, treatment naive. She currently has normal transaminases and normal liver function including normal liver function.  Currently there are no biochemical or physical signs of cirrhosis, but we discussed importance of evaluating fibrosis to determine need for long term follow up and HCC screening.     We discussed the natural course of Hepatitis C virus and the benefits of treating the disease. We discussed the treatment regimen and medication side effects. Patient would be an excellent candidate for Hepatitis C treatment to prevent worsening fibrosis and to prevent extrahepatic manifestations of the disease.     - US elastography to evaluate for any degree of fibrosis   - Will plan to send prescription for Hepatitis C: Mavyret x 8 weeks or Epclusa x 12 weeks   - Repeat HCV RNA at the end of treatment and 12-weeks after finishing treatment to determine SVR  - If patient achieves SVR and Fibrosis Stage 2 or less, she does not need regular follow-up in Hepatology Clinic.   - Follow-up in Hepatology Clinic as needed    Jerry Espinoza PA-C   [facility]    -----------------------------------------------------       HPI:   Kareen Hernandez is a 56 year old female  presenting for evaluation and treatment of Hepatitis C.     Hepatitis C   -Genotype indeterminate  -Diagnosed: May 2021  -History: Hx of tattoos >20  years ago  -Prior biopsy: No   -Prior treatments: naive    Patient states she was diagnosed with Hep C with routine screening labs. She is not sure how she acquired the virus. She does admit to a history of tattoos more than 20 years ago.     Good appetite . Her weight went up over the past year to 160 lbs, typically around 150 lbs. Notes some bloating in abdomen. Usually have BM every other day.     Patient denies jaundice, lower extremity edema, abdominal distension or confusion. Patient also denies melena, hematochezia or hematemesis. Patient denies weight loss, fevers, sweats or chills.    PMH: anxiety, migraine, bipolar depression, fibromyalgia, nephrolithiasis    SMH: s/p rouxeny gastric bypass, abdominoplasty, history of lithotripsy     Medications: See below      No previous tobacco use, smoking E-cig. May have a drink a few times a day. No previous IV/IN drug use. Tattoo 20 years ago. No history of blood transfusion. No currently working. Patient currently lives with daughter. No known famiy history of liver disease or liver cancer.     Lab work-up thus far:   HCV ,535   HBV SAb 0.00  HBV SAg nonreactive  HBV CAb nonreactive  HIV nonreactive    Medical hx Surgical hx   Past Medical History:   Diagnosis Date     Anxiety 3/21/2012     Calculus of kidney      CARDIOVASCULAR SCREENING; LDL GOAL LESS THAN 130 10/31/2010     CARDIOVASCULAR SCREENING; LDL GOAL LESS THAN 160 10/31/2010     Fibromyalgia 11/30/2006     Gastric bypass status for obesity 4/16/2009     Hypertension goal BP (blood pressure) < 140/90 2/24/2015     Insomnia      Major depressive disorder, single episode, severe, specified as with psychotic behavior      Migraine headache      Moderate major depression (H) 11/30/2006     Myalgia and myositis, unspecified      Right maxillary sinusitis, chronic      Tobacco use disorder 7/14/2011     Vitamin B12 deficiency disease 8/14/2013     Vitamin D deficiency disease 8/13/2013    Past Surgical  "History:   Procedure Laterality Date     ABDOMINOPLASTY  12/2/2013     C GASTRIC BYPASS,OBESE<100CM DAYAN-EN-Y  3-25-09    [facility]     C REMOVAL OF KIDNEY STONE      With kidney tents x 5 to 6 procedures.     CL AFF SURGICAL PATHOLOGY  Feb 2007    Feroz's neuroma  - Right Foot     LITHOTRIPSY       SURGICAL PATHOLOGY EXAM      Lipoma Removal on her back                 Medications:     Current Outpatient Medications   Medication     buPROPion (WELLBUTRIN XL) 150 MG 24 hr tablet     cyanocobalamin (CYANOCOBALAMIN) 1000 MCG/ML injection     cyclobenzaprine (FLEXERIL) 10 MG tablet     FLUoxetine (PROZAC) 40 MG capsule     lamoTRIgine (LAMICTAL) 100 MG tablet     medical cannabis (Patient's own supply)     mometasone (NASONEX) 50 MCG/ACT nasal spray     SUMAtriptan (IMITREX) 100 MG tablet     Syringe/Needle, Disp, (BD LUER-BLAYNE SYRINGE) 25G X 1-1/2\" 3 ML MISC     vitamin D2 (ERGOCALCIFEROL) 21580 units (1250 mcg) capsule     zolpidem (AMBIEN) 10 MG tablet     Black Cohosh-SoyIsoflav-C Quad 40- MG CAPS     omeprazole (PRILOSEC) 20 MG DR capsule     order for DME     predniSONE (DELTASONE) 20 MG tablet     pseudoePHEDrine (SUDAFED) 30 MG tablet     No current facility-administered medications for this visit.             Allergies:     Allergies   Allergen Reactions     Lurasidone Diarrhea     Diarrhea     Morphine Rash            Social History:     Social History     Socioeconomic History     Marital status:      Spouse name: Not on file     Number of children: 4     Years of education: Not on file     Highest education level: Not on file   Occupational History     Occupation: Mental health and Fibromyalgia     Employer: UNEMPLOYED     Comment: On disability     Employer: DISABLED   Social Needs     Financial resource strain: Not on file     Food insecurity     Worry: Never true     Inability: Never true     Transportation needs     Medical: Not on file     Non-medical: Not on file   Tobacco Use     " Smoking status: Former Smoker     Packs/day: 0.50     Types: Cigarettes     Quit date: 2013     Years since quittin.7     Smokeless tobacco: Never Used     Tobacco comment: Vape e-cig   Substance and Sexual Activity     Alcohol use: No     Drug use: Yes     Comment: Marijuana - 3 times a week     Sexual activity: Not Currently     Partners: Male   Lifestyle     Physical activity     Days per week: Not on file     Minutes per session: Not on file     Stress: Not on file   Relationships     Social connections     Talks on phone: Not on file     Gets together: Not on file     Attends [employer] service: Not on file     Active member of club or organization: Not on file     Attends meetings of clubs or organizations: Not on file     Relationship status: Not on file     Intimate partner violence     Fear of current or ex partner: Not on file     Emotionally abused: Not on file     Physically abused: Not on file     Forced sexual activity: Not on file   Other Topics Concern      Service No     Blood Transfusions No     Caffeine Concern No     Comment: Pop - 1 to 2 a day     Occupational Exposure No     Hobby Hazards No     Sleep Concern Yes     Comment: trouble falling asleep     Stress Concern No     Weight Concern Yes     Special Diet No     Back Care No     Exercise No     Comment: No regular exercise program     Bike Helmet Yes     Seat Belt Yes     Self-Exams Yes     Parent/sibling w/ CABG, MI or angioplasty before 65F 55M? No   Social History Narrative    Live with daughter.            Family History:     Family History   Problem Relation Age of Onset     Asthma Mother      Hypertension Mother      C.A.D. Mother      Genitourinary Problems Mother         kidney failure   age 80     Chronic Obstructive Pulmonary Disease Mother      Diabetes Father      Hypertension Father      C.A.D. Father      Chronic Obstructive Pulmonary Disease Father      Dementia Father      Genitourinary Problems Sister          kidney stones     Obesity Daughter         gastric sleeve     Cerebrovascular Disease No family hx of      Breast Cancer No family hx of      Cancer - colorectal No family hx of      Prostate Cancer No family hx of             Review of Systems:   GEN: See HPI  HEENT: No change in vision or hearing, mouth sores, dysphagia, lymph nodes  Resp: No shortness of breath, coughing, hx of asthma  CV: No chest pain, palpitations, syncope   GI: See HPI  : No dysuria, history of stones, urine color    Skin: No rash; no pruritus or psoriasis  MS: No arthralgias, myalgias, joint swelling  Neuro: No memory changes, confusion, numbness    Heme: No difficulty clotting, bruising, bleeding  Psych:  No anxiety, depression, agitation          Physical Exam:     PSYCH: Alert and oriented times 3; coherent speech, normal   rate and volume, able to articulate logical thoughts, able   to abstract reason, no tangential thoughts, no hallucinations   or delusions  His affect is normal  RESP: No cough, no audible wheezing, able to talk in full sentences  Remainder of exam unable to be completed due to telephone visits         Data:   Reviewed in person and significant for:    Lab Results   Component Value Date     06/03/2021      Lab Results   Component Value Date    POTASSIUM 3.6 06/03/2021     Lab Results   Component Value Date    CHLORIDE 106 06/03/2021     Lab Results   Component Value Date    CO2 31 06/03/2021     Lab Results   Component Value Date    BUN 6 06/03/2021     Lab Results   Component Value Date    CR 0.91 06/03/2021       Lab Results   Component Value Date    WBC 4.1 06/03/2021     Lab Results   Component Value Date    HGB 12.4 06/03/2021     Lab Results   Component Value Date    HCT 38.4 06/03/2021     Lab Results   Component Value Date    MCV 98 06/03/2021     Lab Results   Component Value Date     06/03/2021       Lab Results   Component Value Date    AST 24 06/03/2021     Lab Results   Component Value  Date    ALT 22 06/03/2021     No results found for: BILICONJ   Lab Results   Component Value Date    BILITOTAL 0.3 06/03/2021       Lab Results   Component Value Date    ALBUMIN 3.4 06/03/2021     Lab Results   Component Value Date    PROTTOTAL 6.9 06/03/2021      Lab Results   Component Value Date    ALKPHOS 97 06/03/2021       Lab Results   Component Value Date    INR 0.99 06/03/2021     No recent abdominal imaging        Jerry Espinoza PA-C

## 2021-06-10 NOTE — PATIENT INSTRUCTIONS
Today we discussed Hepatitis C treatment. The medication used for treatment will be determined by your insurance.       - Once I have all necessary labs, I will send prescription to [facility]. The medication has to go through prior-authorization process to get approved with your insurance. This may take up to 4 weeks.     - Once your prescription for Hep C is approved, [facility] will contact you for delivery. Your insurance may prefer your medication is sent to a different pharmacy. You will be notified of this change.     - [facility] will need to speak to you patient or primary caregiver prior to delivery. Please notify the clinic of any changes in contact information (phone number).     It was a pleasure to see you at your recent visit. Please let me know if you have any questions or concerns.     Clinic Staff - [phone] option 3     Sincerely,     Jerry Espinoza PA-C   [address] [facility] [facility], [id number]  [place]  [address].

## 2021-06-16 ENCOUNTER — TELEPHONE (OUTPATIENT)
Dept: GASTROENTEROLOGY | Facility: CLINIC | Age: 56
End: 2021-06-16

## 2021-06-16 NOTE — TELEPHONE ENCOUNTER
PA Initiation    Medication: Mavyret  Insurance Company: [employer] Part D - Phone [phone] Fax [phone]  Pharmacy Filling the Rx: [facility] - [place], [place] - [facility] [address]  Filling Pharmacy Phone: [phone]  Filling Pharmacy Fax: [phone]  Start Date: 6/16/2021

## 2021-06-18 NOTE — TELEPHONE ENCOUNTER
Prior Authorization Approval    Authorization Effective Date: 6/16/2021  Authorization Expiration Date: 8/11/2021  Medication: Mavyret  Approved Dose/Quantity: 84/28 for 8 weeks  Reference #: [id number]   Insurance Company: [employer] Part D - Phone [phone] Fax [phone]  Expected CoPay: $3.64     CoPay Card Available: No    Foundation Assistance Needed: n/a  Which Pharmacy is filling the prescription (Not needed for infusion/clinic administered): [facility] - [place], [place] - [facility] [address]   Pharmacy Notified: Yes  Patient Notified: No

## 2021-07-05 DIAGNOSIS — M79.7 FIBROMYALGIA: ICD-10-CM

## 2021-07-06 NOTE — TELEPHONE ENCOUNTER
Requested Prescriptions   Pending Prescriptions Disp Refills     cyclobenzaprine (FLEXERIL) 10 MG tablet [Pharmacy Med Name: CYCLOBENZAPRINE HCL 10MG TABS] 60 tablet 3     Sig: TAKE ONE TABLET BY MOUTH THREE TIMES A DAY AS NEEDED FOR MUSCLE SPASMS       There is no refill protocol information for this order        Routing refill request to provider for review/approval because:  Drug [place] [facility] [place]

## 2021-07-07 RX ORDER — CYCLOBENZAPRINE HCL 10 MG
TABLET ORAL
Qty: 60 TABLET | Refills: 3 | Status: SHIPPED | OUTPATIENT
Start: 2021-07-07 | End: 2022-01-04

## 2021-07-08 ENCOUNTER — ANCILLARY PROCEDURE (OUTPATIENT)
Dept: ULTRASOUND IMAGING | Facility: CLINIC | Age: 56
End: 2021-07-08
Attending: PHYSICIAN ASSISTANT
Payer: MEDICARE

## 2021-07-08 DIAGNOSIS — B18.2 CHRONIC HEPATITIS C WITHOUT HEPATIC COMA (H): ICD-10-CM

## 2021-07-08 PROCEDURE — 76981 USE PARENCHYMA: CPT | Performed by: STUDENT IN AN ORGANIZED HEALTH CARE EDUCATION/TRAINING PROGRAM

## 2021-07-08 NOTE — LETTER
July 12, 2021       TO: Kareen Hernandez  [address] [facility] [address]       Dear Ms. Hernandez,    We are writing to inform you of your test results.    Your Ultrasound did not show any significant scarring in your liver. You will not need routine Liver Care after Hep C treatment.     You have been approved for Hep C treatment. [facility] has tried getting in touch with you about delivery of the medication.     Please call them back or let us know if there is a better phone number for them to reach you.     It was a pleasure to see you at your recent visit. Please let me know if you have any questions or concerns.     Clinic Staff - [phone] option 3     Sincerely,     YAIMA Smith  [address] [facility], Mail [id number]  [place]  [address].

## 2021-07-19 ENCOUNTER — CARE COORDINATION (OUTPATIENT)
Dept: GASTROENTEROLOGY | Facility: CLINIC | Age: 56
End: 2021-07-19
Payer: MEDICARE

## 2021-07-19 ENCOUNTER — TELEPHONE (OUTPATIENT)
Dept: GASTROENTEROLOGY | Facility: CLINIC | Age: 56
End: 2021-07-19

## 2021-07-19 DIAGNOSIS — B18.2 CHRONIC HEPATITIS C WITHOUT HEPATIC COMA (H): Primary | ICD-10-CM

## 2021-07-19 NOTE — PROGRESS NOTES
Connected with patient for f/u on Hep C treatment delivery/start status. Patient received their Mavyret medication and are ready to start treatment. Patient will be on Hep C treatment for 8 weeks. Patient reports no recent changes in health, hospitalizations or recent changes in medications. Patient did discuss with a pharmacist. Reviewed the following Hep C POC and education with patient.     Hepatitis C Treatment  Treatment: Mavyret x 8 weeks  Genotype:  (indeterminate )  Stage Fibrosis: F0  Previous Treatment Outcome: Naive    Please have labs drawn as close to the date indicated at an [facility].    Start Date: 07/20/21    Week 8-End of Treatment  HCV RNA Quant Lab due: 9/14/2021  Please have this lab drawn on or within a week after this date 9/14/21. You will need to repeat this lab 3 months after completing treatment to ensure you have cleared the virus. Please see date for the final lab below. You do not need to fast for this lab.     3 Months Post Treatment   HCV RNA Quant Lab due: 12/13/2021  Please have this lab drawn on the specified date of 12/13/21 or within a week after. This final lab will determine if you have cleared the Hepatitis C virus with Mavyret treatment. Without this final lab, we will be unable to determine if treatment was successful. You do not need to fast for this lab.     Educational information to patient on Hep C treatment;     -Contact the [facility] Hepatology clinic and speak with clinical RN prior to starting any new prescribed or OTC medications.   -Take medications exactly as prescribed, do not change dose or stop taking without consulting your provider.   -Take Medication one time each day with  food  -If you miss a dose of medication, then take it as soon as you remember on the same day. If not remembered on the same day, then skip the dose and take the next dose at the usual time. Do not take more than the recommended dose. Contact the clinic if you miss a dose.     Please contact the pharmacy 1-2 weeks prior to needing a medication refill.      Side Effects  The most common side effects of Hep C medication treatment can include:  -tiredness  -headache  -nausea  Notify the clinic of any side effects that bother you or that do not go away.   Possible side effects have been discussed.   Patient has been instructed to clinic for rash, itching or unmanageable nausea.    How to store Hep C Treatment Medications  -Store Medication at room temperature below 86 degrees F  -Keep Medication in it's original container  -Do not use Medication if the seal is broken or missing    General information  It is not known if treatment will prevent you from infecting another person or reinfecting yourself with the hepatitis C virus during treatment. It is best that as soon as you start treatment to buy a new toothbrush, disposable razors (if you use a rotating shaver you do not need to buy a new one) and nail clippers. If you wear dentures, you should soak your dentures in 70-90% Isopropyl solution for 5 minutes one time within the first week of starting treatment. After dentures are done soaking, rinse your dentures off thoroughly with water. If you check your blood sugar at home, please dispose of the fingerstick needle after each use and DO NOT REUSE the insulin needles. These items should not be shared with anyone.        If you have any questions, please contact the main clinic at [phone] or your clinical RN at [phone]. We appreciate you choosing the [facility] for your treatment. Patient agrees to Hep C treatment POC and verbalizes understanding. Patient will receive a copy of treatment plan in the mail, address verified with patient. Patient has no further questions or concerns. Hep C care team updated on patient status.      Lulu Leonardo RN Care Coordinator   [facility]  Hepatology Clinic/Specialty Program

## 2021-07-19 NOTE — LETTER
January 20, 2022       TO: Kareen Hernandez  [address]  [place] [address]       Dear ,    We are writing to inform you of your test results. Approximately 3 months ago, you completed treatment for Hepatitis C. We recently retested for the virus, and the results show that the virus was Not Detected in your system. This is considered a cure! Congratulations!     If a cure is achieved:    1) You are no longer considered to be infectious for Hepatitis C.     2) A cure does not mean you have immunity. You can still be reinfected if you come into contact with infected blood. Avoiding sex with infected people, avoid reusing needles and razors that came into contact with infected blood.     3) You will always test positive for the Hepatitis C antibody. Your Hepatitis C RNA Quant (viral level) will remain undetected as long as you avoid risks for reinfection.     Please continue to take the necessary precautions to prevent reinfection.     If you have any further questions or concerns, you may contact me directly at [phone].      Thank you,     Lulu Leonardo RN, BSN  [facility]   RN Care Coordinator for Hepatology Specialty Clinic/Program

## 2021-07-19 NOTE — LETTER
July 19, 2021       TO: Kareen Hernandez  [address] [facility] [address]       Dear ,    Hepatitis C Treatment    Treatment: Mavyret x 8 weeks    Please have labs drawn as close to the date indicated at an [facility].    Start Date: 07/20/21    Week 8-End of Treatment  HCV RNA Quant Lab due: 9/14/2021  Please have this lab drawn on or within a week after this date 9/14/21. You will need to repeat this lab 3 months after completing treatment to ensure you have cleared the virus. Please see date for the final lab below. You do not need to fast for this lab.     3 Months Post Treatment   HCV RNA Quant Lab due: 12/13/2021  Please have this lab drawn on the specified date of 12/13/21 or within a week after. This final lab will determine if you have cleared the Hepatitis C virus with Mavyret treatment. Without this final lab, we will be unable to determine if treatment was successful. You do not need to fast for this lab.     Educational information to patient on Hep C treatment;     -Contact the [facility] Hepatology clinic and speak with clinical RN prior to starting any new prescribed or OTC medications.   -Take medications exactly as prescribed, do not change dose or stop taking without consulting your provider.   -Take Medication one time each day with food  -If you miss a dose of medication, then take it as soon as you remember on the same day. If not remembered on the same day, then skip the dose and take the next dose at the usual time. Do not take more than the recommended dose. Contact the clinic if you miss a dose.    Please contact the pharmacy 1-2 weeks prior to needing a medication refill.      Side Effects  The most common side effects of Hep C medication treatment can include:  -tiredness  -headache  -nausea  Notify the clinic of any side effects that bother you or that do not go away.   Possible side effects have been discussed.   Patient has been instructed to clinic for rash,  itching or unmanageable nausea.    How to store Hep C Treatment Medications  -Store Medication at room temperature below 86 degrees F  -Keep Medication in it's original container  -Do not use Medication if the seal is broken or missing    General information  It is not known if treatment will prevent you from infecting another person or reinfecting yourself with the hepatitis C virus during treatment. It is best that as soon as you start treatment to buy a new toothbrush, disposable razors (if you use a rotating shaver you do not need to buy a new one) and nail clippers. If you wear dentures, you should soak your dentures in 70-90% Isopropyl solution for 5 minutes one time within the first week of starting treatment. After dentures are done soaking, rinse your dentures off thoroughly with water. If you check your blood sugar at home, please dispose of the fingerstick needle after each use and DO NOT REUSE the insulin needles. These items should not be shared with anyone.        If you have any questions, please contact the main clinic at [phone] or your clinical RN at [phone]. We appreciate you choosing the [facility] clinic for your treatment.       Lulu Leonardo RN Care Coordinator   [facility]  Hepatology Clinic/Specialty Program

## 2021-07-19 NOTE — TELEPHONE ENCOUNTER
[facility] Call Center    Phone Message    May a detailed message be left on voicemail: yes     Reason for Call: Other: Patient is requesting a call back to get clarification on if labs are needed before patient start glecaprevir-pibrentasvir (MAVYRET) 100-40 MG per tablet medication. Please call patient at [phone] to advise.     Action Taken: Message routed to:  [facility]    Travel Screening: Not Applicable

## 2021-07-19 NOTE — LETTER
December 14, 2021       TO: Kareen Hernandez  [address] [facility] [address]       Dear ,    Per our records, the medication Mavyret was prescribed for you to treat hepatitis C. You should have completed this medication in September 2021.     You are due for a lab draw to check the response to this medication, and can go to any [facility] to get this lab drawn.  Without this lab, we will not be able to determine if you have cleared (cured) the Hepatitis C virus, and should be drawn as soon as you are able. If you have any questions regarding this letter, please contact the clinic at [phone] or [phone] and ask to speak with an RN regarding your Hepatitis C treatment plan.       Thank you,    Lluu Leonardo RN Care Coordinator  [facility]atology [facility]

## 2021-07-26 DIAGNOSIS — E55.9 VITAMIN D DEFICIENCY DISEASE: ICD-10-CM

## 2021-07-27 NOTE — TELEPHONE ENCOUNTER
Routing to [place]: Pt has established care with Monika Luna CNP.    Jocelynn Aleman RN  [facility]

## 2021-07-28 RX ORDER — ERGOCALCIFEROL 1.25 MG/1
CAPSULE, LIQUID FILLED ORAL
Qty: 4 CAPSULE | Refills: 1 | Status: SHIPPED | OUTPATIENT
Start: 2021-07-28 | End: 2021-09-21

## 2021-08-05 DIAGNOSIS — F31.9 BIPOLAR AFFECTIVE DISORDER, REMISSION STATUS UNSPECIFIED (H): ICD-10-CM

## 2021-08-05 DIAGNOSIS — F33.1 MAJOR DEPRESSIVE DISORDER, RECURRENT EPISODE, MODERATE (H): ICD-10-CM

## 2021-08-09 ENCOUNTER — TELEPHONE (OUTPATIENT)
Dept: LAB | Facility: CLINIC | Age: 56
End: 2021-08-09

## 2021-08-09 DIAGNOSIS — F33.1 MAJOR DEPRESSIVE DISORDER, RECURRENT EPISODE, MODERATE (H): ICD-10-CM

## 2021-08-09 RX ORDER — FLUOXETINE 40 MG/1
CAPSULE ORAL
Qty: 180 CAPSULE | Refills: 0 | Status: SHIPPED | OUTPATIENT
Start: 2021-08-09 | End: 2021-11-03

## 2021-08-09 RX ORDER — BUPROPION HYDROCHLORIDE 150 MG/1
150 TABLET ORAL EVERY MORNING
Qty: 30 TABLET | Refills: 0 | Status: SHIPPED | OUTPATIENT
Start: 2021-08-09 | End: 2021-09-07

## 2021-09-01 NOTE — TELEPHONE ENCOUNTER
Reason for Call:  Other call back and prescription    Detailed comments: Kareen is calling back to follow up on her previous request. States she spoke with a nurse earlier who told her that she would consult Dr Lozano and call her back today. She is still waiting for a response in regards to her pain.      Phone Number Patient can be reached at: Home number on file [phone] (home)    Best Time: Any    Can we leave a detailed message on this number? YES    Call taken on 5/7/2018 at 6:18 PM by Brendon Turner       Isotretinoin Counseling: Patient should get monthly blood tests, not donate blood, not drive at night if vision affected, not share medication, and not undergo elective surgery for 6 months after tx completed. Side effects reviewed, pt to contact office should one occur.

## 2021-09-04 ENCOUNTER — TELEPHONE (OUTPATIENT)
Dept: LAB | Facility: CLINIC | Age: 56
End: 2021-09-04

## 2021-09-04 DIAGNOSIS — F33.1 MAJOR DEPRESSIVE DISORDER, RECURRENT EPISODE, MODERATE (H): ICD-10-CM

## 2021-09-07 RX ORDER — BUPROPION HYDROCHLORIDE 150 MG/1
TABLET ORAL
Qty: 90 TABLET | Refills: 0 | Status: SHIPPED | OUTPATIENT
Start: 2021-09-07 | End: 2021-12-01

## 2021-09-07 ASSESSMENT — PATIENT HEALTH QUESTIONNAIRE - PHQ9: SUM OF ALL RESPONSES TO PHQ QUESTIONS 1-9: 16

## 2021-09-07 NOTE — TELEPHONE ENCOUNTER
PHQ9 completed, message relayed to patient.     LXIONG3, MEDICAL ASSISTANT     
Prescription approved per [facility] Refill Protocol.    Patient due to complete phq-9, please call patient to complete.   Adriane Rajput RN    
No

## 2021-09-17 ENCOUNTER — LAB (OUTPATIENT)
Dept: LAB | Facility: CLINIC | Age: 56
End: 2021-09-17
Payer: MEDICARE

## 2021-09-17 DIAGNOSIS — B18.2 CHRONIC HEPATITIS C WITHOUT HEPATIC COMA (H): ICD-10-CM

## 2021-09-17 PROCEDURE — 87522 HEPATITIS C REVRS TRNSCRPJ: CPT

## 2021-09-17 PROCEDURE — 99000 SPECIMEN HANDLING OFFICE-LAB: CPT

## 2021-09-17 PROCEDURE — 36415 COLL VENOUS BLD VENIPUNCTURE: CPT

## 2021-09-17 PROCEDURE — 87902 NFCT AGT GNTYP ALYS HEP C: CPT | Mod: 90

## 2021-09-17 NOTE — LETTER
September 20, 2021       TO: Kareen Hernandez  [address] [facility] [address]       Dear Ms. Hernandez,    We are writing to inform you of your test results.    End of treatment Hep C labs are not detectable!     Please repeat labs 3 months post Hep C treatment to determine official cure.       It was a pleasure to see you at your recent visit. Please let me know if you have any questions or concerns.     Clinic Staff - [phone] option 3     Sincerely,     Jerry Espinoza PA-C   [address] [place] [address], Mail Code [id number]  [place]  [address].

## 2021-09-19 LAB — HCV RNA SERPL NAA+PROBE-ACNC: NOT DETECTED IU/ML

## 2021-09-20 DIAGNOSIS — E55.9 VITAMIN D DEFICIENCY DISEASE: ICD-10-CM

## 2021-09-21 RX ORDER — ERGOCALCIFEROL 1.25 MG/1
CAPSULE, LIQUID FILLED ORAL
Qty: 4 CAPSULE | Refills: 1 | Status: SHIPPED | OUTPATIENT
Start: 2021-09-21 | End: 2022-01-06

## 2021-09-23 ENCOUNTER — TELEPHONE (OUTPATIENT)
Dept: GASTROENTEROLOGY | Facility: CLINIC | Age: 56
End: 2021-09-23

## 2021-09-23 NOTE — TELEPHONE ENCOUNTER
Returned pt's call to discuss lab and ultrasound results. Notified pt that labs drawn on 9/17 showed that the hepatitis C virus was not detected. Reviewed that pt will still need labs drawn on 12/13 to determine whether pt is cured of hepatitis C. Also, reviewed ultrasound results from 7/8/2021. Pt verbalized understanding and is agreeable to plan of care.

## 2021-09-23 NOTE — TELEPHONE ENCOUNTER
[facility]    Phone Message    May a detailed message be left on voicemail: yes     Reason for Call: Requesting Results   Name/type of test: [facility] lab and US  Date of test: lab 09/17/21 and US done on 07/08/21  Was test done at a location other than [facility] (Please fill in the location if not [facility])?: No      Action Taken: Message routed to:  [facility]    Travel Screening: Not Applicable

## 2021-09-23 NOTE — PROGRESS NOTES
Notified pt that labs drawn on 9/17 showed that the hepatitis C virus was not detected. Reviewed that pt will still need labs drawn on 12/13 to determine whether pt is cured of hepatitis C. Pt verbalized understanding.

## 2021-09-24 LAB — HCV GENTYP SERPL NAA+PROBE: NORMAL

## 2021-11-03 DIAGNOSIS — F33.1 MAJOR DEPRESSIVE DISORDER, RECURRENT EPISODE, MODERATE (H): ICD-10-CM

## 2021-11-03 DIAGNOSIS — F31.9 BIPOLAR AFFECTIVE DISORDER, REMISSION STATUS UNSPECIFIED (H): ICD-10-CM

## 2021-11-03 RX ORDER — FLUOXETINE 40 MG/1
CAPSULE ORAL
Qty: 180 CAPSULE | Refills: 0 | Status: SHIPPED | OUTPATIENT
Start: 2021-11-03 | End: 2022-01-28

## 2021-11-03 NOTE — TELEPHONE ENCOUNTER
Routing refill request to provider for review/approval because:  PHQ-9    PHQ-9 score:    PHQ 9/7/2021   PHQ-9 Total Score 16   Q9: Thoughts of better off dead/self-harm past 2 weeks Not at all                      Pending Prescriptions:                       Disp   Refills    FLUoxetine (PROZAC) 40 MG capsule [Pharmac*180 ca*0        Sig: TAKE TWO CAPSULES BY MOUTH ONCE DAILY        Otilio Taylor RN

## 2022-01-04 ENCOUNTER — OFFICE VISIT (OUTPATIENT)
Dept: FAMILY MEDICINE | Facility: CLINIC | Age: 57
End: 2022-01-04
Payer: MEDICARE

## 2022-01-04 ENCOUNTER — LAB (OUTPATIENT)
Dept: LAB | Facility: CLINIC | Age: 57
End: 2022-01-04

## 2022-01-04 ENCOUNTER — TELEPHONE (OUTPATIENT)
Dept: PALLIATIVE MEDICINE | Facility: CLINIC | Age: 57
End: 2022-01-04

## 2022-01-04 VITALS
OXYGEN SATURATION: 97 % | BODY MASS INDEX: 23.86 KG/M2 | TEMPERATURE: 98.3 F | HEART RATE: 89 BPM | SYSTOLIC BLOOD PRESSURE: 122 MMHG | HEIGHT: 67 IN | WEIGHT: 152 LBS | DIASTOLIC BLOOD PRESSURE: 69 MMHG

## 2022-01-04 DIAGNOSIS — Z23 HIGH PRIORITY FOR 2019-NCOV VACCINE: ICD-10-CM

## 2022-01-04 DIAGNOSIS — M54.42 ACUTE LEFT-SIDED LOW BACK PAIN WITH LEFT-SIDED SCIATICA: Primary | ICD-10-CM

## 2022-01-04 DIAGNOSIS — F33.1 MAJOR DEPRESSIVE DISORDER, RECURRENT EPISODE, MODERATE (H): ICD-10-CM

## 2022-01-04 DIAGNOSIS — Z23 NEED FOR PROPHYLACTIC VACCINATION AND INOCULATION AGAINST INFLUENZA: ICD-10-CM

## 2022-01-04 DIAGNOSIS — B17.10 ACUTE HEPATITIS C VIRUS INFECTION WITHOUT HEPATIC COMA: ICD-10-CM

## 2022-01-04 DIAGNOSIS — M79.7 FIBROMYALGIA: ICD-10-CM

## 2022-01-04 PROCEDURE — G0008 ADMIN INFLUENZA VIRUS VAC: HCPCS | Mod: 59 | Performed by: FAMILY MEDICINE

## 2022-01-04 PROCEDURE — 87522 HEPATITIS C REVRS TRNSCRPJ: CPT

## 2022-01-04 PROCEDURE — 99214 OFFICE O/P EST MOD 30 MIN: CPT | Mod: 25 | Performed by: FAMILY MEDICINE

## 2022-01-04 PROCEDURE — 90682 RIV4 VACC RECOMBINANT DNA IM: CPT | Performed by: FAMILY MEDICINE

## 2022-01-04 PROCEDURE — 36415 COLL VENOUS BLD VENIPUNCTURE: CPT

## 2022-01-04 PROCEDURE — 0001A COVID-19,PF,PFIZER (12+ YRS): CPT | Performed by: FAMILY MEDICINE

## 2022-01-04 PROCEDURE — 91300 COVID-19,PF,PFIZER (12+ YRS): CPT | Performed by: FAMILY MEDICINE

## 2022-01-04 RX ORDER — BUPROPION HYDROCHLORIDE 300 MG/1
300 TABLET ORAL EVERY MORNING
Qty: 30 TABLET | Refills: 1 | Status: SHIPPED | OUTPATIENT
Start: 2022-01-04 | End: 2022-03-07

## 2022-01-04 RX ORDER — GABAPENTIN 300 MG/1
300 CAPSULE ORAL 3 TIMES DAILY PRN
Qty: 60 CAPSULE | Refills: 1 | Status: SHIPPED | OUTPATIENT
Start: 2022-01-04 | End: 2022-01-06 | Stop reason: DRUGHIGH

## 2022-01-04 RX ORDER — CYCLOBENZAPRINE HCL 10 MG
10 TABLET ORAL 3 TIMES DAILY PRN
Qty: 60 TABLET | Refills: 3 | Status: SHIPPED | OUTPATIENT
Start: 2022-01-04 | End: 2022-03-07

## 2022-01-04 RX ORDER — PREDNISONE 20 MG/1
20 TABLET ORAL 2 TIMES DAILY
Qty: 14 TABLET | Refills: 0 | Status: SHIPPED | OUTPATIENT
Start: 2022-01-04 | End: 2022-01-11

## 2022-01-04 ASSESSMENT — MIFFLIN-ST. JEOR: SCORE: 1322.1

## 2022-01-04 NOTE — PROGRESS NOTES
Assessment & Plan     Acute left-sided low back pain with left-sided sciatica  The patient has symptoms consistent with a left-sided sciatica.  Imaging from January 2021 had shown as well as some foraminal stenosis at the L5-S1 area and impingement on the left S1 nerve root.  She had seen surgery for this but symptoms improved for quite some time after a transforaminal injection so she would like to consider that again.  I have issued prescriptions for prednisone and gabapentin for symptom control which she could use with Aleve at this time.  She did not have any indications for repeat imaging other than recurrence and slight worsening of the symptoms but no other red flag symptoms.  I suspect that given the recurrence she may need to see surgeon again so spine referral was placed.  - Spine Referral; Future  - Pain Management Referral; Future  - predniSONE (DELTASONE) 20 MG tablet; Take 1 tablet (20 mg) by mouth 2 times daily for 7 days  - gabapentin (NEURONTIN) 300 MG capsule; Take 1 capsule (300 mg) by mouth 3 times daily as needed for neuropathic pain    Fibromyalgia  Cyclobenzaprine was refilled.  She has been on this chronically and finds it helpful.  We also recertified her for medical cannabis today due to this diagnosis.  - cyclobenzaprine (FLEXERIL) 10 MG tablet; Take 1 tablet (10 mg) by mouth 3 times daily as needed for muscle spasms    Major depressive disorder, recurrent episode, moderate (H)  Increase Wellbutrin to 300 mg daily and decrease the fluoxetine to 40 mg once daily.  She describes some side effects related to swallowing the fluoxetine capsule and so we can try to cut back on this.  I have also suggested that she may take it with some type of thicker consistency liquid like applesauce as opposed to water.  The symptoms are directly in correlation and suggest possible chemical irritation of the esophagus related to the capsule.  - buPROPion (WELLBUTRIN XL) 300 MG 24 hr tablet; Take 1 tablet  (300 mg) by mouth every morning    Need for prophylactic vaccination and inoculation against influenza  After discussion flu vaccine was provided today.  - INFLUENZA QUAD, RECOMBINANT, P-FREE (RIV4) (FLUBLOK)    Acute hepatitis C virus infection without hepatic coma  Patient has a history of hepatitis C status post treatment and requested to do blood work today to check levels.  - Hepatitis C RNA, quantitative; Future    High priority for 2019-nCoV vaccine  Covid vaccination was discussed with the patient and first immunization was administered today.  Follow-up appointment in 3 weeks is recommended for next shot.  - COVID-19,PF,PFIZER (12+ Yrs PURPLE LABEL)                 Return in about 4 weeks (around 2/1/2022) for Mood Recheck, virtual visit.    Leno Luis MD  [facility] is a 56 year old who presents for the following health issues  accompanied by her daughter.    HPI       Patient comes in today with a number of concerns.  For the last month or so she has had shooting electric shock type pain down her left leg as well as burning in her calf.  Bowel and bladder function is normal.  No unexplained fevers, chills, weight loss, or injury that seem to trigger the symptoms.  Pain begins in the left buttock and radiates down to the left knee and then with walking is aggravated and into the left calf.  The most comfortable position is lying down.  Previous imaging has included an MRI of her lumbar spine that was done in January 2021 and that report was reviewed today.  She also had an injection in the low back sometime last year, she does not recall exactly when, and that was quite helpful until just recently.    She is concerned about side effects from the fluoxetine.  She takes 40 mg capsules, 2 daily.  When she takes these it feels as if it gets stuck in her esophagus and then will cause some burning discomfort for about 3 hours.  This subsequently  resolves.  She does not have dysphagia or odynophagia of any of her other medications or of any food items.    She would like to recertify for medical cannabis at this time.  She uses it for fibromyalgia.      PEG Score 11/30/2020 1/4/2022   PEG Total Score 6.33 6     She would also like to have a hepatitis C viral level checked at this time.  Previous result about 3 months ago was undetectable and she has completed treatment so wants to do this as a 6-month follow-up after completion of treatment.    Review of Systems   Constitutional, HEENT, cardiovascular, pulmonary, gi and gu systems are negative, except as otherwise noted.      Objective    LMP  (LMP Unknown)   There is no height or weight on file to calculate BMI.  Physical Exam   GENERAL: healthy, alert and no distress  EYES: Eyes grossly normal to inspection, PERRL and conjunctivae and sclerae normal  MS: no gross musculoskeletal defects noted, no edema  SKIN: no suspicious lesions or rashes  NEURO: Normal strength and tone, mentation intact and speech normal  Comprehensive back pain exam:  No tenderness, Lower extremity strength functional and equal on both sides, Lower extremity reflexes within normal limits bilaterally, Lower extremity sensation normal and equal on both sides and Straight leg positive on  left, indicating possible ipsilateral radiculopathy  PSYCH: mentation appears normal, affect normal/bright    Lumbar spine MRI results dated 1/29/2021:    IMPRESSION:  1. New focal disc protrusion at L5-S1 on the left side is effacing the  left lateral recess and compressing the left S1 nerve root. Correlate  for left S1 radicular symptoms.  2. Multilevel degenerative disc disease which includes  moderate-to-severe central spinal stenosis L4-5 and mild spinal  stenosis at L3-4, both similar to the prior study.  3. Lateral recess narrowing at L4-5 is moderate on the right and mild  on the left. This may be effecting the L5 nerve roots. There is  mild  lateral recess narrowing bilaterally at L3-4 which may effecting the  L4 nerve roots.  4. There is neural foraminal narrowing which at L3-4 is moderate  bilaterally, at L4-5 is moderate on the right and moderate-to-severe  on the left.

## 2022-01-04 NOTE — PATIENT INSTRUCTIONS
1.  I believe you have a herniated disc in your back that is pinching on the nerve going down the left leg.  Imaging has confirmed this.  We will try the prednisone, aleve, and gabapentin for short term relief.  I have ordered an injection like you had before and recommend consulting with a surgeon.    2.  For your mood, we will increase the bupropion to 300 mg daily and decrease the fluoxetine to one capsule per day.  May consider taking with some applesauce.

## 2022-01-04 NOTE — TELEPHONE ENCOUNTER
Left S1 transforaminal lumbar epidural steroid injection.    Sal Villalpando [facility]

## 2022-01-04 NOTE — TELEPHONE ENCOUNTER
Order received for Injection to be Determined by Pain Management Specialist.    Routing to review.    Jocelynn ADAMSON    [facility]

## 2022-01-05 ENCOUNTER — OFFICE VISIT (OUTPATIENT)
Dept: NEUROSURGERY | Facility: CLINIC | Age: 57
End: 2022-01-05
Payer: MEDICARE

## 2022-01-05 VITALS
BODY MASS INDEX: 23.86 KG/M2 | HEART RATE: 96 BPM | HEIGHT: 67 IN | OXYGEN SATURATION: 96 % | DIASTOLIC BLOOD PRESSURE: 79 MMHG | WEIGHT: 152 LBS | SYSTOLIC BLOOD PRESSURE: 144 MMHG

## 2022-01-05 DIAGNOSIS — M54.42 ACUTE LEFT-SIDED LOW BACK PAIN WITH LEFT-SIDED SCIATICA: ICD-10-CM

## 2022-01-05 DIAGNOSIS — M54.16 LUMBAR RADICULOPATHY: Primary | ICD-10-CM

## 2022-01-05 LAB — HCV RNA SERPL NAA+PROBE-ACNC: NOT DETECTED IU/ML

## 2022-01-05 PROCEDURE — 99204 OFFICE O/P NEW MOD 45 MIN: CPT | Performed by: PREVENTIVE MEDICINE

## 2022-01-05 ASSESSMENT — MIFFLIN-ST. JEOR: SCORE: 1322.1

## 2022-01-05 ASSESSMENT — PAIN SCALES - GENERAL: PAINLEVEL: EXTREME PAIN (8)

## 2022-01-05 NOTE — NURSING NOTE
"Reason For Visit:   Chief Complaint   Patient presents with     Consult     left leg pain         Occupation: None  Currently working? Yes.  Work status?  On disability.    Sports: no  Activities: walking             BP (!) 144/79   Pulse 96   Ht 1.702 m (5' 7\")   Wt 68.9 kg (152 lb)   LMP  (LMP Unknown)   SpO2 96%   BMI 23.81 kg/m        Allergies   Allergen Reactions     Lurasidone Diarrhea     Diarrhea     Morphine Rash       Current Outpatient Medications   Medication     Black Cohosh-SoyIsoflav-C Quad 40- MG CAPS     buPROPion (WELLBUTRIN XL) 300 MG 24 hr tablet     cyanocobalamin (CYANOCOBALAMIN) 1000 MCG/ML injection     cyclobenzaprine (FLEXERIL) 10 MG tablet     FLUoxetine (PROZAC) 40 MG capsule     gabapentin (NEURONTIN) 300 MG capsule     lamoTRIgine (LAMICTAL) 100 MG tablet     medical cannabis (Patient's own supply)     mometasone (NASONEX) 50 MCG/ACT nasal spray     order for DME     predniSONE (DELTASONE) 20 MG tablet     SUMAtriptan (IMITREX) 100 MG tablet     Syringe/Needle, Disp, (BD LUER-BLAYNE SYRINGE) 25G X 1-1/2\" 3 ML MISC     vitamin D2 (ERGOCALCIFEROL) 63702 units (1250 mcg) capsule     zolpidem (AMBIEN) 10 MG tablet     No current facility-administered medications for this visit.         Darla Severin-Brown, LPN  "

## 2022-01-05 NOTE — LETTER
1/5/2022         RE: Kareen Hernandez  [address]  [facility] [address]        Dear Colleague,    Thank you for referring your patient, Kareen Hernandez, to the [facility]. Please see a copy of my visit note below.        SUBJECTIVE:  HPI:  Kareen Hernandez  Is a 56 year old female who presents today for new patient evaluation of low back pain upon referral from Dr. Leno Luis.  There is a history of left-sided sciatica with recent worsening of left leg symptoms for about a month, and she has been evaluated by neurosurgeon Dr. Rock Hargrove in the past and left S1 transforaminal injection on 2/22/2021 at [facility], which was helpful.  The neurosurgeon recommended conservative care only at at follow-up about a month later..  Another 1 has been ordered and the pain consult is pending..  An MRI from 1/29/2021 compared to a prior study from 2018 showed a new left-sided L5-S1 disc.  See below for further details.  There is also evidence of a right-sided laminotomy at L5-S1 with excellent results.  She has been started on prednisone and gabapentin and consultation here has been sought.  A pain clinic consultation for possible epidural steroid injection is ordered by Dr. Luis as well.  The patient also has some underlying fibromyalgia and is on cyclobenzaprine for that.  She has been referred for medical cannabis as well.    She reports that the onset of symptoms currently experienced happened about a month ago without a mechanism of injury, and she describes an S1 pattern of pain down to her left ankle that burns especially if she is walking and feels better if she sits leaning on her right buttock with her left hip and knee slightly flexed.  The back of her leg feels tight and pulling and a little weak.  There is no numbness but there is a little tingling in the lateral left calf.  There is no bowel or bladder dysfunction or saddle  anesthesia.      SYMPTOMS WORSENED WITH walking, holding her grandson  SYMPTOMS IMPROVED WITH sitting on her right buttock with her left hip and knee flexed    Pain score and diagram reviewed.  See questionnaire.      ROS:  Specifically negative for bowel/bladder dysfunction, balance changes, headache, leg pain/numbness, fevers, chills, night sweats, unexplained weight loss;  otherwise unremarkable.   See the patient's intake questionnaire from today for details.    Treatment to Date: Physical therapy, prednisone, gabapentin, right L5-S1 hemilaminotomy microdiscectomy 2 to 3 years ago    MEDICATIONS:  Reviewed.    ALLERGIES:  Reviewed.     Reviewed past medical, surgical, and family history.    Pertinent for prior right L5-S1 laminotomy and microdiscectomy not listed in the surgical history in the chart.  Also resolved obesity, fibromyalgia, hypertension, depression-severe-with psychotic behavior, status post gastric bypass surgery she lost nearly 150 pounds and has kept it off..  There is a history of hepatitis C and recent blood test show that it has been cured.    SOCIAL HX: She is on Social Security disability with the disabling diagnoses being multiple kidney stones and fibromyalgia.  She is currently single and .  He denies sports hobbies or activities.      OBJECTIVE:    --CONSTITUTIONAL:   No acute distress.  The patient is well nourished and well groomed.  She sits on her right buttock leaning to the right with her hip and knee flexed.  She transitions to standing easily.  Signs of weight loss noted  --PSYCHIATRIC:  Appropriate mood and affect. The patient is awake, alert, oriented to person, place, time and answering questions appropriately with clear speech.    --SKIN:  Skin over the face, bilateral lower extremities, and posterior torso is clean, dry, intact without rashes.    --RESPIRATORY: Normal respiratory excursion and effort, and no dyspnea.   --GAIT:  is right antalgic. Flat foot, heel and  toe walking:  normal   .  Squat and rise   normal    .  --STANDING EXAMINATION:    Symmetry of spine/pelvis   unremarkable      Range of motion surprisingly good and she has pain shooting down her left posterior thigh and calf with flexion but not so much with extension and Kemps test is negative bilaterally.   Standing flexion   negative   .    Valerie's sign   negative    .     Stork test   negative    .   --NEUROLOGICAL:  .  SENSATION to light touch is intact in bilateral thighs, lower legs and feet.   REFLEXES:  patellar 1+, and achilles absent.  Babinski is negative. No clonus.  MANUAL MOTOR TESTING:  L3- S1 Myotomes, Femoral, Obturator, Peroneal and Tibial nerves 5/5   DURAL STRETCH TESTS:  SLR positive left.  Femoral Stretch Test not done.   --PELVIC/HIP JOINTS:                Long Sitting unable but equal leg lengths.  Unable to do hip testing because she could not tolerate knee flexing her hip in the supine position.  PELVIC ALIGNMENT difficult to ascertain but anterior pelvic landmarks are level.  .    --ABDOMINAL:  Non-distended.  Nontender  --VASCULAR: Femoral pulses 2+. Lower extremity capillary refill, temperature and color normal.         IMAGING: Images and reports reviewed.    MRI OF THE LUMBAR SPINE WITHOUT AND WITH CONTRAST 1/29/2021      COMPARISON: 5/8/2018      Since the prior examination, the patient has had a  small laminotomy and microdiscectomy on the right side at L5-S1. There  is 3 mm of retrolisthesis of L5 on S1 and 3 mm of anterolisthesis of  L3 on L4, unchanged from the prior study.     L3-4: Mild loss of disc height. Dehydration of the discs. Generalized  disc bulge. Mild facet hypertrophy and ligamentum flavum thickening.  Spinal canal is mildly narrowed. There is mild lateral recess  narrowing bilaterally. The neural foramina are moderately narrowed  bilaterally.     L4-5: Mild-to-moderate loss of disc height. Dehydration of the discs.  Generalized disc bulge. Moderate ligamentum  flavum thickening and mild  facet hypertrophy. Spinal canal is moderately to severely narrow.  There is moderate narrowing of the right lateral recess and mild  narrowing of the left lateral recess. The left neural foramen is  moderate to severely narrow. The right neural foramen is moderately  narrow.     L5-S1: Mild loss of disc height. Dehydration of the discs. Generalized  disc bulge. Laminotomy on the right side. There is a superimposed  focal left central disc protrusion which effaces the left lateral  recess and is compressing the left S1 nerve root. This disc is new  compared to prior study. The neural foramina are patent bilaterally.  There is mild facet hypertrophy on the left side.                                                                      IMPRESSION:  1. New focal disc protrusion at L5-S1 on the left side is effacing the  left lateral recess and compressing the left S1 nerve root. Correlate  for left S1 radicular symptoms.  2. Multilevel degenerative disc disease which includes  moderate-to-severe central spinal stenosis L4-5 and mild spinal  stenosis at L3-4, both similar to the prior study.  3. Lateral recess narrowing at L4-5 is moderate on the right and mild  on the left. This may be effecting the L5 nerve roots. There is mild  lateral recess narrowing bilaterally at L3-4 which may effecting the  L4 nerve roots.  4. There is neural foraminal narrowing which at L3-4 is moderate  bilaterally, at L4-5 is moderate on the right and moderate-to-severe  on the left.    LUMBAR SPINE TWO TO THREE VIEWS 1/28/2021     IMPRESSION: Six nonrib-bearing, lumbar type vertebrae. Normal  alignment. Vertebral body heights are normal. No fractures.  Degenerative endplate spurring at L4-L5. Facet arthropathy at L4-L5,  L5-L6 and L6-S1.        ASSESSMENT: Kareen Hernandez is a 56 year old female who presents  today for new patient evaluation of: History of prior right sided L5-S1 decompression surgery with resolution  of right-sided symptoms.  Recurrent onset of left-sided symptoms that appear to be S1, which have been ongoing for about a month and previously had resolved after one left S1 transforaminal CHIVO 2/22/2021 following an MRI showing recurrent disc herniation at L5-S1 on 1/29/2021.  She is obviously pretty miserable and nonfunctional at this time and a new MRI is indicated.  She is a non-smoker but she uses e-cigarettes and I have encouraged her to quit that if she can specially if the surgery is contemplated down the line      PLAN:  Repeat lumbar MRI she would prefer to do in a [facility] and we do have access to her old studies and the records.  In the interim, she has a consultation scheduled with the pain doctors who can do the injection and will certainly want to see what the MRI results show.  Presuming that they will recommend another epidural injection in which case I will be happy to follow with you and about a month after the injection to see how things are going    Advised patient to call or return early if symptoms worsen, or having problems controlling bladder and bowel function or worsening leg weakness.     Please note: Voice recognition software was used in this dictation.  It may therefore contain typographical errors.    Capo Muller MD             Again, thank you for allowing me to participate in the care of your patient.        Sincerely,        Capo Muller MD

## 2022-01-05 NOTE — PROGRESS NOTES
SUBJECTIVE:  HPI:  Kareen Hernandez  Is a 56 year old female who presents today for new patient evaluation of low back pain upon referral from Dr. Lneo Luis.  There is a history of left-sided sciatica with recent worsening of left leg symptoms for about a month, and she has been evaluated by neurosurgeon Dr. Rock Hargrove in the past and left S1 transforaminal injection on 2/22/2021 at [facility] imaging, which was helpful.  The neurosurgeon recommended conservative care only at at follow-up about a month later..  Another 1 has been ordered and the pain consult is pending..  An MRI from 1/29/2021 compared to a prior study from 2018 showed a new left-sided L5-S1 disc.  See below for further details.  There is also evidence of a right-sided laminotomy at L5-S1 with excellent results.  She has been started on prednisone and gabapentin and consultation here has been sought.  A pain clinic consultation for possible epidural steroid injection is ordered by Dr. Luis as well.  The patient also has some underlying fibromyalgia and is on cyclobenzaprine for that.  She has been referred for medical cannabis as well.    She reports that the onset of symptoms currently experienced happened about a month ago without a mechanism of injury, and she describes an S1 pattern of pain down to her left ankle that burns especially if she is walking and feels better if she sits leaning on her right buttock with her left hip and knee slightly flexed.  The back of her leg feels tight and pulling and a little weak.  There is no numbness but there is a little tingling in the lateral left calf.  There is no bowel or bladder dysfunction or saddle anesthesia.      SYMPTOMS WORSENED WITH walking, holding her grandson  SYMPTOMS IMPROVED WITH sitting on her right buttock with her left hip and knee flexed    Pain score and diagram reviewed.  See questionnaire.      ROS:  Specifically negative for bowel/bladder dysfunction, balance  changes, headache, leg pain/numbness, fevers, chills, night sweats, unexplained weight loss;  otherwise unremarkable.   See the patient's intake questionnaire from today for details.    Treatment to Date: Physical therapy, prednisone, gabapentin, right L5-S1 hemilaminotomy microdiscectomy 2 to 3 years ago    MEDICATIONS:  Reviewed.    ALLERGIES:  Reviewed.     Reviewed past medical, surgical, and family history.    Pertinent for prior right L5-S1 laminotomy and microdiscectomy not listed in the surgical history in the chart.  Also resolved obesity, fibromyalgia, hypertension, depression-severe-with psychotic behavior, status post gastric bypass surgery she lost nearly 150 pounds and has kept it off..  There is a history of hepatitis C and recent blood test show that it has been cured.    SOCIAL HX: She is on Social Security disability with the disabling diagnoses being multiple kidney stones and fibromyalgia.  She is currently single and .  He denies sports hobbies or activities.      OBJECTIVE:    --CONSTITUTIONAL:   No acute distress.  The patient is well nourished and well groomed.  She sits on her right buttock leaning to the right with her hip and knee flexed.  She transitions to standing easily.  Signs of weight loss noted  --PSYCHIATRIC:  Appropriate mood and affect. The patient is awake, alert, oriented to person, place, time and answering questions appropriately with clear speech.    --SKIN:  Skin over the face, bilateral lower extremities, and posterior torso is clean, dry, intact without rashes.    --RESPIRATORY: Normal respiratory excursion and effort, and no dyspnea.   --GAIT:  is right antalgic. Flat foot, heel and toe walking:  normal   .  Squat and rise   normal    .  --STANDING EXAMINATION:    Symmetry of spine/pelvis   unremarkable      Range of motion surprisingly good and she has pain shooting down her left posterior thigh and calf with flexion but not so much with extension and Kemps  test is negative bilaterally.   Standing flexion   negative   .    Valerie's sign   negative    .     Stork test   negative    .   --NEUROLOGICAL:  .  SENSATION to light touch is intact in bilateral thighs, lower legs and feet.   REFLEXES:  patellar 1+, and achilles absent.  Babinski is negative. No clonus.  MANUAL MOTOR TESTING:  L3- S1 Myotomes, Femoral, Obturator, Peroneal and Tibial nerves 5/5   DURAL STRETCH TESTS:  SLR positive left.  Femoral Stretch Test not done.   --PELVIC/HIP JOINTS:                Long Sitting unable but equal leg lengths.  Unable to do hip testing because she could not tolerate knee flexing her hip in the supine position.  PELVIC ALIGNMENT difficult to ascertain but anterior pelvic landmarks are level.  .    --ABDOMINAL:  Non-distended.  Nontender  --VASCULAR: Femoral pulses 2+. Lower extremity capillary refill, temperature and color normal.         IMAGING: Images and reports reviewed.    MRI OF THE LUMBAR SPINE WITHOUT AND WITH CONTRAST 1/29/2021      COMPARISON: 5/8/2018      Since the prior examination, the patient has had a  small laminotomy and microdiscectomy on the right side at L5-S1. There  is 3 mm of retrolisthesis of L5 on S1 and 3 mm of anterolisthesis of  L3 on L4, unchanged from the prior study.     L3-4: Mild loss of disc height. Dehydration of the discs. Generalized  disc bulge. Mild facet hypertrophy and ligamentum flavum thickening.  Spinal canal is mildly narrowed. There is mild lateral recess  narrowing bilaterally. The neural foramina are moderately narrowed  bilaterally.     L4-5: Mild-to-moderate loss of disc height. Dehydration of the discs.  Generalized disc bulge. Moderate ligamentum flavum thickening and mild  facet hypertrophy. Spinal canal is moderately to severely narrow.  There is moderate narrowing of the right lateral recess and mild  narrowing of the left lateral recess. The left neural foramen is  moderate to severely narrow. The right neural foramen is  moderately  narrow.     L5-S1: Mild loss of disc height. Dehydration of the discs. Generalized  disc bulge. Laminotomy on the right side. There is a superimposed  focal left central disc protrusion which effaces the left lateral  recess and is compressing the left S1 nerve root. This disc is new  compared to prior study. The neural foramina are patent bilaterally.  There is mild facet hypertrophy on the left side.                                                                      IMPRESSION:  1. New focal disc protrusion at L5-S1 on the left side is effacing the  left lateral recess and compressing the left S1 nerve root. Correlate  for left S1 radicular symptoms.  2. Multilevel degenerative disc disease which includes  moderate-to-severe central spinal stenosis L4-5 and mild spinal  stenosis at L3-4, both similar to the prior study.  3. Lateral recess narrowing at L4-5 is moderate on the right and mild  on the left. This may be effecting the L5 nerve roots. There is mild  lateral recess narrowing bilaterally at L3-4 which may effecting the  L4 nerve roots.  4. There is neural foraminal narrowing which at L3-4 is moderate  bilaterally, at L4-5 is moderate on the right and moderate-to-severe  on the left.    LUMBAR SPINE TWO TO THREE VIEWS 1/28/2021     IMPRESSION: Six nonrib-bearing, lumbar type vertebrae. Normal  alignment. Vertebral body heights are normal. No fractures.  Degenerative endplate spurring at L4-L5. Facet arthropathy at L4-L5,  L5-L6 and L6-S1.        ASSESSMENT: Kareen Hernandez is a 56 year old female who presents  today for new patient evaluation of: History of prior right sided L5-S1 decompression surgery with resolution of right-sided symptoms.  Recurrent onset of left-sided symptoms that appear to be S1, which have been ongoing for about a month and previously had resolved after one left S1 transforaminal CHIVO 2/22/2021 following an MRI showing recurrent disc herniation at L5-S1 on 1/29/2021.  She  is obviously pretty miserable and nonfunctional at this time and a new MRI is indicated.  She is a non-smoker but she uses e-cigarettes and I have encouraged her to quit that if she can specially if the surgery is contemplated down the line      PLAN:  Repeat lumbar MRI she would prefer to do in a [place] facility and we do have access to her old studies and the records.  In the interim, she has a consultation scheduled with the pain doctors who can do the injection and will certainly want to see what the MRI results show.  Presuming that they will recommend another epidural injection in which case I will be happy to follow with you and about a month after the injection to see how things are going    Advised patient to call or return early if symptoms worsen, or having problems controlling bladder and bowel function or worsening leg weakness.     Please note: Voice recognition software was used in this dictation.  It may therefore contain typographical errors.    Capo Muller MD

## 2022-01-05 NOTE — TELEPHONE ENCOUNTER
Called patient to schedule Left S1 transforaminal lumbar epidural steroid injection. She stated she will call back at another time to schedule       Annalise Mack    [facility]

## 2022-01-05 NOTE — PATIENT INSTRUCTIONS
It was nice to meet you Kareen.  It looks to me like you have the same problem we have last year but we will get a repeat MRI in preparation for your consultation with the pain doctor who can do the injection.  Like to see you back a month after the injection to make sure things are going well for you continue with the medication you got from your family doctor in the meantime and I like to avoid narcotics at all cost if we can.

## 2022-01-06 ENCOUNTER — ANCILLARY PROCEDURE (OUTPATIENT)
Dept: MRI IMAGING | Facility: CLINIC | Age: 57
End: 2022-01-06
Attending: PREVENTIVE MEDICINE
Payer: MEDICARE

## 2022-01-06 DIAGNOSIS — M54.42 ACUTE LEFT-SIDED LOW BACK PAIN WITH LEFT-SIDED SCIATICA: ICD-10-CM

## 2022-01-06 DIAGNOSIS — M54.16 LUMBAR RADICULOPATHY: ICD-10-CM

## 2022-01-06 DIAGNOSIS — E55.9 VITAMIN D DEFICIENCY DISEASE: ICD-10-CM

## 2022-01-06 DIAGNOSIS — G47.00 PERSISTENT DISORDER OF INITIATING OR MAINTAINING SLEEP: ICD-10-CM

## 2022-01-06 DIAGNOSIS — G43.709 CHRONIC MIGRAINE WITHOUT AURA WITHOUT STATUS MIGRAINOSUS, NOT INTRACTABLE: ICD-10-CM

## 2022-01-06 PROCEDURE — 72148 MRI LUMBAR SPINE W/O DYE: CPT | Mod: ME | Performed by: RADIOLOGY

## 2022-01-06 PROCEDURE — G1004 CDSM NDSC: HCPCS | Performed by: RADIOLOGY

## 2022-01-06 RX ORDER — ZOLPIDEM TARTRATE 10 MG/1
TABLET ORAL
Qty: 30 TABLET | Refills: 3 | Status: SHIPPED | OUTPATIENT
Start: 2022-01-06 | End: 2023-03-16

## 2022-01-06 RX ORDER — GABAPENTIN 600 MG/1
300 TABLET ORAL 3 TIMES DAILY PRN
Qty: 60 TABLET | Refills: 1 | Status: SHIPPED | OUTPATIENT
Start: 2022-01-06 | End: 2022-07-08

## 2022-01-06 RX ORDER — ERGOCALCIFEROL 1.25 MG/1
CAPSULE, LIQUID FILLED ORAL
Qty: 4 CAPSULE | Refills: 1 | Status: SHIPPED | OUTPATIENT
Start: 2022-01-06 | End: 2022-03-16

## 2022-01-06 RX ORDER — GABAPENTIN 300 MG/1
300 CAPSULE ORAL 3 TIMES DAILY PRN
Qty: 60 CAPSULE | Refills: 1 | Status: CANCELLED | OUTPATIENT
Start: 2022-01-06

## 2022-01-06 RX ORDER — SUMATRIPTAN 100 MG/1
TABLET, FILM COATED ORAL
Qty: 9 TABLET | Refills: 3 | Status: SHIPPED | OUTPATIENT
Start: 2022-01-06 | End: 2023-10-09

## 2022-01-06 NOTE — TELEPHONE ENCOUNTER
Reason for Call:  Medication or medication refill:    Do you use a [facility]?  Name of the pharmacy and phone number for the current request:      [facility] [place], [place] - [address]    Name of the medication requested:   gabapentin (NEURONTIN) 300 MG capsule    Other request: Pt requesting for 600 mg to break in half     Can we leave a detailed message on this number? YES    Phone number patient can be reached at: Home number on file [phone] (home)    Best Time: Anytime    Call taken on 1/6/2022 at 12:44 PM by Carol Anthony MA

## 2022-01-06 NOTE — TELEPHONE ENCOUNTER
Screening Questions for Radiology Injections:    Injection to be done at which interventional clinic site? [facility]    Remind [place] Pt's that Covid test is no longer required except for sedation procedure Pt's.    Instruct patient to arrive as directed prior to the scheduled appointment time:    [facility]: 30 minutes before      [place]: 30 minutes before; if IV needed 1 hour before     Procedure ordered by Meredith    Procedure ordered? Left S1 transforaminal lumbar epidural steroid injection.      Transforaminal Cervical CHIVO -  [facility] does NOT have providers that do these- [facility] and [facility] do have providers that do    As a reminder, receiving steroids can decrease your body's ability to fight infection.   Would you still like to move forward with scheduling the injection?  Yes    What insurance would patient like us to bill for this procedure? MEDICARE UCARE       Worker's comp or MVA (motor vehicle accident) -Any injection DO NOT SCHEDULE and route to Rylee Cardenas.      [employer] insurance - For SI joint injections, DO NOT SCHEDULE and route Rylee Cardenas.       ALL BCBS, [employer] and [employer]-Route to Rylee for review DO NOT SCHEDULE      IF SCHEDULING IN [place] AND NEEDS A PA, IT IS OKAY TO SCHEDULE. [place] HANDLES THEIR OWN PA'S AFTER THE PATIENT IS SCHEDULED. PLEASE SCHEDULE AT LEAST 1 WEEK OUT SO A PA CAN BE OBTAINED.    Any chance of pregnancy? NO   If YES, do NOT schedule and route to RN pool    Is an  needed? No     Patient has a drive home? (mandatory) YES: OK    Is patient taking any blood thinners (That is: Coumadin, Warfarin, Jantoven, Pradaxa Xarelto, Eliquis, Edoxaban, Enoxaparin, Lovenox, Heparin, Arixtra, Fondaparinux, or Fragmin? OR Antiplatelet medication such as Plavix, Brilinta, or Effient? )? No   If hold needed, do NOT schedule, route to RN pool     Is patient taking any aspirin products (includes Excedrin and Fiorinal)? No     If more than 325mg/day,  OK to schedule; Instruct pt to decrease to less than 325 mg for 7 days AND route to RN pool    For CERVICAL procedures, hold all aspirin products for 6 days.     Tell pt that if aspirin product is not held for 6 days, the procedure WILL BE cancelled.      Does the patient have a bleeding or clotting disorder? No     If YES, okay to schedule AND route to RN nurse pool    For any patients with platelet count <100, must be forwarded to provider    Any allergies to contrast dye, iodine, shellfish, or numbing and steroid medications? No    If YES, add allergy information to appointment notes AND route to the RN pool     If CHIVO and Contrast Dye / Iodine Allergy? DO NOT SCHEDULE, route to RN pool    Allergies: Lurasidone and Morphine     Is patient diabetic?  No  If YES, instruct them to bring their glucometer.    Does patient have an active infection or treated for one within the past week? No     Is patient currently taking any antibiotics?  No     For patients on chronic, preventative, or prophylactic antibiotics, procedures may be scheduled.     For patients on antibiotics for active or recent infection:antibiotic course must have been completed for 4 days    Is patient currently taking any steroid medications? (i.e. Prednisone, Medrol)  Yes - PREDNISONE     For patients on steroid medications, course must have been completed for 4 days    Is patient actively being treated for cancer or immunocompromised? No  If YES, do NOT schedule and route to RN pool     Are you able to get on and off an exam table with minimal or no assistance? Yes  If NO, do NOT schedule and route to RN pool    Are you able to roll over and lay on your stomach with minimal or no assistance? Yes  If NO, do NOT schedule and route to RN pool     Has the patient had a flu shot or any other vaccinations within 7 days before or after the procedure.  Yes - 1/4/2022     Have you recently had a COVID vaccine or have plans to get it in the near future? Yes  - 1/4/2022    If yes, explain that for the vaccine to work best they need to:       wait 1 week before and 1 week after getting Vaccine #1    wait 1 week before and 2 weeks after getting Vaccine #2    wait 1 week before and 2 weeks after getting Vaccine BOOSTER    If patient has concerns about the timing, send to RN pool     Does patient have an MRI/CT?  YES: MRI  Check Procedure Scheduling Grid to see if required.      Was the MRI done within the last 3 years?  Yes    If yes, where was the MRI done i.e.[facility], [facility], [facility], [facility] etc? [facility]      If no, do not schedule and route to RN pool    If MRI was not done at [facility], [facility] or [facility] do NOT schedule and route to RN pool.      If pt has an imaging disc, the injection MAY be scheduled but pt has to bring disc to appt.     If they show up without the disc the injection cannot be done    Procedure Specific Instructions:      If celiac plexus block, informed patient NPO for 6 hours and that it is okay to take medications with sips of water, especially blood pressure medications  Not Applicable         If this is for a cervical procedure, informed patient that aspirin needs to be held for 6 days.   Not Applicable      If IV needed:    Do not schedule procedures requiring IV placement in the first appointment of the day or first appointment after lunch. Do NOT schedule at 0745, 0815 or 1245. OK    Instructed pt to arrive 30 minutes early for IV start if required. (Check Procedure Scheduling Grid)  Not Applicable    Reminders:      If you are started on any steroids or antibiotics between now and your appointment, you must contact us because the procedure may need to be cancelled.  Yes      For all procedures except radiofrequency ablations (RFAs) and spinal cord stimulator (SCS) trials, informed patient:    IV sedation is not provided for this procedure.  If you feel that an oral anti-anxiety medication is needed, you can discuss  this further with your referring provider or primary care provider.  The Pain Clinic provider will discuss specifics of what the procedure includes at your appointment.  Most procedures last 10-20 minutes.  We use numbing medications to help with any discomfort during the procedure.  Not Applicable      For patients 85 or older we recommend having an adult stay w/ them for the remainder of the day.   OK    Does the patient have any questions?  NO  Annita Dobbins  [facility]

## 2022-01-06 NOTE — TELEPHONE ENCOUNTER
"Dr. Luis,    Pt calling requesting refills    Requested Prescriptions   Pending Prescriptions Disp Refills     vitamin D2 (ERGOCALCIFEROL) 67957 units (1250 mcg) capsule 4 capsule 1     Sig: TAKE ONE CAPSULE BY MOUTH ONCE WEEKLY       There is no refill protocol information for this order        SUMAtriptan (IMITREX) 100 MG tablet 9 tablet 3     Sig: TAKE 1 TABLET BY MOUTH ON THE ONSET OF HEADACHE, MAY REPEAT IN 2 HOURS..*MAX 2 TABLETS DAILY*       Serotonin Agonists Failed - 1/6/2022 11:23 AM        Failed - Blood pressure under 140/90 in past 12 months     BP Readings from Last 3 Encounters:   01/05/22 (!) 144/79   01/04/22 122/69   05/19/21 134/79                 Failed - Serotonin Agonist request needs review.     Please review patient's record. If patient has had 8 or more treatments in the past month, please forward to provider.          Passed - Recent (12 mo) or future (30 days) visit within the authorizing provider's specialty     Patient has had an office visit with the authorizing provider or a provider within the authorizing providers department within the previous 12 mos or has a future within next 30 days. See \"Patient Info\" tab in inbasket, or \"Choose Columns\" in Meds & Orders section of the refill encounter.              Passed - Medication is active on med list        Passed - Patient is age 18 or older        Passed - No active pregnancy on record        Passed - No positive pregnancy test in past 12 months           zolpidem (AMBIEN) 10 MG tablet 30 tablet 3     Sig: TAKE ONE-HALF TABLET TO ONE TABLET BY MOUTH AT BEDTIME FOR SLEEP       There is no refill protocol information for this order        Routing refill request to provider for review/approval because:  Drug not on the [facility] refill protocol   Elevated BP    Jocelynn Aleman RN  [facility]          "

## 2022-01-13 ENCOUNTER — TELEPHONE (OUTPATIENT)
Dept: NEUROSURGERY | Facility: CLINIC | Age: 57
End: 2022-01-13
Payer: COMMERCIAL

## 2022-01-13 DIAGNOSIS — M54.16 LUMBAR RADICULOPATHY: ICD-10-CM

## 2022-01-13 DIAGNOSIS — R26.9 GAIT DIFFICULTY: ICD-10-CM

## 2022-01-13 DIAGNOSIS — M54.42 ACUTE LEFT-SIDED LOW BACK PAIN WITH LEFT-SIDED SCIATICA: Primary | ICD-10-CM

## 2022-01-13 NOTE — TELEPHONE ENCOUNTER
Th pt was informed that a message will be sent to Dr Muller requesting that he review the MRI report and advise. We also have a pending DME order for a cane that will be sent to him as well.   Her daughter will come and pick this up in clinic.    Maria Diaz RN   Neurology Care Coordinator

## 2022-01-13 NOTE — TELEPHONE ENCOUNTER
[facility] Call Center    Phone Message    May a detailed message be left on voicemail: no     Reason for Call: Other: Patient is calling wanting a call back with MRI results as well as prescription for a cane as her leg is numb. Please advise. Thank you.     Action Taken: Message routed to:  Adult Clinics: Neurology p [id number]    Travel Screening: Not Applicable

## 2022-01-17 NOTE — TELEPHONE ENCOUNTER
Capo Muller MD  You 3 days ago     OM    She should discuss this with her upcoming pain referral for possible injection.  MRI shows:  At L5-S1 there is been progression of the broad-based disc   extrusion asymmetric to the left that effaces the left lateral recess   and impinges on the descending left S1 nerve root.     I do not advise using a cane.

## 2022-01-17 NOTE — TELEPHONE ENCOUNTER
Tried to reach the pt to discuss Dr Muller's interpretation and suggestion but there was no answer so a message was left on her VM.    Maria Diaz RN   Neurology Care Coordinator

## 2022-01-18 ENCOUNTER — RADIOLOGY INJECTION OFFICE VISIT (OUTPATIENT)
Dept: PALLIATIVE MEDICINE | Facility: CLINIC | Age: 57
End: 2022-01-18
Attending: FAMILY MEDICINE
Payer: MEDICARE

## 2022-01-18 VITALS — SYSTOLIC BLOOD PRESSURE: 140 MMHG | DIASTOLIC BLOOD PRESSURE: 69 MMHG | HEART RATE: 96 BPM | OXYGEN SATURATION: 97 %

## 2022-01-18 DIAGNOSIS — M54.16 LUMBAR RADICULOPATHY: ICD-10-CM

## 2022-01-18 DIAGNOSIS — M54.42 ACUTE LEFT-SIDED LOW BACK PAIN WITH LEFT-SIDED SCIATICA: ICD-10-CM

## 2022-01-18 PROCEDURE — 64483 NJX AA&/STRD TFRM EPI L/S 1: CPT | Mod: LT | Performed by: PAIN MEDICINE

## 2022-01-18 RX ORDER — DEXAMETHASONE SODIUM PHOSPHATE 10 MG/ML
10 INJECTION, SOLUTION INTRAMUSCULAR; INTRAVENOUS ONCE
Status: COMPLETED | OUTPATIENT
Start: 2022-01-18 | End: 2022-01-18

## 2022-01-18 RX ADMIN — DEXAMETHASONE SODIUM PHOSPHATE 10 MG: 10 INJECTION, SOLUTION INTRAMUSCULAR; INTRAVENOUS at 14:43

## 2022-01-18 ASSESSMENT — PAIN SCALES - GENERAL: PAINLEVEL: WORST PAIN (10)

## 2022-01-18 NOTE — PROGRESS NOTES
Pre procedure Diagnosis: lumbar radiculopathy, lumbar degenerative disc disease   Post procedure Diagnosis: Same  Procedure performed: lumbar transforaminal epidural steroid injection at left L5-S1, fluoroscopically guided, contrast controlled   Anesthesia: none  Complications: none  Operators: Tobin Parisi MD     Indications:   Kareen Hernandez is a 56 year old female.  They have a history of left-sided low back pain radiating to her lower extremity.  Exam shows + slump and they have tried conservative treatment including meds/pt/prior injections with benefit.    MRI    L1-2: Circumferential disc bulge with facet arthropathy but no spinal  canal stenosis. The neural foramen are mildly narrowed bilaterally.     L2-3: No spinal canal or neuroforaminal stenosis. Bilateral facet  arthropathy.     L3-4: Disc bulge with facet arthropathy and thickening of the  ligamentum flavum causing mild spinal canal stenosis. There is mild  bladder in the lateral recesses without neural impingement. There is  mild narrowing of the neural foramen bilaterally.     L4-5: Disc bulge with facet arthropathy and thickening of the  ligamentum flavum with mild-to-moderate spinal canal stenosis. There  is moderate narrowing of the right lateral recess with abutment of the  descending right L5 nerve root with possible impingement. The left  lateral recess is mildly narrowed. Mild-to-moderate right neural  foraminal stenosis. The left neural foramen is mildly to moderately  narrowed and there is a neural foraminal disc protrusion which abuts  and may impinge on the exiting left L4 nerve root. This finding  appears mildly progressed compared to the prior study.     L5-S1: Broad-based disc extrusion asymmetric to the left which effaces  the left lateral recess and impinges on the descending left S1 nerve  root. This has progressed since the prior study. There are  postoperative changes of right hemilaminectomy. The spinal canal is  mildly  narrowed. The neural foramen are mildly narrowed bilaterally.     Paraspinous tissues are within normal limits.  Options/alternatives, benefits and risks were discussed with the patient including bleeding, infection, tissue trauma, numbness, weakness, paralysis, spinal cord injury, radiation exposure, headache and reaction to medications. Questions were answered to her satisfaction and she agrees to proceed. Voluntary informed consent was obtained and signed.     Vitals were reviewed: Yes  LMP  (LMP Unknown)   Allergies were reviewed:  Yes   Medications were reviewed:  Yes   Pre-procedure pain score: 10/10    Procedure:  After getting informed consent, patient was brought into the procedure suite and was placed in a prone position on the procedure table.   A Pause for the Cause was performed.  Patient was prepped and draped in sterile fashion.     After identifying the left L5-S1 neuroforamen, the C-arm was rotated to a left lateral oblique angle.  A total of 4ml of Lidocaine 1% was used to anesthetize the skin and the needle track at a skin entry site coaxial with the fluoroscopy beam, and overriding the superior aspect of the neuroforamen.  A 22 gauge 3.5 inch spinal needle was advanced under intermittent fluoroscopy until it entered the foramen superiorly.    The position was then inspected from anteroposterior and lateral views, and the needle adjusted appropriately.  A total of 1ml of Omnipaque-300 was injected, confirming appropriate position, with spread into the nerve root sheath and the epidural space, with no intravascular uptake. 9ml was wasted    Then, after repeated negative aspiration, a combination of Decadron 10 mg, 0.25% bupivacaine 2 ml, diluted with 3ml of normal saline was injected.     Hemostasis was achieved, the area was cleaned, and bandaids were placed when appropriate.  The patient tolerated the procedure well, and was taken to the recovery room.    Images were saved to  PACS.    Post-procedure pain score: 7/10  Follow-up includes:   -f/u phone call in one week  -f/u with referring provider    Tobin Parisi MD  [facility]

## 2022-01-18 NOTE — NURSING NOTE
Discharge Information    IV Discontiued Time:  NA    Amount of Fluid Infused:  NA    Discharge Criteria = When patient returns to baseline or as per MD order    Consciousness:  Pt is fully awake    Circulation:  BP +/- 20% of pre-procedure level    Respiration:  Patient is able to breathe deeply    O2 Sat:  Patient is able to maintain O2 Sat >92% on room air    Activity:  Moves 4 extremities on command    Ambulation:  Patient is able to stand and walk or stand and pivot into wheelchair    Dressing:  Clean/dry or No Dressing    Notes:   Discharge instructions and AVS given to patient    Patient meets criteria for discharge?  YES    Admitted to PCU?  No    Responsible adult present to accompany patient home?  Yes    Signature/Title:    bisi kent RN  RN Care Coordinator  [facility]

## 2022-01-18 NOTE — TELEPHONE ENCOUNTER
The pt was informed of Dr Muller's response below. She verbalized understanding and will discuss these concerns with the pain provider today. She is scheduled to return to see Dr Muller in a month.    Maria Diaz RN   Neurology Care Coordinator

## 2022-01-18 NOTE — PATIENT INSTRUCTIONS
[facility]   Procedure Discharge Instructions    Today you saw:  Dr. Tobin Parisi     You had an:  Epidural steroid injection  -lumbar  Medications used:  Lidocaine   Bupivacaine   Dexamethasone Omnipaque             Be cautious when walking. Numbness and/or weakness in the lower extremities may occur for up to 6-8 hours after the procedure due to effect of the local anesthetic    Do not drive for 6 hours. The effect of the local anesthetic could slow your reflexes.     You may resume your regular activities after 24 hours    Avoid strenuous activity for the first 24 hours    You may shower, however avoid swimming, tub baths or hot tubs for 24 hours following your procedure    You may have a mild to moderate increase in pain for several days following the injection.    It may take up to 14 days for the steroid medication to start working although you may feel the effect as early as a few days after the procedure.       You may use ice packs for 10-15 minutes, 3 to 4 times a day at the injection site for comfort    Do not use heat to painful areas for 6 to 8 hours. This will give the local anesthetic time to wear off and prevent you from accidentally burning your skin.     Unless you have been directed to avoid the use of anti-inflammatory medications (NSAIDS), you may use medications such as ibuprofen, Aleve or Tylenol for pain control if needed.     If you were fasting, you may resume your normal diet and medications after the procedure    If you have diabetes, check your blood sugar more frequently than usual as your blood sugar may be higher than normal for 10-14 days following a steroid injection. Contact your doctor who manages your diabetes if your blood sugar is higher than usual    Possible side effects of steroids that you may experience include flushing, elevated blood pressure, increased appetite, mild headaches and restlessness.  All of these symptoms will get better  with time.    If you experience any of the following, call the Pain Clinic during work hours (Mon-Friday 8-4:30 pm) at [phone] or the Provider Line after hours at [phone]:  -Fever over 100 degree F  -Swelling, bleeding, redness, drainage, warmth at the injection site  -Progressive weakness or numbness in your legs or arms  -Loss of bowel or bladder function  -Unusual headache that is not relieved by Tylenol or other pain reliever  -Unusual new onset of pain that is not improving

## 2022-01-18 NOTE — NURSING NOTE
Pre-procedure Intake  If YES to any questions or NO to having a   Please complete laminated checklist and leave on the computer keyboard for Provider, verbally inform provider if able.    For SCS Trial, RFA's or any sedation procedure:  Have you been fasting? NA    If yes, for how long?     Are you taking any any blood thinners such as Coumadin, Warfarin, Jantoven, Pradaxa Xarelto, Eliquis, Edoxaban, Enoxaparin, Lovenox, Heparin, Arixtra, Fondaparinux, or Fragmin? OR Antiplatelet medication such as Plavix, Brilinta, or Effient?   No     If yes, when did you take your last dose?     Do you take aspirin?  No    If cervical procedure, have you held aspirin for 6 days?       Do you have any allergies to contrast dye, iodine, steroid and/or numbing medications?  NO    Are you currently taking antibiotics or have an active infection?  NO    Have you had a fever/elevated temperature within the past week? NO    Are you currently taking oral steroids? NO    Do you have a ? Yes    Are you pregnant or breastfeeding?  NO    Have you received the COVID-19 vaccine? Yes    If yes, was it your 1st, 2nd or only dose needed? 1st dose     Date of most recent vaccine: 1/4/22    Notify provider and RNs if systolic BP >170, diastolic BP >100, P >100 or O2 sats < 90%

## 2022-01-20 ENCOUNTER — TELEPHONE (OUTPATIENT)
Dept: FAMILY MEDICINE | Facility: CLINIC | Age: 57
End: 2022-01-20
Payer: COMMERCIAL

## 2022-01-20 DIAGNOSIS — M54.42 ACUTE LEFT-SIDED LOW BACK PAIN WITH LEFT-SIDED SCIATICA: Primary | ICD-10-CM

## 2022-01-20 NOTE — PROGRESS NOTES
Pt had SVR 12 labs drawn on 1/4/22 and results show that the virus was not detected. Notified pt of results and that she is considered cured of hepatitis C. Also reviewed the following:    When a cure is achieved:    1) You are no longer considered to be infectious for Hepatitis C.     2) A cure does not mean you have immunity. You can still be reinfected if you come into contact with infected blood. Avoiding sex with infected people, avoid reusing needles and razors that came into contact with infected blood.     3) You will always test positive for the Hepatitis C antibody. Your Hepatitis C RNA Quant (viral level) will remain undetected as long as you avoid risks for reinfection.     Pt will not need further follow up in the hepatology clinic. Pt verbalized understanding.

## 2022-01-20 NOTE — TELEPHONE ENCOUNTER
Patient calling to inform providershe received the injection for the sciatica 2 days ago and is still experiencing a lot of pain. .

## 2022-01-20 NOTE — TELEPHONE ENCOUNTER
Dr. Luis,    Please see below. Would it be appropriate to direct patient to neurologist at this time or do you have any other advise for sciatica pain? She has appt schedule with neuro 2/7/22. Maybe I could reach out to see if they're able to see her sooner?     Thanks,  YESSENIA Cosme  [facility]

## 2022-01-21 ENCOUNTER — TELEPHONE (OUTPATIENT)
Dept: FAMILY MEDICINE | Facility: CLINIC | Age: 57
End: 2022-01-21
Payer: COMMERCIAL

## 2022-01-21 DIAGNOSIS — M54.42 ACUTE LEFT-SIDED LOW BACK PAIN WITH LEFT-SIDED SCIATICA: Primary | ICD-10-CM

## 2022-01-21 RX ORDER — SENNOSIDES 8.6 MG
1300 CAPSULE ORAL EVERY 8 HOURS PRN
Qty: 90 TABLET | Refills: 3 | Status: SHIPPED | OUTPATIENT
Start: 2022-01-21 | End: 2022-11-17

## 2022-01-21 NOTE — TELEPHONE ENCOUNTER
I have added acetaminophen to her plan to see if this will help while waiting for the injection to start helping. Ice may also be helpful. If no improvement from the injection in 5-7 days, she should contact the team that preformed the injection or schedule follow up.    Monika Luna, APRN, CNP

## 2022-01-21 NOTE — TELEPHONE ENCOUNTER
----- Message from Annalise Mack sent at 1/21/2022 10:40 AM CST -----  Hello Carmelina,      Patient was seen one time for injection, if additional pain management is needed please place comprehensive evaluation order using order number [id number].      Thank you,      Annalise Mack    [facility]

## 2022-01-21 NOTE — TELEPHONE ENCOUNTER
Pain mngt,    Patient asking for advise from pain mngt as well. She feels that injection is not working and provider is redirecting pt to discuss with who provided injection. See below for more detail.     Thanks,  YESSENIA Cosme  [facility]

## 2022-01-24 ENCOUNTER — TELEPHONE (OUTPATIENT)
Dept: FAMILY MEDICINE | Facility: CLINIC | Age: 57
End: 2022-01-24
Payer: COMMERCIAL

## 2022-01-24 DIAGNOSIS — M54.42 ACUTE LEFT-SIDED LOW BACK PAIN WITH LEFT-SIDED SCIATICA: Primary | ICD-10-CM

## 2022-01-24 NOTE — TELEPHONE ENCOUNTER
Dr. Luis    Pt calling requesting referral to pain mgmt for back injection - pt had a pain mgmt referral placed but was told by pain clinic that she needs a different referral for the procedure.    Cued if agree.    Jocelynn Aleman RN  [facility]

## 2022-01-24 NOTE — TELEPHONE ENCOUNTER
Please call to clarify with patient.  She appears to have had an injection for the back pain on 1/18.  Is she trying to get a hold of that provider or is she interested in a referral for more comprehensive pain management services?    It would be too soon to do another injection and it can take up to 7-10 days to see improvement from the steroid part of the injection.    Leno Luis MD

## 2022-01-24 NOTE — TELEPHONE ENCOUNTER
Please let her know it is too soon to get another shot and also too soon to determine if this one has really worked or not.  She should reach out to Dr. Parisi, the doctor who did the shot for further guidance.    Leno Luis MD

## 2022-01-24 NOTE — TELEPHONE ENCOUNTER
Dr. Luis,    Pt stated that her recent injection has not improved her pain at all and is requesting to have another injection done soon. Referral was already placed for comprehensive pain mgmt services. Please advise.    Jocelynn Aleman RN  [facility]

## 2022-01-25 ENCOUNTER — IMMUNIZATION (OUTPATIENT)
Dept: FAMILY MEDICINE | Facility: CLINIC | Age: 57
End: 2022-01-25
Attending: FAMILY MEDICINE
Payer: MEDICARE

## 2022-01-25 DIAGNOSIS — Z23 HIGH PRIORITY FOR 2019-NCOV VACCINE: Primary | ICD-10-CM

## 2022-01-25 PROCEDURE — 0002A COVID-19,PF,PFIZER (12+ YRS): CPT

## 2022-01-25 PROCEDURE — 91300 COVID-19,PF,PFIZER (12+ YRS): CPT

## 2022-01-25 PROCEDURE — 99207 PR NO CHARGE NURSE ONLY: CPT

## 2022-01-27 DIAGNOSIS — F31.9 BIPOLAR AFFECTIVE DISORDER, REMISSION STATUS UNSPECIFIED (H): ICD-10-CM

## 2022-01-27 DIAGNOSIS — F33.1 MAJOR DEPRESSIVE DISORDER, RECURRENT EPISODE, MODERATE (H): ICD-10-CM

## 2022-01-28 RX ORDER — FLUOXETINE 40 MG/1
CAPSULE ORAL
Qty: 180 CAPSULE | Refills: 0 | Status: SHIPPED | OUTPATIENT
Start: 2022-01-28 | End: 2022-02-01

## 2022-02-01 ENCOUNTER — VIRTUAL VISIT (OUTPATIENT)
Dept: FAMILY MEDICINE | Facility: CLINIC | Age: 57
End: 2022-02-01
Payer: MEDICARE

## 2022-02-01 DIAGNOSIS — F33.1 MAJOR DEPRESSIVE DISORDER, RECURRENT EPISODE, MODERATE (H): ICD-10-CM

## 2022-02-01 DIAGNOSIS — M54.42 ACUTE LEFT-SIDED LOW BACK PAIN WITH LEFT-SIDED SCIATICA: ICD-10-CM

## 2022-02-01 DIAGNOSIS — F31.9 BIPOLAR AFFECTIVE DISORDER, REMISSION STATUS UNSPECIFIED (H): Primary | ICD-10-CM

## 2022-02-01 PROCEDURE — 99442 PR PHYSICIAN TELEPHONE EVALUATION 11-20 MIN: CPT | Mod: 95 | Performed by: FAMILY MEDICINE

## 2022-02-01 NOTE — PROGRESS NOTES
Kareen is a 56 year old who is being evaluated via a billable telephone visit.      What phone number would you like to be contacted at? [phone]    How would you like to obtain your AVS? [email or web address]    Assessment & Plan     Bipolar affective disorder, remission status unspecified (H)  The patient is weaned herself off fluoxetine since my last visit with her.  She stopped about 1 week ago and no longer has the burning issues in her throat associated with consumption of this.  She remains on the Wellbutrin 300 mg daily as well as lamotrigine.  She seemed fine on the phone today, certainly not manic.  I suggested a follow-up in 3 weeks and we have actually scheduled her for another phone visit on February 22 to see how she is doing.  At that point time we could consider another SSRI or may be the addition of something like Cymbalta if her depressive symptoms are worsening.    Major depressive disorder, recurrent episode, moderate (H)  As above.    Acute left-sided low back pain with left-sided sciatica  She continues to have left leg discomfort.  She did not find the injection performed on January 18 is helpful as a previous injection performed last year.  She has consultation with the surgeon next week.  Imaging has been updated.  Gabapentin is somewhat helpful for the discomfort she is continuing to use that at this time.  She is taking 300 mg during the day and if suggested she could take 600 mg before bedtime for improved pain control and help with sleep.                   Return in about 3 weeks (around 2/22/2022) for Mood Recheck, Pain Recheck, telephone visit.    Leno Luis MD  [facility]    Cristal Mathur is a 56 year old who presents for the following health issues     HPI       Patient continues to have pain and discomfort down her left leg.  She did not find the steroid injection particularly helpful and is not even sure if she got short-term relief related to the  local anesthetic.  She is seeing the surgeon next week.  Gabapentin has been partially helpful at relieving the discomfort.    She stopped fluoxetine about 1 month ago.  She reports that her mood remains okay at this time.  Has not noticed any side effects from the increased dose of the bupropion.  She has used other SSRIs in the past reports that they were helpful and does not really recall how she ended up on fluoxetine.  The burning in her throat related to consumption of the fluoxetine capsules has resolved with cessation.    Review of Systems   Constitutional, HEENT, cardiovascular, pulmonary, gi and gu systems are negative, except as otherwise noted.      Objective           Vitals:  No vitals were obtained today due to virtual visit.    Physical Exam   healthy, alert and no distress  PSYCH: Alert and oriented times 3; coherent speech, normal   rate and volume, able to articulate logical thoughts, able   to abstract reason, no tangential thoughts, no hallucinations   or delusions  Her affect is normal  RESP: No cough, no audible wheezing, able to talk in full sentences  Remainder of exam unable to be completed due to telephone visits                Phone call duration: 15 minutes

## 2022-02-08 ENCOUNTER — OFFICE VISIT (OUTPATIENT)
Dept: NEUROSURGERY | Facility: CLINIC | Age: 57
End: 2022-02-08
Payer: MEDICARE

## 2022-02-08 VITALS
SYSTOLIC BLOOD PRESSURE: 113 MMHG | OXYGEN SATURATION: 98 % | HEIGHT: 67 IN | DIASTOLIC BLOOD PRESSURE: 75 MMHG | WEIGHT: 153 LBS | BODY MASS INDEX: 24.01 KG/M2 | HEART RATE: 95 BPM

## 2022-02-08 DIAGNOSIS — M54.16 LUMBAR RADICULOPATHY: Primary | ICD-10-CM

## 2022-02-08 PROCEDURE — 99214 OFFICE O/P EST MOD 30 MIN: CPT | Performed by: PREVENTIVE MEDICINE

## 2022-02-08 ASSESSMENT — MIFFLIN-ST. JEOR: SCORE: 1326.63

## 2022-02-08 ASSESSMENT — PAIN SCALES - GENERAL: PAINLEVEL: SEVERE PAIN (7)

## 2022-02-08 NOTE — PROGRESS NOTES
Subjective:  Kareen Hernandez is a 56 year old female who presents today for follow-up regarding low back pain and suspected left S1 radiculopathy. Assessment from 1/5/2022 was as follows:  History of prior right sided L5-S1 decompression surgery with resolution of right-sided symptoms.  Recurrent onset of left-sided symptoms that appear to be S1, which have been ongoing for about a month and previously had resolved after one left S1 transforaminal CHIVO 2/22/2021 following an MRI showing recurrent disc herniation at L5-S1 on 1/29/2021.    PRIOR HISTORY from 1/5/2022:  is a history of left-sided sciatica with recent worsening of left leg symptoms for about a month, and she has been evaluated by neurosurgeon Dr. Rock Hargrove in the past and left S1 transforaminal injection on 2/22/2021 at [facility], which was helpful.  The neurosurgeon recommended conservative care only at at follow-up about a month later..  Another 1 has been ordered and the pain consult is pending..  An MRI from 1/29/2021 compared to a prior study from 2018 showed a new left-sided L5-S1 disc.  See below for further details.  There is also evidence of a right-sided laminotomy at L5-S1 with excellent results.  She has been started on prednisone and gabapentin and consultation here has been sought.  A pain clinic consultation for possible epidural steroid injection is ordered by Dr. Luis as well.  The patient also has some underlying fibromyalgia and is on cyclobenzaprine for that.  She has been referred for medical cannabis as well.     She reports that the onset of symptoms currently experienced happened about a month ago without a mechanism of injury, and she describes an S1 pattern of pain down to her left ankle that burns especially if she is walking and feels better if she sits leaning on her right buttock with her left hip and knee slightly flexed.  The back of her leg feels tight and pulling and a little weak.  There is no  numbness but there is a little tingling in the lateral left calf.  There is no bowel or bladder dysfunction or saddle anesthesia.    Treatment to Date: Physical therapy, prednisone, gabapentin, right L5-S1 hemilaminotomy microdiscectomy 2 to 3 years ago.    INTERIM HISTORY:    Her repeat MRI of 1/6/2022 was compared to her old study from 1/29/2021, and showed progression of the L5-S1 disc extrusion asymmetric to the left impinging on the left S1 nerve root. That was the main issue but there was also mild progression of lumbar spondylosis with mild to moderate left foraminal stenosis at L4-5. There is also evidence of a prior right hemilaminectomy at L5-S1. She had a Left transforaminal CHIVO L5-S1 1/18/2022.  Pain score at the time of the injection was 9 out of 10, dropping to 7 out of 10 postinjection, and back up to 8-9 out of 10 today.  Overall she has not seen a significant improvement in the quality, severity, location, duration, or timing of her symptoms.      Past medical history is reviewed and is unchanged for any new medical diagnoses in the interim.   Pertinent for prior right L5-S1 laminotomy and microdiscectomy not listed in the surgical history in the chart.  Also resolved obesity, fibromyalgia, hypertension, depression-severe-with psychotic behavior, status post gastric bypass surgery she lost nearly 150 pounds and has kept it off..  There is a history of hepatitis C and recent blood test show that it has been cured.    Social history is reviewed and is unchanged in the interim.  She is on Social Security disability with the disabling diagnoses being multiple kidney stones and fibromyalgia.  She is currently single and .  He denies sports hobbies or activities           IMAGING: Images and reports reviewed.    MR LUMBAR SPINE W/O CONTRAST 1/6/2022       Comparison: 1/29/2021     L4-5: Disc bulge with facet arthropathy and thickening of the  ligamentum flavum with mild-to-moderate spinal canal  stenosis. There  is moderate narrowing of the right lateral recess with abutment of the  descending right L5 nerve root with possible impingement. The left  lateral recess is mildly narrowed. Mild-to-moderate right neural  foraminal stenosis. The left neural foramen is mildly to moderately  narrowed and there is a neural foraminal disc protrusion which abuts  and may impinge on the exiting left L4 nerve root. This finding  appears mildly progressed compared to the prior study.     L5-S1: Broad-based disc extrusion asymmetric to the left which effaces  the left lateral recess and impinges on the descending left S1 nerve  root. This has progressed since the prior study. There are  postoperative changes of right hemilaminectomy. The spinal canal is  mildly narrowed. The neural foramen are mildly narrowed bilaterally.                                                                     Impression:   1. At L5-S1 there is been progression of the broad-based disc  extrusion asymmetric to the left that effaces the left lateral recess  and impinges on the descending left S1 nerve root.  2. Mildly progressed lumbar spondylosis with mild-to-moderate left  foraminal stenosis at L4-5 as detailed above.   3. Postoperative changes of prior right hemilaminectomy at L5-S1.     MRI OF THE LUMBAR SPINE WITHOUT AND WITH CONTRAST 1/29/2021      COMPARISON: 5/8/2018        L4-5: Mild-to-moderate loss of disc height. Dehydration of the discs.  Generalized disc bulge. Moderate ligamentum flavum thickening and mild  facet hypertrophy. Spinal canal is moderately to severely narrow.  There is moderate narrowing of the right lateral recess and mild  narrowing of the left lateral recess. The left neural foramen is  moderate to severely narrow. The right neural foramen is moderately  narrow.     L5-S1: Mild loss of disc height. Dehydration of the discs. Generalized  disc bulge. Laminotomy on the right side. There is a superimposed  focal left  central disc protrusion which effaces the left lateral  recess and is compressing the left S1 nerve root. This disc is new  compared to prior study. The neural foramina are patent bilaterally.  There is mild facet hypertrophy on the left side.                                                               IMPRESSION:  1. New focal disc protrusion at L5-S1 on the left side is effacing the  left lateral recess and compressing the left S1 nerve root. Correlate  for left S1 radicular symptoms.  2. Multilevel degenerative disc disease which includes  moderate-to-severe central spinal stenosis L4-5 and mild spinal  stenosis at L3-4, both similar to the prior study.  3. Lateral recess narrowing at L4-5 is moderate on the right and mild  on the left. This may be effecting the L5 nerve roots. There is mild  lateral recess narrowing bilaterally at L3-4 which may effecting the  L4 nerve roots.  4. There is neural foraminal narrowing which at L3-4 is moderate  bilaterally, at L4-5 is moderate on the right and moderate-to-severe  on the left.     LUMBAR SPINE TWO TO THREE VIEWS 1/28/2021     IMPRESSION: Six nonrib-bearing, lumbar type vertebrae. Normal  alignment. Vertebral body heights are normal. No fractures.  Degenerative endplate spurring at L4-L5. Facet arthropathy at L4-L5,  L5-L6 and L6-S1.     Objective:    CONSTITUTIONAL:  Vital signs as above.  She is miserable and sits leaning on her right buttock keeping her left knee slightly bent and her left hip slightly extended the patient is well nourished and well groomed.    PSYCHIATRIC:  The patient is awake, alert, oriented to person, place and time.  The patient is answering questions appropriately with clear speech.  Normal affect.  Able to follow commands    MUSCULOSKELETAL: She can barely tolerate any weightbearing on her left leg and cannot stand on her left toe but can do it on the right.  NEUROLOGICAL:    Motor: Weakness in left toe standing.    SLR dramatically  positive left for radiating S1 symptoms      Assessment     Kareen Hernandez  is a 56 year old y.o. female who presents today for follow-up regarding  recurrent disc herniation at L5-S1 on the left following previous right L5-S1 decompression several years ago.  She has severe pain and it fits nicely with a left S1 radiculopathy.  She has not responded at all to 1 left L5-S1 TFESI and is now a surgical candidate      Plan:  Expeditious referral will be made for surgical consultation with Dr. Eugenio Cash and further follow-up deferred to him.    Advised patient to call or return early if symptoms worsen, or having problems controlling bladder and bowel function or worsening leg weakness.     Please note: Voice recognition software was used in this dictation.  It may therefore contain typographical errors.  Capo Muller MD

## 2022-02-08 NOTE — PATIENT INSTRUCTIONS
I am sorry to see you in so much pain today Cassandra.  I will make a referral to Dr. Eugenio Cash and I think you are going to need surgery for this a disc that has herniated and is pushing on that left S1 nerve root.  I wish you well and I will defer further follow-up to him and to Dr. Luis.

## 2022-02-08 NOTE — LETTER
2/8/2022         RE: Kareen Hernandez  [address]  [facility] [address]        Dear Colleague,    Thank you for referring your patient, Kareen Hernandez, to the [facility] [place]. Please see a copy of my visit note below.      Subjective:  Kareen Hernandez is a 56 year old female who presents today for follow-up regarding low back pain and suspected left S1 radiculopathy. Assessment from 1/5/2022 was as follows:  History of prior right sided L5-S1 decompression surgery with resolution of right-sided symptoms.  Recurrent onset of left-sided symptoms that appear to be S1, which have been ongoing for about a month and previously had resolved after one left S1 transforaminal CHIVO 2/22/2021 following an MRI showing recurrent disc herniation at L5-S1 on 1/29/2021.    PRIOR HISTORY from 1/5/2022:  is a history of left-sided sciatica with recent worsening of left leg symptoms for about a month, and she has been evaluated by neurosurgeon Dr. Rock Hargrove in the past and left S1 transforaminal injection on 2/22/2021 at [facility], which was helpful.  The neurosurgeon recommended conservative care only at at follow-up about a month later..  Another 1 has been ordered and the pain consult is pending..  An MRI from 1/29/2021 compared to a prior study from 2018 showed a new left-sided L5-S1 disc.  See below for further details.  There is also evidence of a right-sided laminotomy at L5-S1 with excellent results.  She has been started on prednisone and gabapentin and consultation here has been sought.  A pain clinic consultation for possible epidural steroid injection is ordered by Dr. Luis as well.  The patient also has some underlying fibromyalgia and is on cyclobenzaprine for that.  She has been referred for medical cannabis as well.     She reports that the onset of symptoms currently experienced happened about a month ago without a mechanism of injury, and she describes  an S1 pattern of pain down to her left ankle that burns especially if she is walking and feels better if she sits leaning on her right buttock with her left hip and knee slightly flexed.  The back of her leg feels tight and pulling and a little weak.  There is no numbness but there is a little tingling in the lateral left calf.  There is no bowel or bladder dysfunction or saddle anesthesia.    Treatment to Date: Physical therapy, prednisone, gabapentin, right L5-S1 hemilaminotomy microdiscectomy 2 to 3 years ago.    INTERIM HISTORY:    Her repeat MRI of 1/6/2022 was compared to her old study from 1/29/2021, and showed progression of the L5-S1 disc extrusion asymmetric to the left impinging on the left S1 nerve root. That was the main issue but there was also mild progression of lumbar spondylosis with mild to moderate left foraminal stenosis at L4-5. There is also evidence of a prior right hemilaminectomy at L5-S1. She had a Left transforaminal CHIVO L5-S1 1/18/2022.  Pain score at the time of the injection was 9 out of 10, dropping to 7 out of 10 postinjection, and back up to 8-9 out of 10 today.  Overall she has not seen a significant improvement in the quality, severity, location, duration, or timing of her symptoms.      Past medical history is reviewed and is unchanged for any new medical diagnoses in the interim.   Pertinent for prior right L5-S1 laminotomy and microdiscectomy not listed in the surgical history in the chart.  Also resolved obesity, fibromyalgia, hypertension, depression-severe-with psychotic behavior, status post gastric bypass surgery she lost nearly 150 pounds and has kept it off..  There is a history of hepatitis C and recent blood test show that it has been cured.    Social history is reviewed and is unchanged in the interim.  She is on Social Security disability with the disabling diagnoses being multiple kidney stones and fibromyalgia.  She is currently single and .  He denies  sports hobbies or activities           IMAGING: Images and reports reviewed.    MR LUMBAR SPINE W/O CONTRAST 1/6/2022       Comparison: 1/29/2021     L4-5: Disc bulge with facet arthropathy and thickening of the  ligamentum flavum with mild-to-moderate spinal canal stenosis. There  is moderate narrowing of the right lateral recess with abutment of the  descending right L5 nerve root with possible impingement. The left  lateral recess is mildly narrowed. Mild-to-moderate right neural  foraminal stenosis. The left neural foramen is mildly to moderately  narrowed and there is a neural foraminal disc protrusion which abuts  and may impinge on the exiting left L4 nerve root. This finding  appears mildly progressed compared to the prior study.     L5-S1: Broad-based disc extrusion asymmetric to the left which effaces  the left lateral recess and impinges on the descending left S1 nerve  root. This has progressed since the prior study. There are  postoperative changes of right hemilaminectomy. The spinal canal is  mildly narrowed. The neural foramen are mildly narrowed bilaterally.                                                                     Impression:   1. At L5-S1 there is been progression of the broad-based disc  extrusion asymmetric to the left that effaces the left lateral recess  and impinges on the descending left S1 nerve root.  2. Mildly progressed lumbar spondylosis with mild-to-moderate left  foraminal stenosis at L4-5 as detailed above.   3. Postoperative changes of prior right hemilaminectomy at L5-S1.     MRI OF THE LUMBAR SPINE WITHOUT AND WITH CONTRAST 1/29/2021      COMPARISON: 5/8/2018        L4-5: Mild-to-moderate loss of disc height. Dehydration of the discs.  Generalized disc bulge. Moderate ligamentum flavum thickening and mild  facet hypertrophy. Spinal canal is moderately to severely narrow.  There is moderate narrowing of the right lateral recess and mild  narrowing of the left lateral  recess. The left neural foramen is  moderate to severely narrow. The right neural foramen is moderately  narrow.     L5-S1: Mild loss of disc height. Dehydration of the discs. Generalized  disc bulge. Laminotomy on the right side. There is a superimposed  focal left central disc protrusion which effaces the left lateral  recess and is compressing the left S1 nerve root. This disc is new  compared to prior study. The neural foramina are patent bilaterally.  There is mild facet hypertrophy on the left side.                                                               IMPRESSION:  1. New focal disc protrusion at L5-S1 on the left side is effacing the  left lateral recess and compressing the left S1 nerve root. Correlate  for left S1 radicular symptoms.  2. Multilevel degenerative disc disease which includes  moderate-to-severe central spinal stenosis L4-5 and mild spinal  stenosis at L3-4, both similar to the prior study.  3. Lateral recess narrowing at L4-5 is moderate on the right and mild  on the left. This may be effecting the L5 nerve roots. There is mild  lateral recess narrowing bilaterally at L3-4 which may effecting the  L4 nerve roots.  4. There is neural foraminal narrowing which at L3-4 is moderate  bilaterally, at L4-5 is moderate on the right and moderate-to-severe  on the left.     LUMBAR SPINE TWO TO THREE VIEWS 1/28/2021     IMPRESSION: Six nonrib-bearing, lumbar type vertebrae. Normal  alignment. Vertebral body heights are normal. No fractures.  Degenerative endplate spurring at L4-L5. Facet arthropathy at L4-L5,  L5-L6 and L6-S1.     Objective:    CONSTITUTIONAL:  Vital signs as above.  She is miserable and sits leaning on her right buttock keeping her left knee slightly bent and her left hip slightly extended the patient is well nourished and well groomed.    PSYCHIATRIC:  The patient is awake, alert, oriented to person, place and time.  The patient is answering questions appropriately with clear  speech.  Normal affect.  Able to follow commands    MUSCULOSKELETAL: She can barely tolerate any weightbearing on her left leg and cannot stand on her left toe but can do it on the right.  NEUROLOGICAL:    Motor: Weakness in left toe standing.    SLR dramatically positive left for radiating S1 symptoms      Assessment     Kareen Hernandez  is a 56 year old y.o. female who presents today for follow-up regarding  recurrent disc herniation at L5-S1 on the left following previous right L5-S1 decompression several years ago.  She has severe pain and it fits nicely with a left S1 radiculopathy.  She has not responded at all to 1 left L5-S1 TFESI and is now a surgical candidate      Plan:  Expeditious referral will be made for surgical consultation with Dr. Eugenio Cash and further follow-up deferred to him.    Advised patient to call or return early if symptoms worsen, or having problems controlling bladder and bowel function or worsening leg weakness.     Please note: Voice recognition software was used in this dictation.  It may therefore contain typographical errors.  Capo Muller MD      Again, thank you for allowing me to participate in the care of your patient.        Sincerely,        Capo Muller MD

## 2022-02-10 ENCOUNTER — OFFICE VISIT (OUTPATIENT)
Dept: NEUROSURGERY | Facility: CLINIC | Age: 57
End: 2022-02-10
Payer: MEDICARE

## 2022-02-10 VITALS
BODY MASS INDEX: 24.17 KG/M2 | DIASTOLIC BLOOD PRESSURE: 84 MMHG | HEIGHT: 67 IN | SYSTOLIC BLOOD PRESSURE: 138 MMHG | WEIGHT: 154 LBS

## 2022-02-10 DIAGNOSIS — Z11.59 ENCOUNTER FOR SCREENING FOR OTHER VIRAL DISEASES: Primary | ICD-10-CM

## 2022-02-10 DIAGNOSIS — M54.16 LUMBAR RADICULOPATHY: Primary | ICD-10-CM

## 2022-02-10 DIAGNOSIS — Z01.818 PREOP TESTING: Primary | ICD-10-CM

## 2022-02-10 PROCEDURE — 99204 OFFICE O/P NEW MOD 45 MIN: CPT | Performed by: NEUROLOGICAL SURGERY

## 2022-02-10 ASSESSMENT — MIFFLIN-ST. JEOR: SCORE: 1331.17

## 2022-02-10 ASSESSMENT — PAIN SCALES - GENERAL: PAINLEVEL: SEVERE PAIN (6)

## 2022-02-10 NOTE — LETTER
"    2/10/2022         RE: Kareen Hernandez  [address]  [facility] [address]        Dear Colleague,    Thank you for referring your patient, Kareen Hernandez, to the [facility] [place]. Please see a copy of my visit note below.    Kareen Hernandez is a 56 year old female who presents for:  Chief Complaint   Patient presents with     Neurologic Problem     Surgical consult        Initial Vitals:  /84 (BP Location: Right arm, Patient Position: Sitting, Cuff Size: Adult Regular)   Ht 1.702 m (5' 7\")   Wt 69.9 kg (154 lb)   LMP  (LMP Unknown)   BMI 24.12 kg/m   Estimated body mass index is 24.12 kg/m  as calculated from the following:    Height as of this encounter: 1.702 m (5' 7\").    Weight as of this encounter: 69.9 kg (154 lb).. Body surface area is 1.82 meters squared. BP completed using cuff size: regular  Severe Pain (6)    Nursing Comments:     Adelaida Villagomez      I was asked by Dr. Muller to see this patient in consultation    56 year old female with severe left leg pain, L5-S1 disc herniation.  Several weeks of throbbing, 9/10 pain, radiating from left buttocks to posterior thigh, calf, and plantar foot.  Difficulty walking and sitting die to pain.  CHIVO and Home PT without improvement.  MR Lumbar, personally reviewed, shows multi-level spondylotic change with new, focal, left L5-S1 disc herniation with compression of left S1 nerve root.    Past Medical History:   Diagnosis Date     Anxiety 3/21/2012     Calculus of kidney     Multiple     CARDIOVASCULAR SCREENING; LDL GOAL LESS THAN 130 10/31/2010     CARDIOVASCULAR SCREENING; LDL GOAL LESS THAN 160 10/31/2010     Fibromyalgia 11/30/2006     Gastric bypass status for obesity 4/16/2009     Hypertension goal BP (blood pressure) < 140/90 2/24/2015     Insomnia      Major depressive disorder, single episode, severe, specified as with psychotic behavior      Migraine headache      Moderate major depression (H) 11/30/2006 "     Myalgia and myositis, unspecified     FIBROMYALGIA     Right maxillary sinusitis, chronic      Tobacco use disorder 2011    Quit [id number]     Vitamin B12 deficiency disease 2013     Vitamin D deficiency disease 2013     Past Surgical History:   Procedure Laterality Date     ABDOMINOPLASTY  2013     CL AFF SURGICAL PATHOLOGY  2007    Feroz's neuroma  - Right Foot     LITHOTRIPSY       SURGICAL PATHOLOGY EXAM      Lipoma Removal on her back     ZZC GASTRIC BYPASS,OBESE<100CM DAYAN-EN-Y  3-25-09    [facility]     ZC REMOVAL OF KIDNEY STONE      With kidney tents x 5 to 6 procedures.     Social History     Socioeconomic History     Marital status:      Spouse name: Not on file     Number of children: 4     Years of education: Not on file     Highest education level: Not on file   Occupational History     Occupation: Mental health and Fibromyalgia     Employer: UNEMPLOYED     Comment: On disability     Employer: DISABLED   Tobacco Use     Smoking status: Former Smoker     Packs/day: 0.50     Types: Cigarettes     Quit date: 2013     Years since quittin.3     Smokeless tobacco: Never Used     Tobacco comment: Vape e-cig   Substance and Sexual Activity     Alcohol use: No     Drug use: Yes     Comment: Marijuana - 3 times a week     Sexual activity: Not Currently     Partners: Male   Other Topics Concern      Service No     Blood Transfusions No     Caffeine Concern No     Comment: Pop - 1 to 2 a day     Occupational Exposure No     Hobby Hazards No     Sleep Concern Yes     Comment: trouble falling asleep     Stress Concern No     Weight Concern Yes     Special Diet No     Back Care No     Exercise No     Comment: No regular exercise program     Bike Helmet Yes     Seat Belt Yes     Self-Exams Yes     Parent/sibling w/ CABG, MI or angioplasty before 65F 55M? No   Social History Narrative    Live with daughter.     Social Determinants of Health     Financial  "Resource Strain: Not on file   Food Insecurity: Not on file   Transportation Needs: Not on file   Physical Activity: Not on file   Stress: Not on file   Social Connections: Not on file   Intimate Partner Violence: Not on file   Housing Stability: Not on file     Family History   Problem Relation Age of Onset     Asthma Mother      Hypertension Mother      C.A.D. Mother      Genitourinary Problems Mother         kidney failure   age 80     Chronic Obstructive Pulmonary Disease Mother      Diabetes Father      Hypertension Father      C.A.D. Father      Chronic Obstructive Pulmonary Disease Father      Dementia Father      Genitourinary Problems Sister         kidney stones     Obesity Daughter         gastric sleeve     Cerebrovascular Disease No family hx of      Breast Cancer No family hx of      Cancer - colorectal No family hx of      Prostate Cancer No family hx of         ROS: 10 point ROS neg other than the symptoms noted above in the HPI.    Physical Exam  /84 (BP Location: Right arm, Patient Position: Sitting, Cuff Size: Adult Regular)   Ht 1.702 m (5' 7\")   Wt 69.9 kg (154 lb)   LMP  (LMP Unknown)   BMI 24.12 kg/m    HEENT:  Normocephalic, atraumatic.  PERRLA.  EOM s intact.  Visual fields full to gross exam  Neck:  Supple, non-tender, without lymphadenopathy.  Heart:  No peripheral edema  Lungs:  No SOB  Abdomen:  Non-distended.   Skin:  Warm and dry.  Extremities:  No edema, cyanosis or clubbing.  Psychiatric:  No apparent distress  Musculoskeletal:  Normal bulk and tone    NEUROLOGICAL EXAMINATION:     Mental status:  Alert and Oriented x 3, speech is fluent.  Cranial nerves:  II-XII intact.   Motor:    Shoulder Abduction:  Right:  5/5   Left:  5/5  Biceps:                      Right:  5/5   Left:  5/5  Triceps:                     Right:  5/5   Left:  5/5  Wrist Extensors:       Right:  5/5   Left:  5/5  Wrist Flexors:           Right:  5/5   Left:  5/5  interosseus :            Right:  " 5/5   Left:  5/5  Hip Flexion:                Right: 5/5  Left:  5/5  Quadriceps:             Right:  5/5  Left:  5/5  Hamstrings:             Right:  5/5  Left:  5/5  Gastroc Soleus:        Right:  5/5  Left:  5/5  Tib/Ant:                      Right:  5/5  Left:  5/5  EHL:                     Right:  5/5  Left:  5/5  Sensation:  Numbness in left calf/foot  Reflexes:  Negative Babinski.  Negative Clonus.  Negative Flower's.  Coordination:  Smooth finger to nose testing.   Negative pronator drift.  Pain limited gait    A/P:  56 year old female with severe left leg pain, L5-S1 disc herniation    I had a discussion with the patient, reviewing the history, symptoms, and imaging  Discussed options, will plan for MIS Left L5-S1 microdiscectomy  Risks and benefits discussed       Again, thank you for allowing me to participate in the care of your patient.        Sincerely,        Eugenio Cash MD

## 2022-02-10 NOTE — PROGRESS NOTES
"Kareen Hernandez is a 56 year old female who presents for:  Chief Complaint   Patient presents with     Neurologic Problem     Surgical consult        Initial Vitals:  /84 (BP Location: Right arm, Patient Position: Sitting, Cuff Size: Adult Regular)   Ht 1.702 m (5' 7\")   Wt 69.9 kg (154 lb)   LMP  (LMP Unknown)   BMI 24.12 kg/m   Estimated body mass index is 24.12 kg/m  as calculated from the following:    Height as of this encounter: 1.702 m (5' 7\").    Weight as of this encounter: 69.9 kg (154 lb).. Body surface area is 1.82 meters squared. BP completed using cuff size: regular  Severe Pain (6)    Nursing Comments:     Adelaida Villagomez    "

## 2022-02-10 NOTE — PATIENT INSTRUCTIONS
Patient Instructions    Surgery scheduled at [facility] for Left L5-S1 Microdiscectomy with Dr. Cash    Pre-Operative    Surgical risks: blood clots in the leg or lung, problems urinating, nerve damage, drainage from the incision, infection, stiffness    Pre-operative physical with primary care physician within 30 days of surgical date.     This is a same day procedure     Shower procedure  o Please shower with antimicrobial soap the night before surgery and morning of surgery. Please refer to showering instruction sheet in folder.    Eating/Drinking  o Stop all solid foods 8 hours before surgery.  o Keep drinking clear liquids until 4 hours before surgery  - Clear liquids include water, clear juice, black coffee, or clear tea without milk, Gatorade, clear soda.     Medications  o Hold Aspirin, NSAIDs (Advil/Ibuprofen, Indocin, Naproxen,Nuprin,Relafen/Nabumetone, Diclofenac,Meloxicam, Aleve, Celebrex) x 7 days prior to surgical date  o You can take Tylenol (Acetaminophen) for pain, 1000 mg. Do not exceed 3,000 mg per day.   o Any other medications prescribed, please discuss with your primary care provider at your pre-operative physical     COVID Testing As part of preparation for your upcoming procedure you are required to have a test for the novel Coronavirus, COVID-19  o The test needs to be completed within 4 days (96) hours of surgery.   o Please call the drive-up testing number to schedule your appointment.   o Scheduling Number: [phone]     You may NOT receive the COVID-19 vaccine 72 hours before or after surgery.    Post-Operative    Pain Management    Dealing with pain  o As your body heals, you might feel a stabbing, burning, or aching pain. You may also have some numbness.  o Everyone feels pain differently, we may ask you to rate your pain using a pain scale. This will let us know how much pain you feel.   o Keep in mind that medicine won't take away all of your pain. It  helps to try other ways to relax and ease pain.     Things to help with pain  o After surgery, we will give you medicine for your pain. These medications work well, but they can make you drowsy, itchy, or sick to your stomach. If we give you narcotics for pain, try to take the pills with food.   o For mild to moderate pain, you can take medication such as Tylenol. These can be used with narcotics, but make sure that your narcotic does not contain Tylenol.   - Do NOT drive while taking narcotic pain medication  - Do NOT drink alcohol while using any pain medication  o You can utilize ice as needed (no longer than 20 minutes at one time)    Refills: please call the neurosurgery clinic to request a few days before you run out    Ibuprofen: You may resume taking NSAIDs (ex. Ibuprofen, aleve, naproxen) 2 weeks after surgery as it may cause bleeding and interfere with bone healing     Incision Care  o Look at your incision site every day. You  may need a mirror or family member to help you.   o Watch for signs of infection  - Redness, swelling, warmth, drainage (Green or yellow drainage (pus) from your incision or increased bloody drainage), and fever of 101 degrees or higher  - Notify clinic [phone]  o Remove dressing as instructed upon discharge  o Do not apply lotions or ointments to incision  o It is okay to shower, just pat the incision dry   o No submerging incision in water such as pools, hot tubs, baths for at least 8 weeks or until incision is healed    Bowel Care  o Many people have constipation (hard stools) after surgery.  To help prevent constipation: Drink plenty of fluid (8-10 glasses/day); Eat more fiber, such as whole grain bread, bran cereal, and fruits and vegetables; Stay active by walking; Over the counter stool softener may also help.      Activity Restrictions  o For the first 6 weeks, no lifting > 10 pounds, limited bending, twisting, or overhead reaching.  o Take stairs in moderation      Walking: Walking is the best way to start exercise after surgery. Take short frequent walks. You may gradually increase the distance as tolerated. If you feel pain, decrease your activity, but DO NOT stop walking. Walking will help you gain strength, prevent muscle soreness and spasms, and prevent blood clots.  o Avoid bed rest and prolonged sitting for longer than 30 minutes (change positions frequently while awake)  o No contact sports until after follow up visit  o No high impact activities such as; running/jogging, snowmobile or 4 luis riding or any other recreational vehicles      Contact clinic right away if you develop:     Infection    New injury    Bladder or bowel changes or loss of control      Signs of blood clot:  o Swelling and/or warmth in one or both legs  o Pain or tenderness in your leg, ankle, foot, or arm   o Red or discolored skin       Go to the Emergency Room   o If sudden onset of severe headache, weakness, confusion, change in level of consciousness, pain, or loss of movement.  o Chest pain  o Trouble breathing     Post-Op Follow Up Appointments    2 week incision check & staple/suture removal with RN    6 week post op visit with Physican Assistant or Nurse Practitioner     3 months post op with Physical Assistant or Nurse Practitioner       Resources    If you are currently employed, you will need to be off work for 2-4 weeks for post op recovery and healing.    Please fax any FMLA/short term disability paperwork to [phone] prior to surgery    You may call our clinic when you'd like to return to work and we can provide a work letter    To allow staff adequate time to complete paperwork, please send as soon as possible   YESSENIA Santos   [facility]  [facility] - [address] [address]  Telephone:  [phone]       Fax:  [phone]

## 2022-02-10 NOTE — PROGRESS NOTES
I was asked by Dr. Muller to see this patient in consultation    56 year old female with severe left leg pain, L5-S1 disc herniation.  Several weeks of throbbing, 9/10 pain, radiating from left buttocks to posterior thigh, calf, and plantar foot.  Difficulty walking and sitting die to pain.  CHIVO and Home PT without improvement.  MR Lumbar, personally reviewed, shows multi-level spondylotic change with new, focal, left L5-S1 disc herniation with compression of left S1 nerve root.    Past Medical History:   Diagnosis Date     Anxiety 3/21/2012     Calculus of kidney     Multiple     CARDIOVASCULAR SCREENING; LDL GOAL LESS THAN 130 10/31/2010     CARDIOVASCULAR SCREENING; LDL GOAL LESS THAN 160 10/31/2010     Fibromyalgia 11/30/2006     Gastric bypass status for obesity 4/16/2009     Hypertension goal BP (blood pressure) < 140/90 2/24/2015     Insomnia      Major depressive disorder, single episode, severe, specified as with psychotic behavior      Migraine headache      Moderate major depression (H) 11/30/2006     Myalgia and myositis, unspecified     FIBROMYALGIA     Right maxillary sinusitis, chronic      Tobacco use disorder 7/14/2011    Quit [id number]/01/2013     Vitamin B12 deficiency disease 8/14/2013     Vitamin D deficiency disease 8/13/2013     Past Surgical History:   Procedure Laterality Date     ABDOMINOPLASTY  12/2/2013     CL AFF SURGICAL PATHOLOGY  Feb 2007    Redman's neuroma  - Right Foot     LITHOTRIPSY       SURGICAL PATHOLOGY EXAM      Lipoma Removal on her back     ZZC GASTRIC BYPASS,OBESE<100CM DAYAN-EN-Y  3-25-09    [facility]     Z REMOVAL OF KIDNEY STONE      With kidney tents x 5 to 6 procedures.     Social History     Socioeconomic History     Marital status:      Spouse name: Not on file     Number of children: 4     Years of education: Not on file     Highest education level: Not on file   Occupational History     Occupation: Mental health and Fibromyalgia     Employer: UNEMPLOYED      Comment: On disability     Employer: DISABLED   Tobacco Use     Smoking status: Former Smoker     Packs/day: 0.50     Types: Cigarettes     Quit date: 2013     Years since quittin.3     Smokeless tobacco: Never Used     Tobacco comment: Vape e-cig   Substance and Sexual Activity     Alcohol use: No     Drug use: Yes     Comment: Marijuana - 3 times a week     Sexual activity: Not Currently     Partners: Male   Other Topics Concern      Service No     Blood Transfusions No     Caffeine Concern No     Comment: Pop - 1 to 2 a day     Occupational Exposure No     Hobby Hazards No     Sleep Concern Yes     Comment: trouble falling asleep     Stress Concern No     Weight Concern Yes     Special Diet No     Back Care No     Exercise No     Comment: No regular exercise program     Bike Helmet Yes     Seat Belt Yes     Self-Exams Yes     Parent/sibling w/ CABG, MI or angioplasty before 65F 55M? No   Social History Narrative    Live with daughter.     Social Determinants of Health     Financial Resource Strain: Not on file   Food Insecurity: Not on file   Transportation Needs: Not on file   Physical Activity: Not on file   Stress: Not on file   Social Connections: Not on file   Intimate Partner Violence: Not on file   Housing Stability: Not on file     Family History   Problem Relation Age of Onset     Asthma Mother      Hypertension Mother      C.A.D. Mother      Genitourinary Problems Mother         kidney failure   age 80     Chronic Obstructive Pulmonary Disease Mother      Diabetes Father      Hypertension Father      C.A.D. Father      Chronic Obstructive Pulmonary Disease Father      Dementia Father      Genitourinary Problems Sister         kidney stones     Obesity Daughter         gastric sleeve     Cerebrovascular Disease No family hx of      Breast Cancer No family hx of      Cancer - colorectal No family hx of      Prostate Cancer No family hx of         ROS: 10 point ROS neg other than  "the symptoms noted above in the HPI.    Physical Exam  /84 (BP Location: Right arm, Patient Position: Sitting, Cuff Size: Adult Regular)   Ht 1.702 m (5' 7\")   Wt 69.9 kg (154 lb)   LMP  (LMP Unknown)   BMI 24.12 kg/m    HEENT:  Normocephalic, atraumatic.  PERRLA.  EOM s intact.  Visual fields full to gross exam  Neck:  Supple, non-tender, without lymphadenopathy.  Heart:  No peripheral edema  Lungs:  No SOB  Abdomen:  Non-distended.   Skin:  Warm and dry.  Extremities:  No edema, cyanosis or clubbing.  Psychiatric:  No apparent distress  Musculoskeletal:  Normal bulk and tone    NEUROLOGICAL EXAMINATION:     Mental status:  Alert and Oriented x 3, speech is fluent.  Cranial nerves:  II-XII intact.   Motor:    Shoulder Abduction:  Right:  5/5   Left:  5/5  Biceps:                      Right:  5/5   Left:  5/5  Triceps:                     Right:  5/5   Left:  5/5  Wrist Extensors:       Right:  5/5   Left:  5/5  Wrist Flexors:           Right:  5/5   Left:  5/5  interosseus :            Right:  5/5   Left:  5/5  Hip Flexion:                Right: 5/5  Left:  5/5  Quadriceps:             Right:  5/5  Left:  5/5  Hamstrings:             Right:  5/5  Left:  5/5  Gastroc Soleus:        Right:  5/5  Left:  5/5  Tib/Ant:                      Right:  5/5  Left:  5/5  EHL:                     Right:  5/5  Left:  5/5  Sensation:  Numbness in left calf/foot  Reflexes:  Negative Babinski.  Negative Clonus.  Negative Flower's.  Coordination:  Smooth finger to nose testing.   Negative pronator drift.  Pain limited gait    A/P:  56 year old female with severe left leg pain, L5-S1 disc herniation    I had a discussion with the patient, reviewing the history, symptoms, and imaging  Discussed options, will plan for MIS Left L5-S1 microdiscectomy  Risks and benefits discussed   "

## 2022-02-10 NOTE — PROGRESS NOTES
Reviewed pre- and post-operative instructions as outlined in the After Visit Summary/Patient Instructions with patient.   Surgery folder was given to patient    Patient Education Topic: Procedure with Dr. Cash     Learner(s): Patient    Knowledge Level: Basic    Readiness to Learn: Ready    Method:  Verbal explanation and Written material     Outcome: Able to verbalize instructions    Barriers to Learning: No barrier    Covid Testing: patient educated they will need to have a test for the novel Coronavirus, COVID-19.The test needs to be completed within 4 days (96) hours of surgery. Order Placed.    Scheduling Number: [phone]    NDI/MAXIMINO:  Confirmation of completion within last 6 months      Patient had the opportunity for questions to be answered. Advised Patient to call clinic with any questions/concerns. Gave patient antibacterial soap for pre-surgery skin preparation.     Danielle Matthews RN on 2/10/2022 at 1:58 PM

## 2022-02-12 ENCOUNTER — LAB (OUTPATIENT)
Dept: URGENT CARE | Facility: URGENT CARE | Age: 57
End: 2022-02-12
Attending: NEUROLOGICAL SURGERY
Payer: MEDICARE

## 2022-02-12 DIAGNOSIS — Z11.59 ENCOUNTER FOR SCREENING FOR OTHER VIRAL DISEASES: ICD-10-CM

## 2022-02-12 PROCEDURE — U0005 INFEC AGEN DETEC AMPLI PROBE: HCPCS

## 2022-02-12 PROCEDURE — U0003 INFECTIOUS AGENT DETECTION BY NUCLEIC ACID (DNA OR RNA); SEVERE ACUTE RESPIRATORY SYNDROME CORONAVIRUS 2 (SARS-COV-2) (CORONAVIRUS DISEASE [COVID-19]), AMPLIFIED PROBE TECHNIQUE, MAKING USE OF HIGH THROUGHPUT TECHNOLOGIES AS DESCRIBED BY CMS-2020-01-R: HCPCS

## 2022-02-13 LAB — SARS-COV-2 RNA RESP QL NAA+PROBE: NEGATIVE

## 2022-02-14 ENCOUNTER — LAB (OUTPATIENT)
Dept: LAB | Facility: CLINIC | Age: 57
End: 2022-02-14

## 2022-02-14 ENCOUNTER — OFFICE VISIT (OUTPATIENT)
Dept: FAMILY MEDICINE | Facility: CLINIC | Age: 57
End: 2022-02-14
Payer: MEDICARE

## 2022-02-14 VITALS
HEIGHT: 67 IN | BODY MASS INDEX: 25.02 KG/M2 | WEIGHT: 159.4 LBS | OXYGEN SATURATION: 98 % | SYSTOLIC BLOOD PRESSURE: 115 MMHG | HEART RATE: 78 BPM | TEMPERATURE: 97.9 F | DIASTOLIC BLOOD PRESSURE: 66 MMHG

## 2022-02-14 DIAGNOSIS — Z01.818 PREOP GENERAL PHYSICAL EXAM: Primary | ICD-10-CM

## 2022-02-14 DIAGNOSIS — B18.2 CHRONIC HEPATITIS C WITHOUT HEPATIC COMA (H): ICD-10-CM

## 2022-02-14 DIAGNOSIS — F31.9 BIPOLAR AFFECTIVE DISORDER, REMISSION STATUS UNSPECIFIED (H): ICD-10-CM

## 2022-02-14 DIAGNOSIS — F51.01 PRIMARY INSOMNIA: ICD-10-CM

## 2022-02-14 DIAGNOSIS — M54.42 ACUTE LEFT-SIDED LOW BACK PAIN WITH LEFT-SIDED SCIATICA: ICD-10-CM

## 2022-02-14 DIAGNOSIS — E53.8 VITAMIN B12 DEFICIENCY DISEASE: ICD-10-CM

## 2022-02-14 LAB
ALBUMIN SERPL-MCNC: 3.1 G/DL (ref 3.4–5)
ALP SERPL-CCNC: 108 U/L (ref 40–150)
ALT SERPL W P-5'-P-CCNC: 18 U/L (ref 0–50)
ANION GAP SERPL CALCULATED.3IONS-SCNC: 5 MMOL/L (ref 3–14)
AST SERPL W P-5'-P-CCNC: 16 U/L (ref 0–45)
BASOPHILS # BLD AUTO: 0 10E3/UL (ref 0–0.2)
BASOPHILS NFR BLD AUTO: 0 %
BILIRUB SERPL-MCNC: 0.2 MG/DL (ref 0.2–1.3)
BUN SERPL-MCNC: 10 MG/DL (ref 7–30)
CALCIUM SERPL-MCNC: 8.8 MG/DL (ref 8.5–10.1)
CHLORIDE BLD-SCNC: 108 MMOL/L (ref 94–109)
CO2 SERPL-SCNC: 28 MMOL/L (ref 20–32)
CREAT SERPL-MCNC: 0.76 MG/DL (ref 0.52–1.04)
EOSINOPHIL # BLD AUTO: 0.1 10E3/UL (ref 0–0.7)
EOSINOPHIL NFR BLD AUTO: 2 %
ERYTHROCYTE [DISTWIDTH] IN BLOOD BY AUTOMATED COUNT: 13 % (ref 10–15)
GFR SERPL CREATININE-BSD FRML MDRD: >90 ML/MIN/1.73M2
GLUCOSE BLD-MCNC: 97 MG/DL (ref 70–99)
HCT VFR BLD AUTO: 38.4 % (ref 35–47)
HGB BLD-MCNC: 12.5 G/DL (ref 11.7–15.7)
LYMPHOCYTES # BLD AUTO: 2.8 10E3/UL (ref 0.8–5.3)
LYMPHOCYTES NFR BLD AUTO: 48 %
MCH RBC QN AUTO: 31.3 PG (ref 26.5–33)
MCHC RBC AUTO-ENTMCNC: 32.6 G/DL (ref 31.5–36.5)
MCV RBC AUTO: 96 FL (ref 78–100)
MONOCYTES # BLD AUTO: 0.6 10E3/UL (ref 0–1.3)
MONOCYTES NFR BLD AUTO: 11 %
NEUTROPHILS # BLD AUTO: 2.2 10E3/UL (ref 1.6–8.3)
NEUTROPHILS NFR BLD AUTO: 38 %
PLATELET # BLD AUTO: 249 10E3/UL (ref 150–450)
POTASSIUM BLD-SCNC: 4.4 MMOL/L (ref 3.4–5.3)
PROT SERPL-MCNC: 6.7 G/DL (ref 6.8–8.8)
RBC # BLD AUTO: 3.99 10E6/UL (ref 3.8–5.2)
SODIUM SERPL-SCNC: 141 MMOL/L (ref 133–144)
WBC # BLD AUTO: 5.8 10E3/UL (ref 4–11)

## 2022-02-14 PROCEDURE — 36415 COLL VENOUS BLD VENIPUNCTURE: CPT

## 2022-02-14 PROCEDURE — 85025 COMPLETE CBC W/AUTO DIFF WBC: CPT

## 2022-02-14 PROCEDURE — 80053 COMPREHEN METABOLIC PANEL: CPT

## 2022-02-14 PROCEDURE — 99214 OFFICE O/P EST MOD 30 MIN: CPT | Performed by: FAMILY MEDICINE

## 2022-02-14 ASSESSMENT — ANXIETY QUESTIONNAIRES
7. FEELING AFRAID AS IF SOMETHING AWFUL MIGHT HAPPEN: NOT AT ALL
5. BEING SO RESTLESS THAT IT IS HARD TO SIT STILL: NOT AT ALL
3. WORRYING TOO MUCH ABOUT DIFFERENT THINGS: SEVERAL DAYS
2. NOT BEING ABLE TO STOP OR CONTROL WORRYING: NOT AT ALL
6. BECOMING EASILY ANNOYED OR IRRITABLE: NEARLY EVERY DAY
1. FEELING NERVOUS, ANXIOUS, OR ON EDGE: SEVERAL DAYS
GAD7 TOTAL SCORE: 6

## 2022-02-14 ASSESSMENT — PATIENT HEALTH QUESTIONNAIRE - PHQ9
5. POOR APPETITE OR OVEREATING: SEVERAL DAYS
SUM OF ALL RESPONSES TO PHQ QUESTIONS 1-9: 2

## 2022-02-14 ASSESSMENT — MIFFLIN-ST. JEOR: SCORE: 1355.66

## 2022-02-14 NOTE — PROGRESS NOTES
[address]  [address]  [facility] [address]  Phone: [phone]  Fax: [phone]  Primary Provider: Yair Luis  Pre-op Performing Provider: YAIR LUIS      PREOPERATIVE EVALUATION:  Today's date: 2/14/2022    Kareen Hernandez is a 56 year old female who presents for a preoperative evaluation.    Surgical Information:  Surgery/Procedure: Minimally Invasive Left Lumbar 5 to Sacral 1 microdiscectomy  Surgery Location: [facility]  Surgeon: Eugenio Cash MD  Surgery Date: 02/15/2022  Time of Surgery: 3:05 PM  Where patient plans to recover: At home with family  Fax number for surgical facility: Note does not need to be faxed, will be available electronically in Epic.    Type of Anesthesia Anticipated: General    Assessment & Plan     The proposed surgical procedure is considered LOW risk.    Preop general physical exam  Cleared for surgery.    Acute left-sided low back pain with left-sided sciatica  Per neurosurgery, patient has failed conservative nonoperative treatments.    Chronic hepatitis C without hepatic coma (H)  Recent testing indicates that there is no detectable viral load after completing treatment.  - Comprehensive metabolic panel (BMP + Alb, Alk Phos, ALT, AST, Total. Bili, TP); Future  - CBC with platelets and differential; Future    Primary insomnia  Patient uses zolpidem on a regular basis.  Advised not to use this evening prior to the surgery.    Vitamin B12 deficiency disease  We will check CBC today.  Patient self injects vitamin B12 at home.  - CBC with platelets and differential; Future    Bipolar affective disorder, remission status unspecified (H)  Patient reports mood is improved today with use of the Wellbutrin.  Side effects that she had attributed to the fluoxetine including burning in her esophagus have resolved.    PHQ 11/3/2020 9/7/2021 2/14/2022   PHQ-9 Total Score 11 16 2   Q9: Thoughts of better off  dead/self-harm past 2 weeks Not at all Not at all Not at all     CHARAN-7 SCORE 5/28/2019 11/3/2020 2/14/2022   Total Score - - -   Total Score 13 14 6   Total Score - - -              Risks and Recommendations:  The patient has the following additional risks and recommendations for perioperative complications:   - No identified additional risk factors other than previously addressed    Medication Instructions:  Patient is to take all scheduled medications on the day of surgery    RECOMMENDATION:  APPROVAL GIVEN to proceed with proposed procedure, without further diagnostic evaluation.                      Subjective     HPI related to upcoming procedure: left sided disc herniation and sciatica that has failed to improve with non-surgical methods of treatment    Preop Questions 2/14/2022   1. Have you ever had a heart attack or stroke? No   2. Have you ever had surgery on your heart or blood vessels, such as a stent placement, a coronary artery bypass, or surgery on an artery in your head, neck, heart, or legs? No   3. Do you have chest pain with activity? No   4. Do you have a history of  heart failure? No   5. Do you currently have a cold, bronchitis or symptoms of other infection? No   6. Do you have a cough, shortness of breath, or wheezing? No   7. Do you or anyone in your family have previous history of blood clots? No   8. Do you or does anyone in your family have a serious bleeding problem such as prolonged bleeding following surgeries or cuts? No   9. Have you ever had problems with anemia or been told to take iron pills? No   10. Have you had any abnormal blood loss such as black, tarry or bloody stools, or abnormal vaginal bleeding? No   11. Have you ever had a blood transfusion? No   12. Are you willing to have a blood transfusion if it is medically needed before, during, or after your surgery? Yes   13. Have you or any of your relatives ever had problems with anesthesia? No   14. Do you have sleep apnea,  excessive snoring or daytime drowsiness? No   15. Do you have any artifical heart valves or other implanted medical devices like a pacemaker, defibrillator, or continuous glucose monitor? No   16. Do you have artificial joints? No   17. Are you allergic to latex? No   18. Is there any chance that you may be pregnant? No       Health Care Directive:  Patient does not have a Health Care Directive or Living Will: Discussed advance care planning with patient; however, patient declined at this time.    Preoperative Review of :   reviewed - controlled substances reflected in medication list.      Status of Chronic Conditions:  See problem list for active medical problems.  Problems all longstanding and stable, except as noted/documented.  See ROS for pertinent symptoms related to these conditions.    ANEMIA - Patient has a recent history of moderate-severe anemia, which has not been symptomatic. Work up to date has revealed B12 deficiency related to gastric bypass . Treatment has been B12 injections.     DEPRESSION - Patient has a long history of Depression of moderate severity requiring medication for control with recent symptoms being gradually improving..Current symptoms of depression include  , none.     SLEEP PROBLEM - Patient has a longstanding history of insomnia.. Patient has tried OTC medications with limited success.       Review of Systems  CONSTITUTIONAL: NEGATIVE for fever, chills, change in weight  INTEGUMENTARY/SKIN: NEGATIVE for worrisome rashes, moles or lesions  EYES: NEGATIVE for vision changes or irritation  ENT/MOUTH: NEGATIVE for ear, mouth and throat problems  RESP: NEGATIVE for significant cough or SOB  BREAST: NEGATIVE for masses, tenderness or discharge  CV: NEGATIVE for chest pain, palpitations or peripheral edema  GI: NEGATIVE for nausea, abdominal pain, heartburn, or change in bowel habits  : NEGATIVE for frequency, dysuria, or hematuria  MUSCULOSKELETAL: NEGATIVE for significant  arthralgias or myalgia  NEURO: NEGATIVE for weakness, dizziness or paresthesias  ENDOCRINE: NEGATIVE for temperature intolerance, skin/hair changes  HEME: NEGATIVE for bleeding problems  PSYCHIATRIC: NEGATIVE for changes in mood or affect    Patient Active Problem List    Diagnosis Date Noted     Hepatitis C, chronic (H) 05/19/2021     Priority: Medium     S/P lumbar laminectomy 06/04/2018     Priority: Medium     Acute right-sided low back pain with right-sided sciatica 05/08/2018     Priority: Medium     Bipolar affective disorder, remission status unspecified (H) 11/20/2017     Priority: Medium     History of kidney stones 11/29/2016     Priority: Medium     Tobacco use disorder 11/29/2016     Priority: Medium     Major depressive disorder, recurrent episode, moderate (H) 08/08/2016     Priority: Medium     Chronic migraine without aura without status migrainosus, not intractable 07/18/2016     Priority: Medium     Gastroesophageal reflux disease without esophagitis 05/23/2016     Priority: Medium     History of hypertension 05/03/2016     Priority: Medium     resolved with weight loss 2016       Right maxillary sinusitis, chronic      Priority: Medium     Insomnia      Priority: Medium     Migraine headache      Priority: Medium     Vitamin B12 deficiency disease 08/14/2013     Priority: Medium     Vitamin D deficiency disease 08/13/2013     Priority: Medium     Anxiety 03/21/2012     Priority: Medium     CARDIOVASCULAR SCREENING; LDL GOAL LESS THAN 130 10/31/2010     Priority: Medium     Gastric bypass status for obesity 04/16/2009     Priority: Medium     Fibromyalgia 11/30/2006     Priority: Medium      Past Medical History:   Diagnosis Date     Anxiety 3/21/2012     Calculus of kidney     Multiple     CARDIOVASCULAR SCREENING; LDL GOAL LESS THAN 130 10/31/2010     CARDIOVASCULAR SCREENING; LDL GOAL LESS THAN 160 10/31/2010     Fibromyalgia 11/30/2006     Gastric bypass status for obesity 4/16/2009      Hypertension goal BP (blood pressure) < 140/90 2/24/2015     Insomnia      Major depressive disorder, single episode, severe, specified as with psychotic behavior      Migraine headache      Moderate major depression (H) 11/30/2006     Myalgia and myositis, unspecified     FIBROMYALGIA     Right maxillary sinusitis, chronic      Tobacco use disorder 7/14/2011    Quit eqrcycm37/01/2013     Vitamin B12 deficiency disease 8/14/2013     Vitamin D deficiency disease 8/13/2013     Past Surgical History:   Procedure Laterality Date     ABDOMINOPLASTY  12/2/2013     [facility]FF SURGICAL PATHOLOGY  Feb 2007    Feroz's neuroma  - Right Foot     LITHOTRIPSY       SURGICAL PATHOLOGY EXAM      Lipoma Removal on her back     ZZC GASTRIC BYPASS,OBESE<100CM DAYAN-EN-Y  3-25-09    [facility]     Z REMOVAL OF KIDNEY STONE      With kidney tents x 5 to 6 procedures.     Current Outpatient Medications   Medication Sig Dispense Refill     acetaminophen (ACETAMINOPHEN 8 HOUR) 650 MG CR tablet Take 2 tablets (1,300 mg) by mouth every 8 hours as needed for pain 90 tablet 3     Black Cohosh-SoyIsoflav-C Quad 40- MG CAPS Take 1 capsule by mouth daily       buPROPion (WELLBUTRIN XL) 300 MG 24 hr tablet Take 1 tablet (300 mg) by mouth every morning 30 tablet 1     cyanocobalamin (CYANOCOBALAMIN) 1000 MCG/ML injection INJECT 1 ML INTO THE MUSCLE EVERY 30 DAYS 1 mL 11     cyclobenzaprine (FLEXERIL) 10 MG tablet Take 1 tablet (10 mg) by mouth 3 times daily as needed for muscle spasms 60 tablet 3     gabapentin (NEURONTIN) 600 MG tablet Take 0.5 tablets (300 mg) by mouth 3 times daily as needed for neuropathic pain 60 tablet 1     lamoTRIgine (LAMICTAL) 100 MG tablet TAKE THREE TABLETS BY MOUTH ONCE DAILY  +++Appointment needed prior to further refills+++ 270 tablet 0     medical cannabis (Patient's own supply) See Admin Instructions (The purpose of this order is to document that the patient reports taking medical cannabis.  This is not a  "prescription, and is not used to certify that the patient has a qualifying medical condition.)       mometasone (NASONEX) 50 MCG/ACT nasal spray Spray 2 sprays into both nostrils daily 1 Box 3     order for DME Equipment being ordered: Splint right thumb 1 Units 0     SUMAtriptan (IMITREX) 100 MG tablet TAKE 1 TABLET BY MOUTH ON THE ONSET OF HEADACHE, MAY REPEAT IN 2 HOURS..*MAX 2 TABLETS DAILY* 9 tablet 3     Syringe/Needle, Disp, (BD LUER-BLAYNE SYRINGE) 25G X 1-1/2\" 3 ML MISC 1 Syringe every 30 days 12 each 1     vitamin D2 (ERGOCALCIFEROL) 94084 units (1250 mcg) capsule TAKE ONE CAPSULE BY MOUTH ONCE WEEKLY 4 capsule 1     zolpidem (AMBIEN) 10 MG tablet TAKE ONE-HALF TABLET TO ONE TABLET BY MOUTH AT BEDTIME FOR SLEEP 30 tablet 3       Allergies   Allergen Reactions     Lurasidone Diarrhea     Diarrhea     Morphine Rash        Social History     Tobacco Use     Smoking status: Former Smoker     Packs/day: 0.50     Types: Cigarettes     Quit date: 2013     Years since quittin.4     Smokeless tobacco: Never Used     Tobacco comment: Vape e-cig   Substance Use Topics     Alcohol use: No     Family History   Problem Relation Age of Onset     Asthma Mother      Hypertension Mother      C.A.D. Mother      Genitourinary Problems Mother         kidney failure   age 80     Chronic Obstructive Pulmonary Disease Mother      Diabetes Father      Hypertension Father      C.A.D. Father      Chronic Obstructive Pulmonary Disease Father      Dementia Father      Genitourinary Problems Sister         kidney stones     Obesity Daughter         gastric sleeve     Cerebrovascular Disease No family hx of      Breast Cancer No family hx of      Cancer - colorectal No family hx of      Prostate Cancer No family hx of      History   Drug Use     Comment: Marijuana - 3 times a week         Objective     /66   Pulse 78   Temp 97.9  F (36.6  C) (Temporal)   Ht 1.702 m (5' 7\")   Wt 72.3 kg (159 lb 6.4 oz)   LMP  (LMP " Unknown)   SpO2 98%   BMI 24.97 kg/m      Physical Exam    GENERAL APPEARANCE: healthy, alert and no distress     EYES: EOMI, PERRL     HENT: ear canals and TM's normal and nose and mouth without ulcers or lesions     NECK: no adenopathy, no asymmetry, masses, or scars and thyroid normal to palpation     RESP: lungs clear to auscultation - no rales, rhonchi or wheezes     CV: regular rates and rhythm, normal S1 S2, no S3 or S4 and no murmur, click or rub     ABDOMEN:  soft, nontender, no HSM or masses and bowel sounds normal     MS: extremities normal- no gross deformities noted, no evidence of inflammation in joints, FROM in all extremities.     SKIN: no suspicious lesions or rashes     NEURO: Normal strength and tone, sensory exam grossly normal, mentation intact and speech normal     PSYCH: mentation appears normal. and affect normal/bright     LYMPHATICS: No cervical adenopathy    Recent Labs   Lab Test 06/03/21  0827   HGB 12.4      INR 0.99      POTASSIUM 3.6   CR 0.91        Diagnostics:  Labs pending at this time.  Results will be reviewed when available.   No EKG required, no history of coronary heart disease, significant arrhythmia, peripheral arterial disease or other structural heart disease.    Revised Cardiac Risk Index (RCRI):  The patient has the following serious cardiovascular risks for perioperative complications:   - No serious cardiac risks = 0 points     RCRI Interpretation: 0 points: Class I (very low risk - 0.4% complication rate)           Signed Electronically by: Leno Luis MD  Copy of this evaluation report is provided to requesting physician.

## 2022-02-14 NOTE — PATIENT INSTRUCTIONS
Preparing for Your Surgery  Getting started  A nurse will call you to review your health history and instructions. They will give you an arrival time based on your scheduled surgery time. Please be ready to share:    Your doctor's clinic name and phone number    Your medical, surgical and anesthesia history    A list of allergies and sensitivities    A list of medicines, including herbal treatments and over-the-counter drugs    Whether the patient has a legal guardian (ask how to send us the papers in advance)  Please tell us if you're pregnant--or if there's any chance you might be pregnant. Some surgeries may injure a fetus (unborn baby), so they require a pregnancy test. Surgeries that are safe for a fetus don't always need a test, and you can choose whether to have one.   If you have a child who's having surgery, please ask for a copy of Preparing for Your Child's Surgery.    Preparing for surgery    Within 30 days of surgery: Have a pre-op exam (sometimes called an H&P, or History and Physical). This can be done at a clinic or pre-operative center.  ? If you're having a , you may not need this exam. Talk to your care team.    At your pre-op exam, talk to your care team about all medicines you take. If you need to stop any medicines before surgery, ask when to start taking them again.  ? We do this for your safety. Many medicines can make you bleed too much during surgery. Some change how well surgery (anesthesia) drugs work.    Call your insurance company to let them know you're having surgery. (If you don't have insurance, call [phone].)    Call your clinic if there's any change in your health. This includes signs of a cold or flu (sore throat, runny nose, cough, rash, fever). It also includes a scrape or scratch near the surgery site.    If you have questions on the day of surgery, call your hospital or surgery center.  COVID testing  You may need to be tested for COVID-19 before having  surgery. If so, your surgical team will give you instructions for scheduling this test, separate from your preoperative history and physical.  Eating and drinking guidelines  For your safety: Unless your surgeon tells you otherwise, follow the guidelines below.    Eat and drink as usual until 8 hours before surgery. After that, no food or milk.    Drink clear liquids until 2 hours before surgery. These are liquids you can see through, like water, Gatorade and Propel Water. You may also have black coffee and tea (no cream or milk).    Nothing by mouth within 2 hours of surgery. This includes gum, candy and breath mints.    If you drink alcohol: Stop drinking it the night before surgery.    If your care team tells you to take medicine on the morning of surgery, it's okay to take it with a sip of water.  Preventing infection    Shower or bathe the night before and morning of your surgery. Follow the instructions your clinic gave you. (If no instructions, use regular soap.)    Don't shave or clip hair near your surgery site. We'll remove the hair if needed.    Don't smoke or vape the morning of surgery. You may chew nicotine gum up to 2 hours before surgery. A nicotine patch is okay.  ? Note: Some surgeries require you to completely quit smoking and nicotine. Check with your surgeon.    Your care team will make every effort to keep you safe from infection. We will:  ? Clean our hands often with soap and water (or an alcohol-based hand rub).  ? Clean the skin at your surgery site with a special soap that kills germs.  ? Give you a special gown to keep you warm. (Cold raises the risk of infection.)  ? Wear special hair covers, masks, gowns and gloves during surgery.  ? Give antibiotic medicine, if prescribed. Not all surgeries need antibiotics.  What to bring on the day of surgery    Photo ID and insurance card    Copy of your health care directive, if you have one    Glasses and hearing aides (bring cases)  ? You can't  wear contacts during surgery    Inhaler and eye drops, if you use them (tell us about these when you arrive)    CPAP machine or breathing device, if you use them    A few personal items, if spending the night    If you have . . .  ? A pacemaker, ICD (cardiac defibrillator) or other implant: Bring the ID card.  ? An implanted stimulator: Bring the remote control.  ? A legal guardian: Bring a copy of the certified (court-stamped) guardianship papers.  Please remove any jewelry, including body piercings. Leave jewelry and other valuables at home.  If you're going home the day of surgery    You must have a responsible adult drive you home. They should stay with you overnight as well.    If you don't have someone to stay with you, and you aren't safe to go home alone, we may keep you overnight. Insurance often won't pay for this.  After surgery  If it's hard to control your pain or you need more pain medicine, please call your surgeon's office.  Questions?   If you have any questions for your care team, list them here: _________________________________________________________________________________________________________________________________________________________________________ ____________________________________ ____________________________________ ____________________________________  For informational purposes only. Not to replace the advice of your health care provider. Copyright   2003, 2019 [facility]. All rights reserved. Clinically reviewed by Alyson Reynaga MD. [employer] [id number] - REV 07/21.

## 2022-02-14 NOTE — H&P (VIEW-ONLY)
[address]  [address]  [facility] [address]  Phone: [phone]  Fax: [phone]  Primary Provider: Yair Luis  Pre-op Performing Provider: YAIR LUIS      PREOPERATIVE EVALUATION:  Today's date: 2/14/2022    Kareen Hernandez is a 56 year old female who presents for a preoperative evaluation.    Surgical Information:  Surgery/Procedure: Minimally Invasive Left Lumbar 5 to Sacral 1 microdiscectomy  Surgery Location: [facility]  Surgeon: Eugenio Cash MD  Surgery Date: 02/15/2022  Time of Surgery: 3:05 PM  Where patient plans to recover: At home with family  Fax number for surgical facility: Note does not need to be faxed, will be available electronically in Epic.    Type of Anesthesia Anticipated: General    Assessment & Plan     The proposed surgical procedure is considered LOW risk.    Preop general physical exam  Cleared for surgery.    Acute left-sided low back pain with left-sided sciatica  Per neurosurgery, patient has failed conservative nonoperative treatments.    Chronic hepatitis C without hepatic coma (H)  Recent testing indicates that there is no detectable viral load after completing treatment.  - Comprehensive metabolic panel (BMP + Alb, Alk Phos, ALT, AST, Total. Bili, TP); Future  - CBC with platelets and differential; Future    Primary insomnia  Patient uses zolpidem on a regular basis.  Advised not to use this evening prior to the surgery.    Vitamin B12 deficiency disease  We will check CBC today.  Patient self injects vitamin B12 at home.  - CBC with platelets and differential; Future    Bipolar affective disorder, remission status unspecified (H)  Patient reports mood is improved today with use of the Wellbutrin.  Side effects that she had attributed to the fluoxetine including burning in her esophagus have resolved.    PHQ 11/3/2020 9/7/2021 2/14/2022   PHQ-9 Total Score 11 16 2   Q9: Thoughts of better off  dead/self-harm past 2 weeks Not at all Not at all Not at all     CHARAN-7 SCORE 5/28/2019 11/3/2020 2/14/2022   Total Score - - -   Total Score 13 14 6   Total Score - - -              Risks and Recommendations:  The patient has the following additional risks and recommendations for perioperative complications:   - No identified additional risk factors other than previously addressed    Medication Instructions:  Patient is to take all scheduled medications on the day of surgery    RECOMMENDATION:  APPROVAL GIVEN to proceed with proposed procedure, without further diagnostic evaluation.                      Subjective     HPI related to upcoming procedure: left sided disc herniation and sciatica that has failed to improve with non-surgical methods of treatment    Preop Questions 2/14/2022   1. Have you ever had a heart attack or stroke? No   2. Have you ever had surgery on your heart or blood vessels, such as a stent placement, a coronary artery bypass, or surgery on an artery in your head, neck, heart, or legs? No   3. Do you have chest pain with activity? No   4. Do you have a history of  heart failure? No   5. Do you currently have a cold, bronchitis or symptoms of other infection? No   6. Do you have a cough, shortness of breath, or wheezing? No   7. Do you or anyone in your family have previous history of blood clots? No   8. Do you or does anyone in your family have a serious bleeding problem such as prolonged bleeding following surgeries or cuts? No   9. Have you ever had problems with anemia or been told to take iron pills? No   10. Have you had any abnormal blood loss such as black, tarry or bloody stools, or abnormal vaginal bleeding? No   11. Have you ever had a blood transfusion? No   12. Are you willing to have a blood transfusion if it is medically needed before, during, or after your surgery? Yes   13. Have you or any of your relatives ever had problems with anesthesia? No   14. Do you have sleep apnea,  excessive snoring or daytime drowsiness? No   15. Do you have any artifical heart valves or other implanted medical devices like a pacemaker, defibrillator, or continuous glucose monitor? No   16. Do you have artificial joints? No   17. Are you allergic to latex? No   18. Is there any chance that you may be pregnant? No       Health Care Directive:  Patient does not have a Health Care Directive or Living Will: Discussed advance care planning with patient; however, patient declined at this time.    Preoperative Review of :   reviewed - controlled substances reflected in medication list.      Status of Chronic Conditions:  See problem list for active medical problems.  Problems all longstanding and stable, except as noted/documented.  See ROS for pertinent symptoms related to these conditions.    ANEMIA - Patient has a recent history of moderate-severe anemia, which has not been symptomatic. Work up to date has revealed B12 deficiency related to gastric bypass . Treatment has been B12 injections.     DEPRESSION - Patient has a long history of Depression of moderate severity requiring medication for control with recent symptoms being gradually improving..Current symptoms of depression include  , none.     SLEEP PROBLEM - Patient has a longstanding history of insomnia.. Patient has tried OTC medications with limited success.       Review of Systems  CONSTITUTIONAL: NEGATIVE for fever, chills, change in weight  INTEGUMENTARY/SKIN: NEGATIVE for worrisome rashes, moles or lesions  EYES: NEGATIVE for vision changes or irritation  ENT/MOUTH: NEGATIVE for ear, mouth and throat problems  RESP: NEGATIVE for significant cough or SOB  BREAST: NEGATIVE for masses, tenderness or discharge  CV: NEGATIVE for chest pain, palpitations or peripheral edema  GI: NEGATIVE for nausea, abdominal pain, heartburn, or change in bowel habits  : NEGATIVE for frequency, dysuria, or hematuria  MUSCULOSKELETAL: NEGATIVE for significant  arthralgias or myalgia  NEURO: NEGATIVE for weakness, dizziness or paresthesias  ENDOCRINE: NEGATIVE for temperature intolerance, skin/hair changes  HEME: NEGATIVE for bleeding problems  PSYCHIATRIC: NEGATIVE for changes in mood or affect    Patient Active Problem List    Diagnosis Date Noted     Hepatitis C, chronic (H) 05/19/2021     Priority: Medium     S/P lumbar laminectomy 06/04/2018     Priority: Medium     Acute right-sided low back pain with right-sided sciatica 05/08/2018     Priority: Medium     Bipolar affective disorder, remission status unspecified (H) 11/20/2017     Priority: Medium     History of kidney stones 11/29/2016     Priority: Medium     Tobacco use disorder 11/29/2016     Priority: Medium     Major depressive disorder, recurrent episode, moderate (H) 08/08/2016     Priority: Medium     Chronic migraine without aura without status migrainosus, not intractable 07/18/2016     Priority: Medium     Gastroesophageal reflux disease without esophagitis 05/23/2016     Priority: Medium     History of hypertension 05/03/2016     Priority: Medium     resolved with weight loss 2016       Right maxillary sinusitis, chronic      Priority: Medium     Insomnia      Priority: Medium     Migraine headache      Priority: Medium     Vitamin B12 deficiency disease 08/14/2013     Priority: Medium     Vitamin D deficiency disease 08/13/2013     Priority: Medium     Anxiety 03/21/2012     Priority: Medium     CARDIOVASCULAR SCREENING; LDL GOAL LESS THAN 130 10/31/2010     Priority: Medium     Gastric bypass status for obesity 04/16/2009     Priority: Medium     Fibromyalgia 11/30/2006     Priority: Medium      Past Medical History:   Diagnosis Date     Anxiety 3/21/2012     Calculus of kidney     Multiple     CARDIOVASCULAR SCREENING; LDL GOAL LESS THAN 130 10/31/2010     CARDIOVASCULAR SCREENING; LDL GOAL LESS THAN 160 10/31/2010     Fibromyalgia 11/30/2006     Gastric bypass status for obesity 4/16/2009      Hypertension goal BP (blood pressure) < 140/90 2/24/2015     Insomnia      Major depressive disorder, single episode, severe, specified as with psychotic behavior      Migraine headache      Moderate major depression (H) 11/30/2006     Myalgia and myositis, unspecified     FIBROMYALGIA     Right maxillary sinusitis, chronic      Tobacco use disorder 7/14/2011    Quit [id number]/01/2013     Vitamin B12 deficiency disease 8/14/2013     Vitamin D deficiency disease 8/13/2013     Past Surgical History:   Procedure Laterality Date     ABDOMINOPLASTY  12/2/2013     CL AFF SURGICAL PATHOLOGY  Feb 2007    Feroz's neuroma  - Right Foot     LITHOTRIPSY       SURGICAL PATHOLOGY EXAM      Lipoma Removal on her back     ZZC GASTRIC BYPASS,OBESE<100CM DAYAN-EN-Y  3-25-09    [facility]     Z REMOVAL OF KIDNEY STONE      With kidney tents x 5 to 6 procedures.     Current Outpatient Medications   Medication Sig Dispense Refill     acetaminophen (ACETAMINOPHEN 8 HOUR) 650 MG CR tablet Take 2 tablets (1,300 mg) by mouth every 8 hours as needed for pain 90 tablet 3     Black Cohosh-SoyIsoflav-C Quad 40- MG CAPS Take 1 capsule by mouth daily       buPROPion (WELLBUTRIN XL) 300 MG 24 hr tablet Take 1 tablet (300 mg) by mouth every morning 30 tablet 1     cyanocobalamin (CYANOCOBALAMIN) 1000 MCG/ML injection INJECT 1 ML INTO THE MUSCLE EVERY 30 DAYS 1 mL 11     cyclobenzaprine (FLEXERIL) 10 MG tablet Take 1 tablet (10 mg) by mouth 3 times daily as needed for muscle spasms 60 tablet 3     gabapentin (NEURONTIN) 600 MG tablet Take 0.5 tablets (300 mg) by mouth 3 times daily as needed for neuropathic pain 60 tablet 1     lamoTRIgine (LAMICTAL) 100 MG tablet TAKE THREE TABLETS BY MOUTH ONCE DAILY  +++Appointment needed prior to further refills+++ 270 tablet 0     medical cannabis (Patient's own supply) See Admin Instructions (The purpose of this order is to document that the patient reports taking medical cannabis.  This is not a  "prescription, and is not used to certify that the patient has a qualifying medical condition.)       mometasone (NASONEX) 50 MCG/ACT nasal spray Spray 2 sprays into both nostrils daily 1 Box 3     order for DME Equipment being ordered: Splint right thumb 1 Units 0     SUMAtriptan (IMITREX) 100 MG tablet TAKE 1 TABLET BY MOUTH ON THE ONSET OF HEADACHE, MAY REPEAT IN 2 HOURS..*MAX 2 TABLETS DAILY* 9 tablet 3     Syringe/Needle, Disp, (BD LUER-BLAYNE SYRINGE) 25G X 1-1/2\" 3 ML MISC 1 Syringe every 30 days 12 each 1     vitamin D2 (ERGOCALCIFEROL) 29591 units (1250 mcg) capsule TAKE ONE CAPSULE BY MOUTH ONCE WEEKLY 4 capsule 1     zolpidem (AMBIEN) 10 MG tablet TAKE ONE-HALF TABLET TO ONE TABLET BY MOUTH AT BEDTIME FOR SLEEP 30 tablet 3       Allergies   Allergen Reactions     Lurasidone Diarrhea     Diarrhea     Morphine Rash        Social History     Tobacco Use     Smoking status: Former Smoker     Packs/day: 0.50     Types: Cigarettes     Quit date: 2013     Years since quittin.4     Smokeless tobacco: Never Used     Tobacco comment: Vape e-cig   Substance Use Topics     Alcohol use: No     Family History   Problem Relation Age of Onset     Asthma Mother      Hypertension Mother      C.A.D. Mother      Genitourinary Problems Mother         kidney failure   age 80     Chronic Obstructive Pulmonary Disease Mother      Diabetes Father      Hypertension Father      C.A.D. Father      Chronic Obstructive Pulmonary Disease Father      Dementia Father      Genitourinary Problems Sister         kidney stones     Obesity Daughter         gastric sleeve     Cerebrovascular Disease No family hx of      Breast Cancer No family hx of      Cancer - colorectal No family hx of      Prostate Cancer No family hx of      History   Drug Use     Comment: Marijuana - 3 times a week         Objective     /66   Pulse 78   Temp 97.9  F (36.6  C) (Temporal)   Ht 1.702 m (5' 7\")   Wt 72.3 kg (159 lb 6.4 oz)   LMP  (LMP " Unknown)   SpO2 98%   BMI 24.97 kg/m      Physical Exam    GENERAL APPEARANCE: healthy, alert and no distress     EYES: EOMI, PERRL     HENT: ear canals and TM's normal and nose and mouth without ulcers or lesions     NECK: no adenopathy, no asymmetry, masses, or scars and thyroid normal to palpation     RESP: lungs clear to auscultation - no rales, rhonchi or wheezes     CV: regular rates and rhythm, normal S1 S2, no S3 or S4 and no murmur, click or rub     ABDOMEN:  soft, nontender, no HSM or masses and bowel sounds normal     MS: extremities normal- no gross deformities noted, no evidence of inflammation in joints, FROM in all extremities.     SKIN: no suspicious lesions or rashes     NEURO: Normal strength and tone, sensory exam grossly normal, mentation intact and speech normal     PSYCH: mentation appears normal. and affect normal/bright     LYMPHATICS: No cervical adenopathy    Recent Labs   Lab Test 06/03/21  0827   HGB 12.4      INR 0.99      POTASSIUM 3.6   CR 0.91        Diagnostics:  Labs pending at this time.  Results will be reviewed when available.   No EKG required, no history of coronary heart disease, significant arrhythmia, peripheral arterial disease or other structural heart disease.    Revised Cardiac Risk Index (RCRI):  The patient has the following serious cardiovascular risks for perioperative complications:   - No serious cardiac risks = 0 points     RCRI Interpretation: 0 points: Class I (very low risk - 0.4% complication rate)           Signed Electronically by: Leno Luis MD  Copy of this evaluation report is provided to requesting physician.

## 2022-02-15 ENCOUNTER — ANESTHESIA EVENT (OUTPATIENT)
Dept: SURGERY | Facility: CLINIC | Age: 57
End: 2022-02-15
Payer: MEDICARE

## 2022-02-15 ENCOUNTER — HOSPITAL ENCOUNTER (OUTPATIENT)
Facility: CLINIC | Age: 57
Discharge: HOME | End: 2022-02-15
Attending: NEUROLOGICAL SURGERY | Admitting: NEUROLOGICAL SURGERY
Payer: MEDICARE

## 2022-02-15 ENCOUNTER — ANESTHESIA (OUTPATIENT)
Dept: SURGERY | Facility: CLINIC | Age: 57
End: 2022-02-15
Payer: MEDICARE

## 2022-02-15 ENCOUNTER — APPOINTMENT (OUTPATIENT)
Dept: GENERAL RADIOLOGY | Facility: CLINIC | Age: 57
End: 2022-02-15
Attending: NEUROLOGICAL SURGERY
Payer: MEDICARE

## 2022-02-15 VITALS
TEMPERATURE: 97.1 F | WEIGHT: 158.1 LBS | OXYGEN SATURATION: 95 % | RESPIRATION RATE: 14 BRPM | HEIGHT: 67 IN | BODY MASS INDEX: 24.81 KG/M2 | DIASTOLIC BLOOD PRESSURE: 75 MMHG | SYSTOLIC BLOOD PRESSURE: 126 MMHG | HEART RATE: 85 BPM

## 2022-02-15 DIAGNOSIS — Z98.890 S/P LUMBAR MICRODISCECTOMY: Primary | ICD-10-CM

## 2022-02-15 PROCEDURE — 250N000013 HC RX MED GY IP 250 OP 250 PS 637: Performed by: ANESTHESIOLOGY

## 2022-02-15 PROCEDURE — 250N000011 HC RX IP 250 OP 636: Performed by: NURSE PRACTITIONER

## 2022-02-15 PROCEDURE — 272N000001 HC OR GENERAL SUPPLY STERILE: Performed by: NEUROLOGICAL SURGERY

## 2022-02-15 PROCEDURE — 250N000009 HC RX 250: Performed by: NURSE ANESTHETIST, CERTIFIED REGISTERED

## 2022-02-15 PROCEDURE — 250N000011 HC RX IP 250 OP 636: Performed by: NEUROLOGICAL SURGERY

## 2022-02-15 PROCEDURE — 360N000084 HC SURGERY LEVEL 4 W/ FLUORO, PER MIN: Performed by: NEUROLOGICAL SURGERY

## 2022-02-15 PROCEDURE — 370N000017 HC ANESTHESIA TECHNICAL FEE, PER MIN: Performed by: NEUROLOGICAL SURGERY

## 2022-02-15 PROCEDURE — 999N000141 HC STATISTIC PRE-PROCEDURE NURSING ASSESSMENT: Performed by: NEUROLOGICAL SURGERY

## 2022-02-15 PROCEDURE — 250N000009 HC RX 250: Performed by: NEUROLOGICAL SURGERY

## 2022-02-15 PROCEDURE — 258N000003 HC RX IP 258 OP 636: Performed by: ANESTHESIOLOGY

## 2022-02-15 PROCEDURE — 999N000179 XR SURGERY CARM FLUORO LESS THAN 5 MIN W STILLS

## 2022-02-15 PROCEDURE — 710N000012 HC RECOVERY PHASE 2, PER MINUTE: Performed by: NEUROLOGICAL SURGERY

## 2022-02-15 PROCEDURE — 250N000025 HC SEVOFLURANE, PER MIN: Performed by: NEUROLOGICAL SURGERY

## 2022-02-15 PROCEDURE — 250N000011 HC RX IP 250 OP 636: Performed by: NURSE ANESTHETIST, CERTIFIED REGISTERED

## 2022-02-15 PROCEDURE — 63030 LAMOT DCMPRN NRV RT 1 LMBR: CPT | Mod: LT | Performed by: NEUROLOGICAL SURGERY

## 2022-02-15 PROCEDURE — 250N000011 HC RX IP 250 OP 636: Performed by: ANESTHESIOLOGY

## 2022-02-15 PROCEDURE — 258N000003 HC RX IP 258 OP 636: Performed by: NURSE ANESTHETIST, CERTIFIED REGISTERED

## 2022-02-15 PROCEDURE — 710N000009 HC RECOVERY PHASE 1, LEVEL 1, PER MIN: Performed by: NEUROLOGICAL SURGERY

## 2022-02-15 PROCEDURE — 63030 LAMOT DCMPRN NRV RT 1 LMBR: CPT | Mod: AS | Performed by: NURSE PRACTITIONER

## 2022-02-15 RX ORDER — HYDROMORPHONE HCL IN WATER/PF 6 MG/30 ML
0.4 PATIENT CONTROLLED ANALGESIA SYRINGE INTRAVENOUS EVERY 5 MIN PRN
Status: DISCONTINUED | OUTPATIENT
Start: 2022-02-15 | End: 2022-02-15 | Stop reason: HOSPADM

## 2022-02-15 RX ORDER — KETAMINE HYDROCHLORIDE 10 MG/ML
INJECTION INTRAMUSCULAR; INTRAVENOUS PRN
Status: DISCONTINUED | OUTPATIENT
Start: 2022-02-15 | End: 2022-02-15

## 2022-02-15 RX ORDER — BUPIVACAINE HYDROCHLORIDE AND EPINEPHRINE 5; 5 MG/ML; UG/ML
INJECTION, SOLUTION PERINEURAL PRN
Status: DISCONTINUED | OUTPATIENT
Start: 2022-02-15 | End: 2022-02-15 | Stop reason: HOSPADM

## 2022-02-15 RX ORDER — DEXAMETHASONE SODIUM PHOSPHATE 4 MG/ML
INJECTION, SOLUTION INTRA-ARTICULAR; INTRALESIONAL; INTRAMUSCULAR; INTRAVENOUS; SOFT TISSUE PRN
Status: DISCONTINUED | OUTPATIENT
Start: 2022-02-15 | End: 2022-02-15

## 2022-02-15 RX ORDER — OXYCODONE HYDROCHLORIDE 5 MG/1
5-10 TABLET ORAL EVERY 4 HOURS PRN
Qty: 40 TABLET | Refills: 0 | Status: SHIPPED | OUTPATIENT
Start: 2022-02-15 | End: 2022-02-21

## 2022-02-15 RX ORDER — NEOSTIGMINE METHYLSULFATE 1 MG/ML
VIAL (ML) INJECTION PRN
Status: DISCONTINUED | OUTPATIENT
Start: 2022-02-15 | End: 2022-02-15

## 2022-02-15 RX ORDER — ONDANSETRON 2 MG/ML
4 INJECTION INTRAMUSCULAR; INTRAVENOUS EVERY 30 MIN PRN
Status: DISCONTINUED | OUTPATIENT
Start: 2022-02-15 | End: 2022-02-15 | Stop reason: HOSPADM

## 2022-02-15 RX ORDER — HYDROMORPHONE HYDROCHLORIDE 1 MG/ML
0.5 INJECTION, SOLUTION INTRAMUSCULAR; INTRAVENOUS; SUBCUTANEOUS ONCE
Status: COMPLETED | OUTPATIENT
Start: 2022-02-15 | End: 2022-02-15

## 2022-02-15 RX ORDER — OXYCODONE HYDROCHLORIDE 5 MG/1
5 TABLET ORAL EVERY 4 HOURS PRN
Status: DISCONTINUED | OUTPATIENT
Start: 2022-02-15 | End: 2022-02-15 | Stop reason: HOSPADM

## 2022-02-15 RX ORDER — LIDOCAINE HYDROCHLORIDE 20 MG/ML
INJECTION, SOLUTION INFILTRATION; PERINEURAL PRN
Status: DISCONTINUED | OUTPATIENT
Start: 2022-02-15 | End: 2022-02-15

## 2022-02-15 RX ORDER — FENTANYL CITRATE 0.05 MG/ML
50 INJECTION, SOLUTION INTRAMUSCULAR; INTRAVENOUS EVERY 5 MIN PRN
Status: DISCONTINUED | OUTPATIENT
Start: 2022-02-15 | End: 2022-02-15 | Stop reason: HOSPADM

## 2022-02-15 RX ORDER — OXYCODONE HYDROCHLORIDE 5 MG/1
5-10 TABLET ORAL EVERY 4 HOURS PRN
Qty: 40 TABLET | Refills: 0 | Status: SHIPPED | OUTPATIENT
Start: 2022-02-15 | End: 2022-02-15

## 2022-02-15 RX ORDER — AMOXICILLIN 250 MG
1-2 CAPSULE ORAL 2 TIMES DAILY PRN
Qty: 20 TABLET | Refills: 0 | Status: SHIPPED | OUTPATIENT
Start: 2022-02-15 | End: 2022-02-15

## 2022-02-15 RX ORDER — CEFAZOLIN SODIUM/WATER 2 G/20 ML
2 SYRINGE (ML) INTRAVENOUS SEE ADMIN INSTRUCTIONS
Status: DISCONTINUED | OUTPATIENT
Start: 2022-02-15 | End: 2022-02-15 | Stop reason: HOSPADM

## 2022-02-15 RX ORDER — GLYCOPYRROLATE 0.2 MG/ML
INJECTION, SOLUTION INTRAMUSCULAR; INTRAVENOUS PRN
Status: DISCONTINUED | OUTPATIENT
Start: 2022-02-15 | End: 2022-02-15

## 2022-02-15 RX ORDER — SODIUM CHLORIDE, SODIUM LACTATE, POTASSIUM CHLORIDE, CALCIUM CHLORIDE 600; 310; 30; 20 MG/100ML; MG/100ML; MG/100ML; MG/100ML
INJECTION, SOLUTION INTRAVENOUS CONTINUOUS
Status: DISCONTINUED | OUTPATIENT
Start: 2022-02-15 | End: 2022-02-15 | Stop reason: HOSPADM

## 2022-02-15 RX ORDER — FENTANYL CITRATE 0.05 MG/ML
25 INJECTION, SOLUTION INTRAMUSCULAR; INTRAVENOUS
Status: DISCONTINUED | OUTPATIENT
Start: 2022-02-15 | End: 2022-02-15 | Stop reason: HOSPADM

## 2022-02-15 RX ORDER — FENTANYL CITRATE 50 UG/ML
INJECTION, SOLUTION INTRAMUSCULAR; INTRAVENOUS PRN
Status: DISCONTINUED | OUTPATIENT
Start: 2022-02-15 | End: 2022-02-15

## 2022-02-15 RX ORDER — LIDOCAINE 40 MG/G
CREAM TOPICAL
Status: DISCONTINUED | OUTPATIENT
Start: 2022-02-15 | End: 2022-02-15 | Stop reason: HOSPADM

## 2022-02-15 RX ORDER — CEFAZOLIN SODIUM/WATER 2 G/20 ML
2 SYRINGE (ML) INTRAVENOUS
Status: COMPLETED | OUTPATIENT
Start: 2022-02-15 | End: 2022-02-15

## 2022-02-15 RX ORDER — ONDANSETRON 2 MG/ML
INJECTION INTRAMUSCULAR; INTRAVENOUS PRN
Status: DISCONTINUED | OUTPATIENT
Start: 2022-02-15 | End: 2022-02-15

## 2022-02-15 RX ORDER — ONDANSETRON 4 MG/1
4 TABLET, ORALLY DISINTEGRATING ORAL EVERY 30 MIN PRN
Status: DISCONTINUED | OUTPATIENT
Start: 2022-02-15 | End: 2022-02-15 | Stop reason: HOSPADM

## 2022-02-15 RX ORDER — PROPOFOL 10 MG/ML
INJECTION, EMULSION INTRAVENOUS PRN
Status: DISCONTINUED | OUTPATIENT
Start: 2022-02-15 | End: 2022-02-15

## 2022-02-15 RX ORDER — AMOXICILLIN 250 MG
1-2 CAPSULE ORAL 2 TIMES DAILY PRN
Qty: 20 TABLET | Refills: 0 | Status: SHIPPED | OUTPATIENT
Start: 2022-02-15 | End: 2022-02-21

## 2022-02-15 RX ORDER — METHYLPREDNISOLONE ACETATE 40 MG/ML
INJECTION, SUSPENSION INTRA-ARTICULAR; INTRALESIONAL; INTRAMUSCULAR; SOFT TISSUE PRN
Status: DISCONTINUED | OUTPATIENT
Start: 2022-02-15 | End: 2022-02-15 | Stop reason: HOSPADM

## 2022-02-15 RX ORDER — MEPERIDINE HYDROCHLORIDE 25 MG/ML
12.5 INJECTION INTRAMUSCULAR; INTRAVENOUS; SUBCUTANEOUS
Status: DISCONTINUED | OUTPATIENT
Start: 2022-02-15 | End: 2022-02-15 | Stop reason: HOSPADM

## 2022-02-15 RX ADMIN — GLYCOPYRROLATE 0.4 MG: 0.2 INJECTION, SOLUTION INTRAMUSCULAR; INTRAVENOUS at 17:36

## 2022-02-15 RX ADMIN — ROCURONIUM BROMIDE 10 MG: 50 INJECTION, SOLUTION INTRAVENOUS at 16:56

## 2022-02-15 RX ADMIN — FENTANYL CITRATE 50 MCG: 50 INJECTION, SOLUTION INTRAMUSCULAR; INTRAVENOUS at 16:34

## 2022-02-15 RX ADMIN — Medication 20 MG: at 16:58

## 2022-02-15 RX ADMIN — PHENYLEPHRINE HYDROCHLORIDE 100 MCG: 10 INJECTION INTRAVENOUS at 17:24

## 2022-02-15 RX ADMIN — OXYCODONE HYDROCHLORIDE 5 MG: 5 TABLET ORAL at 19:02

## 2022-02-15 RX ADMIN — SODIUM CHLORIDE, POTASSIUM CHLORIDE, SODIUM LACTATE AND CALCIUM CHLORIDE: 600; 310; 30; 20 INJECTION, SOLUTION INTRAVENOUS at 13:33

## 2022-02-15 RX ADMIN — LIDOCAINE HYDROCHLORIDE 100 MG: 20 INJECTION, SOLUTION INFILTRATION; PERINEURAL at 16:34

## 2022-02-15 RX ADMIN — DEXAMETHASONE SODIUM PHOSPHATE 4 MG: 4 INJECTION, SOLUTION INTRA-ARTICULAR; INTRALESIONAL; INTRAMUSCULAR; INTRAVENOUS; SOFT TISSUE at 16:50

## 2022-02-15 RX ADMIN — Medication 2 G: at 16:31

## 2022-02-15 RX ADMIN — ONDANSETRON 4 MG: 2 INJECTION INTRAMUSCULAR; INTRAVENOUS at 17:32

## 2022-02-15 RX ADMIN — MIDAZOLAM 2 MG: 1 INJECTION INTRAMUSCULAR; INTRAVENOUS at 16:34

## 2022-02-15 RX ADMIN — HYDROMORPHONE HYDROCHLORIDE 0.5 MG: 1 INJECTION, SOLUTION INTRAMUSCULAR; INTRAVENOUS; SUBCUTANEOUS at 17:19

## 2022-02-15 RX ADMIN — ROCURONIUM BROMIDE 40 MG: 50 INJECTION, SOLUTION INTRAVENOUS at 16:34

## 2022-02-15 RX ADMIN — PROPOFOL 150 MG: 10 INJECTION, EMULSION INTRAVENOUS at 16:34

## 2022-02-15 RX ADMIN — HYDROMORPHONE HYDROCHLORIDE 0.5 MG: 1 INJECTION, SOLUTION INTRAMUSCULAR; INTRAVENOUS; SUBCUTANEOUS at 15:57

## 2022-02-15 RX ADMIN — HYDROMORPHONE HYDROCHLORIDE 0.5 MG: 1 INJECTION, SOLUTION INTRAMUSCULAR; INTRAVENOUS; SUBCUTANEOUS at 14:38

## 2022-02-15 RX ADMIN — NEOSTIGMINE METHYLSULFATE 3.5 MG: 1 INJECTION, SOLUTION INTRAVENOUS at 17:36

## 2022-02-15 ASSESSMENT — ENCOUNTER SYMPTOMS
DYSRHYTHMIAS: 0
SEIZURES: 0

## 2022-02-15 ASSESSMENT — ANXIETY QUESTIONNAIRES: GAD7 TOTAL SCORE: 6

## 2022-02-15 ASSESSMENT — MIFFLIN-ST. JEOR: SCORE: 1349.77

## 2022-02-15 ASSESSMENT — LIFESTYLE VARIABLES: TOBACCO_USE: 1

## 2022-02-15 NOTE — ANESTHESIA PROCEDURE NOTES
Airway       Patient location during procedure: OR       Procedure Start/Stop Times: 2/15/2022 4:37 PM  Staff -        CRNA: Laura Mccracken APRN CRNA       Other Anesthesia Staff: Olamide Irving       Performed By: SRNAIndications and Patient Condition       Indications for airway management: eloina-procedural       Induction type:intravenous       Mask difficulty assessment: 1 - vent by mask    Final Airway Details       Final airway type: endotracheal airway       Successful airway: ETT - single  Endotracheal Airway Details        ETT size (mm): 7.0       Cuffed: yes       Cuff volume (mL): 8       Successful intubation technique: direct laryngoscopy       DL Blade Type: Dominguez 2       Grade View of Cords: 1       Adjucts: stylet       Position: Right       Measured from: lips       Secured at (cm): 21       Bite block used: None    Post intubation assessment        Placement verified by: capnometry, equal breath sounds and chest rise        Number of attempts at approach: 1       Number of other approaches attempted: 0       Secured with: pink tape       Ease of procedure: easy       Dentition: Unchanged

## 2022-02-15 NOTE — OP NOTE
Date of surgery: February 15, 2022    Surgeon: Eugenio Cash MD  Assistant: Hyacinth Timmons NP  Note: Hyacinth Timmons was present for and assisted with the entire surgery, and his/her role as an assistant was crucial for aid in positioning, exposure, suctioning, retraction, and closure    Preoperative diagnosis: Lumbar disc herniation  Postoperative diagnosis: Lumbar disc herniation    Procedure:  1.  Left L5-S1 MIS microdiscectomy  2.  Use of Medtronic Metrx minimally invasive system  3.  Use of intraoperative microscope and fluoroscopy    EBL: 25 mL    Indications: 56 year old female who presented with severe leg pain and disc herniation.  Underwent non-operative management with failure to improve.  Risks, benefits, indications, and alternatives were discussed with the patient and family in detail, and they wished to proceed.    Description of surgery: The patient was positioned prone.  Sterile prepping and draping procedures were performed.  Antibiotics were administered and timeout was performed.  A paramedian lumbar incision was performed.  The minimally invasive dilating system was used to dock on the hemilamina.  The microscope was brought into the field, and under microscopy, laminotomies and medial facetectomy were performed with the high speed drill.  The kerrison rongeur was used to remove the ligamentum flavum, and the thecal sac and nerve root were exposed.  The nerve root was retracted medially, and the extruded disk fragment was removed with the pituitary rongeurs.  Hemostasis was achieved and antibiotic irrigation was performed.  The fascia was closed with 0-Vicryl sutures, and the dermal layer was closed with 2-0 vicryl sutures.  The skin was closed with a running subcuticular stitch.  There were no intraprocedural complications.

## 2022-02-15 NOTE — OR NURSING
Spoke with Dr Taylor regarding patient's pain.  Currently 7/10 last dilaudid given at 1438.  See new one time dose of IV dilaudid.

## 2022-02-15 NOTE — BRIEF OP NOTE
[facility] Brief Operative Note    Pre-operative diagnosis: Lumbar radiculopathy [M54.16]   Post-operative diagnosis Same as above   Procedure: Procedure(s):  Minimally Invasive Left Lumbar 5 to Sacral 1 microdiscectomy   Surgeon(s): Surgeon(s) and Role:     * Eugenio Cash MD - Primary   Estimated blood loss: * No values recorded between 2/15/2022  4:58 PM and 2/15/2022  5:48 PM *    Specimens: * No specimens in log *   Findings: See op note

## 2022-02-15 NOTE — ANESTHESIA PREPROCEDURE EVALUATION
Anesthesia Pre-Procedure Evaluation    Patient: Kareen Hernandez   MRN: [id number] : 1965        Preoperative Diagnosis: Lumbar radiculopathy [M54.16]    Procedure : Procedure(s):  Minimally Invasive Left Lumbar 5 to Sacral 1 microdiscectomy          Past Medical History:   Diagnosis Date     Anxiety 3/21/2012     Calculus of kidney     Multiple     CARDIOVASCULAR SCREENING; LDL GOAL LESS THAN 130 10/31/2010     CARDIOVASCULAR SCREENING; LDL GOAL LESS THAN 160 10/31/2010     Fibromyalgia 2006     Gastric bypass status for obesity 2009     Hypertension goal BP (blood pressure) < 140/90 2015     Insomnia      Major depressive disorder, single episode, severe, specified as with psychotic behavior      Migraine headache      Moderate major depression (H) 2006     Myalgia and myositis, unspecified     FIBROMYALGIA     Right maxillary sinusitis, chronic      Tobacco use disorder 2011    Quit [id number]/2013     Vitamin B12 deficiency disease 2013     Vitamin D deficiency disease 2013      Past Surgical History:   Procedure Laterality Date     ABDOMINOPLASTY  2013     CL AFF SURGICAL PATHOLOGY  2007    Redman's neuroma  - Right Foot     LITHOTRIPSY       SURGICAL PATHOLOGY EXAM      Lipoma Removal on her back     ZZC GASTRIC BYPASS,OBESE<100CM DAYAN-EN-Y  3-25-09    [facility]     Z REMOVAL OF KIDNEY STONE      With kidney tents x 5 to 6 procedures.      Allergies   Allergen Reactions     Lurasidone Diarrhea     Diarrhea     Morphine Rash      Social History     Tobacco Use     Smoking status: Former Smoker     Packs/day: 0.50     Types: Cigarettes     Quit date: 2013     Years since quittin.4     Smokeless tobacco: Never Used     Tobacco comment: Vape e-cig   Substance Use Topics     Alcohol use: Yes      Wt Readings from Last 1 Encounters:   02/15/22 71.7 kg (158 lb 1.6 oz)        Anesthesia Evaluation   Pt has had prior anesthetic. Type: General.    No  history of anesthetic complications       ROS/MED HX  ENT/Pulmonary:     (+) tobacco use (e-cig),  (-) asthma and sleep apnea   Neurologic:     (+) peripheral neuropathy, - L sciatica, denies weakness. migraines,  (-) no seizures and no CVA   Cardiovascular:     (+) hypertension----- (-) CAD and arrhythmias   METS/Exercise Tolerance:     Hematologic:     (+) anemia (in past, currently 12.5),     Musculoskeletal:       GI/Hepatic: Comment: S/p gastric bypass    (+) hepatitis type C,  (-) GERD (in past)   Renal/Genitourinary:     (+) Nephrolithiasis ,  (-) renal disease   Endo:    (-) Type II DM and thyroid disease   Psychiatric/Substance Use:     (+) psychiatric history anxiety, depression and bipolar     Infectious Disease:    (-) Recent Fever   Malignancy:       Other:            Physical Exam    Airway  airway exam normal      Mallampati: II   TM distance: > 3 FB   Neck ROM: full   Mouth opening: > 3 cm    Respiratory Devices and Support         Dental  no notable dental history     (+) upper dentures, lower dentures and missing      Cardiovascular   cardiovascular exam normal          Pulmonary   pulmonary exam normal                OUTSIDE LABS:  CBC:   Lab Results   Component Value Date    WBC 5.8 02/14/2022    WBC 4.1 06/03/2021    HGB 12.5 02/14/2022    HGB 12.4 06/03/2021    HCT 38.4 02/14/2022    HCT 38.4 06/03/2021     02/14/2022     06/03/2021     BMP:   Lab Results   Component Value Date     02/14/2022     06/03/2021    POTASSIUM 4.4 02/14/2022    POTASSIUM 3.6 06/03/2021    CHLORIDE 108 02/14/2022    CHLORIDE 106 06/03/2021    CO2 28 02/14/2022    CO2 31 06/03/2021    BUN 10 02/14/2022    BUN 6 (L) 06/03/2021    CR 0.76 02/14/2022    CR 0.91 06/03/2021    GLC 97 02/14/2022    GLC 95 06/03/2021     COAGS:   Lab Results   Component Value Date    INR 0.99 06/03/2021     POC:   Lab Results   Component Value Date    HCG  01/17/2011     Negative   This test provides a presumptive  diagnosis of pregnancy or non-pregnancy. A   confirmed pregnancy diagnosis should only be made by a physician after all   clinical and laboratory findings have been evaluated.     HEPATIC:   Lab Results   Component Value Date    ALBUMIN 3.1 (L) 02/14/2022    PROTTOTAL 6.7 (L) 02/14/2022    ALT 18 02/14/2022    AST 16 02/14/2022    ALKPHOS 108 02/14/2022    BILITOTAL 0.2 02/14/2022     OTHER:   Lab Results   Component Value Date    MADHU 8.8 02/14/2022    TSH 2.27 08/05/2014       Anesthesia Plan    ASA Status:  3   NPO Status:  NPO Appropriate    Anesthesia Type: General.     - Airway: ETT   Induction: Intravenous.   Maintenance: Balanced.   Techniques and Equipment:       - Drips/Meds: Ketamine     Consents    Anesthesia Plan(s) and associated risks, benefits, and realistic alternatives discussed. Questions answered and patient/representative(s) expressed understanding.    - Discussed:     - Discussed with:  Patient         Postoperative Care    Pain management: IV analgesics, Oral pain medications.   PONV prophylaxis: Ondansetron (or other 5HT-3), Dexamethasone or Solumedrol, Background Propofol Infusion     Comments:                Johnny Taylor MD

## 2022-02-15 NOTE — PROVIDER NOTIFICATION
Patient called from pre-op room. Visibly in a significant amount of pain. Laying flat in recliner. Warm blankets, pillow, repositioned. Dr Taylor notified. Transferred to PACU in cart, monitored VS, 0.5 dilaudid IV given per POC. Will CTM in PACU prior to surgery.

## 2022-02-16 ENCOUNTER — TELEPHONE (OUTPATIENT)
Dept: NEUROLOGY | Facility: CLINIC | Age: 57
End: 2022-02-16
Payer: COMMERCIAL

## 2022-02-16 ENCOUNTER — TELEPHONE (OUTPATIENT)
Dept: NEUROSURGERY | Facility: CLINIC | Age: 57
End: 2022-02-16
Payer: COMMERCIAL

## 2022-02-16 NOTE — ANESTHESIA CARE TRANSFER NOTE
Patient: Kareen Hernandez    Procedure: Procedure(s):  Minimally Invasive Left Lumbar 5 to Sacral 1 microdiscectomy       Diagnosis: Lumbar radiculopathy [M54.16]  Diagnosis Additional Information: No value filed.    Anesthesia Type:   General     Note:    Oropharynx: oropharynx clear of all foreign objects and spontaneously breathing  Level of Consciousness: drowsy  Oxygen Supplementation: face mask  Level of Supplemental Oxygen (L/min / FiO2): 6  Independent Airway: airway patency satisfactory and stable  Dentition: dentition unchanged  Vital Signs Stable: post-procedure vital signs reviewed and stable  Report to RN Given: handoff report given  Patient transferred to: PACU  Comments: Neuromuscular blockade reversed after TOF 4/4, spontaneous respirations, adequate tidal volumes, followed commands to voice, oropharynx suctioned with soft flexible catheter, extubated atraumatically, extubated with suction, airway patent after extubation.  Oxygen via facemask at 6 liters per minute to PACU. Oxygen tubing connected to wall O2 in PACU, SpO2, NiBP, and EKG monitors and alarms on and functioning, Yassine Hugger warmer connected to patient gown, report on patient's clinical status given to PACU RN, RN questions answered.     Handoff Report: Identifed the Patient, Identified the Reponsible Provider, Reviewed the pertinent medical history, Discussed the surgical course, Reviewed Intra-OP anesthesia mangement and issues during anesthesia, Set expectations for post-procedure period and Allowed opportunity for questions and acknowledgement of understanding      Vitals:  Vitals Value Taken Time   BP     Temp     Pulse     Resp     SpO2         Electronically Signed By: MESHA Chan CRNA  February 15, 2022  6:00 PM

## 2022-02-16 NOTE — DISCHARGE INSTRUCTIONS
Same Day Surgery Discharge Instructions for  Sedation and General Anesthesia       It's not unusual to feel dizzy, light-headed or faint for up to 24 hours after surgery or while taking pain medication.  If you have these symptoms: sit for a few minutes before standing and have someone assist you when you get up to walk or use the bathroom.      You should rest and relax for the next 24 hours. We recommend you make arrangements to have an adult stay with you for at least 24 hours after your discharge.  Avoid hazardous and strenuous activity.      DO NOT DRIVE any vehicle or operate mechanical equipment for 24 hours following the end of your surgery.  Even though you may feel normal, your reactions may be affected by the medication you have received.      Do not drink alcoholic beverages for 24 hours following surgery.       Slowly progress to your regular diet as you feel able. It's not unusual to feel nauseated and/or vomit after receiving anesthesia.  If you develop these symptoms, drink clear liquids (apple juice, ginger ale, broth, 7-up, etc. ) until you feel better.  If your nausea and vomiting persists for 24 hours, please notify your surgeon.        All narcotic pain medications, along with inactivity and anesthesia, can cause constipation. Drinking plenty of liquids and increasing fiber intake will help.      For any questions of a medical nature, call your surgeon.      Do not make important decisions for 24 hours.      If you had general anesthesia, you may have a sore throat for a couple of days related to the breathing tube used during surgery.  You may use Cepacol lozenges to help with this discomfort.  If it worsens or if you develop a fever, contact your surgeon.       If you feel your pain is not well managed with the pain medications prescribed by your surgeon, please contact your surgeon's office to let them know so they can address your concerns.       CoVid 19 Information    We want to give you  information regarding Covid. Please consult your primary care provider with any questions you might have.     Patient who have symptoms (cough, fever, or shortness of breath), need to isolate for 7 days from when symptoms started OR 72 hours after fever resolves (without fever reducing medications) AND improvement of respiratory symptoms (whichever is longer).      Isolate yourself at home (in own room/own bathroom if possible)    Do Not allow any visitors    Do Not go to work or school    Do Not go to Jew,  centers, shopping, or other public places.    Do Not shake hands.    Avoid close and intimate contact with others (hugging, kissing).    Follow CDC recommendations for household cleaning of frequently touched services.     After the initial 7 days, continue to isolate yourself from household members as much as possible. To continue decrease the risk of community spread and exposure, you and any members of your household should limit activities in public for 14 days after starting home isolation.     You can reference the following CDC link for helpful home isolation/care tips:  https://www.cdc.gov/coronavirus/2019-ncov/downloads/10Things.pdf    Protect Others:    Cover Your Mouth and Nose with a mask, disposable tissue or wash cloth to avoid spreading germs to others.    Wash your hands and face frequently with soap and water    Call Your Primary Doctor If: Breathing difficulty develops or you become worse.    For more information about COVID19 and options for caring for yourself at home, please visit the CDC website at https://www.cdc.gov/coronavirus/2019-ncov/about/steps-when-sick.html  For more options for care at [facility], please visit our website at [email or web address]    Spine and Brain Clinic at [facility]  Dr. Cash Discharge Instructions Following Spine Surgery  [phone]  Monday - Friday; 8:00 AM - 4:00 PM    In General:   After you  have had surgery on your spine, remember do not bend, twist, or overhead reach. No lifting over 10 lbs. These activities can prevent healing.  Pain is normal and to be expected following surgery.      Bowel Care:  Many people have constipation (hard stools) after surgery.  To help prevent constipation: Drink plenty of fluid (8-10 glasses/day); Eat more fiber, such as whole grain bread, bran cereal, and fruits and vegetables; Stay active by walking; Over the counter stool softener may also help.      Medications:  Spine surgery and pain management is unique to all patients.  You will generally be given medications for pain, muscle spasms or tightness, and for constipation during the immediate post op period.  It is important that you use these as prescribed.  Avoid driving while taking narcotic pain medications.  Avoid alcoholic beverages while taking narcotic pain medications. You can use ice to areas of pain as needed, 20 minutes at a time.  Changing positions and walking will help loosen your muscles as well.  No NSAIDs (Ibuprofen, Advil, Motrin, Aleve, Naproxen) until given the ok (likely at one of your follow up appointments).      Driving:  No driving while on narcotic pain medications.  It is state law not to drive while under the influence of a drug to a degree which renders you incapable of safely driving.  The narcotic medication you will be taking after surgery falls under this category. Wait 24 hours from your last narcotic pain medication before driving.     Activity:   After surgery, most people feel less pain than they have had in a long time.  Walking and light activities will help you regain the use of your muscles.  You are encouraged to walk: start with short walks 5-10 minutes at a time for 4-5 times per day and increase as tolerated.  Stair climbing as tolerated, we recommend you use the railing. No lifting greater than 10 pounds: approximately equal to one gallon of milk. No twisting, bending,  or overhead reaching in the area you have had surgery. No housework, vacuuming, laundry, leaf raking, lawn mowing, or snow removal. Wear your brace (if ordered) as directed.    Showers:  You may shower two days after surgery without covering your incision. It is ok to let water run over your incision but do not touch or scrub on the incision. Pat dry immediately after showering. If there is a dressing in place, you may remove it 2 days after surgery. Do not apply lotions or ointments to your incision. No baths, hot tubs, or pool activity for at least 6 weeks.     Nutrition:  In general, your diet restrictions will not change with your surgery.  You may need to eat small frequent meals initially until your appetite returns.  Eat plenty of high fiber foods and drink plenty of fluids. If you do not have a fluid restriction from or prior to surgery, we recommend 6-8 (8oz) glasses of water per day. Other fluids are fine, but water is best. Nausea is not uncommon; it is a common side effect to many pain medications.  We recommend that you take the pain medications with food, if this does not improve your symptoms, please call us.     Smoking:  For proper healing it is required that you quit using all tobacco products.  This includes smoking, chewing, nicotine gums, and nicotine patches.    Follow-Up Appointment  2 weeks post-op with neurosurgery nurse or provider.  Call [phone] for appointment (if not already scheduled).    Call the neurosurgery clinic at [phone] if these occur: signs of incisional infection (redness, swelling, warmth, pain, drainage, temperature greater than 101.5), increased leg/arm pain and/or swelling, unrelieved headaches, new injury, bladder or bowel changes or loss of control, difficulty swallowing liquids or pills.     Go to the nearest Emergency Room if you experience: sudden onset of severe headache, weakness, confusion, change in level of consciousness, pain, loss of movement or neck  swelling/swallowing problems, chest pain, shortness of breath.

## 2022-02-16 NOTE — ANESTHESIA POSTPROCEDURE EVALUATION
Patient: Kareen Hernandez    Procedure: Procedure(s):  Minimally Invasive Left Lumbar 5 to Sacral 1 microdiscectomy       Diagnosis:Lumbar radiculopathy [M54.16]  Diagnosis Additional Information: No value filed.    Anesthesia Type:  General    Note:     Postop Pain Control: Uneventful            Sign Out: Well controlled pain   PONV: No   Neuro/Psych: Uneventful            Sign Out: Acceptable/Baseline neuro status   Airway/Respiratory: Uneventful            Sign Out: Acceptable/Baseline resp. status   CV/Hemodynamics: Uneventful            Sign Out: Acceptable CV status; No obvious hypovolemia; No obvious fluid overload   Other NRE: NONE   DID A NON-ROUTINE EVENT OCCUR? No           Last vitals:  Vitals Value Taken Time   /73 02/15/22 1910   Temp 36.4  C (97.6  F) 02/15/22 1900   Pulse 80 02/15/22 1915   Resp 14 02/15/22 1915   SpO2 95 % 02/15/22 1914   Vitals shown include unvalidated device data.    Electronically Signed By: Leno Dumont MD  February 16, 2022  7:51 AM

## 2022-02-16 NOTE — TELEPHONE ENCOUNTER
Post-Op Call    DOS: 2/17/22   Surgery: Left L5-S1 MIS microdiscectomy  Surgeon: Dr Cash    Called patient for post-operative follow-up. Pre op leg pain improved. Just with complaints of some incisional soreness. Managed well with Oxycodone. Denies new symptoms, weakness, numbness and tingling, bowel or bladder issues.     Patient is walking without difficulty. Encouraged short, frequent walks as tolerated.     Patient has not had a bowel movement since surgery. Discussed reasons of constipation after surgery and reinforced treatment options.     Patient's dressing has not been removed. Patient denies any signs or symptoms of infection. Incision care reinforced.     Post op restrictions reviewed with patient.     Reviewed follow up appointments and clinic contact information. Patient will call with any questions or concerns.

## 2022-02-21 DIAGNOSIS — Z98.890 S/P LUMBAR MICRODISCECTOMY: ICD-10-CM

## 2022-02-21 RX ORDER — AMOXICILLIN 250 MG
1-2 CAPSULE ORAL 2 TIMES DAILY PRN
Qty: 20 TABLET | Refills: 0 | Status: SHIPPED | OUTPATIENT
Start: 2022-02-21 | End: 2022-07-08

## 2022-02-21 RX ORDER — OXYCODONE HYDROCHLORIDE 5 MG/1
5-10 TABLET ORAL EVERY 4 HOURS PRN
Qty: 40 TABLET | Refills: 0 | Status: SHIPPED | OUTPATIENT
Start: 2022-02-21 | End: 2022-07-08

## 2022-02-21 NOTE — TELEPHONE ENCOUNTER
Pt called to request a refill on Oxycodone. Also asking if she should fill the laxative order or if she can continue to use the over the counter laxative she's been using. Please call her back at [phone]. Thank you~

## 2022-02-21 NOTE — TELEPHONE ENCOUNTER
Patient calling for a refill of Oxycodone and senna.     DOS: 2/15/22  Procedure: Left L5-S1 MIS microdiscectomy  Surgeon: Dr. Cash  Weeks Post Op: 1 week tomorrow    Current symptom(s):  7/10 pain. preop leg pain greatly improved. No c/o N/T     Current pain management:  Oxy: 5 mg q 4 hours  Tylenol: unable to use r/t bypass surgery  Ice/Heat: ice     Last fill: 2/15/22  Next visit: 3/4/22    Medication pended for your approval, if appropriate. Pharmacy verified.     Any patient questions or concerns:     Dicussed stool softener with patient, she states she was never able to pick senna rx up r/t pharmacy stock. She states she has been using an OTC one with good luck. Requests we fill her senna as well.     Informed patient request will be forwarded to care team.

## 2022-03-04 ENCOUNTER — OFFICE VISIT (OUTPATIENT)
Dept: NEUROSURGERY | Facility: CLINIC | Age: 57
End: 2022-03-04
Payer: MEDICARE

## 2022-03-04 VITALS — OXYGEN SATURATION: 98 % | HEART RATE: 78 BPM | SYSTOLIC BLOOD PRESSURE: 119 MMHG | DIASTOLIC BLOOD PRESSURE: 76 MMHG

## 2022-03-04 DIAGNOSIS — Z98.890 S/P LUMBAR MICRODISCECTOMY: Primary | ICD-10-CM

## 2022-03-04 PROCEDURE — 99024 POSTOP FOLLOW-UP VISIT: CPT | Performed by: NURSE PRACTITIONER

## 2022-03-04 NOTE — NURSING NOTE
"Kareen Hernandez is a 56 year old female who presents for:  Chief Complaint   Patient presents with     Surgical Followup     2 week post op follow up; DOS 2/15/22        Initial Vitals:  /76   Pulse 78   LMP  (LMP Unknown)   SpO2 98%  Estimated body mass index is 24.76 kg/m  as calculated from the following:    Height as of 2/15/22: 5' 7\" (1.702 m).    Weight as of 2/15/22: 158 lb 1.6 oz (71.7 kg).. There is no height or weight on file to calculate BSA. BP completed using cuff size: regular  Data Unavailable    Tito Lopez MA    "

## 2022-03-04 NOTE — PROGRESS NOTES
[facility] Neurosurgery Follow-Up:     HPI: 2 weeks s/p MIS left L5-S1 microdiscectomy with Dr. Cash. Presents today for incision check. Patient denies any incisional concerns. Patient reports pre op leg pain has resolved. She has occasional back pain at incision site. She states she is happy with her recovery so far. Denies any post op concerns today.     Current pain: 4/10    Exam:  Constitutional:  Alert, well nourished, NAD.  HEENT: Normocephalic, atraumatic.   Pulm:  Without shortness of breath   CV:  No pitting edema of BLE.     Neurological:  Awake  Alert  Oriented x 3  Speech clear  Cranial nerves II - XII grossly intact  PERRL  EOMI  Motor exam: 5/5 strength in BLE     Sensation intact in BLE    Gait: Able to stand from a seated position. Normal non-antalgic, non-myelopathic gait.     Incision: Incision is clean, dry, intact. No drainage, dehiscence, erythema, warmth, or swelling noted.    Assessment/Plan:    2 weeks s/p MIS left L5-S1 microdiscectomy    -Continue with routine post operative care plan at this time.  -Keep your incision clean and dry. Okay to shower and gently pat dry. No swimming or submerging under water until 6 weeks post op.   -No lifting greater than 10-15 pounds. Limit bending and twisting.   -Follow up in 4 weeks as scheduled    -Call the clinic or go to the Emergency Room if you develop any new pain, weakness, incision drainage/swelling, or fever.     Discussed red flag symptoms and advised to seek medical attention if these develop. Patient voiced understanding and agreement.      Hyacinth Timmons CNP  [facility] Neurosurgery  [address] [place] [id number]  Tel [phone]  Pager [phone]

## 2022-03-04 NOTE — LETTER
3/4/2022         RE: Kareen Hernandez  [address]  [place] [address]        Dear Colleague,    Thank you for referring your patient, Kareen Hernandez, to the [facility]. Please see a copy of my visit note below.    [facility] Neurosurgery Follow-Up:     HPI: 2 weeks s/p MIS left L5-S1 microdiscectomy with Dr. Cash. Presents today for incision check. Patient denies any incisional concerns. Patient reports pre op leg pain has resolved. She has occasional back pain at incision site. She states she is happy with her recovery so far. Denies any post op concerns today.     Current pain: 4/10    Exam:  Constitutional:  Alert, well nourished, NAD.  HEENT: Normocephalic, atraumatic.   Pulm:  Without shortness of breath   CV:  No pitting edema of BLE.     Neurological:  Awake  Alert  Oriented x 3  Speech clear  Cranial nerves II - XII grossly intact  PERRL  EOMI  Motor exam: 5/5 strength in BLE     Sensation intact in BLE    Gait: Able to stand from a seated position. Normal non-antalgic, non-myelopathic gait.     Incision: Incision is clean, dry, intact. No drainage, dehiscence, erythema, warmth, or swelling noted.    Assessment/Plan:    2 weeks s/p MIS left L5-S1 microdiscectomy    -Continue with routine post operative care plan at this time.  -Keep your incision clean and dry. Okay to shower and gently pat dry. No swimming or submerging under water until 6 weeks post op.   -No lifting greater than 10-15 pounds. Limit bending and twisting.   -Follow up in 4 weeks as scheduled    -Call the clinic or go to the Emergency Room if you develop any new pain, weakness, incision drainage/swelling, or fever.     Discussed red flag symptoms and advised to seek medical attention if these develop. Patient voiced understanding and agreement.      Hyacinth Timmons CNP  [facility] Neurosurgery  [address] [place] [address], [place] [address]  Tel  [phone]  Pager [phone]          Again, thank you for allowing me to participate in the care of your patient.        Sincerely,        Hyacinth Timmons NP

## 2022-03-04 NOTE — PATIENT INSTRUCTIONS
-Continue with routine post operative care plan at this time.  -Keep your incision clean and dry. Okay to shower and gently pat dry. No swimming or submerging under water until 6 weeks post op.   -No lifting greater than 10-15 pounds. Limit bending and twisting.   -Follow up in 4 weeks as scheduled    -Call the clinic or go to the Emergency Room if you develop any new pain, weakness, incision drainage/swelling, or fever.

## 2022-03-07 DIAGNOSIS — M79.7 FIBROMYALGIA: ICD-10-CM

## 2022-03-07 DIAGNOSIS — F33.1 MAJOR DEPRESSIVE DISORDER, RECURRENT EPISODE, MODERATE (H): ICD-10-CM

## 2022-03-07 RX ORDER — BUPROPION HYDROCHLORIDE 300 MG/1
300 TABLET ORAL EVERY MORNING
Qty: 30 TABLET | Refills: 1 | Status: SHIPPED | OUTPATIENT
Start: 2022-03-07 | End: 2022-05-05

## 2022-03-07 RX ORDER — CYCLOBENZAPRINE HCL 10 MG
10 TABLET ORAL 3 TIMES DAILY PRN
Qty: 60 TABLET | Refills: 3 | Status: SHIPPED | OUTPATIENT
Start: 2022-03-07 | End: 2022-06-16

## 2022-03-07 NOTE — TELEPHONE ENCOUNTER
Routing refill request to provider for review/approval because:  Drug not on the [facility] refill protocol     Ni Son RN   [facility]

## 2022-03-11 ENCOUNTER — TELEPHONE (OUTPATIENT)
Dept: FAMILY MEDICINE | Facility: CLINIC | Age: 57
End: 2022-03-11
Payer: COMMERCIAL

## 2022-03-11 DIAGNOSIS — F41.0 PANIC ATTACK: Primary | ICD-10-CM

## 2022-03-11 RX ORDER — HYDROXYZINE PAMOATE 50 MG/1
50 CAPSULE ORAL 3 TIMES DAILY PRN
Qty: 10 CAPSULE | Refills: 0 | Status: SHIPPED | OUTPATIENT
Start: 2022-03-11 | End: 2022-11-17

## 2022-03-11 NOTE — TELEPHONE ENCOUNTER
Patient requesting call back due to swallowing concerns. Patient stated she went to [facility] ER due to inability to swallow. Pt stated aniexty and panic attacks increased and may be causing her throat to close up.   Patient stated she was given Nebulizer - lidocain 4% and  decadron 10 mg that helped.   Patient requesting medication like Wellbutrin to hold her over until next appointment.   Future appointment Monday 3/14  Please call patient [phone]

## 2022-03-11 NOTE — TELEPHONE ENCOUNTER
Called patient and reviewed the ER note.  She is feeling better at this time.  Symptoms started suddenly where she experienced a sensation of palpitations and a rapid heartbeat followed by standing up and feeling somewhat lightheaded or dizzy.  She describes a feeling as if her swallowing forgot how to work and that she could not swallow.  She did not have problems managing her own secretions and had not been eating or drinking prior to the onset of the symptoms.  Eating and drinking has been doing fine since the symptoms occurred despite a recurrence of a mild panic attack this morning.    She also mentioned that her back is doing much better and the leg symptoms that she was having have essentially resolved after the surgery.    I sent in a small prescription for hydroxyzine to be available for her to use over the weekend and will follow up as scheduled on Monday, March 14.    Leno Luis MD

## 2022-03-14 ENCOUNTER — VIRTUAL VISIT (OUTPATIENT)
Dept: FAMILY MEDICINE | Facility: CLINIC | Age: 57
End: 2022-03-14
Payer: MEDICARE

## 2022-03-14 DIAGNOSIS — F41.0 PANIC ATTACK: ICD-10-CM

## 2022-03-14 DIAGNOSIS — F33.1 MAJOR DEPRESSIVE DISORDER, RECURRENT EPISODE, MODERATE (H): Primary | ICD-10-CM

## 2022-03-14 PROCEDURE — 99441 PR PHYSICIAN TELEPHONE EVALUATION 5-10 MIN: CPT | Mod: 95 | Performed by: FAMILY MEDICINE

## 2022-03-14 RX ORDER — FLUOXETINE 20 MG/1
20 TABLET, FILM COATED ORAL DAILY
Qty: 30 TABLET | Refills: 1 | Status: SHIPPED | OUTPATIENT
Start: 2022-03-14 | End: 2022-04-07

## 2022-03-14 NOTE — PROGRESS NOTES
Kareen is a 56 year old who is being evaluated via a billable telephone visit.      What phone number would you like to be contacted at? [phone]    How would you like to obtain your AVS? Mail a copy    Assessment & Plan     Major depressive disorder, recurrent episode, moderate (H)  At this time we will restart fluoxetine 20 mg daily but in tablet instead of capsule form given the swallowing issues that she had before.  I elected to recommend fluoxetine again because it had been working for her.  Follow-up in about 3-1/2 weeks to see how things are going.  - FLUoxetine 20 MG tablet; Take 1 tablet (20 mg) by mouth daily    Panic attack  Symptoms do sound pretty typical for panic attack.  We will restart the fluoxetine as noted above and reassess in about 3-1/2 weeks.  - FLUoxetine 20 MG tablet; Take 1 tablet (20 mg) by mouth daily                 Return in about 24 days (around 4/7/2022) for Mood Recheck.    Leno Luis MD  [facility]    Cristal Mathur is a 56 year old who presents for the following health issues     HPI       Patient is continued to have what she describes as panic attacks.  These look her at rest when all of a sudden she will feel her heart racing and a sense of impending doom.  Symptoms last approximately 15 minutes.  She is reluctant to take any capsules and the hydroxyzine sent to the pharmacy previously was a capsule.  This is because of the irritation in her throat that she had related to the fluoxetine.  She tried the suggestion to breathe into a paper bag and is found that helpful at relieving some of the symptoms.  She is not had any more of the issues of forgetting to swallow as she described before that prompted the emergency room visit last week.    Review of Systems   Constitutional, HEENT, cardiovascular, pulmonary, gi and gu systems are negative, except as otherwise noted.      Objective           Vitals:  No vitals were obtained today due  to virtual visit.    Physical Exam   healthy, alert and no distress  PSYCH: Alert and oriented times 3; coherent speech, normal   rate and volume, able to articulate logical thoughts, able   to abstract reason, no tangential thoughts, no hallucinations   or delusions  Her affect is normal  RESP: No cough, no audible wheezing, able to talk in full sentences  Remainder of exam unable to be completed due to telephone visits                Phone call duration: 10 minutes

## 2022-03-16 DIAGNOSIS — E53.8 VITAMIN B12 DEFICIENCY DISEASE: ICD-10-CM

## 2022-03-16 DIAGNOSIS — E55.9 VITAMIN D DEFICIENCY DISEASE: ICD-10-CM

## 2022-03-16 DIAGNOSIS — F41.0 PANIC ATTACK: Primary | ICD-10-CM

## 2022-03-16 RX ORDER — SYRINGE WITH NEEDLE, 1 ML 25GX5/8"
1 SYRINGE, EMPTY DISPOSABLE MISCELLANEOUS
Qty: 12 EACH | Refills: 1 | Status: SHIPPED | OUTPATIENT
Start: 2022-03-16

## 2022-03-16 RX ORDER — ERGOCALCIFEROL 1.25 MG/1
CAPSULE, LIQUID FILLED ORAL
Qty: 4 CAPSULE | Refills: 1 | Status: SHIPPED | OUTPATIENT
Start: 2022-03-16 | End: 2022-06-16

## 2022-03-16 RX ORDER — HYDROXYZINE HYDROCHLORIDE 50 MG/1
50 TABLET, FILM COATED ORAL 3 TIMES DAILY PRN
Qty: 15 TABLET | Refills: 0 | Status: SHIPPED | OUTPATIENT
Start: 2022-03-16 | End: 2022-11-17

## 2022-03-16 RX ORDER — CYANOCOBALAMIN 1000 UG/ML
INJECTION, SOLUTION INTRAMUSCULAR; SUBCUTANEOUS
Qty: 3 ML | Refills: 3 | Status: SHIPPED | OUTPATIENT
Start: 2022-03-16 | End: 2023-12-07

## 2022-03-16 RX ORDER — CYANOCOBALAMIN 1000 UG/ML
INJECTION, SOLUTION INTRAMUSCULAR; SUBCUTANEOUS
Qty: 1 ML | Refills: 11 | Status: SHIPPED | OUTPATIENT
Start: 2022-03-16 | End: 2022-03-16

## 2022-03-16 NOTE — TELEPHONE ENCOUNTER
"Requested Prescriptions   Signed Prescriptions Disp Refills    cyanocobalamin (CYANOCOBALAMIN) 1000 MCG/ML injection 3 mL 3     Sig: ADMINISTER 1 ML IN THE MUSCLE EVERY 30 DAYS       Vitamin Supplements (Adult) Protocol Passed - 3/16/2022 11:44 AM        Passed - High dose Vitamin D not ordered        Passed - Recent (12 mo) or future (30 days) visit within the authorizing provider's specialty     Patient has had an office visit with the authorizing provider or a provider within the authorizing providers department within the previous 12 mos or has a future within next 30 days. See \"Patient Info\" tab in inbasket, or \"Choose Columns\" in Meds & Orders section of the refill encounter.              Passed - Medication is active on med list           Jocelynn Aleman RN  [facility]    "

## 2022-03-16 NOTE — TELEPHONE ENCOUNTER
"Dr. Luis,     Patient cannot take capsules of vistaril and per pt pharmacy has tablets available. Patient asking if ok to send over tablet form. Pt requesting other refills too. Cued.     Requested Prescriptions   Pending Prescriptions Disp Refills     cyanocobalamin (CYANOCOBALAMIN) 1000 MCG/ML injection 1 mL 11     Sig: INJECT 1 ML INTO THE MUSCLE EVERY 30 DAYS       There is no refill protocol information for this order        vitamin D2 (ERGOCALCIFEROL) 30472 units (1250 mcg) capsule 4 capsule 1     Sig: TAKE ONE CAPSULE BY MOUTH ONCE WEEKLY       There is no refill protocol information for this order        Syringe/Needle (Disp) (BD LUER-BLAYNE SYRINGE) 25G X 1-1/2\" 3 ML MISC 12 each 1     Si Syringe every 30 days       There is no refill protocol information for this order        hydrOXYzine (ATARAX) 50 MG tablet 15 tablet 0     Sig: Take 1 tablet (50 mg) by mouth 3 times daily as needed for itching       There is no refill protocol information for this order          [phone], pt call back.     Thanks,  YESSENIA Cosme  [facility]       "

## 2022-03-18 ENCOUNTER — TELEPHONE (OUTPATIENT)
Dept: FAMILY MEDICINE | Facility: CLINIC | Age: 57
End: 2022-03-18
Payer: COMMERCIAL

## 2022-03-18 NOTE — TELEPHONE ENCOUNTER
Patient called back. Relayed Dr. Luis's message below. She verbalized understanding and will start the 40 mg dose next week.    Hafsa Salmon RN

## 2022-03-18 NOTE — TELEPHONE ENCOUNTER
Patient requesting call back regarding the following medication.  FLUoxetine 20 MG tablet   Patient originally took 80 mg and last refill request was given 20 mg tablets.  Patient wants to know if she can take 40mg instead.    Please call patient   [phone]

## 2022-03-18 NOTE — TELEPHONE ENCOUNTER
Dr. Luis,    Can pt take 40 mg instead of 20 mg of her fluoxetine?    Thanks,  YESSENIA Cosme  [facility]

## 2022-03-18 NOTE — TELEPHONE ENCOUNTER
20 mg is the usual starting dose.  It is a tablet so absorption might be a bit different but she could go up to 40 mg daily.  I might wait until next week to do that.    I tried calling but could not reach patient.  Please try again.    Thanks,    Leno Luis MD

## 2022-03-21 ENCOUNTER — TELEPHONE (OUTPATIENT)
Dept: FAMILY MEDICINE | Facility: CLINIC | Age: 57
End: 2022-03-21
Payer: COMMERCIAL

## 2022-03-21 NOTE — TELEPHONE ENCOUNTER
Pt states that it is not the dose of Bupropion 300mg is not causing the panic attacks.   Was on fluoxitine 80mg, then went to 40mg, tried to lower dose more, but only had capsules so stopped abruptly. Stopped taking for about 1-2 weeks then started having panic attacks.   Has now been taking Fluoxitine for 5-6 days with no change.   She was also Dx with COPD.     Pt does not want to stop take Bupropion. Panic attacks came when she was off Fluoxitine.     She will continue both Bupropion and Fluoxitine, if needed Lorazepam for panic attack and see you on 7th  Future Appointments   Date Time Provider Department Center   4/4/2022 10:30 AM Hyacinth Timmons NP [facility]   4/7/2022  2:40 PM Leno Luis MD [facility]   5/16/2022 11:00 AM Hyacinth Timmons NP [facility]

## 2022-03-21 NOTE — TELEPHONE ENCOUNTER
Please call to get a little more information from the patient.      I would suggest cutting back on the bupropion as the addition and increase of this seems to possibly correlated with her panic symptoms.    Leno Luis MD

## 2022-03-21 NOTE — TELEPHONE ENCOUNTER
Noted.  Too soon to determine if the fluoxetine, just needs more time likely as it was helpful in the past.    Leno Luis MD

## 2022-03-21 NOTE — TELEPHONE ENCOUNTER
Patient is requesting a call from PCP.   Had ER visit over the weekend and was told to follow up with PCP ASAP.   No current openings. Patient is scheduled for 4/7

## 2022-04-01 ENCOUNTER — TRANSFERRED RECORDS (OUTPATIENT)
Dept: HEALTH INFORMATION MANAGEMENT | Facility: CLINIC | Age: 57
End: 2022-04-01
Payer: COMMERCIAL

## 2022-04-04 ENCOUNTER — OFFICE VISIT (OUTPATIENT)
Dept: NEUROSURGERY | Facility: CLINIC | Age: 57
End: 2022-04-04
Payer: COMMERCIAL

## 2022-04-04 VITALS — SYSTOLIC BLOOD PRESSURE: 104 MMHG | OXYGEN SATURATION: 99 % | HEART RATE: 83 BPM | DIASTOLIC BLOOD PRESSURE: 75 MMHG

## 2022-04-04 DIAGNOSIS — Z98.890 S/P LUMBAR MICRODISCECTOMY: Primary | ICD-10-CM

## 2022-04-04 PROCEDURE — 99024 POSTOP FOLLOW-UP VISIT: CPT | Performed by: NURSE PRACTITIONER

## 2022-04-04 NOTE — PROGRESS NOTES
[facility] Follow-Up:     HPI: 6 weeks s/p MIS left L5-S1 microdiscectomy with Dr. Cash. Patient reports feeling well, mild low back pain. Pre op leg pain has resolved. She has weaned off pain medications. Denies any incision concerns. She has been following activity restrictions.     Current pain: 2/10    Exam:  Constitutional:  Alert, well nourished, NAD.  HEENT: Normocephalic, atraumatic.   Pulm:  Without shortness of breath   CV:  No pitting edema of BLE.      /75   Pulse 83   LMP  (LMP Unknown)   SpO2 99%     Neurological:  Awake  Alert  Oriented x 3  Motor exam:  Hip Flexor:                Right: 5/5  Left:  5/5  Quadriceps:              Right:  5/5  Left:  5/5  Hamstrings:              Right:  5/5  Left:  5/5  Gastroc Soleus:        Right:  5/5  Left:  5/5  Tib/Ant:                      Right:  5/5  Left:  5/5  EHL:                          Right:  5/5  Left:  5/5      Able to spontaneously move BLE  Sensation intact throughout BLE     Incision: Well healed     Assessment/Plan:    6 weeks s/p MIS left L5-S1 microdiscectomy     - Continue with routine post operative care plan   - May gradually increase activity and lifting restriction to 20 pounds, as tolerated  - Follow up in 6 weeks   - Call our clinic for any incisional concerns, increased pain, new symptoms, or any other post operative questions or concerns    Discussed red flag symptoms and advised to seek medical attention if these develop. Patient voiced understanding and agreement.      Hyacinth Timmons CNP  [facility]  [address] [place] [address] [address] [place] [id number]  Tel [phone]  Pager [phone]

## 2022-04-04 NOTE — LETTER
4/4/2022         RE: Kareen Hernandez  [address]  [place] [address]        Dear Colleague,    Thank you for referring your patient, Kareen Hernandez, to the [facility] [place]. Please see a copy of my visit note below.    [facility] Follow-Up:     HPI: 6 weeks s/p MIS left L5-S1 microdiscectomy with Dr. Cash. Patient reports feeling well, mild low back pain. Pre op leg pain has resolved. She has weaned off pain medications. Denies any incision concerns. She has been following activity restrictions.     Current pain: 2/10    Exam:  Constitutional:  Alert, well nourished, NAD.  HEENT: Normocephalic, atraumatic.   Pulm:  Without shortness of breath   CV:  No pitting edema of BLE.      /75   Pulse 83   LMP  (LMP Unknown)   SpO2 99%     Neurological:  Awake  Alert  Oriented x 3  Motor exam:  Hip Flexor:                Right: 5/5  Left:  5/5  Quadriceps:              Right:  5/5  Left:  5/5  Hamstrings:              Right:  5/5  Left:  5/5  Gastroc Soleus:        Right:  5/5  Left:  5/5  Tib/Ant:                      Right:  5/5  Left:  5/5  EHL:                          Right:  5/5  Left:  5/5      Able to spontaneously move BLE  Sensation intact throughout BLE     Incision: Well healed     Assessment/Plan:    6 weeks s/p MIS left L5-S1 microdiscectomy     - Continue with routine post operative care plan   - May gradually increase activity and lifting restriction to 20 pounds, as tolerated  - Follow up in 6 weeks   - Call our clinic for any incisional concerns, increased pain, new symptoms, or any other post operative questions or concerns    Discussed red flag symptoms and advised to seek medical attention if these develop. Patient voiced understanding and agreement.      Hyacinth Timmons, CNP  [facility]  [address] [place] [address] [address]  [place] [address]  Tel [phone]  Pager [phone]          Again, thank you for allowing  me to participate in the care of your patient.        Sincerely,        Hyacinth Timmons NP

## 2022-04-04 NOTE — PATIENT INSTRUCTIONS
- Continue with routine post operative care plan   - May gradually increase activity and lifting restriction to 20 pounds, as tolerated  - Follow up in 6 weeks   - Call our clinic for any incisional concerns, increased pain, new symptoms, or any other post operative questions or concerns

## 2022-04-07 ENCOUNTER — VIRTUAL VISIT (OUTPATIENT)
Dept: FAMILY MEDICINE | Facility: CLINIC | Age: 57
End: 2022-04-07
Payer: COMMERCIAL

## 2022-04-07 DIAGNOSIS — F33.1 MAJOR DEPRESSIVE DISORDER, RECURRENT EPISODE, MODERATE (H): Primary | ICD-10-CM

## 2022-04-07 DIAGNOSIS — E53.8 VITAMIN B12 DEFICIENCY DISEASE: ICD-10-CM

## 2022-04-07 DIAGNOSIS — F41.0 PANIC ATTACK: ICD-10-CM

## 2022-04-07 PROCEDURE — 99443 PR PHYSICIAN TELEPHONE EVALUATION 21-30 MIN: CPT | Mod: 95 | Performed by: FAMILY MEDICINE

## 2022-04-07 RX ORDER — FLUOXETINE 20 MG/1
40 TABLET, FILM COATED ORAL DAILY
Qty: 60 TABLET | Refills: 1 | Status: SHIPPED | OUTPATIENT
Start: 2022-04-07 | End: 2022-04-08

## 2022-04-07 NOTE — PROGRESS NOTES
"Kareen is a 56 year old who is being evaluated via a billable telephone visit.      What phone number would you like to be contacted at? [phone]  How would you like to obtain your AVS? Mail a copy    Assessment & Plan     Major depressive disorder, recurrent episode, moderate (H)  Patient reports her mood is improved at this time.  She feels that she should go back up to 40 mg daily of the fluoxetine as this was the dose she was on previously.  The burning that she developed in the esophagus related to the capsules has not recurred with reinitiation of therapy with the fluoxetine and she also feels that her anxiety is coming under better control as she has had markedly fewer of the episodes where she had problems breathing or the feeling that she forgot how to swallow.  Medication was refilled with an increase to 40 mg daily.  No indication from speaking her today that she is developing any manic type symptoms.  She continues to use the lamotrigine and bupropion at this time.  Follow-up is recommended in 2 months for Medicare annual wellness examination.  - FLUoxetine 20 MG tablet; Take 2 tablets (40 mg) by mouth daily    Panic attack  Improved per patient report as above and changes as above.  - FLUoxetine 20 MG tablet; Take 2 tablets (40 mg) by mouth daily    Vitamin B12 deficiency disease  Patient has vitamin B12 deficiency related to gastric bypass surgery and self injects vitamin B12 at home.  [facility] told her that they do not stock the syringe and needle, both its been sent over previously so I sent over new prescriptions for separate needle and syringe and discussed with the patient how to utilize for her once monthly injections.  - Syringe, Disposable, (SYRINGE LUER LOCK) 3 ML MISC; 1 Syringe every 30 days  - Needle, Disp, 25G X 1-1/2\" MISC; 1 Device every 30 days    Review of prior external note(s) from - [facility] information from [facility] reviewed             Return in about 2 months " (around 2022) for Physical Exam.    Leno Luis MD  [facility] is a 56 year old who presents for the following health issues     HPI       Patient feels things are going better.  Anxiety is improved significantly with initiating the therapy with the fluoxetine and she would like to go back onto the 40 mg dose that she was on previously.  She had experienced some burning in her esophagus when utilizing the fluoxetine capsules and that problem has not recurred while using the fluoxetine tablets.  She feels that her anxiety is much better and she has noticed a significant improvement in her panic type symptoms as well as a resolution of the swallowing and shortness of breath issues.    She had some questions today about when she had be due for a Covid booster.    She also had some questions today about possible COPD that was seen on a chest x-ray done at an outside organization.  I reviewed the report from [facility] where they had talked about some hyperinflation in her lungs and how that could be interpreted as COPD.  She states that both of her parents developed COPD as they got older in fact her mom was oxygen dependent before she .  The primary symptom for both of them was shortness of breath.  Both were smokers.  Patient has used tobacco in the past but is quit smoking cigarettes and is now using a vaping device only which she says does not even have any nicotine in the solution.    Review of Systems   Constitutional, HEENT, cardiovascular, pulmonary, gi and gu systems are negative, except as otherwise noted.      Objective           Vitals:  No vitals were obtained today due to virtual visit.    Physical Exam   healthy, alert and no distress  PSYCH: Alert and oriented times 3; coherent speech, normal   rate and volume, able to articulate logical thoughts, able   to abstract reason, no tangential thoughts, no hallucinations   or  delusions  Her affect is normal  RESP: No cough, no audible wheezing, able to talk in full sentences  Remainder of exam unable to be completed due to telephone visits                Phone call duration: 26 minutes

## 2022-04-08 DIAGNOSIS — F41.0 PANIC ATTACK: ICD-10-CM

## 2022-04-08 DIAGNOSIS — F33.1 MAJOR DEPRESSIVE DISORDER, RECURRENT EPISODE, MODERATE (H): ICD-10-CM

## 2022-04-08 RX ORDER — FLUOXETINE 20 MG/1
40 TABLET, FILM COATED ORAL DAILY
Qty: 60 TABLET | Refills: 1 | Status: SHIPPED | OUTPATIENT
Start: 2022-04-08 | End: 2022-05-30

## 2022-04-08 NOTE — TELEPHONE ENCOUNTER
Reason for Call:  Medication or medication refill:    Do you use a [facility]?  Name of the pharmacy and phone number for the current request:  [id number] - [place], [place] - [address] AT [facility] OF  [address]    Name of the medication requested: FLUoxetine 20 MG tablet    Other request: N/A    Can we leave a detailed message on this number? YES    Phone number patient can be reached at: Home number on file [phone] (home)    Best Time: ANY    Call taken on 4/8/2022 at 3:41 PM by Ashleigh Marie

## 2022-04-08 NOTE — TELEPHONE ENCOUNTER
"Requested Prescriptions   Signed Prescriptions Disp Refills    FLUoxetine 20 MG tablet 60 tablet 1     Sig: Take 2 tablets (40 mg) by mouth daily       SSRIs Protocol Passed - 4/8/2022  3:43 PM        Passed - PHQ-9 score less than 5 in past 6 months     Please review last PHQ-9 score.           Passed - Medication is active on med list        Passed - Patient is age 18 or older        Passed - No active pregnancy on record        Passed - No positive pregnancy test in last 12 months        Passed - Recent (6 mo) or future (30 days) visit within the authorizing provider's specialty     Patient had office visit in the last 6 months or has a visit in the next 30 days with authorizing provider or within the authorizing provider's specialty.  See \"Patient Info\" tab in inbasket, or \"Choose Columns\" in Meds & Orders section of the refill encounter.               Thanks,  YESSENIA Cosme  [facility]     "

## 2022-04-13 ENCOUNTER — TELEPHONE (OUTPATIENT)
Dept: FAMILY MEDICINE | Facility: CLINIC | Age: 57
End: 2022-04-13
Payer: COMMERCIAL

## 2022-04-13 NOTE — TELEPHONE ENCOUNTER
"PA Initiation    Medication: Needles 25 G x 1 1/2\"  Insurance Company: [employer] - Phone [phone] Fax [phone]  Pharmacy Filling the Rx: [facility][id number] - [place] - [facility] AT [facility] OF  [address]  Filling Pharmacy Phone: [phone]  Filling Pharmacy Fax:    Start Date: 4/13/2022    Central Prior Authorization Team   Phone: [phone]        "

## 2022-04-13 NOTE — TELEPHONE ENCOUNTER
"PA Team -    Pt was informed needs a PA for needles 25G X 1-1/2\" MISC.    Is out of needles and can't inject B12.    Please initiate and robbin as Urgent [phone]    Thank you,  Gely Sanderson, RN  [place]    "

## 2022-04-14 NOTE — TELEPHONE ENCOUNTER
"Prior Authorization Approval    Authorization Effective Date: 3/14/2022  Authorization Expiration Date: 4/14/2023  Medication: Needles 25 G x 1 1/2\"  Approved Dose/Quantity: 3 for 90 days  Reference #:     Insurance Company: [employer] - Phone [phone] Fax [phone]  Expected CoPay:       CoPay Card Available:      Foundation Assistance Needed:    Which Pharmacy is filling the prescription (Not needed for infusion/clinic administered): [id number] [address] AT [facility] [address]  Pharmacy Notified: Yes  Patient Notified: Comment:  Pharmacy will notify patient.        "

## 2022-04-25 ENCOUNTER — TELEPHONE (OUTPATIENT)
Dept: FAMILY MEDICINE | Facility: CLINIC | Age: 57
End: 2022-04-25

## 2022-04-25 NOTE — TELEPHONE ENCOUNTER
"Dr. Luis,     Patient diagnosed with COPD a month ago, please see ED notes 3/20/22.     Patient stated she was put on your schedule tomorrow, however, has a visit with you in June. Stated she would like to avoid driving out to the cities twice and wondering if you'd be willing to prescribe inhaler and nebulizer treatment and she can follow up in June. Stated oxygen levels are \"fine\" last time she used a pulse ox to check it was in 90's% but stated she feels short of breath and heaviness. Stated the nebs in the hospital really helped.     If ok, pt uses [facility] in [place].     [phone], ok for detailed vm.    Thanks,  YESSENIA Cosme  [facility]        "

## 2022-04-25 NOTE — TELEPHONE ENCOUNTER
TC,    Please let pt know that tomorrows visit can be a virtual telephone but still needs to discuss with Dr. Luis.     Thanks,  YESSENIA Cosme  [facility]

## 2022-04-25 NOTE — TELEPHONE ENCOUNTER
I would prefer to keep the visit, but we can convert to a telephone visit if that works for the patient.  And then the in person follow up later.    Leno Luis MD

## 2022-04-26 ENCOUNTER — VIRTUAL VISIT (OUTPATIENT)
Dept: FAMILY MEDICINE | Facility: CLINIC | Age: 57
End: 2022-04-26
Payer: COMMERCIAL

## 2022-04-26 ENCOUNTER — ANCILLARY PROCEDURE (OUTPATIENT)
Dept: CT IMAGING | Facility: CLINIC | Age: 57
End: 2022-04-26
Attending: FAMILY MEDICINE
Payer: COMMERCIAL

## 2022-04-26 DIAGNOSIS — Z87.891 PERSONAL HISTORY OF TOBACCO USE: ICD-10-CM

## 2022-04-26 DIAGNOSIS — E53.8 VITAMIN B12 DEFICIENCY DISEASE: ICD-10-CM

## 2022-04-26 DIAGNOSIS — J20.9 ACUTE BRONCHITIS WITH SYMPTOMS > 10 DAYS: Primary | ICD-10-CM

## 2022-04-26 PROCEDURE — 71271 CT THORAX LUNG CANCER SCR C-: CPT | Performed by: RADIOLOGY

## 2022-04-26 PROCEDURE — 99442 PR PHYSICIAN TELEPHONE EVALUATION 11-20 MIN: CPT | Mod: 95 | Performed by: FAMILY MEDICINE

## 2022-04-26 RX ORDER — ALBUTEROL SULFATE 90 UG/1
2 AEROSOL, METERED RESPIRATORY (INHALATION) EVERY 4 HOURS PRN
Qty: 18 G | Refills: 1 | Status: SHIPPED | OUTPATIENT
Start: 2022-04-26 | End: 2022-09-13

## 2022-04-26 RX ORDER — PREDNISONE 20 MG/1
20 TABLET ORAL 2 TIMES DAILY
Qty: 14 TABLET | Refills: 0 | Status: SHIPPED | OUTPATIENT
Start: 2022-04-26 | End: 2022-05-03

## 2022-04-26 RX ORDER — DOXYCYCLINE 100 MG/1
100 CAPSULE ORAL 2 TIMES DAILY
Qty: 20 CAPSULE | Refills: 0 | Status: SHIPPED | OUTPATIENT
Start: 2022-04-26 | End: 2022-05-06

## 2022-04-26 NOTE — PATIENT INSTRUCTIONS
Lung Cancer Screening   Frequently Asked Questions  If you are at high-risk for lung cancer, getting screened with low-dose computed tomography (LDCT) every year can help save your life. This handout offers answers to some of the most common questions about lung cancer screening. If you have other questions, please call [phone] ([phone]).     What is it?  Lung cancer screening uses special X-ray technology to create an image of your lung tissue. The exam is quick and easy and takes less than 10 seconds. We don t give you any medicine or use any needles. You can eat before and after the exam. You don t need to change your clothes as long as the clothing on your chest doesn t contain metal. But, you do need to be able to hold your breath for at least 6 seconds during the exam.    What is the goal of lung cancer screening?  The goal of lung cancer screening is to save lives. Many times, lung cancer is not found until a person starts having physical symptoms. Lung cancer screening can help detect lung cancer in the earliest stages when it may be easier to treat.    Who should be screened for lung cancer?  We suggest lung cancer screening for anyone who is at high-risk for lung cancer. You are in the high-risk group if you:      are between the ages of 55 and 79, and    have smoked at least 1 pack of cigarettes a day for 20 or more years, and    still smoke or have quit within the past 15 years.    However, if you have a new cough or shortness of breath, you should talk to your doctor before being screened.    Why does it matter if I have symptoms?  Certain symptoms can be a sign that you have a condition in your lungs that should be checked and treated by your doctor. These symptoms include fever, chest pain, a new or changing cough, shortness of breath that you have never felt before, coughing up blood or unexplained weight loss. Having any of these symptoms can greatly affect the results of lung  cancer screening.       Should all smokers get an LDCT lung cancer screening exam?  It depends. Lung cancer screening is for a very specific group of men and women who have a history of heavy smoking over a long period of time (see  Who should be screened for lung cancer  above).  I am in the high-risk group, but have been diagnosed with cancer in the past. Is LDCT lung cancer screening right for me?  In some cases, you should not have LDCT lung screening, such as when your doctor is already following your cancer with CT scan studies. Your doctor will help you decide if LDCT lung screening is right for you.  Do I need to have a screening exam every year?  Yes. If you are in the high-risk group described earlier, you should get an LDCT lung cancer screening exam every year until you are 79, or are no longer willing or able to undergo screening and possible procedures to diagnose and treat lung cancer.  How effective is LDCT at preventing death from lung cancer?  Studies have shown that LDCT lung cancer screening can lower the risk of death from lung cancer by 20 percent in people who are at high-risk.  What are the risks?  There are some risks and limitations of LDCT lung cancer screening. We want to make sure you understand the risks and benefits, so please let us know if you have any questions. Your doctor may want to talk with you more about these risks.    Radiation exposure: As with any exam that uses radiation, there is a very small increased risk of cancer. The amount of radiation in LDCT is small--about the same amount a person would get from a mammogram. Your doctor orders the exam when he or she feels the potential benefits outweigh the risks.    False negatives: No test is perfect, including LDCT. It is possible that you may have a medical condition, including lung cancer, that is not found during your exam. This is called a false negative result.    False positives and more testing: LDCT very often finds  something in the lung that could be cancer, but in fact is not. This is called a false positive result. False positive tests often cause anxiety. To make sure these findings are not cancer, you may need to have more tests. These tests will be done only if you give us permission. Sometimes patients need a treatment that can have side effects, such as a biopsy. For more information on false positives, see  What can I expect from the results?     Findings not related to lung cancer: Your LDCT exam also takes pictures of areas of your body next to your lungs. In a very small number of cases, the CT scan will show an abnormal finding in one of these areas, such as your kidneys, adrenal glands, liver or thyroid. This finding may not be serious, but you may need more tests. Your doctor can help you decide what other tests you may need, if any.  What can I expect from the results?  About 1 out of 4 LDCT exams will find something that may need more tests. Most of the time, these findings are lung nodules. Lung nodules are very small collections of tissue in the lung. These nodules are very common, and the vast majority--more than 97 percent--are not cancer (benign). Most are normal lymph nodes or small areas of scarring from past infections.  But, if a small lung nodule is found to be cancer, the cancer can be cured more than 90 percent of the time. To know if the nodule is cancer, we may need to get more images before your next yearly screening exam. If the nodule has suspicious features (for example, it is large, has an odd shape or grows over time), we will refer you to a specialist for further testing.  Will my doctor also get the results?  Yes. Your doctor will get a copy of your results.  Is it okay to keep smoking now that there s a cancer screening exam?  No. Tobacco is one of the strongest cancer-causing agents. It causes not only lung cancer, but other cancers and cardiovascular (heart) diseases as well. The damage  caused by smoking builds over time. This means that the longer you smoke, the higher your risk of disease. While it is never too late to quit, the sooner you quit, the better.  Where can I find help to quit smoking?  The best way to prevent lung cancer is to stop smoking. If you have already quit smoking, congratulations and keep it up! For help on quitting smoking, please call [employer] at 2-751-QUITNOW (1-188.837.6972) or the American [employer] Society at 1-659.623.2235 to find local resources near you.  One-on-one health coaching:  If you d prefer to work individually with a health care provider on tobacco cessation, we offer:      Medication Therapy Management:  Our specially trained pharmacists work closely with you and your doctor to help you quit smoking.  Call [phone] or [phone] (toll free).

## 2022-04-26 NOTE — PROGRESS NOTES
Kareen is a 57 year old who is being evaluated via a billable telephone visit.      What phone number would you like to be contacted at? [phone]    How would you like to obtain your AVS? Mail a copy    Assessment & Plan     Acute bronchitis with symptoms > 10 days  Symptoms at this time seem most consistent with an acute bronchitis or exacerbation of possible COPD.  She has no formal diagnosis of that condition but did have an x-ray recently suggestive of it due to flattening the diaphragms and hyperinflation.  We will treat as such with antibiotic, steroid, and albuterol inhaler.  Indications for follow-up and seeking care were reviewed with the patient.  Otherwise anticipate follow-up at her previously scheduled visit in June.  - doxycycline hyclate (VIBRAMYCIN) 100 MG capsule; Take 1 capsule (100 mg) by mouth 2 times daily for 10 days  - predniSONE (DELTASONE) 20 MG tablet; Take 1 tablet (20 mg) by mouth 2 times daily for 7 days  - albuterol (PROAIR HFA/PROVENTIL HFA/VENTOLIN HFA) 108 (90 Base) MCG/ACT inhaler; Inhale 2 puffs into the lungs every 4 hours as needed for shortness of breath / dyspnea or wheezing    Vitamin B12 deficiency disease  She said that [facility] never got the prescription for the syringes so we will send to [facility] at her request at this time.  These are used as she self injects vitamin B12 monthly at home.  - Syringe, Disposable, (SYRINGE LUER LOCK) 3 ML MISC; 1 Syringe every 30 days    Personal history of tobacco use  She qualifies for lung cancer screening based upon her tobacco history and age.  Recommend she hold off on doing this until her current symptoms are better.  - Prof fee: Shared Decision Making for Lung Cancer Screening  - CT Chest Lung Cancer Scrn Low Dose wo; Future                 Return in about 6 weeks (around 6/7/2022) for Physical Exam.    Leno Luis MD  [facility]    Cristal Mathur is a 57 year old who presents for the  following health issues     HPI       Patient has a 5-day history of pain in her chest associated with coughing up brown sputum.  Oxygen saturations are checked at home and ranged from 95 to 98% and stayed stable with physical activity although her heart rate will go up from  with relatively mild physical activity.  She has no cold symptoms and no fever.  She continues to use an e-cigarette at this time but feels that her breathing is bad enough that she is not able to use marijuana at this time.    Review of Systems   Constitutional, HEENT, cardiovascular, pulmonary, gi and gu systems are negative, except as otherwise noted.      Objective           Vitals:  No vitals were obtained today due to virtual visit.    Physical Exam   healthy, alert and no distress  PSYCH: Alert and oriented times 3; coherent speech, normal   rate and volume, able to articulate logical thoughts, able   to abstract reason, no tangential thoughts, no hallucinations   or delusions  Her affect is normal  RESP: No cough, no audible wheezing, able to talk in full sentences  Remainder of exam unable to be completed due to telephone visits                Phone call duration: 16 minutes    Lung Cancer Screening Shared Decision Making Visit     Kareen Hernanedz, a 57 year old female, is eligible for lung cancer screening    History   Smoking Status     Former Smoker     Packs/day: 1.00     Years: 37.00     Types: Cigarettes     Quit date: 9/22/2013   Smokeless Tobacco     Never Used     Comment: Vape e-cig       I have discussed with patient the risks and benefits of screening for lung cancer with low-dose CT.     The risks include: False negative, false positive, and other unsuspected findings outside the lungs.    radiation exposure: one low dose chest CT has as much ionizing radiation as about 15 chest x-rays, or 6 months of background radiation living in [place]      false positives: most findings/nodules are NOT cancer, but some might still  require additional diagnostic evaluation, including biopsy    over-diagnosis: some slow growing cancers that might never have been clinically significant will be detected and treated unnecessarily     The benefit of early detection of lung cancer is contingent upon adherence to annual screening or more frequent follow up if indicated.     Furthermore, to benefit from screening, Kareen must be willing and able to undergo diagnostic procedures, if indicated. Although no specific guide is available for determining severity of comorbidities, it is reasonable to withhold screening in patients who have greater mortality risk from other diseases.     We did discuss that the best way to prevent lung cancer is to not smoke.    Some patients may value a numeric estimation of lung cancer risk when evaluating if lung cancer screening is right for them, here is one calculator:    ShouldIScreen

## 2022-04-27 ENCOUNTER — TELEPHONE (OUTPATIENT)
Dept: FAMILY MEDICINE | Facility: CLINIC | Age: 57
End: 2022-04-27
Payer: COMMERCIAL

## 2022-04-27 NOTE — TELEPHONE ENCOUNTER
Dr. Luis,    Pt calling back.    Wanted you to know she took 1 tablet of prednisone yesterday and not two tablets.    She is wanting to know if she can take 1/2 tab (10 mg) bid instead of 1 tablet (20 mg) bid.    Please advise.  Thanks,  Tamanna Carney RN

## 2022-04-27 NOTE — TELEPHONE ENCOUNTER
The CT scan shows there are no concerning nodules, just some calcified scars.  There is mild emphysema (COPD), and mild calcification in the coronary arteries and aorta which are common in someone her age who is a long term smoker.    No pneumonia.    She could go to using the prednisone once per day with continued use of the antibiotic and albuterol inhaler.     Reviewed the ER note and things looked good.  Keep doing the heart monitor.    Leno Luis MD

## 2022-04-27 NOTE — TELEPHONE ENCOUNTER
Dr. Luis,    Patient ended up in ED last night.  Pt used inhaler that you prescribed and prednisone but an hour after felt like her heart was beating out of her chest so ended up in ED. Has CT done yesterday due to symptoms. After discharge from ED that night heart felt like it was beating really fast again. Pt was discharged with a zio patch and has this on right now.     Per pt whenever she stands up BP and HR both go up and patient feels dizzy as well. Per pt this has been going on for about a week or so. Pt asking about her CT results (resulted but no note entered). ED provider said to take prednisone but pt nervous about taking it due to the bad reaction she had to it.     Thanks,  YESSENIA Cosme  [facility]

## 2022-05-04 DIAGNOSIS — F31.9 BIPOLAR AFFECTIVE DISORDER, REMISSION STATUS UNSPECIFIED (H): ICD-10-CM

## 2022-05-04 DIAGNOSIS — F33.1 MAJOR DEPRESSIVE DISORDER, RECURRENT EPISODE, MODERATE (H): ICD-10-CM

## 2022-05-04 DIAGNOSIS — R09.81 CONGESTION OF PARANASAL SINUS: Primary | ICD-10-CM

## 2022-05-04 NOTE — TELEPHONE ENCOUNTER
Recently transferred rx from [facility] to [facility].  Needs a refill on bupropion, lamotrigine and requesting Rx for Flonase.    Patient says she has been only taking 2 tabs in the morning of the lamotrigine for the past year. Would like new Rx reflecting that.     Patient also mentioned she cannot take plastic capsules only can take tabs, causes throat burning.    Pended meds.    Please approve if appropriate  Gely Sanderson RN

## 2022-05-05 RX ORDER — BUPROPION HYDROCHLORIDE 300 MG/1
300 TABLET ORAL EVERY MORNING
Qty: 30 TABLET | Refills: 1 | Status: SHIPPED | OUTPATIENT
Start: 2022-05-05 | End: 2022-07-06

## 2022-05-05 RX ORDER — FLUTICASONE PROPIONATE 50 MCG
1 SPRAY, SUSPENSION (ML) NASAL DAILY
Qty: 16 G | Refills: 11 | Status: SHIPPED | OUTPATIENT
Start: 2022-05-05 | End: 2022-06-27

## 2022-05-05 RX ORDER — LAMOTRIGINE 100 MG/1
200 TABLET ORAL DAILY
Qty: 180 TABLET | Refills: 3 | Status: SHIPPED | OUTPATIENT
Start: 2022-05-05 | End: 2023-04-25

## 2022-05-05 NOTE — TELEPHONE ENCOUNTER
Dr. Luis,    Wellbutrin sent as all protocols passed. Lamictal needs PCP review and flonase not active med.     Thanks,  YESSENIA Cosme  [facility]

## 2022-05-08 DIAGNOSIS — F41.0 PANIC ATTACK: ICD-10-CM

## 2022-05-08 DIAGNOSIS — F33.1 MAJOR DEPRESSIVE DISORDER, RECURRENT EPISODE, MODERATE (H): Primary | ICD-10-CM

## 2022-05-09 RX ORDER — FLUOXETINE 40 MG/1
CAPSULE ORAL
Qty: 180 CAPSULE | Refills: 0 | Status: SHIPPED | OUTPATIENT
Start: 2022-05-09 | End: 2022-05-09 | Stop reason: SINTOL

## 2022-05-09 NOTE — TELEPHONE ENCOUNTER
Patient had side effects from the capsules and this medication should not be refilled.    Leno Luis MD

## 2022-05-09 NOTE — TELEPHONE ENCOUNTER
"Prescription approved per [facility] Refill Protocol.    Requested Prescriptions   Pending Prescriptions Disp Refills     FLUoxetine (PROZAC) 40 MG capsule [Pharmacy Med Name: FLUOXETINE HCL 40MG CAPS] 180 capsule 0     Sig: TAKE TWO CAPSULES BY MOUTH ONCE DAILY       SSRIs Protocol Passed - 5/8/2022  5:02 AM        Passed - Recent (12 mo) or future (30 days) visit within the authorizing provider's specialty     Patient has had an office visit with the authorizing provider or a provider within the authorizing providers department within the previous 12 mos or has a future within next 30 days. See \"Patient Info\" tab in inbasket, or \"Choose Columns\" in Meds & Orders section of the refill encounter.              Passed - Medication is active on med list        Passed - Patient is age 18 or older        Passed - No active pregnancy on record        Passed - No positive pregnancy test in last 12 months           ESTEVAN Lopez RN  [facility] [place]  "

## 2022-05-30 DIAGNOSIS — F41.0 PANIC ATTACK: ICD-10-CM

## 2022-05-30 DIAGNOSIS — F33.1 MAJOR DEPRESSIVE DISORDER, RECURRENT EPISODE, MODERATE (H): ICD-10-CM

## 2022-05-30 RX ORDER — FLUOXETINE 20 MG/1
TABLET, FILM COATED ORAL
Qty: 60 TABLET | Refills: 1 | Status: SHIPPED | OUTPATIENT
Start: 2022-05-30 | End: 2022-06-27

## 2022-06-07 ENCOUNTER — TELEPHONE (OUTPATIENT)
Dept: FAMILY MEDICINE | Facility: CLINIC | Age: 57
End: 2022-06-07
Payer: COMMERCIAL

## 2022-06-07 NOTE — TELEPHONE ENCOUNTER
Reason for Call:  Medication or medication refill:    Do you use a [facility]?  Name of the pharmacy and phone number for the current request:  [facility] [place] [place] [address] [facility]    Name of the medication requested: buPROPion (WELLBUTRIN XL) 300 MG 24 hr tablet    Other request: NA    Can we leave a detailed message on this number? YES    Phone number patient can be reached at: Cell number on file:    Telephone Information:   Mobile [phone]       Best Time: any    Call taken on 6/7/2022 at 12:18 PM by Gracy Washington

## 2022-06-07 NOTE — TELEPHONE ENCOUNTER
1 refill left.  Spoke to pharm and they will get it ready for pt.  Pt informed.  Tamanna Carney RN

## 2022-06-14 DIAGNOSIS — E53.8 VITAMIN B12 DEFICIENCY DISEASE: ICD-10-CM

## 2022-06-14 RX ORDER — SYRINGE, DISPOSABLE, 1 ML
SYRINGE, EMPTY DISPOSABLE MISCELLANEOUS
Qty: 25 EACH | Refills: 1 | Status: SHIPPED | OUTPATIENT
Start: 2022-06-14

## 2022-06-16 DIAGNOSIS — M79.7 FIBROMYALGIA: ICD-10-CM

## 2022-06-16 DIAGNOSIS — E55.9 VITAMIN D DEFICIENCY DISEASE: ICD-10-CM

## 2022-06-16 RX ORDER — ERGOCALCIFEROL 1.25 MG/1
CAPSULE, LIQUID FILLED ORAL
Qty: 4 CAPSULE | Refills: 1 | Status: SHIPPED | OUTPATIENT
Start: 2022-06-16 | End: 2022-09-02

## 2022-06-16 RX ORDER — CYCLOBENZAPRINE HCL 10 MG
TABLET ORAL
Qty: 60 TABLET | Refills: 1 | Status: SHIPPED | OUTPATIENT
Start: 2022-06-16 | End: 2022-08-03

## 2022-06-16 NOTE — TELEPHONE ENCOUNTER
Routing refill request to provider for review/approval because:  Drug not on the [facility] refill protocol refill request for cyclobenzaprine and Vit D2    Lyudmila Crabtree RN  [facility]

## 2022-06-26 DIAGNOSIS — F33.1 MAJOR DEPRESSIVE DISORDER, RECURRENT EPISODE, MODERATE (H): ICD-10-CM

## 2022-06-26 DIAGNOSIS — F41.0 PANIC ATTACK: ICD-10-CM

## 2022-06-26 DIAGNOSIS — R09.81 CONGESTION OF PARANASAL SINUS: ICD-10-CM

## 2022-06-27 RX ORDER — FLUOXETINE 20 MG/1
TABLET, FILM COATED ORAL
Qty: 60 TABLET | Refills: 1 | Status: SHIPPED | OUTPATIENT
Start: 2022-06-27 | End: 2022-08-30

## 2022-06-27 RX ORDER — FLUTICASONE PROPIONATE 50 MCG
SPRAY, SUSPENSION (ML) NASAL
Qty: 16 ML | Refills: 11 | Status: SHIPPED | OUTPATIENT
Start: 2022-06-27 | End: 2023-03-16

## 2022-07-06 DIAGNOSIS — F33.1 MAJOR DEPRESSIVE DISORDER, RECURRENT EPISODE, MODERATE (H): ICD-10-CM

## 2022-07-06 RX ORDER — BUPROPION HYDROCHLORIDE 300 MG/1
TABLET ORAL
Qty: 30 TABLET | Refills: 1 | Status: SHIPPED | OUTPATIENT
Start: 2022-07-06 | End: 2022-07-29

## 2022-07-06 NOTE — TELEPHONE ENCOUNTER
"Requested Prescriptions   Pending Prescriptions Disp Refills     buPROPion (WELLBUTRIN XL) 300 MG 24 hr tablet [Pharmacy Med Name: BUPROPION HCL  MG TABLET] 30 tablet 1     Sig: TAKE 1 TABLET BY MOUTH EVERY MORNING       SSRIs Protocol Passed - 7/6/2022 10:16 AM        Passed - PHQ-9 score less than 5 in past 6 months     Please review last PHQ-9 score.           Passed - Medication is Bupropion     If the medication is Bupropion (Wellbutrin), and the patient is taking for smoking cessation; OK to refill.          Passed - Medication is active on med list        Passed - Patient is age 18 or older        Passed - No active pregnancy on record        Passed - No positive pregnancy test in last 12 months        Passed - Recent (6 mo) or future (30 days) visit within the authorizing provider's specialty     Patient had office visit in the last 6 months or has a visit in the next 30 days with authorizing provider or within the authorizing provider's specialty.  See \"Patient Info\" tab in inbasket, or \"Choose Columns\" in Meds & Orders section of the refill encounter.               Refilled  Per protocol.  Lucero AVALOS RN    "

## 2022-07-08 ENCOUNTER — OFFICE VISIT (OUTPATIENT)
Dept: FAMILY MEDICINE | Facility: CLINIC | Age: 57
End: 2022-07-08
Payer: COMMERCIAL

## 2022-07-08 VITALS
BODY MASS INDEX: 24.45 KG/M2 | DIASTOLIC BLOOD PRESSURE: 72 MMHG | OXYGEN SATURATION: 99 % | HEIGHT: 67 IN | HEART RATE: 82 BPM | SYSTOLIC BLOOD PRESSURE: 126 MMHG | TEMPERATURE: 97.8 F | WEIGHT: 155.8 LBS

## 2022-07-08 DIAGNOSIS — Z23 NEED FOR VACCINATION: ICD-10-CM

## 2022-07-08 DIAGNOSIS — Z23 HIGH PRIORITY FOR 2019-NCOV VACCINE: ICD-10-CM

## 2022-07-08 DIAGNOSIS — Z13.6 ENCOUNTER FOR LIPID SCREENING FOR CARDIOVASCULAR DISEASE: ICD-10-CM

## 2022-07-08 DIAGNOSIS — F33.1 MAJOR DEPRESSIVE DISORDER, RECURRENT EPISODE, MODERATE (H): ICD-10-CM

## 2022-07-08 DIAGNOSIS — F41.0 PANIC ATTACKS: ICD-10-CM

## 2022-07-08 DIAGNOSIS — J42 CHRONIC BRONCHITIS, UNSPECIFIED CHRONIC BRONCHITIS TYPE (H): Primary | ICD-10-CM

## 2022-07-08 DIAGNOSIS — F31.9 BIPOLAR AFFECTIVE DISORDER, REMISSION STATUS UNSPECIFIED (H): ICD-10-CM

## 2022-07-08 DIAGNOSIS — Z00.00 ENCOUNTER FOR MEDICARE ANNUAL WELLNESS EXAM: ICD-10-CM

## 2022-07-08 DIAGNOSIS — Z13.220 ENCOUNTER FOR LIPID SCREENING FOR CARDIOVASCULAR DISEASE: ICD-10-CM

## 2022-07-08 DIAGNOSIS — E55.9 VITAMIN D DEFICIENCY DISEASE: ICD-10-CM

## 2022-07-08 LAB
CHOLEST SERPL-MCNC: 180 MG/DL
DEPRECATED CALCIDIOL+CALCIFEROL SERPL-MC: 32 UG/L (ref 20–75)
FASTING STATUS PATIENT QL REPORTED: NORMAL
HDLC SERPL-MCNC: 79 MG/DL
LDLC SERPL CALC-MCNC: 89 MG/DL
NONHDLC SERPL-MCNC: 101 MG/DL
TRIGL SERPL-MCNC: 60 MG/DL

## 2022-07-08 PROCEDURE — 91305 COVID-19,PF,PFIZER (12+ YRS): CPT | Performed by: FAMILY MEDICINE

## 2022-07-08 PROCEDURE — 36415 COLL VENOUS BLD VENIPUNCTURE: CPT | Performed by: FAMILY MEDICINE

## 2022-07-08 PROCEDURE — 82306 VITAMIN D 25 HYDROXY: CPT | Performed by: FAMILY MEDICINE

## 2022-07-08 PROCEDURE — G0009 ADMIN PNEUMOCOCCAL VACCINE: HCPCS | Performed by: FAMILY MEDICINE

## 2022-07-08 PROCEDURE — 99396 PREV VISIT EST AGE 40-64: CPT | Mod: 25 | Performed by: FAMILY MEDICINE

## 2022-07-08 PROCEDURE — 80061 LIPID PANEL: CPT | Performed by: FAMILY MEDICINE

## 2022-07-08 PROCEDURE — 99214 OFFICE O/P EST MOD 30 MIN: CPT | Mod: 25 | Performed by: FAMILY MEDICINE

## 2022-07-08 PROCEDURE — 0054A COVID-19,PF,PFIZER (12+ YRS): CPT | Performed by: FAMILY MEDICINE

## 2022-07-08 PROCEDURE — 90677 PCV20 VACCINE IM: CPT | Performed by: FAMILY MEDICINE

## 2022-07-08 RX ORDER — FLUTICASONE PROPIONATE AND SALMETEROL XINAFOATE 115; 21 UG/1; UG/1
2 AEROSOL, METERED RESPIRATORY (INHALATION) 2 TIMES DAILY
Qty: 12 G | Refills: 3 | Status: SHIPPED | OUTPATIENT
Start: 2022-07-08 | End: 2022-10-31

## 2022-07-08 ASSESSMENT — ENCOUNTER SYMPTOMS
WEAKNESS: 0
HEARTBURN: 0
COUGH: 1
HEMATURIA: 0
DIZZINESS: 0
PARESTHESIAS: 0
CONSTIPATION: 0
BREAST MASS: 0
MYALGIAS: 1
EYE PAIN: 1
PALPITATIONS: 1
CHILLS: 0
NAUSEA: 0
ABDOMINAL PAIN: 0
FREQUENCY: 0
HEADACHES: 1
NERVOUS/ANXIOUS: 1
ARTHRALGIAS: 1
DYSURIA: 0
SHORTNESS OF BREATH: 1
HEMATOCHEZIA: 0
FEVER: 0
SORE THROAT: 1
JOINT SWELLING: 0
DIARRHEA: 0

## 2022-07-08 ASSESSMENT — ACTIVITIES OF DAILY LIVING (ADL): CURRENT_FUNCTION: NO ASSISTANCE NEEDED

## 2022-07-08 NOTE — PROGRESS NOTES
"SUBJECTIVE:   Kareen Hernandez is a 57 year old female who presents for Preventive Visit.  Patient has been advised of split billing requirements and indicates understanding: Yes  Are you in the first 12 months of your Medicare coverage?  No    Healthy Habits:     In general, how would you rate your overall health?  Fair    Frequency of exercise:  1 day/week    Duration of exercise:  15-30 minutes    Do you usually eat at least 4 servings of fruit and vegetables a day, include whole grains    & fiber and avoid regularly eating high fat or \"junk\" foods?  No    Taking medications regularly:  Yes    Medication side effects:  None    Ability to successfully perform activities of daily living:  No assistance needed    Home Safety:  No safety concerns identified    Hearing Impairment:  No hearing concerns    In the past 6 months, have you been bothered by leaking of urine?  No    In general, how would you rate your overall mental or emotional health?  Good      PHQ-2 Total Score: 2    Additional concerns today:  No    Patient presents for physical and to establish care.  Patient has a recent diagnosis of COPD/chronic bronchitis.  This is suspected from her chest x-ray.  She is yet to do spirometry.  Patient is taking albuterol which helps a little bit, she is wondering if there is a medication she can take that can help with the mucus production.    Patient is up-to-date on all of her cancer screening tests.  Patient has a long history of tobacco use, she quit back in 2013.  She is up-to-date on lung cancer screening.  She does still use an e-cigarette, as well as medical marijuana.  Cologuard test completed last year.    Patient is a history of depression anxiety, with panic attacks.  Panic attacks usually show up on driving.  Patient is worried that she is going to crash.  She has never been in an accident before.  She is currently not going to therapy.  Not seeing psychiatry.  She is taking Prozac 40 mg as well as " Wellbutrin 300 mg once daily.  She has a history of insomnia for which she takes Ambien.  She has a history of bipolar depression as well    Patient has a recent back surgery in February of this year.  Surgery went well according to patient.    Patient has a history of vitamin D deficiency she takes high-dose vitamin D once weekly.    Patient has history of fibromyalgia    Do you feel safe in your environment? Yes    Have you ever done Advance Care Planning? (For example, a Health Directive, POLST, or a discussion with a medical provider or your loved ones about your wishes): No, advance care planning information given to patient to review.  Patient declined advance care planning discussion at this time.       Fall risk  Fallen 2 or more times in the past year?: No  Any fall with injury in the past year?: No    Cognitive Screening   1) Repeat 3 items (Leader, Season, Table)    2) Clock draw: NORMAL  3) 3 item recall: Recalls NO objects   Results: 0 items recalled: PROBABLE COGNITIVE IMPAIRMENT, **INFORM PROVIDER**    Mini-CogTM Copyright DARLIN Driscoll. Licensed by the author for use in [facility]; reprinted with permission (jesus@[facility]). All rights reserved.      Do you have sleep apnea, excessive snoring or daytime drowsiness?: yes    Reviewed and updated as needed this visit by clinical staff   Tobacco  Allergies  Meds                Reviewed and updated as needed this visit by Provider                   Social History     Tobacco Use     Smoking status: Former Smoker     Packs/day: 1.00     Years: 37.00     Pack years: 37.00     Types: Cigarettes     Quit date: 2013     Years since quittin.7     Smokeless tobacco: Never Used     Tobacco comment: Vape e-cig   Substance Use Topics     Alcohol use: Yes     If you drink alcohol do you typically have >3 drinks per day or >7 drinks per week? No    Alcohol Use 2022   Prescreen: >3 drinks/day or >7 drinks/week? No   Prescreen: >3 drinks/day or >7  drinks/week? -   No flowsheet data found.            Current providers sharing in care for this patient include:   Patient Care Team:  Leno Luis MD as PCP - General (Family Medicine)  Barrington Watt PA-C as Assigned Musculoskeletal Provider  Jerry Espinoza PA-C as Physician Assistant (Gastroenterology)  Monika Luna APRN CNP as Referring Physician (Family Medicine)  Leno Luis MD as Assigned PCP  Hyacinth Timmons NP as Assigned Neuroscience Provider    The following health maintenance items are reviewed in Epic and correct as of today:  Health Maintenance Due   Topic Date Due     SPIROMETRY  Never done     COPD ACTION PLAN  Never done     HEPATITIS B IMMUNIZATION (1 of 3 - Risk 3-dose series) Never done     BP Readings from Last 3 Encounters:   07/08/22 126/72   04/04/22 104/75   03/04/22 119/76    Wt Readings from Last 3 Encounters:   07/08/22 70.7 kg (155 lb 12.8 oz)   02/15/22 71.7 kg (158 lb 1.6 oz)   02/14/22 72.3 kg (159 lb 6.4 oz)                      Breast CA Risk Assessment (FHS-7) 2/14/2022   Do you have a family history of breast, colon, or ovarian cancer? No / Unknown         Mammogram Screening: Recommended mammography every 1-2 years with patient discussion and risk factor consideration  Pertinent mammograms are reviewed under the imaging tab.    Review of Systems   Constitutional: Negative for chills and fever.   HENT: Positive for congestion and sore throat. Negative for hearing loss.    Eyes: Positive for pain and visual disturbance.   Respiratory: Positive for cough and shortness of breath.    Cardiovascular: Positive for palpitations. Negative for chest pain and peripheral edema.   Gastrointestinal: Negative for abdominal pain, constipation, diarrhea, heartburn, hematochezia and nausea.   Breasts:  Negative for tenderness, breast mass and discharge.   Genitourinary: Negative for dysuria, frequency, genital sores, hematuria, pelvic pain,  "urgency, vaginal bleeding and vaginal discharge.   Musculoskeletal: Positive for arthralgias and myalgias. Negative for joint swelling.   Skin: Negative for rash.   Neurological: Positive for headaches. Negative for dizziness, weakness and paresthesias.   Psychiatric/Behavioral: Positive for mood changes. The patient is nervous/anxious.      Constitutional, HEENT, cardiovascular, pulmonary, gi and gu systems are negative, except as otherwise noted.    OBJECTIVE:   /72   Pulse 82   Temp 97.8  F (36.6  C) (Oral)   Ht 1.702 m (5' 7\")   Wt 70.7 kg (155 lb 12.8 oz)   LMP  (LMP Unknown)   SpO2 99%   BMI 24.40 kg/m   Estimated body mass index is 24.4 kg/m  as calculated from the following:    Height as of this encounter: 1.702 m (5' 7\").    Weight as of this encounter: 70.7 kg (155 lb 12.8 oz).  Physical Exam  Constitutional:       General: She is not in acute distress.     Appearance: She is well-developed.   HENT:      Right Ear: Tympanic membrane and external ear normal.      Left Ear: Tympanic membrane and external ear normal.      Nose: Nose normal.      Mouth/Throat:      Pharynx: No oropharyngeal exudate.   Eyes:      General:         Right eye: No discharge.         Left eye: No discharge.      Conjunctiva/sclera: Conjunctivae normal.      Pupils: Pupils are equal, round, and reactive to light.   Neck:      Thyroid: No thyromegaly.      Trachea: No tracheal deviation.   Cardiovascular:      Rate and Rhythm: Normal rate and regular rhythm.      Pulses: Normal pulses.      Heart sounds: Normal heart sounds, S1 normal and S2 normal. No murmur heard.    No friction rub. No S3 or S4 sounds.   Pulmonary:      Effort: Pulmonary effort is normal. No respiratory distress.      Breath sounds: Normal breath sounds. No wheezing or rales.   Abdominal:      General: Bowel sounds are normal.      Palpations: Abdomen is soft. There is no mass.      Tenderness: There is no abdominal tenderness.   Musculoskeletal:       " "  General: Normal range of motion.      Cervical back: Neck supple.   Lymphadenopathy:      Cervical: No cervical adenopathy.   Skin:     General: Skin is warm and dry.      Findings: No rash.   Neurological:      Mental Status: She is alert and oriented to person, place, and time.      Motor: No abnormal muscle tone.      Deep Tendon Reflexes: Reflexes are normal and symmetric.   Psychiatric:         Thought Content: Thought content normal.         Judgment: Judgment normal.               ASSESSMENT / PLAN:       ICD-10-CM    1. Chronic bronchitis, unspecified chronic bronchitis type (H)  J42 REVIEW OF HEALTH MAINTENANCE PROTOCOL ORDERS     fluticasone-salmeterol (ADVAIR HFA) 115-21 MCG/ACT inhaler     General PFT Lab (Please always keep checked)     Pulmonary Function Test   2. Major depressive disorder, recurrent episode, moderate (H)  F33.1 Adult Mental Health  Referral   3. Panic attacks  F41.0    4. Bipolar affective disorder, remission status unspecified (H)  F31.9 Adult Mental Health  Referral   5. Encounter for lipid screening for cardiovascular disease  Z13.220 Lipid panel reflex to direct LDL Fasting    Z13.6 Lipid panel reflex to direct LDL Fasting   6. High priority for 2019-nCoV vaccine  Z23 COVID-19,PF,PFIZER (12+ Yrs GRAY LABEL)   7. Vitamin D deficiency disease  E55.9 Vitamin D Deficiency     Vitamin D Deficiency   8. Need for vaccination  Z23 Pneumococcal 20 Valent Conjugate (Prevnar 20)   9. Encounter for Medicare annual wellness exam  Z00.00        Patient has been advised of split billing requirements and indicates understanding: Yes    COUNSELING:  Reviewed preventive health counseling, as reflected in patient instructions       Regular exercise       Healthy diet/nutrition    Estimated body mass index is 24.4 kg/m  as calculated from the following:    Height as of this encounter: 1.702 m (5' 7\").    Weight as of this encounter: 70.7 kg (155 lb 12.8 oz).        She reports " that she quit smoking about 8 years ago. Her smoking use included cigarettes. She has a 37.00 pack-year smoking history. She has never used smokeless tobacco.      Appropriate preventive services were discussed with this patient, including applicable screening as appropriate for cardiovascular disease, diabetes, osteopenia/osteoporosis, and glaucoma.  As appropriate for age/gender, discussed screening for colorectal cancer, prostate cancer, breast cancer, and cervical cancer. Checklist reviewing preventive services available has been given to the patient.    Reviewed patients plan of care and provided an AVS. The Basic Care Plan (routine screening as documented in Health Maintenance) for Kareen meets the Care Plan requirement. This Care Plan has been established and reviewed with the Patient.    Counseling Resources:  ATP IV Guidelines  Pooled Cohorts Equation Calculator  Breast Cancer Risk Calculator  Breast Cancer: Medication to Reduce Risk  FRAX Risk Assessment  ICSI Preventive Guidelines  Dietary Guidelines for Americans, 2010  USDA's MyPlate  ASA Prophylaxis  Lung CA Screening    Leno Robbins MD  [facility]    Identified Health Risks:    The patient was provided with suggestions to help her develop a healthy physical lifestyle.  She is at risk for lack of exercise and has been provided with information to increase physical activity for the benefit of her well-being.  The patient was counseled and encouraged to consider modifying their diet and eating habits. She was provided with information on recommended healthy diet options.

## 2022-07-08 NOTE — PATIENT INSTRUCTIONS
Patient Education   Personalized Prevention Plan  You are due for the preventive services outlined below.  Your care team is available to assist you in scheduling these services.  If you have already completed any of these items, please share that information with your care team to update in your medical record.  Health Maintenance Due   Topic Date Due     Breathing Capacity Test  Never done     COPD Action Plan  Never done     Pneumococcal Vaccine (1 - PCV) Never done     Hepatitis B Vaccine (1 of 3 - Risk 3-dose series) Never done     COVID-19 Vaccine (3 - Booster for Pfizer series) 06/25/2022     Your Health Risk Assessment indicates you feel you are not in good health    A healthy lifestyle helps keep the body fit and the mind alert. It helps protect you from disease, helps you fight disease, and helps prevent chronic disease (disease that doesn't go away) from getting worse. This is important as you get older and begin to notice twinges in muscles and joints and a decline in the strength and stamina you once took for granted. A healthy lifestyle includes good healthcare, good nutrition, weight control, recreation, and regular exercise. Avoid harmful substances and do what you can to keep safe. Another part of a healthy lifestyle is stay mentally active and socially involved.    Good healthcare     Have a wellness visit every year.     If you have new symptoms, let us know right away. Don't wait until the next checkup.     Take medicines exactly as prescribed and keep your medicines in a safe place. Tell us if your medicine causes problems.   Healthy diet and weight control     Eat 3 or 4 small, nutritious, low-fat, high-fiber meals a day. Include a variety of fruits, vegetables, and whole-grain foods.     Make sure you get enough calcium in your diet. Calcium, vitamin D, and exercise help prevent osteoporosis (bone thinning).     If you live alone, try eating with others when you can. That way you get a good  meal and have company while you eat it.     Try to keep a healthy weight. If you eat more calories than your body uses for energy, it will be stored as fat and you will gain weight.     Recreation   Recreation is not limited to sports and team events. It includes any activity that provides relaxation, interest, enjoyment, and exercise. Recreation provides an outlet for physical, mental, and social energy. It can give a sense of worth and achievement. It can help you stay healthy.    Mental Exercise and Social Involvement  Mental and emotional health is as important as physical health. Keep in touch with friends and family. Stay as active as possible. Continue to learn and challenge yourself.   Things you can do to stay mentally active are:    Learn something new, like a foreign language or musical instrument.     Play SCRABBLE or do crossword puzzles. If you cannot find people to play these games with you at home, you can play them with others on your computer through the Internet.     Join a games club--anything from card games to chess or checkers or lawn bowling.     Start a new hobby.     Go back to school.     Volunteer.     Read.   Keep up with world events.    Exercise for a Healthier Heart  You may wonder how you can improve the health of your heart. If you re thinking about exercise, you re on the right track. You don t need to become an athlete. But you do need a certain amount of brisk exercise to help strengthen your heart. If you have been diagnosed with a heart condition, your healthcare provider may advise exercise to help stabilize your condition. To help make exercise a habit, choose safe, fun activities.      Exercise with a friend. When activity is fun, you're more likely to stick with it.   Before you start  Check with your healthcare provider before starting an exercise program. This is especially important if you have not been active for a while. It's also important if you have a long-term  (chronic) health problem such as heart disease, diabetes, or obesity. Or if you are at high risk for having these problems.   Why exercise?  Exercising regularly offers many healthy rewards. It can help you do all of the following:     Improve your blood cholesterol level to help prevent further heart trouble    Lower your blood pressure to help prevent a stroke or heart attack    Control diabetes, or reduce your risk of getting this disease    Improve your heart and lung function    Reach and stay at a healthy weight    Make your muscles stronger so you can stay active    Prevent falls and fractures by slowing the loss of bone mass (osteoporosis)    Manage stress better    Reduce your blood pressure    Improve your sense of self and your body image  Exercise tips      Ease into your routine. Set small goals. Then build on them. If you are not sure what your activity level should be, talk with your healthcare provider first before starting an exercise routine.    Exercise on most days. Aim for a total of 150 minutes (2 hours and 30 minutes) or more of moderate-intensity aerobic activity each week. Or 75 minutes (1 hour and 15 minutes) or more of vigorous-intensity aerobic activity each week. Or try for a combination of both. Moderate activity means that you breathe heavier and your heart rate increases but you can still talk. Think about doing 40 minutes of moderate exercise, 3 to 4 times a week. For best results, activity should last for about 40 minutes to lower blood pressure and cholesterol. It's OK to work up to the 40-minute period over time. Examples of moderate-intensity activity are walking 1 mile in 15 minutes. Or doing 30 to 45 minutes of yard work.    Step up your daily activity level.  Along with your exercise program, try being more active the whole day. Walk instead of drive. Or park further away so that you take more steps each day. Do more household tasks or yard work. You may not be able to meet  the advised mount of physical activity. But doing some moderate- or vigorous-intensity aerobic activity can help reduce your risk for heart disease. Your healthcare provider can help you figure out what is best for you.    Choose 1 or more activities you enjoy.  Walking is one of the easiest things you can do. You can also try swimming, riding a bike, dancing, or taking an exercise class.    When to call your healthcare provider  Call your healthcare provider if you have any of these:     Chest pain or feel dizzy or lightheaded    Burning, tightness, pressure, or heaviness in your chest, neck, shoulders, back, or arms    Abnormal shortness of breath    More joint or muscle pain    A very fast or irregular heartbeat (palpitations)  [employer] last reviewed this educational content on 7/1/2019 2000-2021 [employer]. All rights reserved. This information is not intended as a substitute for professional medical care. Always follow your healthcare professional's instructions.          Understanding USDA MyPlate  The USDA has guidelines to help you make healthy food choices. These are called MyPlate. MyPlate shows the food groups that make up healthy meals using the image of a place setting. Before you eat, think about the healthiest choices for what to put on your plate or in your cup or bowl. To learn more about building a healthy plate, visit www.choosemyplate.gov.    The food groups    Fruits. Any fruit or 100% fruit juice counts as part of the Fruit Group. Fruits may be fresh, canned, frozen, or dried, and may be whole, cut-up, or pureed. Make 1/2 of your plate fruits and vegetables.    Vegetables. Any vegetable or 100% vegetable juice counts as a member of the Vegetable Group. Vegetables may be fresh, frozen, canned, or dried. They can be served raw or cooked and may be whole, cut-up, or mashed. Make 1/2 of your plate fruits and vegetables.    Grains. All foods made from grains are part of the Grains  Group. These include wheat, rice, oats, cornmeal, and barley. Grains are often used to make foods such as bread, pasta, oatmeal, cereal, tortillas, and grits. Grains should be no more than 1/4 of your plate. At least half of your grains should be whole grains.    Protein. This group includes meat, poultry, seafood, beans and peas, eggs, processed soy products (such as tofu), nuts (including nut butters), and seeds. Make protein choices no more than 1/4 of your plate. Meat and poultry choices should be lean or low fat.    Dairy. The Dairy Group includes all fluid milk products and foods made from milk that contain calcium, such as yogurt and cheese. (Foods that have little calcium, such as cream, butter, and cream cheese, are not part of this group.) Most dairy choices should be low-fat or fat-free.    Oils. Oils aren't a food group, but they do contain essential nutrients. However it's important to watch your intake of oils. These are fats that are liquid at room temperature. They include canola, corn, olive, soybean, vegetable, and sunflower oil. Foods that are mainly oil include mayonnaise, certain salad dressings, and soft margarines. You likely already get your daily oil allowance from the foods you eat.  Things to limit  Eating healthy also means limiting these things in your diet:       Salt (sodium). Many processed foods have a lot of sodium. To keep sodium intake down, eat fresh vegetables, meats, poultry, and seafood when possible. Purchase low-sodium, reduced-sodium, or no-salt-added food products at the store. And don't add salt to your meals at home. Instead, season them with herbs and spices such as dill, oregano, cumin, and paprika. Or try adding flavor with lemon or lime zest and juice.    Saturated fat. Saturated fats are most often found in animal products such as beef, pork, and chicken. They are often solid at room temperature, such as butter. To reduce your saturated fat intake, choose leaner  cuts of meat and poultry. And try healthier cooking methods such as grilling, broiling, roasting, or baking. For a simple lower-fat swap, use plain nonfat yogurt instead of mayonnaise when making potato salad or macaroni salad.    Added sugars. These are sugars added to foods. They are in foods such as ice cream, candy, soda, fruit drinks, sports drinks, energy drinks, cookies, pastries, jams, and syrups. Cut down on added sugars by sharing sweet treats with a family member or friend. You can also choose fruit for dessert, and drink water or other unsweetened beverages.     [employer] last reviewed this educational content on 6/1/2020 2000-2021 [employer]. All rights reserved. This information is not intended as a substitute for professional medical care. Always follow your healthcare professional's instructions.

## 2022-07-08 NOTE — LETTER
July 12, 2022    Kareen Mary  [address]   [place] [address]          Dear ,    We are writing to inform you of your test results.    Your lab results are completely normal at this time.  Please let me know if you have any questions.       Resulted Orders   Vitamin D Deficiency   Result Value Ref Range    Vitamin D, Total (25-Hydroxy) 32 20 - 75 ug/L    Narrative    Season, race, dietary intake, and treatment affect the concentration of 25-hydroxy-Vitamin D. Values may decrease during winter months and increase during summer months. Values 20-29 ug/L may indicate Vitamin D insufficiency and values <20 ug/L may indicate Vitamin D deficiency.    Vitamin D determination is routinely performed by an immunoassay specific for 25 hydroxyvitamin D3.  If an individual is on vitamin D2(ergocalciferol) supplementation, please specify 25 OH vitamin D2 and D3 level determination by LCMSMS test VITD23.     Lipid panel reflex to direct LDL Fasting   Result Value Ref Range    Cholesterol 180 <200 mg/dL    Triglycerides 60 <150 mg/dL    Direct Measure HDL 79 >=50 mg/dL    LDL Cholesterol Calculated 89 <=100 mg/dL    Non HDL Cholesterol 101 <130 mg/dL    Patient Fasting > 8hrs? Unknown     Narrative    Cholesterol  Desirable:  <200 mg/dL    Triglycerides  Normal:  Less than 150 mg/dL  Borderline High:  150-199 mg/dL  High:  200-499 mg/dL  Very High:  Greater than or equal to 500 mg/dL    Direct Measure HDL  Female:  Greater than or equal to 50 mg/dL   Male:  Greater than or equal to 40 mg/dL    LDL Cholesterol  Desirable:  <100mg/dL  Above Desirable:  100-129 mg/dL   Borderline High:  130-159 mg/dL   High:  160-189 mg/dL   Very High:  >= 190 mg/dL    Non HDL Cholesterol  Desirable:  130 mg/dL  Above Desirable:  130-159 mg/dL  Borderline High:  160-189 mg/dL  High:  190-219 mg/dL  Very High:  Greater than or equal to 220 mg/dL       If you have any questions or concerns, please call the clinic at the number  listed above.       Sincerely,      Leno Courtney MD

## 2022-07-13 ENCOUNTER — TELEPHONE (OUTPATIENT)
Dept: FAMILY MEDICINE | Facility: CLINIC | Age: 57
End: 2022-07-13

## 2022-07-13 NOTE — TELEPHONE ENCOUNTER
Patient called was given a new inhaler was wondering if she should still use her Albuterol with the new inhaler please call and advise.  Mandy Cason,

## 2022-07-13 NOTE — TELEPHONE ENCOUNTER
Attempted to call pt at [phone]. This was the first attempt at calling and voice message left to return call to clinic at [phone].    When pt returns call to clinic, please let her know she was started on Advair 7/8/22, a steroid inhaler. It is still ok to use albuterol.     Kelly WORTHINGTON RN, BSN  [facility]

## 2022-07-14 NOTE — TELEPHONE ENCOUNTER
Spoke with pt and notified of below, verbalized understanding.     Thanks,  Carmelina RN  [facility]

## 2022-07-28 ENCOUNTER — OFFICE VISIT (OUTPATIENT)
Dept: NEUROSURGERY | Facility: CLINIC | Age: 57
End: 2022-07-28
Payer: COMMERCIAL

## 2022-07-28 VITALS
OXYGEN SATURATION: 97 % | BODY MASS INDEX: 23.49 KG/M2 | HEART RATE: 86 BPM | DIASTOLIC BLOOD PRESSURE: 78 MMHG | WEIGHT: 150 LBS | SYSTOLIC BLOOD PRESSURE: 130 MMHG

## 2022-07-28 DIAGNOSIS — M54.16 LUMBAR RADICULOPATHY: ICD-10-CM

## 2022-07-28 DIAGNOSIS — Z98.890 S/P LUMBAR MICRODISCECTOMY: Primary | ICD-10-CM

## 2022-07-28 PROCEDURE — 99024 POSTOP FOLLOW-UP VISIT: CPT | Performed by: NURSE PRACTITIONER

## 2022-07-28 RX ORDER — METHYLPREDNISOLONE 4 MG
TABLET, DOSE PACK ORAL
Qty: 21 TABLET | Refills: 0 | Status: SHIPPED | OUTPATIENT
Start: 2022-07-28 | End: 2022-09-02

## 2022-07-28 ASSESSMENT — PAIN SCALES - GENERAL: PAINLEVEL: SEVERE PAIN (6)

## 2022-07-28 NOTE — NURSING NOTE
"Kareen Hernandez is a 57 year old female who presents for:  Chief Complaint   Patient presents with     Surgical Followup     12 wk follow-up   DOS: L L5-S1 MIS microdiscectomy        Initial Vitals:  /78   Pulse 86   Wt 150 lb (68 kg)   LMP  (LMP Unknown)   SpO2 97%   BMI 23.49 kg/m   Estimated body mass index is 23.49 kg/m  as calculated from the following:    Height as of 7/8/22: 5' 7\" (1.702 m).    Weight as of this encounter: 150 lb (68 kg).. Body surface area is 1.79 meters squared. BP completed using cuff size: regular  Severe Pain (6)    Nursing Comments: 6/10 pain. Patient stated that her L side is doing a lot better, but is now experiencing R side LBP to R LE pain.    Mann Lomeli    "

## 2022-07-28 NOTE — LETTER
7/28/2022         RE: Kareen Hernandez  [address]  [place] [address]        Dear Colleague,    Thank you for referring your patient, Kareen Hernandez, to the [facility]. Please see a copy of my visit note below.    [facility] Neurosurgery Follow-Up:     HPI: 5 months s/p MIS left L5-S1 microdiscectomy with Dr. Cash. Patient reports pre op left leg pain has resolved, but she developed new right leg pain about 1 month ago. Denies trauma or injuries at onset. She describes right sided low back pain that radiates to right buttocks, posterior right leg to knee, with tingling in toes as well. She has tried massage and swimming/pool therapy but symptoms persist. Denies falls, weakness, or bowel/bladder changes.     Current pain: 6/10      Exam:  Constitutional:  Alert, well nourished, NAD.  HEENT: Normocephalic, atraumatic.   Pulm:  Without shortness of breath   CV:  No pitting edema of BLE.      /78   Pulse 86   Wt 150 lb (68 kg)   LMP  (LMP Unknown)   SpO2 97%   BMI 23.49 kg/m      Neurological:  Awake  Alert  Oriented x 3  Motor exam:  Hip Flexor:                Right: 5/5  Left:  5/5  Quadriceps:              Right:  5/5  Left:  5/5  Hamstrings:              Right:  5/5  Left:  5/5  Gastroc Soleus:        Right:  5/5  Left:  5/5  Tib/Ant:                      Right:  5/5  Left:  5/5  EHL:                          Right:  5/5  Left:  5/5      Able to spontaneously move BLE  Sensation intact throughout BLE  Incision: Well healed     Assessment/Plan:   5 months s/p MIS left L5-S1 microdiscectomy with Dr. Cash. Patient reports pre op left leg pain has resolved, but she developed new right leg pain about 1 month ago. She describes right sided low back pain that radiates to right buttocks, posterior right leg to knee, with tingling in toes as well. We discussed options at this time. Will obtain an updated lumbar spine MRI for further evaluation. Medrol  dosepak sent to pharmacy as well. Patient would like to hold off on trying PT until MRI has been completed. Will call patient with MRI results and next steps.     Discussed red flag symptoms and advised to seek medical attention if these develop. Patient voiced understanding and agreement.      Hyacinth Timmons CNP  [facility] Neurosurgery  [address] [place] [address]  Tel [phone]  Pager [phone]          Again, thank you for allowing me to participate in the care of your patient.        Sincerely,        Hyacinth Timmons, NP

## 2022-07-28 NOTE — PROGRESS NOTES
[facility] Neurosurgery Follow-Up:     HPI: 5 months s/p MIS left L5-S1 microdiscectomy with Dr. Cash. Patient reports pre op left leg pain has resolved, but she developed new right leg pain about 1 month ago. Denies trauma or injuries at onset. She describes right sided low back pain that radiates to right buttocks, posterior right leg to knee, with tingling in toes as well. She has tried massage and swimming/pool therapy but symptoms persist. Denies falls, weakness, or bowel/bladder changes.     Current pain: 6/10      Exam:  Constitutional:  Alert, well nourished, NAD.  HEENT: Normocephalic, atraumatic.   Pulm:  Without shortness of breath   CV:  No pitting edema of BLE.      /78   Pulse 86   Wt 150 lb (68 kg)   LMP  (LMP Unknown)   SpO2 97%   BMI 23.49 kg/m      Neurological:  Awake  Alert  Oriented x 3  Motor exam:  Hip Flexor:                Right: 5/5  Left:  5/5  Quadriceps:              Right:  5/5  Left:  5/5  Hamstrings:              Right:  5/5  Left:  5/5  Gastroc Soleus:        Right:  5/5  Left:  5/5  Tib/Ant:                      Right:  5/5  Left:  5/5  EHL:                          Right:  5/5  Left:  5/5      Able to spontaneously move BLE  Sensation intact throughout BLE  Incision: Well healed     Assessment/Plan:   5 months s/p MIS left L5-S1 microdiscectomy with Dr. Cash. Patient reports pre op left leg pain has resolved, but she developed new right leg pain about 1 month ago. She describes right sided low back pain that radiates to right buttocks, posterior right leg to knee, with tingling in toes as well. We discussed options at this time. Will obtain an updated lumbar spine MRI for further evaluation. Medrol dosepak sent to pharmacy as well. Patient would like to hold off on trying PT until MRI has been completed. Will call patient with MRI results and next steps.     Discussed red flag symptoms and advised to seek medical attention if these develop. Patient voiced  understanding and agreement.      Hyacinth Timmons CNP  [facility]  [address] [address]  Tel [phone]  Pager [phone]

## 2022-07-28 NOTE — PATIENT INSTRUCTIONS
Medrol dosepak (oral steroid medication) sent to pharmacy.     Order for lumbar spine MRI. You can call to schedule at [phone]. I will call with the results and next steps.     Please call our clinic if symptoms persist, change, or worsen at any time.    [facility] Neurosurgery  [address] [address]  Tel [phone]

## 2022-07-29 DIAGNOSIS — F33.1 MAJOR DEPRESSIVE DISORDER, RECURRENT EPISODE, MODERATE (H): ICD-10-CM

## 2022-07-29 RX ORDER — BUPROPION HYDROCHLORIDE 300 MG/1
TABLET ORAL
Qty: 30 TABLET | Refills: 1 | Status: SHIPPED | OUTPATIENT
Start: 2022-07-29 | End: 2022-09-28

## 2022-07-29 NOTE — TELEPHONE ENCOUNTER
Prescription approved per [facility] Refill Protocol.    Lyudmila Crabtree RN  [facility]

## 2022-08-01 ENCOUNTER — OFFICE VISIT (OUTPATIENT)
Dept: OPHTHALMOLOGY | Facility: CLINIC | Age: 57
End: 2022-08-01
Payer: COMMERCIAL

## 2022-08-01 DIAGNOSIS — Z01.00 EXAMINATION OF EYES AND VISION: Primary | ICD-10-CM

## 2022-08-01 DIAGNOSIS — H52.4 PRESBYOPIA: ICD-10-CM

## 2022-08-01 PROCEDURE — 92004 COMPRE OPH EXAM NEW PT 1/>: CPT | Performed by: OPHTHALMOLOGY

## 2022-08-01 PROCEDURE — 92015 DETERMINE REFRACTIVE STATE: CPT | Performed by: OPHTHALMOLOGY

## 2022-08-01 ASSESSMENT — VISUAL ACUITY
OD_SC+: +1
OD_SC: 20/30
METHOD: SNELLEN - LINEAR
OS_SC: 20/40

## 2022-08-01 ASSESSMENT — CUP TO DISC RATIO
OD_RATIO: 0.5
OS_RATIO: 0.6

## 2022-08-01 ASSESSMENT — REFRACTION_MANIFEST
OS_ADD: +2.50
OD_CYLINDER: +0.75
OD_ADD: +2.50
OS_SPHERE: +0.75
OD_AXIS: 165
OD_SPHERE: +0.75
OS_CYLINDER: SPHERE

## 2022-08-01 ASSESSMENT — EXTERNAL EXAM - LEFT EYE: OS_EXAM: 1+ BROW PTOSIS

## 2022-08-01 ASSESSMENT — TONOMETRY
OS_IOP_MMHG: 16
OD_IOP_MMHG: 15
IOP_METHOD: APPLANATION

## 2022-08-01 ASSESSMENT — CONF VISUAL FIELD
OD_NORMAL: 1
OS_NORMAL: 1

## 2022-08-01 ASSESSMENT — EXTERNAL EXAM - RIGHT EYE: OD_EXAM: 1+ BROW PTOSIS

## 2022-08-01 NOTE — PROGRESS NOTES
" Current Eye Medications:  None.      Subjective:  Comprehensive Eye Exam.  Patient complains of decreasing reading vision.  She wears over-the-counter readers which help somewhat, but she is wearing the strongest power, and still has some difficulty reading.   Distance vision is good, without correction.  She is bothered by the lighting inside of [place].  Last eye exam was several years ago.  No history of eye injuries or surgery.  Grandmother had glaucoma, and dad had glaucoma and injections for macular degeneration.      Objective:  See Ophthalmology Exam.       Assessment:  Baseline eye exam.      ICD-10-CM    1. Examination of eyes and vision  Z01.00    2. Presbyopia  H52.4         Plan:  Glasses prescription given - optional  Okay to use OTC +3.00 - +3.25 half readers.  Use artificial tears up to four times a day (like Refresh Optive, Systane Balance, TheraTears, or generic artificial tears are ok. Avoid \"get the red out\" drops).   Call in April 2023/ 2024 for an appointment in August 2023/ 2024 for Complete Exam    Dr. Wells [phone]      "

## 2022-08-01 NOTE — LETTER
"    8/1/2022         RE: Kareen Hernandez  [address]  [place] [address]        Dear Colleague,    Thank you for referring your patient, Kareen Hernandez, to the [facility]. Please see a copy of my visit note below.     Current Eye Medications:  None.      Subjective:  Comprehensive Eye Exam.  Patient complains of decreasing reading vision.  She wears over-the-counter readers which help somewhat, but she is wearing the strongest power, and still has some difficulty reading.   Distance vision is good, without correction.  She is bothered by the lighting inside of [employer]  Last eye exam was several years ago.  No history of eye injuries or surgery.  Grandmother had glaucoma, and dad had glaucoma and injections for macular degeneration.      Objective:  See Ophthalmology Exam.       Assessment:  Baseline eye exam.      ICD-10-CM    1. Examination of eyes and vision  Z01.00    2. Presbyopia  H52.4         Plan:  Glasses prescription given - optional  Okay to use OTC +3.00 - +3.25 half readers.  Use artificial tears up to four times a day (like Refresh Optive, Systane Balance, TheraTears, or generic artificial tears are ok. Avoid \"get the red out\" drops).   Call in April 2023/ 2024 for an appointment in August 2023/ 2024 for Complete Exam    Dr. Wells [phone]          Again, thank you for allowing me to participate in the care of your patient.        Sincerely,        Jose Wells MD    "

## 2022-08-01 NOTE — PATIENT INSTRUCTIONS
"Glasses prescription given - optional  Okay to use OTC +3.00 - +3.25 half readers.  Use artificial tears up to four times a day (like Refresh Optive, Systane Balance, TheraTears, or generic artificial tears are ok. Avoid \"get the red out\" drops).   Call in April 2023/ 2024 for an appointment in August 2023/ 2024 for Complete Exam    Dr. Wells [phone]   "

## 2022-08-03 DIAGNOSIS — M79.7 FIBROMYALGIA: ICD-10-CM

## 2022-08-03 NOTE — TELEPHONE ENCOUNTER
Requested Prescriptions   Pending Prescriptions Disp Refills     cyclobenzaprine (FLEXERIL) 10 MG tablet [Pharmacy Med Name: CYCLOBENZAPRINE 10 MG TABLET] 60 tablet 1     Sig: TAKE 1 TABLET 3 TIMES A DAY AS NEEDED MUSCLE SPASM       There is no refill protocol information for this order        Last sent 6-16-22 for 30 days with 1 refill. Using for Fibromyalgia.    Lucero AVALOS RN

## 2022-08-04 RX ORDER — CYCLOBENZAPRINE HCL 10 MG
TABLET ORAL
Qty: 60 TABLET | Refills: 0 | Status: SHIPPED | OUTPATIENT
Start: 2022-08-04 | End: 2022-09-02

## 2022-08-11 ENCOUNTER — TELEPHONE (OUTPATIENT)
Dept: NEUROSURGERY | Facility: CLINIC | Age: 57
End: 2022-08-11

## 2022-08-11 NOTE — TELEPHONE ENCOUNTER
Patient seen in clinic 7/28/22 for new Right sided low back pain radiating to right knee, tingling in toes. MDP given and MRI ordered at that time but not scheduled until 8/25/22. Patient called requesting another MDP to help with pain.

## 2022-08-12 NOTE — TELEPHONE ENCOUNTER
Too early for MDP. Will try to move up MRI to a sooner date.     Attempted to reach out to patient, no answer. Left voice message for patient to call clinic back to further discuss.

## 2022-08-23 DIAGNOSIS — F33.1 MAJOR DEPRESSIVE DISORDER, RECURRENT EPISODE, MODERATE (H): ICD-10-CM

## 2022-08-23 NOTE — TELEPHONE ENCOUNTER
"Requested Prescriptions   Pending Prescriptions Disp Refills     buPROPion (WELLBUTRIN XL) 300 MG 24 hr tablet [Pharmacy Med Name: BUPROPION HCL  MG TABLET] 30 tablet 1     Sig: TAKE 1 TABLET BY MOUTH EVERY DAY IN THE MORNING       SSRIs Protocol Failed - 8/23/2022  9:33 AM        Failed - PHQ-9 score less than 5 in past 6 months     Please review last PHQ-9 score.           Passed - Medication is Bupropion     If the medication is Bupropion (Wellbutrin), and the patient is taking for smoking cessation; OK to refill.          Passed - Medication is active on med list        Passed - Patient is age 18 or older        Passed - No active pregnancy on record        Passed - No positive pregnancy test in last 12 months        Passed - Recent (6 mo) or future (30 days) visit within the authorizing provider's specialty     Patient had office visit in the last 6 months or has a visit in the next 30 days with authorizing provider or within the authorizing provider's specialty.  See \"Patient Info\" tab in inbasket, or \"Choose Columns\" in Meds & Orders section of the refill encounter.               Pt saw Dr. Whitney 7-8 to [facility], routing to his team.    Lucero AVALOS RN    Lucero AVALOS RN    "

## 2022-08-25 ENCOUNTER — ANCILLARY PROCEDURE (OUTPATIENT)
Dept: MRI IMAGING | Facility: CLINIC | Age: 57
End: 2022-08-25
Attending: NURSE PRACTITIONER
Payer: COMMERCIAL

## 2022-08-25 DIAGNOSIS — M54.16 LUMBAR RADICULOPATHY: ICD-10-CM

## 2022-08-25 DIAGNOSIS — Z98.890 S/P LUMBAR MICRODISCECTOMY: ICD-10-CM

## 2022-08-25 PROCEDURE — A9585 GADOBUTROL INJECTION: HCPCS | Performed by: STUDENT IN AN ORGANIZED HEALTH CARE EDUCATION/TRAINING PROGRAM

## 2022-08-25 PROCEDURE — 72158 MRI LUMBAR SPINE W/O & W/DYE: CPT | Performed by: STUDENT IN AN ORGANIZED HEALTH CARE EDUCATION/TRAINING PROGRAM

## 2022-08-25 RX ORDER — BUPROPION HYDROCHLORIDE 300 MG/1
TABLET ORAL
Qty: 30 TABLET | Refills: 1 | OUTPATIENT
Start: 2022-08-25

## 2022-08-25 RX ORDER — GADOBUTROL 604.72 MG/ML
7.5 INJECTION INTRAVENOUS ONCE
Status: COMPLETED | OUTPATIENT
Start: 2022-08-25 | End: 2022-08-25

## 2022-08-25 RX ADMIN — GADOBUTROL 7 ML: 604.72 INJECTION INTRAVENOUS at 08:29

## 2022-08-25 NOTE — TELEPHONE ENCOUNTER
Prescription on file at Requesting Pharmacy.     Danielle Zaman RN BSN  [facility]

## 2022-08-26 ENCOUNTER — TELEPHONE (OUTPATIENT)
Dept: PALLIATIVE MEDICINE | Facility: CLINIC | Age: 57
End: 2022-08-26

## 2022-08-26 ENCOUNTER — TELEPHONE (OUTPATIENT)
Dept: NEUROSURGERY | Facility: CLINIC | Age: 57
End: 2022-08-26

## 2022-08-26 DIAGNOSIS — Z98.890 S/P LUMBAR MICRODISCECTOMY: Primary | ICD-10-CM

## 2022-08-26 DIAGNOSIS — M54.16 LUMBAR RADICULOPATHY: ICD-10-CM

## 2022-08-26 NOTE — TELEPHONE ENCOUNTER
Patient would like a call back regarding her MRI from August 11th, patient states that both sides hurt so she is wanting to talk to YOLETTE about it. Please call her back at [phone]. Thank you~

## 2022-08-26 NOTE — TELEPHONE ENCOUNTER
Screening Questions for Radiology Injections:    Injection to be done at which interventional clinic site? [facility] [place]    Procedure ordered by Shanelle    Procedure ordered? ROSLYN    Transforaminal Cervical CHIVO - Send to [facility] - No [facility] providers perform this procedure    What insurance would patient like us to bill for this procedure? [employer]    Worker's comp or MVA (motor vehicle accident) -Any injection DO NOT SCHEDULE and route to Keeley Dobbins.      [employer] insurance - For SI joint injections, DO NOT SCHEDULE and route to Rylee Cardenas.       ALL BCBS, [employer] and [employer] - DO NOT SCHEDULE and route to Rylee Cardenas    Is an  needed? No     Patient has a  home? (Review Grid) YES: ok    Any chance of pregnancy? NO   If YES, do NOT schedule and route to RN pool    Is patient actively being treated for cancer or immunocompromised? No  If YES, do NOT schedule and route to RN pool     Does the patient have a bleeding or clotting disorder? No     If YES, okay to schedule AND route to RN nurse pool. (For any patients with platelet count <100, RN must forward to provider)    Is patient taking any Blood Thinners OR Antiplatelet medication?  No   If hold needed, do NOT schedule, route to RN pool    Examples:   o Blood Thinners: (Coumadin, Warfarin, Jantoven, Pradaxa, Xarelto, Eliquis, Edoxaban, Enoxaparin, Lovenox, Heparin, Arixtra, Fondaparinux or Fragmin)  o Antiplatelet Medications: (Plavix, Brilinta or Effient)     Is patient taking any aspirin products (includes Excedrin and Fiorinal)? No     If more than 325mg/day, OK to schedule; Instruct Pt to decrease to less than 325 mg for 7 days AND route to RN pool    For CERVICAL procedures, hold all aspirin products for 6 days.     Tell Pt that if aspirin product is not held for 6 days, the procedure WILL BE cancelled.     Any allergies to contrast dye, iodine, shellfish, or numbing and steroid  medications? No    If YES, schedule and add allergy information to appointment notes AND route to the RN pool     If CHIVO and Contrast Dye / Iodine Allergy? DO NOT SCHEDULE, route to RN pool    Allergies: Lurasidone and Morphine     Does patient have an active infection or treated for one within the past week? No    Is patient currently taking any antibiotics or steroid medications?  No     For patients on chronic, preventative, or prophylactic antibiotics, procedures may be scheduled.     For patients on antibiotics for active or recent infection, schedule 4 days after completed.    For patients on steroid medications, schedule 4 days after completed.     Has the patient had a flu shot or any other vaccinations within the past 7 days? No  If yes, explain that for the vaccine to work best they need to:       wait 1 week before and 1 week after getting any Vaccine    wait 1 week before and 2 weeks after getting Covid Vaccine #2 or BOOSTER    If patient has concerns about the timing, send to RN pool     Does patient have an MRI/CT?  YES: MRI Include Date and Check Procedure Scheduling Grid to see if required.    Was the MRI/CT done within the last 3 years?  Yes     If no route to RN Pool    If yes, where was the MRI/CT done? [facility]     Refer to PACS Transmissions list for approved external locations and route to RN Pool High Priority    If MRI was not done at approved external location do NOT schedule and route to RN pool.      If patient has an imaging disc, the injection MAY be scheduled but patient must bring disc to appt or appt will be cancelled.  2  Procedure Specific Instructions:    If celiac plexus block, informed patient NPO for 6 hours and that it is okay to take medications with sips of water, especially blood pressure medications Not Applicable         If this is for a cervical procedure, informed patient that aspirin needs to be held for 6 days.   Not Applicable      Sedation, If Sedation is ordered  for any procedure, patient must be NPO for 6 hours prior to procedure Not Applicable      If IV needed:    Do not schedule procedures requiring IV placement in the first appointment of the day or first appointment after lunch. Do NOT schedule at 0745, 0815 or 1245. ok    Instructed patient to arrive 30 minutes early for IV start if required. (Check Procedure Scheduling Grid)  Not Applicable    Reminders:    If you are started on any steroids or antibiotics between now and your appointment, you must contact us because the procedure may need to be cancelled.  Yes      As a reminder, receiving steroids can decrease your body's ability to fight infection.   Would you still like to move forward with scheduling the injection?  Yes      IV Sedation is not provided for procedures. If oral anti-anxiety medication is needed, the patient should request this from their referring provider.      Instruct patient to arrive as directed prior to the scheduled appointment time:  [place]: 30 minutes before; if IV needed 1 hour before       For patients 85 or older we recommend having an adult stay w/ them for the remainder of the day.       If the patient is Diabetic, remind them to bring their glucometer.      Does the patient have any questions?  NO  Annita Dobbins  [facility]

## 2022-08-26 NOTE — TELEPHONE ENCOUNTER
Call patient and discussed MRI results as stated below.  She reports continued right-sided low back pain that radiates to right posterior leg to knee with tingling in toes.  She denies any fevers, body aches, chills.  We discussed imaging and next steps.  We will plan for ESR, CRP, CBC, physical therapy, and CHIVO.  We will call patient with lab results.  Advised patient to continue to monitor symptoms and call clinic with any questions or concerns.  She voiced agreement with plan.    MR LUMBAR SPINE W/O & W CONTRAST 8/25/2022 8:42 AM  Impression:  1. Since January 2022 there are new postoperative changes of  left-sided microlaminectomy and microdiscectomy at L5-S1. Associated  granulation related enhancement in the left posterior lateral and  anterior epidural space and there is slight improvement of the disc  herniation with mass effect on the left descending S1. However there  is residual central disc extrusion contacting the right descending S1  within the lateral recess which may explain patient's right-sided  symptoms. Recommend correlation with right S1 radiculopathy.  2. Worsening anterolisthesis at L4-5 with extensive degenerative  endplate changes with contrast enhancement and worsening spinal canal  stenosis, previously moderate, today moderate to severe. There is also  increased effacement of the lateral recesses bilaterally. This may  also explain patient's symptoms.  3. Note that there is contrast enhancement in the left paraspinous  soft tissues, this is favored as degenerative in nature, however  recommend correlation with ESR and CRP to rule out an atypical  spondylitis discitis.

## 2022-08-27 ENCOUNTER — LAB (OUTPATIENT)
Dept: LAB | Facility: CLINIC | Age: 57
End: 2022-08-27
Payer: COMMERCIAL

## 2022-08-27 DIAGNOSIS — M54.16 LUMBAR RADICULOPATHY: ICD-10-CM

## 2022-08-27 DIAGNOSIS — Z98.890 S/P LUMBAR MICRODISCECTOMY: ICD-10-CM

## 2022-08-27 LAB
ERYTHROCYTE [DISTWIDTH] IN BLOOD BY AUTOMATED COUNT: 13.8 % (ref 10–15)
ERYTHROCYTE [SEDIMENTATION RATE] IN BLOOD BY WESTERGREN METHOD: 17 MM/HR (ref 0–30)
HCT VFR BLD AUTO: 39.8 % (ref 35–47)
HGB BLD-MCNC: 12.5 G/DL (ref 11.7–15.7)
MCH RBC QN AUTO: 30.3 PG (ref 26.5–33)
MCHC RBC AUTO-ENTMCNC: 31.4 G/DL (ref 31.5–36.5)
MCV RBC AUTO: 97 FL (ref 78–100)
PLATELET # BLD AUTO: 282 10E3/UL (ref 150–450)
RBC # BLD AUTO: 4.12 10E6/UL (ref 3.8–5.2)
WBC # BLD AUTO: 6.6 10E3/UL (ref 4–11)

## 2022-08-27 PROCEDURE — 36415 COLL VENOUS BLD VENIPUNCTURE: CPT

## 2022-08-27 PROCEDURE — 86140 C-REACTIVE PROTEIN: CPT

## 2022-08-27 PROCEDURE — 85652 RBC SED RATE AUTOMATED: CPT

## 2022-08-27 PROCEDURE — 85027 COMPLETE CBC AUTOMATED: CPT

## 2022-08-29 ENCOUNTER — TELEPHONE (OUTPATIENT)
Dept: NEUROSURGERY | Facility: CLINIC | Age: 57
End: 2022-08-29

## 2022-08-29 ENCOUNTER — TELEPHONE (OUTPATIENT)
Dept: FAMILY MEDICINE | Facility: CLINIC | Age: 57
End: 2022-08-29

## 2022-08-29 LAB — CRP SERPL-MCNC: <2.9 MG/L (ref 0–8)

## 2022-08-29 NOTE — TELEPHONE ENCOUNTER
Called patient and discussed lab results- ESR, CRP, CBC are normal. Will plan for CHIVO and PT as previously discussed. Advised patient to follow-up if symptoms persist, change, or worsen. She voiced agreement with plan.

## 2022-08-29 NOTE — TELEPHONE ENCOUNTER
Reason for Call:  Other Call back    Detailed comments: Would like her PCP to take a 2nd look at her MRI on her back. Believes she may have a kidney stone. Please advise    Phone Number Patient can be reached at: Cell number on file:    Telephone Information:   Mobile [phone]       Best Time: any    Can we leave a detailed message on this number? YES    Call taken on 8/29/2022 at 11:36 AM by Magui Núñez

## 2022-08-30 DIAGNOSIS — F33.1 MAJOR DEPRESSIVE DISORDER, RECURRENT EPISODE, MODERATE (H): ICD-10-CM

## 2022-08-30 DIAGNOSIS — F41.0 PANIC ATTACK: ICD-10-CM

## 2022-08-30 RX ORDER — FLUOXETINE 20 MG/1
TABLET, FILM COATED ORAL
Qty: 60 TABLET | Refills: 3 | Status: SHIPPED | OUTPATIENT
Start: 2022-08-30 | End: 2022-12-05

## 2022-08-30 NOTE — TELEPHONE ENCOUNTER
4-7-22  moderate (H)  Patient reports her mood is improved at this time.  She feels that she should go back up to 40 mg daily of the fluoxetine as this was the dose she was on previously.  The burning that she developed in the esophagus related to the capsules has not recurred with reinitiation of therapy with the fluoxetine and she also feels that her anxiety is coming under better control as she has had markedly fewer of the episodes where she had problems breathing or the feeling that she forgot how to swallow.  Medication was refilled with an increase to 40 mg daily.  No indication from speaking her today that she is developing any manic type symptoms.  She continues to use the lamotrigine and bupropion at this time.  Follow-up is recommended in 2 months for Medicare annual wellness examination.  - FLUoxetine 20 MG tablet; Take 2 tablets (40 mg) by mouth daily    Appt scheduled for next available (November), ok sending to get to appt?  Lucero AVALOS RN

## 2022-08-31 DIAGNOSIS — M54.16 LUMBAR RADICULOPATHY: ICD-10-CM

## 2022-08-31 DIAGNOSIS — M79.7 FIBROMYALGIA: ICD-10-CM

## 2022-08-31 DIAGNOSIS — Z98.890 S/P LUMBAR MICRODISCECTOMY: ICD-10-CM

## 2022-08-31 DIAGNOSIS — E55.9 VITAMIN D DEFICIENCY DISEASE: ICD-10-CM

## 2022-09-01 NOTE — TELEPHONE ENCOUNTER
Routing refill request to provider for review/approval because:  Drug not on the [facility] refill protocol     Kate Samuel RN, BSN, PHN  [facility]: [place]

## 2022-09-02 RX ORDER — ERGOCALCIFEROL 1.25 MG/1
CAPSULE, LIQUID FILLED ORAL
Qty: 4 CAPSULE | Refills: 1 | Status: SHIPPED | OUTPATIENT
Start: 2022-09-02 | End: 2022-10-26

## 2022-09-02 RX ORDER — CYCLOBENZAPRINE HCL 10 MG
TABLET ORAL
Qty: 60 TABLET | Refills: 0 | Status: SHIPPED | OUTPATIENT
Start: 2022-09-02 | End: 2022-09-23

## 2022-09-02 RX ORDER — METHYLPREDNISOLONE 4 MG
TABLET, DOSE PACK ORAL
Qty: 21 TABLET | Refills: 0 | Status: SHIPPED | OUTPATIENT
Start: 2022-09-02 | End: 2022-11-11

## 2022-09-13 ENCOUNTER — VIRTUAL VISIT (OUTPATIENT)
Dept: FAMILY MEDICINE | Facility: CLINIC | Age: 57
End: 2022-09-13
Payer: COMMERCIAL

## 2022-09-13 DIAGNOSIS — J20.9 ACUTE BRONCHITIS WITH SYMPTOMS > 10 DAYS: ICD-10-CM

## 2022-09-13 DIAGNOSIS — R10.9 LEFT FLANK PAIN: ICD-10-CM

## 2022-09-13 DIAGNOSIS — N20.0 RECURRENT KIDNEY STONES: Primary | ICD-10-CM

## 2022-09-13 PROCEDURE — 99214 OFFICE O/P EST MOD 30 MIN: CPT | Mod: 95 | Performed by: FAMILY MEDICINE

## 2022-09-13 RX ORDER — ALBUTEROL SULFATE 90 UG/1
2 AEROSOL, METERED RESPIRATORY (INHALATION) EVERY 4 HOURS PRN
Qty: 18 G | Refills: 1 | Status: SHIPPED | OUTPATIENT
Start: 2022-09-13 | End: 2023-06-08

## 2022-09-13 NOTE — PROGRESS NOTES
Kareen is a 57 year old who is being evaluated via a billable telephone visit.      What phone number would you like to be contacted at? [phone]  How would you like to obtain your AVS? Mail a copy    Assessment & Plan       ICD-10-CM    1. Recurrent kidney stones  N20.0 UA with Microscopic reflex to Culture - lab collect     Urine Culture Aerobic Bacterial - lab collect     CT Abdomen Pelvis w/o Contrast     Adult Urology  Referral   2. Left flank pain  R10.9 UA with Microscopic reflex to Culture - lab collect     Urine Culture Aerobic Bacterial - lab collect     CT Abdomen Pelvis w/o Contrast     Adult Urology  Referral   3. Acute bronchitis with symptoms > 10 days  J20.9 albuterol (PROAIR HFA/PROVENTIL HFA/VENTOLIN HFA) 108 (90 Base) MCG/ACT inhaler         Review of external notes as documented elsewhere in note  I spent a total of 35 minutes on the day of the visit.   Time spent doing chart review, history and exam, documentation and further activities per the note       Patient Instructions   Imaging Services   [facility] Scheduling  [phone]  Hours M-F: 8:00 AM-5:00 PM        No follow-ups on file.    Leno Robbins MD  [facility] HARMEET Mathur is a 57 year old, presenting for the following health issues:  Patient Request (2nd look at MRI possible kidney stone)      HPI     Chief Complaint   Patient presents with     Patient Request     2nd look at MRI possible kidney stone           Review of Systems   Constitutional, HEENT, cardiovascular, pulmonary, gi and gu systems are negative, except as otherwise noted.      Objective           Vitals:  No vitals were obtained today due to virtual visit.    Physical Exam   healthy, alert and no distress  PSYCH: Alert and oriented times 3; coherent speech, normal   rate and volume, able to articulate logical thoughts, able   to abstract reason, no tangential thoughts, no hallucinations   or  delusions  Her affect is normal  RESP: No cough, no audible wheezing, able to talk in full sentences  Remainder of exam unable to be completed due to telephone visits                Phone call duration: 35 minutes

## 2022-09-13 NOTE — PATIENT INSTRUCTIONS
Imaging Services   [facility] Scheduling  [phone]  Hours M-F: 8:00 AM-5:00 PM

## 2022-09-15 ENCOUNTER — LAB (OUTPATIENT)
Dept: LAB | Facility: CLINIC | Age: 57
End: 2022-09-15
Payer: COMMERCIAL

## 2022-09-15 DIAGNOSIS — R10.9 LEFT FLANK PAIN: ICD-10-CM

## 2022-09-15 DIAGNOSIS — N20.0 RECURRENT KIDNEY STONES: ICD-10-CM

## 2022-09-15 LAB
ALBUMIN UR-MCNC: NEGATIVE MG/DL
APPEARANCE UR: CLEAR
BILIRUB UR QL STRIP: NEGATIVE
COLOR UR AUTO: YELLOW
GLUCOSE UR STRIP-MCNC: NEGATIVE MG/DL
HGB UR QL STRIP: ABNORMAL
KETONES UR STRIP-MCNC: NEGATIVE MG/DL
LEUKOCYTE ESTERASE UR QL STRIP: NEGATIVE
NITRATE UR QL: NEGATIVE
PH UR STRIP: 6.5 [PH] (ref 5–7)
RBC #/AREA URNS AUTO: NORMAL /HPF
SP GR UR STRIP: 1.01 (ref 1–1.03)
UROBILINOGEN UR STRIP-ACNC: 0.2 E.U./DL
WBC #/AREA URNS AUTO: NORMAL /HPF

## 2022-09-15 PROCEDURE — 81001 URINALYSIS AUTO W/SCOPE: CPT

## 2022-09-15 PROCEDURE — 87086 URINE CULTURE/COLONY COUNT: CPT

## 2022-09-17 LAB — BACTERIA UR CULT: NORMAL

## 2022-09-20 ENCOUNTER — RADIOLOGY INJECTION OFFICE VISIT (OUTPATIENT)
Dept: PALLIATIVE MEDICINE | Facility: CLINIC | Age: 57
End: 2022-09-20
Attending: NURSE PRACTITIONER
Payer: COMMERCIAL

## 2022-09-20 ENCOUNTER — NURSE TRIAGE (OUTPATIENT)
Dept: FAMILY MEDICINE | Facility: CLINIC | Age: 57
End: 2022-09-20

## 2022-09-20 VITALS — SYSTOLIC BLOOD PRESSURE: 130 MMHG | DIASTOLIC BLOOD PRESSURE: 67 MMHG | HEART RATE: 72 BPM | OXYGEN SATURATION: 99 %

## 2022-09-20 DIAGNOSIS — Z98.890 S/P LUMBAR MICRODISCECTOMY: ICD-10-CM

## 2022-09-20 DIAGNOSIS — M54.16 LUMBAR RADICULOPATHY: ICD-10-CM

## 2022-09-20 PROCEDURE — 64484 NJX AA&/STRD TFRM EPI L/S EA: CPT | Mod: RT | Performed by: PAIN MEDICINE

## 2022-09-20 PROCEDURE — 64483 NJX AA&/STRD TFRM EPI L/S 1: CPT | Mod: RT | Performed by: PAIN MEDICINE

## 2022-09-20 RX ORDER — DEXAMETHASONE SODIUM PHOSPHATE 10 MG/ML
10 INJECTION, SOLUTION INTRAMUSCULAR; INTRAVENOUS ONCE
Status: COMPLETED | OUTPATIENT
Start: 2022-09-20 | End: 2022-09-20

## 2022-09-20 RX ADMIN — DEXAMETHASONE SODIUM PHOSPHATE 10 MG: 10 INJECTION, SOLUTION INTRAMUSCULAR; INTRAVENOUS at 08:43

## 2022-09-20 ASSESSMENT — PAIN SCALES - GENERAL: PAINLEVEL: SEVERE PAIN (7)

## 2022-09-20 NOTE — PATIENT INSTRUCTIONS
[facility]   Procedure Discharge Instructions    Today you saw:    Dr. Tobin Parisi    You had an:  Epidural steroid injection   -lumbar     Medications used:  Lidocaine   Bupivacaine   Dexamethasone Omnipaque        Be cautious when walking. Numbness and/or weakness in the lower extremities may occur for up to 6-8 hours after the procedure due to effect of the local anesthetic  Do not drive for 6 hours. The effect of the local anesthetic could slow your reflexes.   You may resume your regular activities after 24 hours  Avoid strenuous activity for the first 24 hours  You may shower, however avoid swimming, tub baths or hot tubs for 24 hours following your procedure  You may have a mild to moderate increase in pain for several days following the injection.  It may take up to 14 days for the steroid medication to start working although you may feel the effect as early as a few days after the procedure.     You may use ice packs for 10-15 minutes, 3 to 4 times a day at the injection site for comfort  Do not use heat to painful areas for 6 to 8 hours. This will give the local anesthetic time to wear off and prevent you from accidentally burning your skin.   Unless you have been directed to avoid the use of anti-inflammatory medications (NSAIDS), you may use medications such as ibuprofen, Aleve or Tylenol for pain control if needed.   If you were fasting, you may resume your normal diet and medications after the procedure  If you have diabetes, check your blood sugar more frequently than usual as your blood sugar may be higher than normal for 10-14 days following a steroid injection. Contact your doctor who manages your diabetes if your blood sugar is higher than usual  Possible side effects of steroids that you may experience include flushing, elevated blood pressure, increased appetite, mild headaches and restlessness.  All of these symptoms will get better with time.  If you  experience any of the following, call the Pain Clinic during work hours (Mon-Friday 8-4:30 pm) at [phone] or the Provider Line after hours at [phone]:  -Fever over 100 degree F  -Swelling, bleeding, redness, drainage, warmth at the injection site  -Progressive weakness or numbness in your legs or arms  -Loss of bowel or bladder function  -Unusual headache that is not relieved by Tylenol or other pain reliever  -Unusual new onset of pain that is not improving

## 2022-09-20 NOTE — NURSING NOTE
Pre-procedure Intake  If YES to any questions or NO to having a   Please complete laminated checklist and leave on the computer keyboard for Provider, verbally inform provider if able.    For SCS Trial, RFA's or any sedation procedure:  Have you been fasting? NA    If yes, for how long? NA    Are you taking any any blood thinners such as Coumadin, Warfarin, Jantoven, Pradaxa Xarelto, Eliquis, Edoxaban, Enoxaparin, Lovenox, Heparin, Arixtra, Fondaparinux, or Fragmin? OR Antiplatelet medication such as Plavix, Brilinta, or Effient?   No     If yes, when did you take your last dose? NA    Do you take aspirin?  No    If cervical procedure, have you held aspirin for 6 days?   NA    Do you have any allergies to contrast dye, iodine, steroid and/or numbing medications?  NO    Are you currently taking antibiotics or have an active infection?  NO    Have you had a fever/elevated temperature within the past week? NO    Are you currently taking oral steroids? NO    Do you have a ? Yes    Are you pregnant or breastfeeding?  NO    Have you received the COVID-19 vaccine? Yes    If yes, was it your 1st, 2nd or only dose needed? 3rd    Date of most recent vaccine: 07/08/22    Notify provider and RNs if systolic BP >170, diastolic BP >100, P >100 or O2 sats < 90%      Benita Velasquez CMA (AAMA)

## 2022-09-20 NOTE — NURSING NOTE
Discharge Information    IV Discontiued Time:  NA    Amount of Fluid Infused:  NA    Discharge Criteria = When patient returns to baseline or as per MD order    Consciousness:  Pt is fully awake    Circulation:  BP +/- 20% of pre-procedure level    Respiration:  Patient is able to breathe deeply    O2 Sat:  Patient is able to maintain O2 Sat >92% on room air    Activity:  Moves 4 extremities on command    Ambulation:  Patient is able to stand and walk or stand and pivot into wheelchair    Dressing:  Clean/dry or No Dressing    Notes:   Discharge instructions and AVS given to patient    Patient meets criteria for discharge?  YES    Admitted to PCU?  No    Responsible adult present to accompany patient home?  Yes    Signature/Title:    bisi kent RN  RN Care Coordinator  [facility] [place]

## 2022-09-20 NOTE — TELEPHONE ENCOUNTER
Pt calling. Throat was sore and on back of tongue has white dots. White is also on the back of tongue. Thinks she has oral thrush from her inhalers. Has been rinsing her mouth and using Listerine mouth wash. Noticed symptoms a couple of days ago. Has a sore throat and has been using cough drops. No difficulty breathing. No fever or chills. Pt declined UC or to see another provider.  No approval requested appointments until 9/30 and assisted with scheduling appt. Only same day slots available (9/27).    Pt is wondering if there is anything else she can try prior to the appt?    If she doesn't answer, ok to leave a detailed message.    Olamide Rhodes RN  [facility] [place]      Reason for Disposition    White patches that stick to tongue or inner cheek, which can be wiped off    Additional Information    Negative: SEVERE difficulty breathing (e.g., struggling for each breath, speaks in single words, stridor)    Negative: Sounds like a life-threatening emergency to the triager    Negative: Injury to tooth or teeth    Negative: Injury to mouth    Negative: Cold sore suspected (i.e., fever blister sore) on the outer lip    Negative: Tooth is painful or swelling around a tooth    Negative: Throat is painful    Negative: Drooling or spitting out saliva (because can't swallow) and new-onset    Negative: Bleeding from mouth and won't stop after 10 minutes of direct pressure    Negative: Electrical burn of mouth    Negative: Chemical burn of mouth    Negative: Difficulty breathing and not severe    Negative: Patient sounds very sick or weak to the triager    Negative: Gum bleeding and taking Coumadin (warfarin) or other strong blood thinner, or known bleeding disorder (e.g., thrombocytopenia)    Negative: Dry mouth and urinating more frequently than usual (i.e., frequency)    Negative: Dry mouth and drinking more liquids than usual (thirsty) and present more than 1 day (24 hours)    Negative: Receiving  chemotherapy or radiation therapy    Protocols used: MOUTH SYMPTOMS-A-OH

## 2022-09-20 NOTE — PROGRESS NOTES
Pre procedure Diagnosis: lumbar radiculopathy, lumbar degenerative disc disease   Post procedure Diagnosis: Same  Procedure performed: lumbar transforaminal epidural steroid injection at right, L4-5 L5-S1, fluoroscopically guided, contrast controlled   Anesthesia: none  Complications: none  Operators: Tobin Parisi MD     Indications:   Kareen Hernandez is a 56 year old female.  They have a history of left-sided low back pain radiating to her right lower extremity.  Exam shows + slump and they have tried conservative treatment including meds/pt/prior injections with benefit.    MRI    L1-2: Circumferential disc bulge with facet arthropathy but no spinal  canal stenosis. The neural foramen are mildly narrowed bilaterally.     L2-3: No spinal canal or neuroforaminal stenosis. Bilateral facet  arthropathy.     L3-4: Disc bulge with facet arthropathy and thickening of the  ligamentum flavum causing mild spinal canal stenosis. There is mild  bladder in the lateral recesses without neural impingement. There is  mild narrowing of the neural foramen bilaterally.     L4-5: Disc bulge with facet arthropathy and thickening of the  ligamentum flavum with mild-to-moderate spinal canal stenosis. There  is moderate narrowing of the right lateral recess with abutment of the  descending right L5 nerve root with possible impingement. The left  lateral recess is mildly narrowed. Mild-to-moderate right neural  foraminal stenosis. The left neural foramen is mildly to moderately  narrowed and there is a neural foraminal disc protrusion which abuts  and may impinge on the exiting left L4 nerve root. This finding  appears mildly progressed compared to the prior study.     L5-S1: Broad-based disc extrusion asymmetric to the left which effaces  the left lateral recess and impinges on the descending left S1 nerve  root. This has progressed since the prior study. There are  postoperative changes of right hemilaminectomy. The spinal canal  is  mildly narrowed. The neural foramen are mildly narrowed bilaterally.     Paraspinous tissues are within normal limits.  Options/alternatives, benefits and risks were discussed with the patient including bleeding, infection, tissue trauma, numbness, weakness, paralysis, spinal cord injury, radiation exposure, headache and reaction to medications. Questions were answered to her satisfaction and she agrees to proceed. Voluntary informed consent was obtained and signed.     Vitals were reviewed: Yes  /67   Pulse 72   LMP  (LMP Unknown)   SpO2 99%   Allergies were reviewed:  Yes   Medications were reviewed:  Yes   Pre-procedure pain score: 7/10    Procedure:  After getting informed consent, patient was brought into the procedure suite and was placed in a prone position on the procedure table.   A Pause for the Cause was performed.  Patient was prepped and draped in sterile fashion.     After identifying the right L4-5, L5-S1 neuroforamen, the C-arm was rotated to a left lateral oblique angle.  A total of 4ml of Lidocaine 1% was used to anesthetize the skin and the needle track at a skin entry site coaxial with the fluoroscopy beam, and overriding the superior aspect of the neuroforamen.  A 22 gauge 3.5 inch spinal needle was advanced under intermittent fluoroscopy until it entered the foramen superiorly.    The position was then inspected from anteroposterior and lateral views, and the needle adjusted appropriately.  A total of 1ml of Omnipaque-300 was injected, confirming appropriate position, with spread into the nerve root sheath and the epidural space, with no intravascular uptake. 9ml was wasted    Then, after repeated negative aspiration, a combination of Decadron 10 mg, 0.25% bupivacaine 2 ml, diluted with 3ml of normal saline was injected.     Hemostasis was achieved, the area was cleaned, and bandaids were placed when appropriate.  The patient tolerated the procedure well, and was taken to the  recovery room.    Images were saved to PACS.    Post-procedure pain score: 7/10  Follow-up includes:   -f/u phone call in one week  -f/u with referring provider    Tobin Parisi MD  [facility] [place]

## 2022-09-20 NOTE — TELEPHONE ENCOUNTER
Called patient and notified her of message from PCP. Pt stated that she would like to come in on 09/26. Appt scheduled.    Micheline Davis RN   [facility]

## 2022-09-20 NOTE — TELEPHONE ENCOUNTER
Can double book 2:30 on 9/26 if needed, or can use same-day slot 9/27.  She can try warm salt water gargle, chloraseptic throat spray.    Leno Robbins MD

## 2022-09-26 ENCOUNTER — ANCILLARY PROCEDURE (OUTPATIENT)
Dept: CT IMAGING | Facility: CLINIC | Age: 57
End: 2022-09-26
Attending: FAMILY MEDICINE
Payer: COMMERCIAL

## 2022-09-26 ENCOUNTER — OFFICE VISIT (OUTPATIENT)
Dept: FAMILY MEDICINE | Facility: CLINIC | Age: 57
End: 2022-09-26
Payer: COMMERCIAL

## 2022-09-26 VITALS
TEMPERATURE: 98.2 F | DIASTOLIC BLOOD PRESSURE: 72 MMHG | BODY MASS INDEX: 24.75 KG/M2 | HEART RATE: 85 BPM | OXYGEN SATURATION: 99 % | SYSTOLIC BLOOD PRESSURE: 110 MMHG | WEIGHT: 158 LBS

## 2022-09-26 DIAGNOSIS — B37.0 ORAL THRUSH: ICD-10-CM

## 2022-09-26 DIAGNOSIS — N20.0 RECURRENT KIDNEY STONES: ICD-10-CM

## 2022-09-26 DIAGNOSIS — J42 CHRONIC BRONCHITIS, UNSPECIFIED CHRONIC BRONCHITIS TYPE (H): Primary | ICD-10-CM

## 2022-09-26 DIAGNOSIS — R10.9 LEFT FLANK PAIN: ICD-10-CM

## 2022-09-26 PROCEDURE — 99214 OFFICE O/P EST MOD 30 MIN: CPT | Performed by: FAMILY MEDICINE

## 2022-09-26 PROCEDURE — 96127 BRIEF EMOTIONAL/BEHAV ASSMT: CPT | Performed by: FAMILY MEDICINE

## 2022-09-26 PROCEDURE — 74176 CT ABD & PELVIS W/O CONTRAST: CPT | Mod: GC | Performed by: RADIOLOGY

## 2022-09-26 RX ORDER — NYSTATIN 100000/ML
500000 SUSPENSION, ORAL (FINAL DOSE FORM) ORAL 4 TIMES DAILY
Qty: 473 ML | Refills: 2 | Status: ON HOLD | OUTPATIENT
Start: 2022-09-26 | End: 2022-11-19

## 2022-09-26 ASSESSMENT — PATIENT HEALTH QUESTIONNAIRE - PHQ9
SUM OF ALL RESPONSES TO PHQ QUESTIONS 1-9: 8
10. IF YOU CHECKED OFF ANY PROBLEMS, HOW DIFFICULT HAVE THESE PROBLEMS MADE IT FOR YOU TO DO YOUR WORK, TAKE CARE OF THINGS AT HOME, OR GET ALONG WITH OTHER PEOPLE: VERY DIFFICULT
SUM OF ALL RESPONSES TO PHQ QUESTIONS 1-9: 8

## 2022-09-26 NOTE — PROGRESS NOTES
Assessment & Plan       ICD-10-CM    1. Chronic bronchitis, unspecified chronic bronchitis type (H)  J42 General PFT Lab (Please always keep checked)     Pulmonary Function Test   2. Oral thrush  B37.0 nystatin (MYCOSTATIN) 965641 UNIT/ML suspension         Review of external notes as documented elsewhere in note  A total of 30 minutes spent face-to-face with greater than 50% of the time spent in counseling and coordinating cares of the issues above  Time spent doing chart review, history and exam, documentation and further activities per the note       There are no Patient Instructions on file for this visit.    No follow-ups on file.    Leno Robbins MD  [facility] HARMEET Mathur is a 57 year old, presenting for the following health issues:  Thrush      History of Present Illness       Reason for visit:  Throat, thrush , back pain  Symptom onset:  3-7 days ago  Symptom intensity:  Moderate  Symptom progression:  Staying the same  Had these symptoms before:  No  What makes it worse:  Smoking  What makes it better:  Coughdrops    She eats 0-1 servings of fruits and vegetables daily.She consumes 1 sweetened beverage(s) daily.She exercises with enough effort to increase her heart rate 9 or less minutes per day.  She exercises with enough effort to increase her heart rate 3 or less days per week.   She is taking medications regularly.    Today's PHQ-9         PHQ-9 Total Score: 8    PHQ-9 Q9 Thoughts of better off dead/self-harm past 2 weeks :   Not at all    How difficult have these problems made it for you to do your work, take care of things at home, or get along with other people: Very difficult     1 week ago  White dots back of tongue  Same   Painful  Advair, new inhaler      Review of Systems   Constitutional, HEENT, cardiovascular, pulmonary, gi and gu systems are negative, except as otherwise noted.      Objective    /72   Pulse 85   Temp 98.2  F (36.8  C) (Oral)    Wt 71.7 kg (158 lb)   LMP  (LMP Unknown)   SpO2 99%   BMI 24.75 kg/m    Body mass index is 24.75 kg/m .  Physical Exam  Vitals reviewed.   Constitutional:       General: She is not in acute distress.     Appearance: Normal appearance. She is well-developed.   HENT:      Head: Normocephalic and atraumatic.      Right Ear: External ear normal.      Left Ear: External ear normal.      Nose: Nose normal.      Mouth/Throat:      Comments: Slight pharyngeal erythema, evidence of thrush (mild)  Eyes:      General: No scleral icterus.     Conjunctiva/sclera: Conjunctivae normal.   Cardiovascular:      Rate and Rhythm: Normal rate.   Pulmonary:      Effort: Pulmonary effort is normal.   Musculoskeletal:         General: No deformity. Normal range of motion.      Cervical back: Normal range of motion.   Skin:     General: Skin is warm and dry.      Findings: No rash.   Neurological:      Mental Status: She is alert and oriented to person, place, and time.   Psychiatric:         Behavior: Behavior normal.         Thought Content: Thought content normal.         Judgment: Judgment normal.

## 2022-09-28 ENCOUNTER — TELEPHONE (OUTPATIENT)
Dept: NEUROSURGERY | Facility: CLINIC | Age: 57
End: 2022-09-28

## 2022-09-28 DIAGNOSIS — F33.1 MAJOR DEPRESSIVE DISORDER, RECURRENT EPISODE, MODERATE (H): ICD-10-CM

## 2022-09-28 RX ORDER — BUPROPION HYDROCHLORIDE 300 MG/1
300 TABLET ORAL EVERY MORNING
Qty: 90 TABLET | Refills: 1 | Status: SHIPPED | OUTPATIENT
Start: 2022-09-28 | End: 2023-03-28

## 2022-09-28 NOTE — TELEPHONE ENCOUNTER
"Requested Prescriptions   Pending Prescriptions Disp Refills     buPROPion (WELLBUTRIN XL) 300 MG 24 hr tablet [Pharmacy Med Name: BUPROPION HCL  MG TABLET] 30 tablet 1     Sig: TAKE 1 TABLET BY MOUTH EVERY DAY IN THE MORNING       SSRIs Protocol Failed - 9/28/2022  8:32 AM        Failed - PHQ-9 score less than 5 in past 6 months     Please review last PHQ-9 score.           Passed - Medication is Bupropion     If the medication is Bupropion (Wellbutrin), and the patient is taking for smoking cessation; OK to refill.          Passed - Medication is active on med list        Passed - Patient is age 18 or older        Passed - No active pregnancy on record        Passed - No positive pregnancy test in last 12 months        Passed - Recent (6 mo) or future (30 days) visit within the authorizing provider's specialty     Patient had office visit in the last 6 months or has a visit in the next 30 days with authorizing provider or within the authorizing provider's specialty.  See \"Patient Info\" tab in inbasket, or \"Choose Columns\" in Meds & Orders section of the refill encounter.               Has appt 11-8.  Lucero AVALOS RN    "

## 2022-09-28 NOTE — TELEPHONE ENCOUNTER
"Last Visit: 7/28/22 in person. 8/26/22 MRI results via telephone.    Next Visit: None scheduled    Name of Provider: Hyacinth Timmons NP    Assessment: Patient called to report that injection did not provide her with pain relief.     9/20 - injection with Dr. Parisi: lumbar transforaminal epidural steroid injection at right, L4-5 L5-S1    Patient reports the injection worked for 2 days and now the pain is back. Same pain as previously. Nothing new. Patient states her left side hurts as well but she is more concerned with the right side. She states she knows she is supposed to wait 14 days for the injection to work but feels this injection has not worked since her pain has returned and this is the same thing that has happened with previous injections.    She states approximately one month ago she tried MDP which \"took the edge off.\" She reports she has another pack left over from a different provider and is wondering if she can take this. Advised against taking MDP as it is from a different provider and too close to injection - likely too much steroid in a short amount of time.     Patient denies new numbness, tingling, weakness.     Patient wondering if she should make follow-up appointment with Hyacinth to create care plan moving forward.     Per patient, ok to leave detailed voicemail on phone number ending in [phone] if she does not answer.     Recommendation given: Don't take additional MDP at this time without provider approval. Will route to Hyacinth for review and recommendations. Will contact patient when we hear back.     Action needed from provider: OK for patient to schedule follow-up appointment to discuss symptoms and care plan as injection did not work for her? Other recommendations at this time?          "

## 2022-09-28 NOTE — TELEPHONE ENCOUNTER
Patient requests a call back, she said the injection she had a couple weeks ago has not improved symptoms so she would like to discuss what her next steps should be.  Also is wondering if she can possibly take some prednisone. Please call patient back at [phone]

## 2022-09-29 NOTE — TELEPHONE ENCOUNTER
Sent staff message to schedulers to reach out to patient to schedule a follow up with Hyacinth per patients request.

## 2022-09-30 DIAGNOSIS — M79.7 FIBROMYALGIA: ICD-10-CM

## 2022-09-30 NOTE — TELEPHONE ENCOUNTER
Current script for cyclobenzaprine is only 60 tabs, taken 3 times a day. This only does 3 weeks rather than a whole month. Will need 90 tablets per refill to get a full months worth at current dosage.

## 2022-10-04 RX ORDER — CYCLOBENZAPRINE HCL 10 MG
10 TABLET ORAL 3 TIMES DAILY PRN
Qty: 60 TABLET | Refills: 1 | Status: ON HOLD | OUTPATIENT
Start: 2022-10-04 | End: 2022-11-15

## 2022-10-04 RX ORDER — CYCLOBENZAPRINE HCL 10 MG
TABLET ORAL
Qty: 60 TABLET | Refills: 1 | Status: CANCELLED | OUTPATIENT
Start: 2022-10-04

## 2022-10-04 NOTE — TELEPHONE ENCOUNTER
"Called patient and notified her of message below per PCP. She verbalized understanding.      Leno Robbins MD  Fz Rn Triage Pool 1 hour ago (9:11 AM)     \"This is a PRN medication.  This is not to be taken 3x daily scheduled.  If dose change is needed, she should discuss with her pain specialist.     Leno Robbins MD\"       Micheline Davis RN   [facility]  "

## 2022-10-06 ENCOUNTER — OFFICE VISIT (OUTPATIENT)
Dept: NEUROSURGERY | Facility: CLINIC | Age: 57
End: 2022-10-06
Payer: COMMERCIAL

## 2022-10-06 VITALS — HEART RATE: 84 BPM | SYSTOLIC BLOOD PRESSURE: 130 MMHG | DIASTOLIC BLOOD PRESSURE: 80 MMHG | OXYGEN SATURATION: 98 %

## 2022-10-06 DIAGNOSIS — M54.16 LUMBAR RADICULOPATHY: Primary | ICD-10-CM

## 2022-10-06 DIAGNOSIS — Z98.890 S/P LUMBAR MICRODISCECTOMY: ICD-10-CM

## 2022-10-06 PROCEDURE — 99213 OFFICE O/P EST LOW 20 MIN: CPT | Performed by: NURSE PRACTITIONER

## 2022-10-06 ASSESSMENT — PAIN SCALES - GENERAL: PAINLEVEL: MODERATE PAIN (5)

## 2022-10-06 NOTE — PROGRESS NOTES
[facility] Neurosurgery Follow-Up:     HPI: 8 months s/p MIS left L5-S1 microdiscectomy with Dr. Cash. Recent MRI shows L5-S1 disc extrusion contacting the right S1 nerve root and L4-5 anterolisthesis with worsening stenosis. Patient had right L4-S1 CHIVO on 9/20/22 which provided relief for 1-2 days. MDP provided relief for a few days. Currently using Flexeril for pain management. She reports continued right sided low back pain that radiates to right buttocks and posterior right leg to knee with numbness/tingling in the same pattern. Denies any weakness, falls, or bowel/bladder changes. She is not interested in PT or repeat CHIVO.     Current pain: 6/10    Exam:  Constitutional:  Alert, well nourished, NAD.  HEENT: Normocephalic, atraumatic.   Pulm:  Without shortness of breath   CV:  No pitting edema of BLE.      /80   Pulse 84   LMP  (LMP Unknown)   SpO2 98%     Neurological:  Awake  Alert  Oriented x 3  Motor exam:  Hip Flexor:                Right: 5/5  Left:  5/5  Quadriceps:              Right:  5/5  Left:  5/5  Hamstrings:              Right:  5/5  Left:  5/5  Gastroc Soleus:        Right:  5/5  Left:  5/5  Tib/Ant:                      Right:  5/5  Left:  5/5  EHL:                          Right:  5/5  Left:  5/5      Able to spontaneously move BLE  Sensation intact throughout BLE  Incision: Well healed     Imaging/Labs:  MR LUMBAR SPINE W/O & W CONTRAST 8/25/2022 8:42 AM  Impression:  1. Since January 2022 there are new postoperative changes of  left-sided microlaminectomy and microdiscectomy at L5-S1. Associated  granulation related enhancement in the left posterior lateral and  anterior epidural space and there is slight improvement of the disc  herniation with mass effect on the left descending S1. However there  is residual central disc extrusion contacting the right descending S1  within the lateral recess which may explain patient's right-sided  symptoms. Recommend correlation with right  S1 radiculopathy.  2. Worsening anterolisthesis at L4-5 with extensive degenerative  endplate changes with contrast enhancement and worsening spinal canal  stenosis, previously moderate, today moderate to severe. There is also  increased effacement of the lateral recesses bilaterally. This may  also explain patient's symptoms.  3. Note that there is contrast enhancement in the left paraspinous  soft tissues, this is favored as degenerative in nature, however  recommend correlation with ESR and CRP to rule out an atypical  spondylitis discitis.    Labs completed on 8/27/22:   WBC: 6.6  CRP: <2.9   ESR: 17     Assessment/Plan:   8 months s/p MIS left L5-S1 microdiscectomy with Dr. Cash. Patient presents for follow-up of right sided low back pain that radiates to right buttocks and posterior right leg to knee with numbness/tingling as well. Recent MRI shows L5-S1 disc extrusion contacting the right S1 nerve root and L4-5 anterolisthesis with worsening stenosis. Recent right L4-S1 CHIVO provided relief for a few days. Patient is not interested in PT or repeat CHIVO. She would like to meet with Dr. Cash to discuss possible surgical options. Appointment scheduled.    Discussed red flag symptoms and advised to seek medical attention if these develop. Patient voiced understanding and agreement.      Hyacinth Timmons CNP  [facility] Neurosurgery  [address] [place] [address]  Tel [phone]  Pager [phone]

## 2022-10-06 NOTE — PATIENT INSTRUCTIONS
Follow-up with Dr. Cash as scheduled.     Please call our clinic if symptoms persist, change, or worsen at any time.    [facility] Neurosurgery  [address] [address]  Tel [phone]

## 2022-10-06 NOTE — LETTER
10/6/2022         RE: Kareen Hernandez  [address]  [place] [address]        Dear Colleague,    Thank you for referring your patient, Kareen Hernandez, to the [facility]. Please see a copy of my visit note below.    [facility] Neurosurgery Follow-Up:     HPI: 8 months s/p MIS left L5-S1 microdiscectomy with Dr. Cash. Recent MRI shows L5-S1 disc extrusion contacting the right S1 nerve root and L4-5 anterolisthesis with worsening stenosis. Patient had right L4-S1 CHIVO on 9/20/22 which provided relief for 1-2 days. MDP provided relief for a few days. Currently using Flexeril for pain management. She reports continued right sided low back pain that radiates to right buttocks and posterior right leg to knee with numbness/tingling in the same pattern. Denies any weakness, falls, or bowel/bladder changes. She is not interested in PT or repeat CHIVO.     Current pain: 6/10    Exam:  Constitutional:  Alert, well nourished, NAD.  HEENT: Normocephalic, atraumatic.   Pulm:  Without shortness of breath   CV:  No pitting edema of BLE.      /80   Pulse 84   LMP  (LMP Unknown)   SpO2 98%     Neurological:  Awake  Alert  Oriented x 3  Motor exam:  Hip Flexor:                Right: 5/5  Left:  5/5  Quadriceps:              Right:  5/5  Left:  5/5  Hamstrings:              Right:  5/5  Left:  5/5  Gastroc Soleus:        Right:  5/5  Left:  5/5  Tib/Ant:                      Right:  5/5  Left:  5/5  EHL:                          Right:  5/5  Left:  5/5      Able to spontaneously move BLE  Sensation intact throughout BLE  Incision: Well healed     Imaging/Labs:  MR LUMBAR SPINE W/O & W CONTRAST 8/25/2022 8:42 AM  Impression:  1. Since January 2022 there are new postoperative changes of  left-sided microlaminectomy and microdiscectomy at L5-S1. Associated  granulation related enhancement in the left posterior lateral and  anterior epidural space and there is slight  improvement of the disc  herniation with mass effect on the left descending S1. However there  is residual central disc extrusion contacting the right descending S1  within the lateral recess which may explain patient's right-sided  symptoms. Recommend correlation with right S1 radiculopathy.  2. Worsening anterolisthesis at L4-5 with extensive degenerative  endplate changes with contrast enhancement and worsening spinal canal  stenosis, previously moderate, today moderate to severe. There is also  increased effacement of the lateral recesses bilaterally. This may  also explain patient's symptoms.  3. Note that there is contrast enhancement in the left paraspinous  soft tissues, this is favored as degenerative in nature, however  recommend correlation with ESR and CRP to rule out an atypical  spondylitis discitis.    Labs completed on 8/27/22:   WBC: 6.6  CRP: <2.9   ESR: 17     Assessment/Plan:   8 months s/p MIS left L5-S1 microdiscectomy with Dr. Cash. Patient presents for follow-up of right sided low back pain that radiates to right buttocks and posterior right leg to knee with numbness/tingling as well. Recent MRI shows L5-S1 disc extrusion contacting the right S1 nerve root and L4-5 anterolisthesis with worsening stenosis. Recent right L4-S1 CHIVO provided relief for a few days. Patient is not interested in PT or repeat CHIVO. She would like to meet with Dr. Cash to discuss possible surgical options. Appointment scheduled.    Discussed red flag symptoms and advised to seek medical attention if these develop. Patient voiced understanding and agreement.      Hyacinth Timmons, CNP  [facility] Neurosurgery  [address] [place] [address]  Tel [phone]  Pager [phone]          Again, thank you for allowing me to participate in the care of your patient.        Sincerely,        Hyacinth Timmons, BETTINA

## 2022-10-20 ENCOUNTER — OFFICE VISIT (OUTPATIENT)
Dept: NEUROSURGERY | Facility: CLINIC | Age: 57
End: 2022-10-20
Payer: COMMERCIAL

## 2022-10-20 VITALS
SYSTOLIC BLOOD PRESSURE: 125 MMHG | HEIGHT: 67 IN | WEIGHT: 158 LBS | BODY MASS INDEX: 24.8 KG/M2 | HEART RATE: 86 BPM | DIASTOLIC BLOOD PRESSURE: 78 MMHG | OXYGEN SATURATION: 97 %

## 2022-10-20 DIAGNOSIS — M54.16 LUMBAR RADICULOPATHY: Primary | ICD-10-CM

## 2022-10-20 PROCEDURE — 99214 OFFICE O/P EST MOD 30 MIN: CPT | Performed by: NEUROLOGICAL SURGERY

## 2022-10-20 ASSESSMENT — PAIN SCALES - GENERAL: PAINLEVEL: SEVERE PAIN (7)

## 2022-10-20 NOTE — PROGRESS NOTES
"Reviewed pre- and post-operative instructions as outlined in the After Visit Summary/Patient Instructions with patient.   Surgery folder was given to patient    Patient Education Topic: Procedure with Dr. Cash     Learner(s): Patient    Knowledge Level: Basic    Readiness to Learn: Ready    Method:  Verbal explanation and Written material     Outcome: Able to verbalize instructions    Barriers to Learning: No barrier    Covid Testing: patient educated they will need to have a test for the novel Coronavirus, COVID-19.The rapid antigen test needs to be completed within 1-2 days prior to surgery. Patient instructed to take a photo of the negative test and bring to surgery to show to the nurse. If positive, patient instructed to refer to the \"Before Your Procedure or Hospital Admission\" printout..     Scheduling Number: [phone]    NDI/MAXIMINO: Confirmation of completion within last 6 months    Patient had the opportunity for questions to be answered. Advised Patient to call clinic with any questions/concerns. Gave patient antibacterial soap for pre-surgery skin preparation.     Danielle Matthews RN on 10/20/2022 at 9:43 AM    "

## 2022-10-20 NOTE — PROGRESS NOTES
"Kareen Hernandez is a 57 year old female who presents for:  Chief Complaint   Patient presents with     RECHECK     R leg pain. Tried CHIVO and didn't work. Wants to discuss surgery options         Initial Vitals:  /78   Pulse 86   Ht 5' 7\" (1.702 m)   Wt 158 lb (71.7 kg)   LMP  (LMP Unknown)   SpO2 97%   BMI 24.75 kg/m   Estimated body mass index is 24.75 kg/m  as calculated from the following:    Height as of this encounter: 5' 7\" (1.702 m).    Weight as of this encounter: 158 lb (71.7 kg).. Body surface area is 1.84 meters squared. BP completed using cuff size: regular  Severe Pain (7)    Nursing Comments:     Fior Chou MA    "

## 2022-10-20 NOTE — PATIENT INSTRUCTIONS
Patient Instructions    Surgery scheduled at [facility] for Right L4-5 & L5-S1 Microdiscectomy with Dr. Cash    Pre-Operative    Surgical risks: blood clots in the leg or lung, problems urinating, nerve damage, drainage from the incision, infection, stiffness    You will need a Pre-operative physical with primary care physician within 30 days prior to your surgical date.     This is a same day procedure with discharge home day of surgery.    Shower procedure  You will need to shower the night before and morning of surgery using the antimicrobial soap (chlorhexidine) given to you. Please refer to showering instruction sheet in surgery education folder.    Eating/Drinking  Stop all solid foods 8 hours before surgery.  Keep drinking clear liquids until 4 hours before surgery  Clear liquids include water, clear juice, black coffee, or clear tea without milk, Gatorade, clear soda.     Medications  Discontinue Aspirin & NSAIDs (Advil/Ibuprofen, Indocin, Naproxen,Nuprin,Relafen/Nabumetone, Diclofenac,Meloxicam, Aleve, Celebrex) 7 days prior to surgical date. After surgery, do not begin taking these medications until given clearance as it may cause bleeding and interfere with healing.  It is ok to take Tylenol (Acetaminophen) for pain within the 7 days prior to surgery. Example: you could take 1000 mg 3 times per day. Do not exceed 3,000 mg per day.    If you are on chronic pain medication (oxycodone, Percocet, hydrocodone, Vicodin, Norco, Dilaudid, morphine, MS Contin, naltrexone, Suboxone, etc) or have a pain contract you need  to contact the pain medication prescriber and/or the prescriber who holds the pain contract with you. A plan needs to be created between you and the provider on how to take your chronic pain medication leading up to surgery and what you will be taking to control pain/who will be prescribing that pain medication during recovery.  Any other medications prescribed, please  "discuss with your primary care provider at your pre-operative physical     COVID Testing   As part of preparation for your upcoming procedure you are required to have a test for the novel Coronavirus, COVID-19  1-2 days before your procedure, take an at-home, rapid antigen test. If the test is negative, take a photo, then show the photo to the nurse when you come in for your procedure. If the test is positive, follow the instructions on the printout within your surgery education folder.  Review \"Before Your Procedure or Hospital Admission\" printout in your surgery education folder for additional details   COVID Vaccine: You may NOT receive the COVID-19 vaccine 72 hours before or after surgery.      Post-Operative    Incision Care  Look at your incision site every day. You  may need a mirror or family member to help you.   Watch for signs of infection  Redness, swelling, warmth, drainage (Green or yellow drainage (pus) from your incision or increased bloody drainage), and fever of 101 degrees or higher  [facility] [phone]  Remove dressing as instructed upon discharge  Do not apply lotions or ointments to incision  It is okay to shower, just pat the incision dry   No submerging incision in water such as pools, hot tubs, or baths for at least 8 weeks and until the incision is healed    Pain Management  Dealing with pain  As your body heals, you might feel a stabbing, burning, or aching pain. You may also have some numbness.  Everyone feels pain differently, we may ask you to rate your pain using a pain scale. This will let us know how much pain you feel.   Keep in mind that medicine won't take away all of your pain. It helps to try other ways to relax and ease pain.   Things to help with pain  After surgery, we will give you medicine for your pain. These medications work well, but they can make you drowsy, itchy, or sick to your stomach. If we give you narcotics for pain, try to take the pills with food.   For " mild to moderate pain, you can take medication such as Tylenol. These can be used with narcotics, but make sure that your narcotic does not contain Tylenol.   Do NOT drive while taking narcotic pain medication  Do NOT drink alcohol while using any pain medication  You can utilize ice as needed (no longer than 20 minutes at one time)  Refills of pain medication: please call the neurosurgery clinic to request 2-3 days before you run out  Aspirin & NSAIDS (ex. Ibuprofen, aleve, naproxen): Don't take NSAIDs until 2 weeks after surgery to reduce risk of bleeding and interference with bone healing     Bowel Care  Many people have constipation (hard stools) after surgery. The narcotic pain medication we often prescribed can contribute to constipation. To help prevent constipation: Drink plenty of fluid (8-10 glasses/day); Eat more fiber, such as whole grain bread, bran cereal, and fruits and vegetables; Stay active by walking; Over the counter stool softener may also help.      Activity Restrictions  For the first 6 weeks, no lifting > 10 pounds, limited bending, twisting, or overhead reaching.  Take stairs in moderation   Walking is the best way to start exercise after surgery. Take short frequent walks. You may gradually increase the distance as tolerated. If you feel pain, decrease your activity, but DO NOT stop walking. Walking will help you gain strength, prevent muscle soreness and spasms, and prevent blood clots.  Avoid bed rest and prolonged sitting for longer than 30 minutes (change positions frequently while awake)  No contact sports or high impact activities such as; running/jogging, snowmobile or 4 luis riding or any other recreational vehicles until after given clearance at one of your follow up visits    Contact clinic right away or go to the Emergency Department if you develop:   Infection (incisional redness, swelling, warmth, drainage, or fever (temp > 101 F))  New injury  Bladder or bowel changes or  loss of control    Signs of blood clot:  Swelling and/or warmth in one or both legs  Pain or tenderness in your leg, ankle, foot, or arm   Red or discolored skin     Go to the Emergency Department   If sudden onset of severe headache, weakness, confusion, change in level of consciousness, pain, or loss of movement.  Chest pain  Trouble breathing     Post-Op Follow Up Appointments  2 week incision check & staple/suture removal with a Neurosurgery Nurse  6 week and 3 month post-op visit with Physican Assistant or Nurse Practitioner     Resources  If you are currently employed, you will need to be off work for 2-4 weeks for recovery and healing.  Please fax any FMLA/short term disability paperwork to [phone] prior to surgery  You may call our clinic when you'd like to return to work and we can provide a work letter  To allow staff adequate time to complete paperwork, please send as soon as possible     [facility]  [facility] - [address] [address]  Telephone:  [phone]       Fax:  [phone]

## 2022-10-20 NOTE — PROGRESS NOTES
"[facility] Neurosurgery Follow-Up:     HPI: 8 months s/p MIS left L5-S1 microdiscectomy with Dr. Cash. Recent MRI shows L5-S1 disc extrusion contacting the right S1 nerve root and L4-5 anterolisthesis with worsening stenosis. Patient had right L4-S1 CHIVO on 9/20/22 which provided relief for 1-2 days. MDP provided relief for a few days. Currently using Flexeril for pain management. She reports continued right sided low back pain that radiates to right buttocks and posterior right leg to knee with numbness/tingling in the same pattern. Denies any weakness, falls, or bowel/bladder changes. She is not interested in PT or repeat CHIVO.     Returns for follow up.  Continued severe right leg pain.    Exam:  Constitutional:  Alert, well nourished, NAD.  HEENT: Normocephalic, atraumatic.   Pulm:  Without shortness of breath   CV:  No pitting edema of BLE.      /78   Pulse 86   Ht 5' 7\" (1.702 m)   Wt 158 lb (71.7 kg)   LMP  (LMP Unknown)   SpO2 97%   BMI 24.75 kg/m      Neurological:  Awake  Alert  Oriented x 3  Motor exam:  Hip Flexor:                Right: 5/5  Left:  5/5  Quadriceps:              Right:  5/5  Left:  5/5  Hamstrings:              Right:  5/5  Left:  5/5  Gastroc Soleus:        Right:  5/5  Left:  5/5  Tib/Ant:                      Right:  5/5  Left:  5/5  EHL:                          Right:  5/5  Left:  5/5      Able to spontaneously move BLE  Sensation intact throughout BLE  Incision: Well healed     Imaging/Labs:  MR LUMBAR SPINE W/O & W CONTRAST 8/25/2022 8:42 AM  Impression:  1. Since January 2022 there are new postoperative changes of  left-sided microlaminectomy and microdiscectomy at L5-S1. Associated  granulation related enhancement in the left posterior lateral and  anterior epidural space and there is slight improvement of the disc  herniation with mass effect on the left descending S1. However there  is residual central disc extrusion contacting the right descending S1  within " the lateral recess which may explain patient's right-sided  symptoms. Recommend correlation with right S1 radiculopathy.  2. Worsening anterolisthesis at L4-5 with extensive degenerative  endplate changes with contrast enhancement and worsening spinal canal  stenosis, previously moderate, today moderate to severe. There is also  increased effacement of the lateral recesses bilaterally. This may  also explain patient's symptoms.  3. Note that there is contrast enhancement in the left paraspinous  soft tissues, this is favored as degenerative in nature, however  recommend correlation with ESR and CRP to rule out an atypical  spondylitis discitis.    Labs completed on 8/27/22:   WBC: 6.6  CRP: <2.9   ESR: 17     Assessment/Plan:   8 months s/p MIS left L5-S1 microdiscectomy with Dr. Cash. Patient presents for follow-up of right sided low back pain that radiates to right buttocks and posterior right leg to knee with numbness/tingling as well    Discussed options  Will plan for MIS Right L4-5 and L5-S1 microdiscectomies  Risks and benefits discussed

## 2022-10-20 NOTE — LETTER
"    10/20/2022         RE: Kareen Hernandez  [address]  [facility] [id number]        Dear Colleague,    Thank you for referring your patient, Kareen Hernandez, to [facility]. Please see a copy of my visit note below.    [facility] Neurosurgery Follow-Up:     HPI: 8 months s/p MIS left L5-S1 microdiscectomy with Dr. Cash. Recent MRI shows L5-S1 disc extrusion contacting the right S1 nerve root and L4-5 anterolisthesis with worsening stenosis. Patient had right L4-S1 CHIVO on 9/20/22 which provided relief for 1-2 days. MDP provided relief for a few days. Currently using Flexeril for pain management. She reports continued right sided low back pain that radiates to right buttocks and posterior right leg to knee with numbness/tingling in the same pattern. Denies any weakness, falls, or bowel/bladder changes. She is not interested in PT or repeat CHIVO.     Returns for follow up.  Continued severe right leg pain.    Exam:  Constitutional:  Alert, well nourished, NAD.  HEENT: Normocephalic, atraumatic.   Pulm:  Without shortness of breath   CV:  No pitting edema of BLE.      /78   Pulse 86   Ht 5' 7\" (1.702 m)   Wt 158 lb (71.7 kg)   LMP  (LMP Unknown)   SpO2 97%   BMI 24.75 kg/m      Neurological:  Awake  Alert  Oriented x 3  Motor exam:  Hip Flexor:                Right: 5/5  Left:  5/5  Quadriceps:              Right:  5/5  Left:  5/5  Hamstrings:              Right:  5/5  Left:  5/5  Gastroc Soleus:        Right:  5/5  Left:  5/5  Tib/Ant:                      Right:  5/5  Left:  5/5  EHL:                          Right:  5/5  Left:  5/5      Able to spontaneously move BLE  Sensation intact throughout BLE  Incision: Well healed     Imaging/Labs:  MR LUMBAR SPINE W/O & W CONTRAST 8/25/2022 8:42 AM  Impression:  1. Since January 2022 there are new postoperative changes of  left-sided microlaminectomy and microdiscectomy at L5-S1. Associated  granulation " related enhancement in the left posterior lateral and  anterior epidural space and there is slight improvement of the disc  herniation with mass effect on the left descending S1. However there  is residual central disc extrusion contacting the right descending S1  within the lateral recess which may explain patient's right-sided  symptoms. Recommend correlation with right S1 radiculopathy.  2. Worsening anterolisthesis at L4-5 with extensive degenerative  endplate changes with contrast enhancement and worsening spinal canal  stenosis, previously moderate, today moderate to severe. There is also  increased effacement of the lateral recesses bilaterally. This may  also explain patient's symptoms.  3. Note that there is contrast enhancement in the left paraspinous  soft tissues, this is favored as degenerative in nature, however  recommend correlation with ESR and CRP to rule out an atypical  spondylitis discitis.    Labs completed on 8/27/22:   WBC: 6.6  CRP: <2.9   ESR: 17     Assessment/Plan:   8 months s/p MIS left L5-S1 microdiscectomy with Dr. Cash. Patient presents for follow-up of right sided low back pain that radiates to right buttocks and posterior right leg to knee with numbness/tingling as well    Discussed options  Will plan for MIS Right L4-5 and L5-S1 microdiscectomies  Risks and benefits discussed        Again, thank you for allowing me to participate in the care of your patient.        Sincerely,        Eugenio Cash MD

## 2022-10-26 DIAGNOSIS — E55.9 VITAMIN D DEFICIENCY DISEASE: ICD-10-CM

## 2022-10-26 RX ORDER — ERGOCALCIFEROL 1.25 MG/1
CAPSULE, LIQUID FILLED ORAL
Qty: 4 CAPSULE | Refills: 1 | Status: SHIPPED | OUTPATIENT
Start: 2022-10-26 | End: 2023-01-10

## 2022-10-29 DIAGNOSIS — J42 CHRONIC BRONCHITIS, UNSPECIFIED CHRONIC BRONCHITIS TYPE (H): ICD-10-CM

## 2022-10-31 RX ORDER — FLUTICASONE PROPIONATE AND SALMETEROL XINAFOATE 115; 21 UG/1; UG/1
AEROSOL, METERED RESPIRATORY (INHALATION)
Qty: 12 G | Refills: 3 | Status: SHIPPED | OUTPATIENT
Start: 2022-10-31 | End: 2023-03-16

## 2022-11-11 ENCOUNTER — OFFICE VISIT (OUTPATIENT)
Dept: FAMILY MEDICINE | Facility: CLINIC | Age: 57
End: 2022-11-11
Payer: COMMERCIAL

## 2022-11-11 VITALS
TEMPERATURE: 98.5 F | HEART RATE: 73 BPM | BODY MASS INDEX: 24.43 KG/M2 | DIASTOLIC BLOOD PRESSURE: 85 MMHG | OXYGEN SATURATION: 97 % | WEIGHT: 156 LBS | SYSTOLIC BLOOD PRESSURE: 138 MMHG

## 2022-11-11 DIAGNOSIS — M54.41 ACUTE RIGHT-SIDED LOW BACK PAIN WITH RIGHT-SIDED SCIATICA: ICD-10-CM

## 2022-11-11 DIAGNOSIS — M54.16 LUMBAR RADICULOPATHY: ICD-10-CM

## 2022-11-11 DIAGNOSIS — Z01.818 PREOP GENERAL PHYSICAL EXAM: Primary | ICD-10-CM

## 2022-11-11 PROCEDURE — 99214 OFFICE O/P EST MOD 30 MIN: CPT | Performed by: PHYSICIAN ASSISTANT

## 2022-11-11 NOTE — PATIENT INSTRUCTIONS
Preparing for Your Surgery  Getting started  A nurse will call you to review your health history and instructions. They will give you an arrival time based on your scheduled surgery time. Please be ready to share:    Your doctor s clinic name and phone number    Your medical, surgical, and anesthesia history    A list of allergies and sensitivities    A list of medicines, including herbal treatments and over-the-counter drugs    Whether the patient has a legal guardian (ask how to send us the papers in advance)  Please tell us if you re pregnant--or if there s any chance you might be pregnant. Some surgeries may injure a fetus (unborn baby), so they require a pregnancy test. Surgeries that are safe for a fetus don t always need a test, and you can choose whether to have one.   If you have a child who s having surgery, please ask for a copy of Preparing for Your Child s Surgery.    Preparing for surgery    Within 10 to 30 days of surgery: Have a pre-op exam (sometimes called an H&P, or History and Physical). This can be done at a clinic or pre-operative center.  ? If you re having a , you may not need this exam. Talk to your care team.    At your pre-op exam, talk to your care team about all medicines you take. If you need to stop any medicines before surgery, ask when to start taking them again.  ? We do this for your safety. Many medicines can make you bleed too much during surgery. Some change how well surgery (anesthesia) drugs work.    Call your insurance company to let them know you re having surgery. (If you don t have insurance, call [phone].)    Call your clinic if there s any change in your health. This includes signs of a cold or flu (sore throat, runny nose, cough, rash, fever). It also includes a scrape or scratch near the surgery site.    If you have questions on the day of surgery, call your hospital or surgery center.  COVID testing  You may need to be tested for COVID-19 before having  surgery. If so, we will give you instructions (or click here).  Eating and drinking guidelines  For your safety: Unless your surgeon tells you otherwise, follow the guidelines below.    Eat and drink as usual until 8 hours before you arrive for surgery. After that, no food or milk.    Drink clear liquids until 2 hours before you arrive. These are liquids you can see through, like water, Gatorade, and Propel Water. They also include plain black coffee and tea (no cream or milk), candy, and breath mints. You can spit out gum when you arrive.    If you drink alcohol: Stop drinking it the night before surgery.    If your care team tells you to take medicine on the morning of surgery, it s okay to take it with a sip of water.  Preventing infection    Shower or bathe the night before and morning of your surgery. Follow the instructions your clinic gave you. (If no instructions, use regular soap.)    Don t shave or clip hair near your surgery site. We ll remove the hair if needed.    Don t smoke or vape the morning of surgery. You may chew nicotine gum up to 2 hours before surgery. A nicotine patch is okay.  ? Note: Some surgeries require you to completely quit smoking and nicotine. Check with your surgeon.    Your care team will make every effort to keep you safe from infection. We will:  ? Clean our hands often with soap and water (or an alcohol-based hand rub).  ? Clean the skin at your surgery site with a special soap that kills germs.  ? Give you a special gown to keep you warm. (Cold raises the risk of infection.)  ? Wear special hair covers, masks, gowns and gloves during surgery.  ? Give antibiotic medicine, if prescribed. Not all surgeries need antibiotics.  What to bring on the day of surgery    Photo ID and insurance card    Copy of your health care directive, if you have one    Glasses and hearing aids (bring cases)  ? You can t wear contacts during surgery    Inhaler and eye drops, if you use them (tell us  about these when you arrive)    CPAP machine or breathing device, if you use them    A few personal items, if spending the night    If you have . . .  ? A pacemaker, ICD (cardiac defibrillator) or other implant: Bring the ID card.  ? An implanted stimulator: Bring the remote control.  ? A legal guardian: Bring a copy of the certified (court-stamped) guardianship papers.  Please remove any jewelry, including body piercings. Leave jewelry and other valuables at home.  If you re going home the day of surgery    You must have a responsible adult drive you home. They should stay with you overnight as well.    If you don t have someone to stay with you, and you aren t safe to go home alone, we may keep you overnight. Insurance often won t pay for this.  After surgery  If it s hard to control your pain or you need more pain medicine, please call your surgeon s office.  Questions?   If you have any questions for your care team, list them here:   ____________________________________________________________________________________________________________________________________________________________________________________________________________________________________________________________________  For informational purposes only. Not to replace the advice of your health care provider. Copyright   2003, 2019 [employer]. All rights reserved. Clinically reviewed by Alyson Reynaga MD. [employer] [id number] - REV 10/22.    How to Take Your Medication Before Surgery  - Take all of your medications before surgery as usual

## 2022-11-11 NOTE — PROGRESS NOTES
[facility]  [address] [place] [address]  [place] [address]  Phone: [phone]  Primary Provider: Leno Courtney  Pre-op Performing Provider: MEHRDAD YAÑEZ      PREOPERATIVE EVALUATION:  Today's date: 11/11/2022    Kareen Hernandez is a 57 year old female who presents for a preoperative evaluation.    Surgical Information:  Surgery/Procedure: back   Surgery Location: [facility]    Surgeon: Shailesh Vincent  Surgery Date: 11-15-22   Time of Surgery: 12:10 pm   Where patient plans to recover: At home with family  Fax number for surgical facility:     Type of Anesthesia Anticipated: to be determined    Assessment & Plan     The proposed surgical procedure is considered INTERMEDIATE risk.    Preop general physical exam  Overall stable and no concerns noted for surgery     Acute right-sided low back pain with right-sided sciatica      Lumbar radiculopathy               Risks and Recommendations:  The patient has the following additional risks and recommendations for perioperative complications:   - No identified additional risk factors other than previously addressed    Medication Instructions:  Patient is to take all scheduled medications on the day of surgery    RECOMMENDATION:  APPROVAL GIVEN to proceed with proposed procedure, without further diagnostic evaluation.                      Subjective     HPI related to upcoming procedure: microdiscectomy for lumbar radiculopathy     Preop Questions 11/11/2022   1. Have you ever had a heart attack or stroke? No   2. Have you ever had surgery on your heart or blood vessels, such as a stent placement, a coronary artery bypass, or surgery on an artery in your head, neck, heart, or legs? No   3. Do you have chest pain with activity? No   4. Do you have a history of  heart failure? No   5. Do you currently have a cold, bronchitis or symptoms of other infection? No   6. Do you have a cough, shortness of breath, or wheezing? YES -  cough-chronic not changed    7. Do you or anyone in your family have previous history of blood clots? No   8. Do you or does anyone in your family have a serious bleeding problem such as prolonged bleeding following surgeries or cuts? No   9. Have you ever had problems with anemia or been told to take iron pills? No   10. Have you had any abnormal blood loss such as black, tarry or bloody stools, or abnormal vaginal bleeding? No   11. Have you ever had a blood transfusion? No   12. Are you willing to have a blood transfusion if it is medically needed before, during, or after your surgery? Yes   13. Have you or any of your relatives ever had problems with anesthesia? No   14. Do you have sleep apnea, excessive snoring or daytime drowsiness? No   15. Do you have any artifical heart valves or other implanted medical devices like a pacemaker, defibrillator, or continuous glucose monitor? No   16. Do you have artificial joints? No   17. Are you allergic to latex? No   18. Is there any chance that you may be pregnant? No       Health Care Directive:  Patient does not have a Health Care Directive or Living Will: patient has Health Care Directive     Preoperative Review of :   reviewed - controlled substances reflected in medication list.          Review of Systems  CONSTITUTIONAL: NEGATIVE for fever, chills, change in weight  INTEGUMENTARY/SKIN: NEGATIVE for worrisome rashes, moles or lesions  ENT/MOUTH: NEGATIVE for ear, mouth and throat problems  RESP: NEGATIVE for significant cough or SOB  CV: NEGATIVE for chest pain, palpitations or peripheral edema  GI: NEGATIVE for nausea, abdominal pain, heartburn, or change in bowel habits  : NEGATIVE for frequency, dysuria, or hematuria  MUSCULOSKELETAL: NEGATIVE for significant arthralgias or myalgia  HEME: NEGATIVE for bleeding problems  PSYCHIATRIC: NEGATIVE for changes in mood or affect    Patient Active Problem List    Diagnosis Date Noted     Hepatitis C, chronic (H)  05/19/2021     Priority: Medium     S/P lumbar laminectomy 06/04/2018     Priority: Medium     Acute right-sided low back pain with right-sided sciatica 05/08/2018     Priority: Medium     Bipolar affective disorder, remission status unspecified (H) 11/20/2017     Priority: Medium     History of kidney stones 11/29/2016     Priority: Medium     Tobacco use disorder 11/29/2016     Priority: Medium     Major depressive disorder, recurrent episode, moderate (H) 08/08/2016     Priority: Medium     Chronic migraine without aura without status migrainosus, not intractable 07/18/2016     Priority: Medium     Gastroesophageal reflux disease without esophagitis 05/23/2016     Priority: Medium     History of hypertension 05/03/2016     Priority: Medium     resolved with weight loss 2016       Right maxillary sinusitis, chronic      Priority: Medium     Insomnia      Priority: Medium     Migraine headache      Priority: Medium     Vitamin B12 deficiency disease 08/14/2013     Priority: Medium     Vitamin D deficiency disease 08/13/2013     Priority: Medium     Anxiety 03/21/2012     Priority: Medium     CARDIOVASCULAR SCREENING; LDL GOAL LESS THAN 130 10/31/2010     Priority: Medium     Gastric bypass status for obesity 04/16/2009     Priority: Medium     Fibromyalgia 11/30/2006     Priority: Medium      Past Medical History:   Diagnosis Date     Anxiety 3/21/2012     Calculus of kidney     Multiple     CARDIOVASCULAR SCREENING; LDL GOAL LESS THAN 130 10/31/2010     CARDIOVASCULAR SCREENING; LDL GOAL LESS THAN 160 10/31/2010     Fibromyalgia 11/30/2006     Gastric bypass status for obesity 4/16/2009     Hypertension goal BP (blood pressure) < 140/90 2/24/2015     Insomnia      Major depressive disorder, single episode, severe, specified as with psychotic behavior      Migraine headache      Moderate major depression (H) 11/30/2006     Myalgia and myositis, unspecified     FIBROMYALGIA     Right maxillary sinusitis, chronic       Tobacco use disorder 7/14/2011    Quit jafvnay22/01/2013     Vitamin B12 deficiency disease 8/14/2013     Vitamin D deficiency disease 8/13/2013     Past Surgical History:   Procedure Laterality Date     ABDOMINOPLASTY  12/2/2013     [facility]  Feb 2007    Redman's neuroma  - Right Foot     DISCECTOMY LUMBAR POSTERIOR MICROSCOPIC ONE LEVEL Left 2/15/2022    Procedure: Minimally Invasive Left Lumbar 5 to Sacral 1 microdiscectomy;  Surgeon: Eugenio Cash MD;  Location: [facility]     LITHOTRIPSY       SURGICAL PATHOLOGY EXAM      Lipoma Removal on her back     ZZC GASTRIC BYPASS,OBESE<100CM DAYAN-EN-Y  3-25-09    [facility]     ZZC REMOVAL OF KIDNEY STONE      With kidney tents x 5 to 6 procedures.     Current Outpatient Medications   Medication Sig Dispense Refill     ADVAIR -21 MCG/ACT inhaler INHALE 2 PUFFS INTO THE LUNGS TWICE A DAY 12 g 3     albuterol (PROAIR HFA/PROVENTIL HFA/VENTOLIN HFA) 108 (90 Base) MCG/ACT inhaler Inhale 2 puffs into the lungs every 4 hours as needed for shortness of breath / dyspnea or wheezing 18 g 1     B-D SYRINGE LUER-BLAYNE 3 ML MISC 1 SYRINGE EVERY 30 DAYS 25 each 1     buPROPion (WELLBUTRIN XL) 300 MG 24 hr tablet Take 1 tablet (300 mg) by mouth every morning 90 tablet 1     cyanocobalamin (CYANOCOBALAMIN) 1000 MCG/ML injection ADMINISTER 1 ML IN THE MUSCLE EVERY 30 DAYS 3 mL 3     cyclobenzaprine (FLEXERIL) 10 MG tablet Take 1 tablet (10 mg) by mouth 3 times daily as needed for muscle spasms 60 tablet 1     FLUoxetine 20 MG tablet TAKE 2 TABLETS (40 MG) BY MOUTH EVERY DAY 60 tablet 3     fluticasone (FLONASE) 50 MCG/ACT nasal spray INSTILL 1 SPRAY INTO BOTH NOSTRILS DAILY 16 mL 11     lamoTRIgine (LAMICTAL) 100 MG tablet Take 2 tablets (200 mg) by mouth daily 180 tablet 3     medical cannabis (Patient's own supply) See Admin Instructions (The purpose of this order is to document that the patient reports taking medical cannabis.  This is not a prescription,  "and is not used to certify that the patient has a qualifying medical condition.)       mometasone (NASONEX) 50 MCG/ACT nasal spray Spray 2 sprays into both nostrils daily 1 Box 3     Needle, Disp, 25G X 1-1/2\" MISC 1 Device every 30 days 50 each 1     Syringe/Needle, Disp, (BD LUER-BLAYNE SYRINGE) 25G X 1-1/2\" 3 ML MISC 1 Syringe every 30 days 12 each 1     vitamin D2 (ERGOCALCIFEROL) 52974 units (1250 mcg) capsule TAKE 1 CAPSULE BY MOUTH ONCE WEEKLY 4 capsule 1     zolpidem (AMBIEN) 10 MG tablet TAKE ONE-HALF TABLET TO ONE TABLET BY MOUTH AT BEDTIME FOR SLEEP 30 tablet 3     acetaminophen (ACETAMINOPHEN 8 HOUR) 650 MG CR tablet Take 2 tablets (1,300 mg) by mouth every 8 hours as needed for pain (Patient not taking: Reported on 2022) 90 tablet 3     Black Cohosh-SoyIsoflav-C Quad 40- MG CAPS Take 1 capsule by mouth daily       hydrOXYzine (ATARAX) 50 MG tablet Take 1 tablet (50 mg) by mouth 3 times daily as needed for itching (Patient not taking: Reported on 2022) 15 tablet 0     hydrOXYzine (VISTARIL) 50 MG capsule Take 1 capsule (50 mg) by mouth 3 times daily as needed for anxiety (Patient not taking: Reported on 2022) 10 capsule 0     nystatin (MYCOSTATIN) 809962 UNIT/ML suspension Take 5 mLs (500,000 Units) by mouth 4 times daily (Patient not taking: Reported on 2022) 473 mL 2     order for DME Equipment being ordered: Splint right thumb (Patient not taking: Reported on 2022) 1 Units 0     SUMAtriptan (IMITREX) 100 MG tablet TAKE 1 TABLET BY MOUTH ON THE ONSET OF HEADACHE, MAY REPEAT IN 2 HOURS..*MAX 2 TABLETS DAILY* (Patient not taking: Reported on 2022) 9 tablet 3       Allergies   Allergen Reactions     Lurasidone Diarrhea     Diarrhea     Morphine Rash        Social History     Tobacco Use     Smoking status: Former     Packs/day: 1.00     Years: 37.00     Pack years: 37.00     Types: Cigarettes     Quit date: 2013     Years since quittin.1     Smokeless tobacco: " Never     Tobacco comments:     Vape e-cig   Substance Use Topics     Alcohol use: Yes     Family History   Problem Relation Age of Onset     Asthma Mother      Hypertension Mother      C.A.D. Mother      Genitourinary Problems Mother         kidney failure   age 80     Chronic Obstructive Pulmonary Disease Mother      Diabetes Father      Hypertension Father      C.A.D. Father      Chronic Obstructive Pulmonary Disease Father      Dementia Father      Genitourinary Problems Sister         kidney stones     Obesity Daughter         gastric sleeve     Cerebrovascular Disease No family hx of      Breast Cancer No family hx of      Cancer - colorectal No family hx of      Prostate Cancer No family hx of      History   Drug Use     Comment: Marijuana - 3 times a week         Objective     /85   Pulse 73   Temp 98.5  F (36.9  C) (Oral)   Wt 70.8 kg (156 lb)   LMP  (LMP Unknown)   SpO2 97%   BMI 24.43 kg/m      Physical Exam    GENERAL APPEARANCE: healthy, alert and no distress     HENT: ear canals and TM's normal and nose and mouth without ulcers or lesions     NECK: no adenopathy, no asymmetry, masses, or scars and thyroid normal to palpation     RESP: lungs clear to auscultation - no rales, rhonchi or wheezes     CV: regular rates and rhythm, normal S1 S2, no S3 or S4 and no murmur, click or rub     ABDOMEN:  soft, nontender, no HSM or masses and bowel sounds normal     MS: extremities normal- no gross deformities noted, no evidence of inflammation in joints, FROM in all extremities.     NEURO: Normal strength and tone, sensory exam grossly normal, mentation intact and speech normal     PSYCH: mentation appears normal. and affect normal/bright     LYMPHATICS: No cervical adenopathy    Recent Labs   Lab Test 22  1147 22  1203 21  0827   HGB 12.5 12.5 12.4    249 208   INR  --   --  0.99   NA  --  141 139   POTASSIUM  --  4.4 3.6   CR  --  0.76 0.91        Diagnostics:  No labs  were ordered during this visit.   No EKG required, no history of coronary heart disease, significant arrhythmia, peripheral arterial disease or other structural heart disease.    Revised Cardiac Risk Index (RCRI):  The patient has the following serious cardiovascular risks for perioperative complications:   - No serious cardiac risks = 0 points     RCRI Interpretation: 0 points: Class I (very low risk - 0.4% complication rate)           Signed Electronically by: Elen Meade PA-C  Copy of this evaluation report is provided to requesting physician.

## 2022-11-15 ENCOUNTER — ANESTHESIA EVENT (OUTPATIENT)
Dept: SURGERY | Facility: CLINIC | Age: 57
DRG: 909 | End: 2022-11-15
Payer: COMMERCIAL

## 2022-11-15 ENCOUNTER — ANESTHESIA (OUTPATIENT)
Dept: SURGERY | Facility: CLINIC | Age: 57
DRG: 909 | End: 2022-11-15
Payer: COMMERCIAL

## 2022-11-15 ENCOUNTER — HOSPITAL ENCOUNTER (OUTPATIENT)
Facility: CLINIC | Age: 57
Discharge: HOME | DRG: 909 | End: 2022-11-15
Attending: NEUROLOGICAL SURGERY | Admitting: NEUROLOGICAL SURGERY
Payer: COMMERCIAL

## 2022-11-15 ENCOUNTER — APPOINTMENT (OUTPATIENT)
Dept: GENERAL RADIOLOGY | Facility: CLINIC | Age: 57
DRG: 909 | End: 2022-11-15
Attending: NEUROLOGICAL SURGERY
Payer: COMMERCIAL

## 2022-11-15 VITALS
SYSTOLIC BLOOD PRESSURE: 145 MMHG | OXYGEN SATURATION: 95 % | BODY MASS INDEX: 24.61 KG/M2 | RESPIRATION RATE: 14 BRPM | DIASTOLIC BLOOD PRESSURE: 79 MMHG | TEMPERATURE: 97.5 F | WEIGHT: 156.8 LBS | HEART RATE: 80 BPM | HEIGHT: 67 IN

## 2022-11-15 DIAGNOSIS — M79.7 FIBROMYALGIA: ICD-10-CM

## 2022-11-15 DIAGNOSIS — Z98.890 S/P LUMBAR MICRODISCECTOMY: Primary | ICD-10-CM

## 2022-11-15 PROCEDURE — 258N000003 HC RX IP 258 OP 636: Performed by: ANESTHESIOLOGY

## 2022-11-15 PROCEDURE — 710N000009 HC RECOVERY PHASE 1, LEVEL 1, PER MIN: Performed by: NEUROLOGICAL SURGERY

## 2022-11-15 PROCEDURE — 250N000009 HC RX 250

## 2022-11-15 PROCEDURE — 360N000084 HC SURGERY LEVEL 4 W/ FLUORO, PER MIN: Performed by: NEUROLOGICAL SURGERY

## 2022-11-15 PROCEDURE — 63035 LAMOT DCMPRN NRV RT EA ADDL: CPT | Mod: AS | Performed by: NURSE PRACTITIONER

## 2022-11-15 PROCEDURE — 999N000141 HC STATISTIC PRE-PROCEDURE NURSING ASSESSMENT: Performed by: NEUROLOGICAL SURGERY

## 2022-11-15 PROCEDURE — 250N000025 HC SEVOFLURANE, PER MIN: Performed by: NEUROLOGICAL SURGERY

## 2022-11-15 PROCEDURE — 272N000001 HC OR GENERAL SUPPLY STERILE: Performed by: NEUROLOGICAL SURGERY

## 2022-11-15 PROCEDURE — 63035 LAMOT DCMPRN NRV RT EA ADDL: CPT | Mod: RT | Performed by: NEUROLOGICAL SURGERY

## 2022-11-15 PROCEDURE — 250N000009 HC RX 250: Performed by: NEUROLOGICAL SURGERY

## 2022-11-15 PROCEDURE — 250N000011 HC RX IP 250 OP 636: Performed by: NEUROLOGICAL SURGERY

## 2022-11-15 PROCEDURE — 250N000011 HC RX IP 250 OP 636: Performed by: NURSE PRACTITIONER

## 2022-11-15 PROCEDURE — 999N000179 XR SURGERY CARM FLUORO LESS THAN 5 MIN W STILLS

## 2022-11-15 PROCEDURE — 63030 LAMOT DCMPRN NRV RT 1 LMBR: CPT | Mod: AS | Performed by: NURSE PRACTITIONER

## 2022-11-15 PROCEDURE — 370N000017 HC ANESTHESIA TECHNICAL FEE, PER MIN: Performed by: NEUROLOGICAL SURGERY

## 2022-11-15 PROCEDURE — 250N000011 HC RX IP 250 OP 636

## 2022-11-15 PROCEDURE — 258N000003 HC RX IP 258 OP 636

## 2022-11-15 PROCEDURE — 250N000013 HC RX MED GY IP 250 OP 250 PS 637: Performed by: ANESTHESIOLOGY

## 2022-11-15 PROCEDURE — 250N000013 HC RX MED GY IP 250 OP 250 PS 637: Performed by: NEUROLOGICAL SURGERY

## 2022-11-15 PROCEDURE — 63030 LAMOT DCMPRN NRV RT 1 LMBR: CPT | Mod: RT | Performed by: NEUROLOGICAL SURGERY

## 2022-11-15 PROCEDURE — 710N000012 HC RECOVERY PHASE 2, PER MINUTE: Performed by: NEUROLOGICAL SURGERY

## 2022-11-15 RX ORDER — KETAMINE HYDROCHLORIDE 10 MG/ML
INJECTION INTRAMUSCULAR; INTRAVENOUS PRN
Status: DISCONTINUED | OUTPATIENT
Start: 2022-11-15 | End: 2022-11-15

## 2022-11-15 RX ORDER — DEXAMETHASONE SODIUM PHOSPHATE 4 MG/ML
INJECTION, SOLUTION INTRA-ARTICULAR; INTRALESIONAL; INTRAMUSCULAR; INTRAVENOUS; SOFT TISSUE PRN
Status: DISCONTINUED | OUTPATIENT
Start: 2022-11-15 | End: 2022-11-15

## 2022-11-15 RX ORDER — ONDANSETRON 2 MG/ML
4 INJECTION INTRAMUSCULAR; INTRAVENOUS EVERY 30 MIN PRN
Status: CANCELLED | OUTPATIENT
Start: 2022-11-15

## 2022-11-15 RX ORDER — LIDOCAINE HYDROCHLORIDE 20 MG/ML
INJECTION, SOLUTION INFILTRATION; PERINEURAL PRN
Status: DISCONTINUED | OUTPATIENT
Start: 2022-11-15 | End: 2022-11-15

## 2022-11-15 RX ORDER — OXYCODONE HYDROCHLORIDE 5 MG/1
5 TABLET ORAL ONCE
Status: COMPLETED | OUTPATIENT
Start: 2022-11-15 | End: 2022-11-15

## 2022-11-15 RX ORDER — SODIUM CHLORIDE, SODIUM LACTATE, POTASSIUM CHLORIDE, CALCIUM CHLORIDE 600; 310; 30; 20 MG/100ML; MG/100ML; MG/100ML; MG/100ML
INJECTION, SOLUTION INTRAVENOUS CONTINUOUS
Status: CANCELLED | OUTPATIENT
Start: 2022-11-15

## 2022-11-15 RX ORDER — MEPERIDINE HYDROCHLORIDE 25 MG/ML
12.5 INJECTION INTRAMUSCULAR; INTRAVENOUS; SUBCUTANEOUS
Status: CANCELLED | OUTPATIENT
Start: 2022-11-15

## 2022-11-15 RX ORDER — LABETALOL HYDROCHLORIDE 5 MG/ML
10 INJECTION, SOLUTION INTRAVENOUS
Status: CANCELLED | OUTPATIENT
Start: 2022-11-15

## 2022-11-15 RX ORDER — HYDRALAZINE HYDROCHLORIDE 20 MG/ML
2.5-5 INJECTION INTRAMUSCULAR; INTRAVENOUS EVERY 10 MIN PRN
Status: CANCELLED | OUTPATIENT
Start: 2022-11-15

## 2022-11-15 RX ORDER — SODIUM CHLORIDE, SODIUM LACTATE, POTASSIUM CHLORIDE, CALCIUM CHLORIDE 600; 310; 30; 20 MG/100ML; MG/100ML; MG/100ML; MG/100ML
INJECTION, SOLUTION INTRAVENOUS CONTINUOUS
Status: DISCONTINUED | OUTPATIENT
Start: 2022-11-15 | End: 2022-11-15 | Stop reason: HOSPADM

## 2022-11-15 RX ORDER — ALBUTEROL SULFATE 0.83 MG/ML
2.5 SOLUTION RESPIRATORY (INHALATION) EVERY 4 HOURS PRN
Status: CANCELLED | OUTPATIENT
Start: 2022-11-15

## 2022-11-15 RX ORDER — HYDROMORPHONE HCL IN WATER/PF 6 MG/30 ML
0.2 PATIENT CONTROLLED ANALGESIA SYRINGE INTRAVENOUS EVERY 5 MIN PRN
Status: CANCELLED | OUTPATIENT
Start: 2022-11-15

## 2022-11-15 RX ORDER — ONDANSETRON 4 MG/1
4 TABLET, ORALLY DISINTEGRATING ORAL EVERY 30 MIN PRN
Status: CANCELLED | OUTPATIENT
Start: 2022-11-15

## 2022-11-15 RX ORDER — CYCLOBENZAPRINE HCL 10 MG
10 TABLET ORAL 3 TIMES DAILY PRN
Qty: 60 TABLET | Refills: 0 | Status: SHIPPED | OUTPATIENT
Start: 2022-11-15 | End: 2023-01-05

## 2022-11-15 RX ORDER — CEFAZOLIN SODIUM/WATER 2 G/20 ML
2 SYRINGE (ML) INTRAVENOUS
Status: COMPLETED | OUTPATIENT
Start: 2022-11-15 | End: 2022-11-15

## 2022-11-15 RX ORDER — OXYCODONE HYDROCHLORIDE 5 MG/1
5 TABLET ORAL EVERY 4 HOURS PRN
Status: CANCELLED | OUTPATIENT
Start: 2022-11-15

## 2022-11-15 RX ORDER — PROPOFOL 10 MG/ML
INJECTION, EMULSION INTRAVENOUS PRN
Status: DISCONTINUED | OUTPATIENT
Start: 2022-11-15 | End: 2022-11-15

## 2022-11-15 RX ORDER — EPHEDRINE SULFATE 50 MG/ML
INJECTION, SOLUTION INTRAMUSCULAR; INTRAVENOUS; SUBCUTANEOUS PRN
Status: DISCONTINUED | OUTPATIENT
Start: 2022-11-15 | End: 2022-11-15

## 2022-11-15 RX ORDER — FENTANYL CITRATE 50 UG/ML
INJECTION, SOLUTION INTRAMUSCULAR; INTRAVENOUS PRN
Status: DISCONTINUED | OUTPATIENT
Start: 2022-11-15 | End: 2022-11-15

## 2022-11-15 RX ORDER — OXYCODONE HYDROCHLORIDE 5 MG/1
5-10 TABLET ORAL EVERY 6 HOURS PRN
Qty: 40 TABLET | Refills: 0 | Status: ON HOLD | OUTPATIENT
Start: 2022-11-15 | End: 2022-11-19

## 2022-11-15 RX ORDER — FENTANYL CITRATE 0.05 MG/ML
25 INJECTION, SOLUTION INTRAMUSCULAR; INTRAVENOUS EVERY 5 MIN PRN
Status: CANCELLED | OUTPATIENT
Start: 2022-11-15

## 2022-11-15 RX ORDER — LIDOCAINE 40 MG/G
CREAM TOPICAL
Status: DISCONTINUED | OUTPATIENT
Start: 2022-11-15 | End: 2022-11-15 | Stop reason: HOSPADM

## 2022-11-15 RX ORDER — HYDROMORPHONE HYDROCHLORIDE 1 MG/ML
INJECTION, SOLUTION INTRAMUSCULAR; INTRAVENOUS; SUBCUTANEOUS PRN
Status: DISCONTINUED | OUTPATIENT
Start: 2022-11-15 | End: 2022-11-15

## 2022-11-15 RX ORDER — ONDANSETRON 2 MG/ML
INJECTION INTRAMUSCULAR; INTRAVENOUS PRN
Status: DISCONTINUED | OUTPATIENT
Start: 2022-11-15 | End: 2022-11-15

## 2022-11-15 RX ORDER — FENTANYL CITRATE 0.05 MG/ML
25 INJECTION, SOLUTION INTRAMUSCULAR; INTRAVENOUS
Status: CANCELLED | OUTPATIENT
Start: 2022-11-15

## 2022-11-15 RX ORDER — AMOXICILLIN 250 MG
1-2 CAPSULE ORAL 2 TIMES DAILY PRN
Qty: 30 TABLET | Refills: 0 | Status: SHIPPED | OUTPATIENT
Start: 2022-11-15 | End: 2023-01-26

## 2022-11-15 RX ORDER — CEFAZOLIN SODIUM/WATER 2 G/20 ML
2 SYRINGE (ML) INTRAVENOUS SEE ADMIN INSTRUCTIONS
Status: DISCONTINUED | OUTPATIENT
Start: 2022-11-15 | End: 2022-11-15 | Stop reason: HOSPADM

## 2022-11-15 RX ADMIN — Medication 2 G: at 11:46

## 2022-11-15 RX ADMIN — SODIUM CHLORIDE, POTASSIUM CHLORIDE, SODIUM LACTATE AND CALCIUM CHLORIDE: 600; 310; 30; 20 INJECTION, SOLUTION INTRAVENOUS at 12:32

## 2022-11-15 RX ADMIN — HYDROMORPHONE HYDROCHLORIDE 0.5 MG: 1 INJECTION, SOLUTION INTRAMUSCULAR; INTRAVENOUS; SUBCUTANEOUS at 12:53

## 2022-11-15 RX ADMIN — FENTANYL CITRATE 100 MCG: 50 INJECTION, SOLUTION INTRAMUSCULAR; INTRAVENOUS at 11:42

## 2022-11-15 RX ADMIN — LIDOCAINE HYDROCHLORIDE 100 MG: 20 INJECTION, SOLUTION INFILTRATION; PERINEURAL at 11:42

## 2022-11-15 RX ADMIN — Medication 30 MG: at 12:02

## 2022-11-15 RX ADMIN — Medication 20 MG: at 12:12

## 2022-11-15 RX ADMIN — PHENYLEPHRINE HYDROCHLORIDE 0.3 MCG/KG/MIN: 10 INJECTION INTRAVENOUS at 12:47

## 2022-11-15 RX ADMIN — Medication 10 MG: at 12:30

## 2022-11-15 RX ADMIN — MIDAZOLAM 2 MG: 1 INJECTION INTRAMUSCULAR; INTRAVENOUS at 11:37

## 2022-11-15 RX ADMIN — OXYCODONE HYDROCHLORIDE 5 MG: 5 TABLET ORAL at 15:22

## 2022-11-15 RX ADMIN — PHENYLEPHRINE HYDROCHLORIDE 200 MCG: 10 INJECTION INTRAVENOUS at 12:29

## 2022-11-15 RX ADMIN — PROPOFOL 200 MG: 10 INJECTION, EMULSION INTRAVENOUS at 11:42

## 2022-11-15 RX ADMIN — Medication 5 MG: at 12:47

## 2022-11-15 RX ADMIN — SODIUM CHLORIDE, POTASSIUM CHLORIDE, SODIUM LACTATE AND CALCIUM CHLORIDE: 600; 310; 30; 20 INJECTION, SOLUTION INTRAVENOUS at 10:37

## 2022-11-15 RX ADMIN — Medication 10 MG: at 12:42

## 2022-11-15 RX ADMIN — OXYCODONE HYDROCHLORIDE 5 MG: 5 TABLET ORAL at 14:33

## 2022-11-15 RX ADMIN — DEXAMETHASONE SODIUM PHOSPHATE 8 MG: 4 INJECTION, SOLUTION INTRA-ARTICULAR; INTRALESIONAL; INTRAMUSCULAR; INTRAVENOUS; SOFT TISSUE at 12:05

## 2022-11-15 RX ADMIN — PHENYLEPHRINE HYDROCHLORIDE 200 MCG: 10 INJECTION INTRAVENOUS at 12:46

## 2022-11-15 RX ADMIN — HYDROMORPHONE HYDROCHLORIDE 0.5 MG: 1 INJECTION, SOLUTION INTRAMUSCULAR; INTRAVENOUS; SUBCUTANEOUS at 13:12

## 2022-11-15 RX ADMIN — ONDANSETRON 4 MG: 2 INJECTION INTRAMUSCULAR; INTRAVENOUS at 13:01

## 2022-11-15 RX ADMIN — PHENYLEPHRINE HYDROCHLORIDE 200 MCG: 10 INJECTION INTRAVENOUS at 12:25

## 2022-11-15 RX ADMIN — ROCURONIUM BROMIDE 50 MG: 50 INJECTION, SOLUTION INTRAVENOUS at 11:42

## 2022-11-15 RX ADMIN — DEXMEDETOMIDINE HYDROCHLORIDE 0.3 MCG/KG/HR: 100 INJECTION, SOLUTION INTRAVENOUS at 11:52

## 2022-11-15 RX ADMIN — SUGAMMADEX 200 MG: 100 INJECTION, SOLUTION INTRAVENOUS at 13:15

## 2022-11-15 ASSESSMENT — COPD QUESTIONNAIRES: COPD: 1

## 2022-11-15 ASSESSMENT — ACTIVITIES OF DAILY LIVING (ADL)
ADLS_ACUITY_SCORE: 35

## 2022-11-15 NOTE — ANESTHESIA CARE TRANSFER NOTE
Patient: Kareen Hernandez    Procedure: Procedure(s):  Minimally Invasive Right Lumbar 4 to Lumbar 5 and Lumbar 5 to Sacral 1 microdiscectomies       Diagnosis: Lumbar radiculopathy [M54.16]  Diagnosis Additional Information: No value filed.    Anesthesia Type:   General     Note:    Oropharynx: oropharynx clear of all foreign objects and spontaneously breathing  Level of Consciousness: awake  Oxygen Supplementation: face mask  Level of Supplemental Oxygen (L/min / FiO2): 6    Dentition: dentition unchanged  Vital Signs Stable: post-procedure vital signs reviewed and stable  Report to RN Given: handoff report given  Patient transferred to: PACU    Handoff Report: Identifed the Patient, Identified the Reponsible Provider, Reviewed the pertinent medical history, Discussed the surgical course, Reviewed Intra-OP anesthesia mangement and issues during anesthesia, Set expectations for post-procedure period and Allowed opportunity for questions and acknowledgement of understanding      Vitals:  Vitals Value Taken Time   /63    Temp 36.4    Pulse 65 11/15/22 1330   Resp 8 11/15/22 1330   SpO2 100 % 11/15/22 1330   Vitals shown include unvalidated device data.    Electronically Signed By: MESHA Jordan CRNA  November 15, 2022  1:32 PM

## 2022-11-15 NOTE — BRIEF OP NOTE
[facility] Brief Operative Note    Pre-operative diagnosis: Lumbar radiculopathy [M54.16]   Post-operative diagnosis Same as above    Procedure: Procedure(s):  Minimally Invasive Right Lumbar 4 to Lumbar 5 and Lumbar 5 to Sacral 1 microdiscectomies   Surgeon(s): Surgeon(s) and Role:     * Eugenio Cash MD - Primary   Estimated blood loss: 25 mL    Specimens: * No specimens in log *   Findings: See op note

## 2022-11-15 NOTE — OR NURSING
Patient is adequate for discharge to home from Phase 2. Ox4 and AVSS. Tolerating PO, denies nausea. Pain well controlled-requested an additional oxy 5mg, home ordered dose 5-10mg Q6. Updated AVS. Pt ready to discharge. Discharge instructions completed with DAUGHTER. Discharge medications and all belongings returned to patient and sent home.

## 2022-11-15 NOTE — OR NURSING
Patient brought a picture of home covid antigen test on phone as directed.  I personally saw this result and it was time stamped 11/14/22. Result was neg.

## 2022-11-15 NOTE — ANESTHESIA PROCEDURE NOTES
Airway       Patient location during procedure: OR       Procedure Start/Stop Times: 11/15/2022 11:45 AM  Staff -        Anesthesiologist:  Elen Maria MD       CRNA: Raf Aguilar APRN CRNA       Performed By: CRNA  Consent for Airway        Urgency: elective  Indications and Patient Condition       Indications for airway management: eloina-procedural       Induction type:intravenous       Mask difficulty assessment: 2 - vent by mask + OA or adjuvant +/- NMBA    Final Airway Details       Final airway type: endotracheal airway       Successful airway: ETT - single  Endotracheal Airway Details        ETT size (mm): 7.0       Cuffed: yes       Successful intubation technique: direct laryngoscopy       DL Blade Type: Dominguez 2       Grade View of Cords: 1       Adjucts: stylet       Position: Right       Measured from: lips       Secured at (cm): 21       Bite block used: None    Post intubation assessment        Placement verified by: capnometry, equal breath sounds and chest rise        Number of attempts at approach: 1       Secured with: pink tape       Ease of procedure: easy       Dentition: Intact and Unchanged    Medication(s) Administered   Medication Administration Time: 11/15/2022 11:45 AM

## 2022-11-15 NOTE — DISCHARGE INSTRUCTIONS
[facility] at [facility]  Dr. Cash Discharge Instructions Following Spine Surgery  [phone]  Monday - Friday; 8:00 AM - 4:00 PM    In General:   After you have had surgery on your spine, remember do not bend, twist, or overhead reach. No lifting over 10 lbs. These activities can prevent healing.  Pain is normal and to be expected following surgery.      Bowel Care:  Many people have constipation (hard stools) after surgery.  To help prevent constipation: Drink plenty of fluid (8-10 glasses/day); Eat more fiber, such as whole grain bread, bran cereal, and fruits and vegetables; Stay active by walking; Over the counter stool softener may also help.      Medications:  Spine surgery and pain management is unique to all patients.  You will generally be given medications for pain, muscle spasms or tightness, and for constipation during the immediate post op period.  It is important that you use these as prescribed.  Avoid driving while taking narcotic pain medications.  Avoid alcoholic beverages while taking narcotic pain medications. You can use ice to areas of pain as needed, 20 minutes at a time.  Changing positions and walking will help loosen your muscles as well.  No NSAIDs (Ibuprofen, Advil, Motrin, Aleve, Naproxen) until given the ok (likely at one of your follow up appointments).      Driving:  No driving while on narcotic pain medications.  It is state law not to drive while under the influence of a drug to a degree which renders you incapable of safely driving.  The narcotic medication you will be taking after surgery falls under this category. Wait 24 hours from your last narcotic pain medication before driving.     Activity:   After surgery, most people feel less pain than they have had in a long time.  Walking and light activities will help you regain the use of your muscles.  You are encouraged to walk: start with short walks 5-10 minutes at a time for 4-5 times per day and  increase as tolerated.  Stair climbing as tolerated, we recommend you use the railing. No lifting greater than 10 pounds: approximately equal to one gallon of milk. No twisting, bending, or overhead reaching in the area you have had surgery. No housework, vacuuming, laundry, leaf raking, lawn mowing, or snow removal. Wear your brace (if ordered) as directed.    Showers:  You may shower two days after surgery without covering your incision. It is ok to let water run over your incision but do not touch or scrub on the incision. Pat dry immediately after showering. If there is a dressing in place, you may remove it 2 days after surgery. Do not apply lotions or ointments to your incision. No baths, hot tubs, or pool activity for at least 6 weeks.     Nutrition:  In general, your diet restrictions will not change with your surgery.  You may need to eat small frequent meals initially until your appetite returns.  Eat plenty of high fiber foods and drink plenty of fluids. If you do not have a fluid restriction from or prior to surgery, we recommend 6-8 (8oz) glasses of water per day. Other fluids are fine, but water is best. Nausea is not uncommon; it is a common side effect to many pain medications.  We recommend that you take the pain medications with food, if this does not improve your symptoms, please call us.     Smoking:  For proper healing it is required that you quit using all tobacco products.  This includes smoking, chewing, nicotine gums, and nicotine patches.    Follow-Up Appointment  2 weeks post-op with neurosurgery nurse or provider.  Call [phone] for appointment (if not already scheduled).    Call the neurosurgery clinic at [phone] if these occur: signs of incisional infection (redness, swelling, warmth, pain, drainage, temperature greater than 101.5), increased leg/arm pain and/or swelling, unrelieved headaches, new injury, bladder or bowel changes or loss of control, difficulty swallowing  liquids or pills.     Go to the nearest Emergency Room if you experience: sudden onset of severe headache, weakness, confusion, change in level of consciousness, pain, loss of movement or neck swelling/swallowing problems, chest pain, shortness of breath.        Same Day Surgery Discharge Instructions for  Sedation and General Anesthesia     It's not unusual to feel dizzy, light-headed or faint for up to 24 hours after surgery or while taking pain medication.  If you have these symptoms: sit for a few minutes before standing and have someone assist you when you get up to walk or use the bathroom.    You should rest and relax for the next 24 hours. We recommend you make arrangements to have an adult stay with you for at least 24 hours after your discharge.  Avoid hazardous and strenuous activity.    DO NOT DRIVE any vehicle or operate mechanical equipment for 24 hours following the end of your surgery.  Even though you may feel normal, your reactions may be affected by the medication you have received.    Do not drink alcoholic beverages for 24 hours following surgery.     Slowly progress to your regular diet as you feel able. It's not unusual to feel nauseated and/or vomit after receiving anesthesia.  If you develop these symptoms, drink clear liquids (apple juice, ginger ale, broth, 7-up, etc. ) until you feel better.  If your nausea and vomiting persists for 24 hours, please notify your surgeon.      All narcotic pain medications, along with inactivity and anesthesia, can cause constipation. Drinking plenty of liquids and increasing fiber intake will help.    For any questions of a medical nature, call your surgeon.    Do not make important decisions for 24 hours.    If you had general anesthesia, you may have a sore throat for a couple of days related to the breathing tube used during surgery.  You may use Cepacol lozenges to help with this discomfort.  If it worsens or if you develop a fever, contact your  surgeon.     If you feel your pain is not well managed with the pain medications prescribed by your surgeon, please contact your surgeon's office to let them know so they can address your concerns.     **If you have questions or concerns about your procedure,   call Dr. Cash at [phone]**

## 2022-11-15 NOTE — OP NOTE
Date of surgery: November 15, 2022  Surgeon: Eugenio Cash MD  Assistant: Hyacinth Timmons NP (Note: the assistant was present for and assisted with the entire surgery, and his/her role as an assistant was crucial for aid in positioning, exposure, suctioning, retraction, and closure)     Preoperative diagnosis: Lumbar disc herniations  Postoperative diagnosis: Lumbar disc herniations     Procedure:  1.  Right L4-5 MIS microdiscectomy  2.  Right L5-S1 MIS microdiscectomy  3.  Use of [employer] Metrx minimally invasive system  4.  Use of intraoperative microscope and fluoroscopy     EBL: 50 mL     Indications: 57 year old female who presented with severe leg pain and disc herniations.  Underwent non-operative management with failure to improve.  Risks, benefits, indications, and alternatives were discussed with the patient and family in detail, and they wished to proceed.     Description of surgery: The patient was positioned prone.  Sterile prepping and draping procedures were performed.  Antibiotics were administered and timeout was performed.  A paramedian lumbar incision was performed.  The minimally invasive dilating system was used to dock on the hemilamina.  The microscope was brought into the field, and under microscopy, L4-5 laminotomies and medial facetectomy were performed with the high speed drill.  The kerrison rongeur was used to remove the ligamentum flavum, and the thecal sac and nerve root were exposed.  The nerve root was retracted medially, and the extruded disk fragment was removed with the pituitary rongeurs.  Hemostasis was achieved and antibiotic irrigation was performed.  Next, the minimally invasive dilating system was re-docked at L5-S1.  L5-S1 laminotomies and medial facetectomy were performed with the high speed drill.  The kerrison rongeur was used to remove the ligamentum flavum, and the thecal sac and nerve root were exposed.  The nerve root was retracted medially, and the extruded disk  fragment was removed with the pituitary rongeurs.  Hemostasis was achieved and antibiotic irrigation was performed.   The fascia was closed with 0-Vicryl sutures, and the dermal layer was closed with 2-0 vicryl sutures.  The skin was closed with a running subcuticular stitch.  There were no intraprocedural complications.

## 2022-11-15 NOTE — ANESTHESIA PREPROCEDURE EVALUATION
Anesthesia Pre-Procedure Evaluation    Patient: Kareen Hernandez   MRN: [id number] : 1965        Procedure : Procedure(s):  Minimally Invasive Right Lumbar 4 to Lumbar 5 and Lumbar 5 to Sacral 1 microdiscectomies          Past Medical History:   Diagnosis Date     Anxiety 2012     Calculus of kidney     Multiple     CARDIOVASCULAR SCREENING; LDL GOAL LESS THAN 130 10/31/2010     CARDIOVASCULAR SCREENING; LDL GOAL LESS THAN 160 10/31/2010     COPD (chronic obstructive pulmonary disease) (H)      Fibromyalgia 2006     Gastric bypass status for obesity 2009     Hypertension goal BP (blood pressure) < 140/90 2015     Insomnia      Major depressive disorder, single episode, severe, specified as with psychotic behavior      Migraine headache      Moderate major depression (H) 2006     Myalgia and myositis, unspecified     FIBROMYALGIA     Right maxillary sinusitis, chronic      Tobacco use disorder 2011    Quit [id number]     Vitamin B12 deficiency disease 2013     Vitamin D deficiency disease 2013      Past Surgical History:   Procedure Laterality Date     ABDOMINOPLASTY  2013     CL AFF SURGICAL PATHOLOGY  2007    Redman's neuroma  - Right Foot     DISCECTOMY LUMBAR POSTERIOR MICROSCOPIC ONE LEVEL Left 2/15/2022    Procedure: Minimally Invasive Left Lumbar 5 to Sacral 1 microdiscectomy;  Surgeon: Eugenio Cash MD;  Location: [facility]     LITHOTRIPSY       SURGICAL PATHOLOGY EXAM      Lipoma Removal on her back     ZZC GASTRIC BYPASS,OBESE<100CM DAYAN-EN-Y  3-25-09    [facility] REMOVAL OF KIDNEY STONE      With kidney tents x 5 to 6 procedures.      Allergies   Allergen Reactions     Lurasidone Diarrhea     Diarrhea     Morphine Rash      Social History     Tobacco Use     Smoking status: Former     Packs/day: 1.00     Years: 37.00     Pack years: 37.00     Types: Cigarettes     Quit date: 2013     Years since quittin.1     Smokeless  tobacco: Current     Tobacco comments:     Vape e-cig   Substance Use Topics     Alcohol use: Yes      Wt Readings from Last 1 Encounters:   11/15/22 71.1 kg (156 lb 12.8 oz)        Anesthesia Evaluation   Pt has had prior anesthetic.     No history of anesthetic complications       ROS/MED HX  ENT/Pulmonary:     (+) COPD,  (-) sleep apnea   Neurologic:    (-) no CVA   Cardiovascular:     (+) hypertension----- (-) CAD   METS/Exercise Tolerance:     Hematologic:       Musculoskeletal:       GI/Hepatic:     (+) GERD, hepatitis     Renal/Genitourinary:     (+) Nephrolithiasis ,     Endo:     (+) Obesity,  (-) Type II DM   Psychiatric/Substance Use:       Infectious Disease:       Malignancy:       Other:            Physical Exam    Airway        Mallampati: II   TM distance: > 3 FB   Neck ROM: full   Mouth opening: > 3 cm    Respiratory Devices and Support         Dental       (+) lower dentures and upper dentures      Cardiovascular   cardiovascular exam normal          Pulmonary   pulmonary exam normal                OUTSIDE LABS:  CBC:   Lab Results   Component Value Date    WBC 6.6 08/27/2022    WBC 5.8 02/14/2022    HGB 12.5 08/27/2022    HGB 12.5 02/14/2022    HCT 39.8 08/27/2022    HCT 38.4 02/14/2022     08/27/2022     02/14/2022     BMP:   Lab Results   Component Value Date     02/14/2022     06/03/2021    POTASSIUM 4.4 02/14/2022    POTASSIUM 3.6 06/03/2021    CHLORIDE 108 02/14/2022    CHLORIDE 106 06/03/2021    CO2 28 02/14/2022    CO2 31 06/03/2021    BUN 10 02/14/2022    BUN 6 (L) 06/03/2021    CR 0.76 02/14/2022    CR 0.91 06/03/2021    GLC 97 02/14/2022    GLC 95 06/03/2021     COAGS:   Lab Results   Component Value Date    INR 0.99 06/03/2021     POC:   Lab Results   Component Value Date    HCG  01/17/2011     Negative   This test provides a presumptive diagnosis of pregnancy or non-pregnancy. A   confirmed pregnancy diagnosis should only be made by a physician after all    clinical and laboratory findings have been evaluated.     HEPATIC:   Lab Results   Component Value Date    ALBUMIN 3.1 (L) 02/14/2022    PROTTOTAL 6.7 (L) 02/14/2022    ALT 18 02/14/2022    AST 16 02/14/2022    ALKPHOS 108 02/14/2022    BILITOTAL 0.2 02/14/2022     OTHER:   Lab Results   Component Value Date    MADHU 8.8 02/14/2022    TSH 2.27 08/05/2014    CRP <2.9 08/27/2022    SED 17 08/27/2022       Anesthesia Plan    ASA Status:  3   NPO Status:  NPO Appropriate    Anesthesia Type: General.     - Airway: ETT   Induction: Propofol, Intravenous.   Maintenance: Balanced.   Techniques and Equipment:       - Drips/Meds: Dexmed. infusion     Consents    Anesthesia Plan(s) and associated risks, benefits, and realistic alternatives discussed. Questions answered and patient/representative(s) expressed understanding.    - Discussed:     - Discussed with:  Patient         Postoperative Care    Pain management: Multi-modal analgesia.   PONV prophylaxis: Ondansetron (or other 5HT-3), Dexamethasone or Solumedrol, Background Propofol Infusion     Comments:                Elen Maria MD, MD

## 2022-11-15 NOTE — ANESTHESIA POSTPROCEDURE EVALUATION
Patient: Kareen Hernandez    Procedure: Procedure(s):  Minimally Invasive Right Lumbar 4 to Lumbar 5 and Lumbar 5 to Sacral 1 microdiscectomies       Anesthesia Type:  General    Note:     Postop Pain Control: Uneventful            Sign Out: Well controlled pain   PONV: No   Neuro/Psych: Uneventful            Sign Out: Acceptable/Baseline neuro status   Airway/Respiratory: Uneventful            Sign Out: Acceptable/Baseline resp. status   CV/Hemodynamics: Uneventful            Sign Out: Acceptable CV status; No obvious hypovolemia; No obvious fluid overload   Other NRE: NONE   DID A NON-ROUTINE EVENT OCCUR? No           Last vitals:  Vitals Value Taken Time   /70 11/15/22 1345   Temp     Pulse 71 11/15/22 1348   Resp 8 11/15/22 1348   SpO2 100 % 11/15/22 1348   Vitals shown include unvalidated device data.    Electronically Signed By: Elen Maria MD, MD  November 15, 2022  1:50 PM

## 2022-11-16 ENCOUNTER — APPOINTMENT (OUTPATIENT)
Dept: MRI IMAGING | Facility: CLINIC | Age: 57
DRG: 909 | End: 2022-11-16
Attending: EMERGENCY MEDICINE
Payer: COMMERCIAL

## 2022-11-16 ENCOUNTER — TELEPHONE (OUTPATIENT)
Dept: NEUROSURGERY | Facility: CLINIC | Age: 57
End: 2022-11-16

## 2022-11-16 ENCOUNTER — HOSPITAL ENCOUNTER (INPATIENT)
Facility: CLINIC | Age: 57
LOS: 2 days | Discharge: HOME | DRG: 909 | End: 2022-11-19
Attending: EMERGENCY MEDICINE | Admitting: INTERNAL MEDICINE
Payer: COMMERCIAL

## 2022-11-16 DIAGNOSIS — Z98.890 S/P LUMBAR MICRODISCECTOMY: Primary | ICD-10-CM

## 2022-11-16 DIAGNOSIS — G96.00 CSF LEAK: ICD-10-CM

## 2022-11-16 DIAGNOSIS — Z98.890 S/P LUMBAR LAMINECTOMY: Primary | ICD-10-CM

## 2022-11-16 DIAGNOSIS — S06.4XAA EPIDURAL HEMATOMA (H): ICD-10-CM

## 2022-11-16 DIAGNOSIS — J30.9 ALLERGIC RHINITIS, UNSPECIFIED SEASONALITY, UNSPECIFIED TRIGGER: ICD-10-CM

## 2022-11-16 DIAGNOSIS — G89.18 POSTOPERATIVE PAIN: ICD-10-CM

## 2022-11-16 DIAGNOSIS — B37.0 ORAL THRUSH: ICD-10-CM

## 2022-11-16 LAB
ANION GAP SERPL CALCULATED.3IONS-SCNC: 1 MMOL/L (ref 3–14)
BASOPHILS # BLD AUTO: 0 10E3/UL (ref 0–0.2)
BASOPHILS NFR BLD AUTO: 0 %
BUN SERPL-MCNC: 7 MG/DL (ref 7–30)
CALCIUM SERPL-MCNC: 8.7 MG/DL (ref 8.5–10.1)
CHLORIDE BLD-SCNC: 104 MMOL/L (ref 94–109)
CO2 SERPL-SCNC: 33 MMOL/L (ref 20–32)
CREAT SERPL-MCNC: 0.85 MG/DL (ref 0.52–1.04)
EOSINOPHIL # BLD AUTO: 0.3 10E3/UL (ref 0–0.7)
EOSINOPHIL NFR BLD AUTO: 3 %
ERYTHROCYTE [DISTWIDTH] IN BLOOD BY AUTOMATED COUNT: 12.8 % (ref 10–15)
GFR SERPL CREATININE-BSD FRML MDRD: 80 ML/MIN/1.73M2
GLUCOSE BLD-MCNC: 96 MG/DL (ref 70–99)
HCT VFR BLD AUTO: 39 % (ref 35–47)
HGB BLD-MCNC: 12.3 G/DL (ref 11.7–15.7)
IMM GRANULOCYTES # BLD: 0 10E3/UL
IMM GRANULOCYTES NFR BLD: 0 %
LYMPHOCYTES # BLD AUTO: 3.9 10E3/UL (ref 0.8–5.3)
LYMPHOCYTES NFR BLD AUTO: 41 %
MCH RBC QN AUTO: 30.4 PG (ref 26.5–33)
MCHC RBC AUTO-ENTMCNC: 31.5 G/DL (ref 31.5–36.5)
MCV RBC AUTO: 97 FL (ref 78–100)
MONOCYTES # BLD AUTO: 0.9 10E3/UL (ref 0–1.3)
MONOCYTES NFR BLD AUTO: 10 %
NEUTROPHILS # BLD AUTO: 4.3 10E3/UL (ref 1.6–8.3)
NEUTROPHILS NFR BLD AUTO: 46 %
NRBC # BLD AUTO: 0 10E3/UL
NRBC BLD AUTO-RTO: 0 /100
PLATELET # BLD AUTO: 248 10E3/UL (ref 150–450)
POTASSIUM BLD-SCNC: 3.4 MMOL/L (ref 3.4–5.3)
RBC # BLD AUTO: 4.04 10E6/UL (ref 3.8–5.2)
SODIUM SERPL-SCNC: 138 MMOL/L (ref 133–144)
WBC # BLD AUTO: 9.5 10E3/UL (ref 4–11)

## 2022-11-16 PROCEDURE — 96374 THER/PROPH/DIAG INJ IV PUSH: CPT

## 2022-11-16 PROCEDURE — 82310 ASSAY OF CALCIUM: CPT | Performed by: EMERGENCY MEDICINE

## 2022-11-16 PROCEDURE — 96376 TX/PRO/DX INJ SAME DRUG ADON: CPT

## 2022-11-16 PROCEDURE — 36415 COLL VENOUS BLD VENIPUNCTURE: CPT | Performed by: EMERGENCY MEDICINE

## 2022-11-16 PROCEDURE — A9585 GADOBUTROL INJECTION: HCPCS | Performed by: EMERGENCY MEDICINE

## 2022-11-16 PROCEDURE — 250N000011 HC RX IP 250 OP 636: Performed by: EMERGENCY MEDICINE

## 2022-11-16 PROCEDURE — 72158 MRI LUMBAR SPINE W/O & W/DYE: CPT

## 2022-11-16 PROCEDURE — 255N000002 HC RX 255 OP 636: Performed by: EMERGENCY MEDICINE

## 2022-11-16 PROCEDURE — 85025 COMPLETE CBC W/AUTO DIFF WBC: CPT | Performed by: EMERGENCY MEDICINE

## 2022-11-16 PROCEDURE — 99285 EMERGENCY DEPT VISIT HI MDM: CPT | Mod: 25

## 2022-11-16 RX ORDER — DEXAMETHASONE SODIUM PHOSPHATE 10 MG/ML
10 INJECTION, SOLUTION INTRAMUSCULAR; INTRAVENOUS ONCE
Status: COMPLETED | OUTPATIENT
Start: 2022-11-17 | End: 2022-11-17

## 2022-11-16 RX ORDER — GADOBUTROL 604.72 MG/ML
7 INJECTION INTRAVENOUS ONCE
Status: COMPLETED | OUTPATIENT
Start: 2022-11-16 | End: 2022-11-16

## 2022-11-16 RX ORDER — METHYLPREDNISOLONE 4 MG
TABLET, DOSE PACK ORAL
Qty: 21 TABLET | Refills: 0 | Status: ON HOLD | OUTPATIENT
Start: 2022-11-16 | End: 2022-11-19

## 2022-11-16 RX ORDER — KETAMINE HYDROCHLORIDE 10 MG/ML
5 INJECTION, SOLUTION INTRAMUSCULAR; INTRAVENOUS
Status: DISCONTINUED | OUTPATIENT
Start: 2022-11-16 | End: 2022-11-17

## 2022-11-16 RX ORDER — HYDROMORPHONE HYDROCHLORIDE 1 MG/ML
0.5 INJECTION, SOLUTION INTRAMUSCULAR; INTRAVENOUS; SUBCUTANEOUS ONCE
Status: COMPLETED | OUTPATIENT
Start: 2022-11-16 | End: 2022-11-16

## 2022-11-16 RX ORDER — HYDROMORPHONE HYDROCHLORIDE 1 MG/ML
.5-1 INJECTION, SOLUTION INTRAMUSCULAR; INTRAVENOUS; SUBCUTANEOUS
Status: DISCONTINUED | OUTPATIENT
Start: 2022-11-16 | End: 2022-11-17

## 2022-11-16 RX ADMIN — HYDROMORPHONE HYDROCHLORIDE 1 MG: 1 INJECTION, SOLUTION INTRAMUSCULAR; INTRAVENOUS; SUBCUTANEOUS at 21:08

## 2022-11-16 RX ADMIN — HYDROMORPHONE HYDROCHLORIDE 1 MG: 1 INJECTION, SOLUTION INTRAMUSCULAR; INTRAVENOUS; SUBCUTANEOUS at 19:55

## 2022-11-16 RX ADMIN — GADOBUTROL 7 ML: 604.72 INJECTION INTRAVENOUS at 23:08

## 2022-11-16 RX ADMIN — HYDROMORPHONE HYDROCHLORIDE 0.5 MG: 1 INJECTION, SOLUTION INTRAMUSCULAR; INTRAVENOUS; SUBCUTANEOUS at 18:03

## 2022-11-16 RX ADMIN — HYDROMORPHONE HYDROCHLORIDE 0.5 MG: 1 INJECTION, SOLUTION INTRAMUSCULAR; INTRAVENOUS; SUBCUTANEOUS at 19:21

## 2022-11-16 ASSESSMENT — ENCOUNTER SYMPTOMS
BACK PAIN: 1
NUMBNESS: 1
SHORTNESS OF BREATH: 0

## 2022-11-16 ASSESSMENT — ACTIVITIES OF DAILY LIVING (ADL)
ADLS_ACUITY_SCORE: 35

## 2022-11-16 NOTE — ED NOTES
Bed: ED26  Expected date:   Expected time:   Means of arrival:   Comments:  [address] 57 F back pain after back surgery ETA [id number]

## 2022-11-16 NOTE — TELEPHONE ENCOUNTER
Hyacinth Timmons CNP states okay to increase to 2 tabs q 4 hours. Can also order MDP -    Placed call to pt. Updated her of recc. Placed order for MDP. She does not need oxycodone refill at this time.     Pt states continued concern about being unable to get up to use bathroom. Instructed to seek care in ER/call 911.

## 2022-11-16 NOTE — ED TRIAGE NOTES
Patient arrived via EMS, VSS en route, no medications administered.  Patient got outpatient back [facility] yesterday [facility] Pain increased after surgery, progressively worsening throughout day. Reports pain 10/10, unable to take more than 5 steps due to pain. Denies incontinence, neck pain, or numbness. Took home Oxycodone 10mg at home at 16:00

## 2022-11-16 NOTE — TELEPHONE ENCOUNTER
"Last Visit: 11/15/22 Minimally Invasive Right Lumbar 4 to Lumbar 5 and Lumbar 5 to Sacral 1 microdiscectomies    Next Visit: 12/1/22    Name of Provider:  Dr. Cash    Assessment:     Pain: 10/10 to  buttock to BLE. \"barely can sit up\". Can only take 5 steps. \"stings\". Bilateral calves hurt when walking. States weakness to BLE due to pain. Pt states she is unable to walk to the bathroom.     Pt states she has had 2 other back surgeries and she has never been in this much pain. She reports oxycodone working well for her in the past.     Reviewed DVT s/s, pt denies.    N/T: RLE to ankle > she has this prior to surgery    Oxycodone: 1-2 q6 > states does not help at all  Flexeril: TID  Tylenol: cant take d/t gastric bypass hx  Ice: PRN    No infection s/s noted    Recommendation given:     Reviewed red flag symptoms.     Pt instructed to follow-up in the ER if she is in 10/10 pain despite taking her medications.     Routed to [facility] team for further recc           "

## 2022-11-16 NOTE — TELEPHONE ENCOUNTER
[facility] ([facility]) progress note  CC: F/U   S: Feels better overall. He reports improved fatigue. He is up walking walking the halls okay. No fever, cp, sob, edema.     A/P:   Principal Problem:    Hypertensive urgency  Active Problems:    Type 2 diabetes mellitus with other circulatory complication, unspecified whether long term insulin use (H)    Class 2 severe obesity due to excess calories with serious comorbidity in adult, unspecified BMI (H)    Coronary artery disease due to lipid rich plaque    S/P CABG (coronary artery bypass graft)    Chest pain with high risk for cardiac etiology    1. CKD-4: Likely due to DN/HTN.  Patient with progressively declining kidney function over the past 2 years with creatinine 1.81 mg/dL in June 2019 worsening to 4.34 mg/dL on 7/19/2021 admission.  He follows with Dr. Rios in clinic with dialysis fistula placed in June 2021 and left forearm that is not yet mature.  He has noted increasing fatigue over the past several weeks with poorly controlled hypertension chronically.  Denies anorexia, dysgeusia, orthopnea or other uremic symptoms at this time so no emergent need for dialysis.              -Risk for need dialysis with coronary angiogram if decided to proceed and he understands this.              -holding losartan - today.               -Has been referred to [facility] transplant clinic for evaluation just recently.   -BMP in am. Monitor for KINSEY.      2.  Hypertensive urgency: improved. Poorly controlled chronically likely cause for advanced CKD.  Blood pressure at goal currently. Coreg increased by cards. Trend.     3.  Coronary artery disease with previous CABG x2 in June 2019.  Echocardiogram pending by cardiology with ongoing evaluation pending.  We did discuss high risk for requiring dialysis with possible angiogram although he is on the brink of dialysis as it is.     4.  Status post left forearm AVF placement at [facility] June 2021 (5 weeks  Patient called to ask for a return call, she had surgery yesterday and is in a lot of pain. Please call her back at [phone]. Thank you~   "ago). Still immature and would need tunneled CVC if dialysis required at present.     I'm fine with discharge if stable kidney function tomorrow.     Aime Rios MD      No interval changes to past medical history, social history or family history to report.    /62 (BP Location: Right arm)   Pulse 78   Temp 98.2  F (36.8  C) (Oral)   Resp 18   Ht 1.803 m (5' 11\")   Wt 115.6 kg (254 lb 14.4 oz)   SpO2 92%   BMI 35.55 kg/m      I/O last 3 completed shifts:  In: 723 [P.O.:720; I.V.:3]  Out: 1200 [Urine:1200]    Physical Exam:   GENERAL: calm and comfortable  EYES: No scleral icterus, conjunctiva clear  ENT: MMM  RESP:  no respiratory distress  CV: no leg edema.    GI: soft, NT  Musculoskeletal: Normal muscle mass   SKIN: No rash  PSYCH:  Appropriate mood and affect  Left forearm aVF with thrill       Lab Results   Component Value Date     07/21/2021     07/20/2021     07/19/2021    Lab Results   Component Value Date    CHLORIDE 112 07/21/2021    CHLORIDE 110 07/20/2021    CHLORIDE 109 07/19/2021    Lab Results   Component Value Date    BUN 40 07/21/2021    BUN 46 07/20/2021    BUN 48 07/19/2021      Lab Results   Component Value Date    POTASSIUM 3.8 07/21/2021    POTASSIUM 3.8 07/20/2021    POTASSIUM 4.5 07/19/2021    Lab Results   Component Value Date    CO2 21 07/21/2021    CO2 21 07/20/2021    CO2 22 07/19/2021    Lab Results   Component Value Date    CR 3.93 07/21/2021    CR 4.09 07/20/2021    CR 4.34 07/19/2021        No results found for: NTBNPI, NTBNP  Lab Results   Component Value Date    WBC 5.7 07/24/2021    WBC 6.1 07/22/2021    WBC 6.8 07/20/2021    HGB 11.9 (L) 07/24/2021    HGB 11.7 (L) 07/22/2021    HGB 12.9 (L) 07/20/2021    HCT 35.5 (L) 07/24/2021    HCT 36.3 (L) 07/22/2021    HCT 37.1 (L) 07/20/2021    MCV 93 07/24/2021    MCV 96 07/22/2021    MCV 89 07/20/2021     (L) 07/24/2021     (L) 07/22/2021     (L) 07/20/2021     Lab Results   Component " Value Date    CR 4.82 (H) 07/24/2021    CR 4.00 (H) 07/23/2021    CR 4.02 (H) 07/22/2021     Lab Results   Component Value Date    DD 0.34 05/07/2020     Lab Results   Component Value Date     07/24/2021     07/23/2021     07/22/2021    POTASSIUM 4.6 07/24/2021    POTASSIUM 5.0 07/23/2021    POTASSIUM 3.8 07/22/2021    CHLORIDE 110 (H) 07/24/2021    CHLORIDE 112 (H) 07/23/2021    CHLORIDE 111 (H) 07/22/2021    CO2 21 (L) 07/24/2021    CO2 20 (L) 07/23/2021    CO2 21 (L) 07/22/2021     (H) 07/24/2021     07/24/2021     07/24/2021     No results found for: SED  Lab Results   Component Value Date     (H) 07/24/2021     07/24/2021     07/24/2021     Lab Results   Component Value Date    HGB 11.9 (L) 07/24/2021    HGB 11.7 (L) 07/22/2021    HGB 12.9 (L) 07/20/2021     Lab Results   Component Value Date    AST 25 07/20/2021    AST 23 10/05/2020    AST 26 10/04/2020    ALT 34 07/20/2021    ALT 29 10/05/2020    ALT 34 10/04/2020    ALKPHOS 63 07/20/2021    ALKPHOS 66 10/05/2020    ALKPHOS 79 10/04/2020    BILITOTAL 0.5 07/20/2021    BILITOTAL 0.4 10/05/2020    BILITOTAL 0.3 10/04/2020         Drug and lactation database from the [place] [employer]:  http://toxnet.nlm.nih.gov/cgi-bin/sis/htmlgen?LACT        Labs personally reviewed today during this evaluation at 9:12 AM

## 2022-11-16 NOTE — ED PROVIDER NOTES
History   Chief Complaint:  Back Pain     The history is provided by the patient.      Kareen Hernandez is a 57 year old female who presents with EMS and her daughter for back pain. Per Chart Review, patient was had a S/P lumbar microdiscectomy here with Eugenio Sheppard MD. Last night after her anesthesia wore off from her lumbar procedure she had done yesterday, she noticed increase shooting pain in both legs with numbness in her right leg. She is unable to walk on her own due to the pain. She reports this is the third time she's had this surgery. Adding on, she denies having and fecal or urinary incontinence. In addition, she denies having any shortness of breath or chest pain.     Review of Systems   Respiratory: Negative for shortness of breath.    Cardiovascular: Negative for chest pain.   Genitourinary:        (-) fecal or urinary incontinence    Musculoskeletal: Positive for back pain.        (+) Leg Pain in Both Legs   Neurological: Positive for numbness (right leg).   All other systems reviewed and are negative.    Allergies:  Lurasidone  Morphine    Medications:  advair  albuterol   Black Cohosh-SoyIsoflav-C Quad  buPROPion   cyclobenzaprine   FLUoxetine  hydrOXYzine   lamoTRIgine  medical cannabis (Patient's own supply)  methylPREDNISolone   nystatin   oxyCODONE   senna-docusate  SUMAtriptan  zolpidem    Past Medical History:     Fibromyalgia  Gastric bypass status for obesity  Anxiety  Vitamin D deficiency disease  Vitamin B12 deficiency disease  Migraine headache  Insomnia  Right maxillary sinusitis, chronic  hypertension  GERD  Chronic migraine   MDD  kidney stones  Tobacco use disorder  Bipolar affective disorder  Acute right-sided low back pain with right-sided sciatica  S/P lumbar laminectomy  Hepatitis C, chronic     Past Surgical History:    Abdominoplasty   Cl aff surgical pathology  Discectomy lumbar posterior microscopic one level   Discectomy lumbar posterior microscopic two  levels  Lithotripsy   Lo[p,a removal on back   Gastric bypass  Kidney stone removal     Family History:    Mother: Asthma, hypertension, CAD, Kidney failure, COPD  Father: diabetes, hypertension, CAD, COPD, Dementia  Sister: Kidney Stones  Daughter: Obesity    Social History:  The patient presents to the ED with her daughter.  PCP: Leno Courtney    Physical Exam     Patient Vitals for the past 24 hrs:   BP Temp Temp src Pulse Resp SpO2   11/16/22 2202 -- -- -- -- -- 94 %   11/16/22 2200 108/66 -- -- 85 -- 91 %   11/16/22 2130 125/77 -- -- 87 -- 95 %   11/16/22 2000 (!) 143/75 -- -- 91 -- 98 %   11/16/22 1947 127/65 -- -- 84 -- 95 %   11/16/22 1920 -- -- -- -- -- 100 %   11/16/22 1919 112/72 -- -- 82 -- --   11/16/22 1716 126/69 98.1  F (36.7  C) Oral 84 18 100 %     Physical Exam  Constitutional: Well developed, nontox appearance  Head: Atraumatic.   Neck:  no stridor  Eyes: no scleral icterus  Cardiovascular: RRR, 2+ bilat radial pulses  Pulmonary/Chest: nml resp effort  Abdominal: ND, soft, NT, no rebound or guarding   Back: Surgical incision with small amount of serosanguineous drainage on gauze  Ext: Warm, well perfused, no edema  Neurological: A&O, symmetric facies, moves ext x4, 5/5 strength to bilateral lower extremities, sensation intact light touch  Skin: Skin is warm and dry.   Psychiatric: Behavior is normal. Thought content normal.   Nursing note and vitals reviewed.    Emergency Department Course     Imaging:  Lumbar spine MRI w & w/o contrast - surgery <10yrs   Preliminary Result   IMPRESSION:   1.  Right hemilaminectomy at L4-L5 and microdiscectomy. There is mixed signaling material and gas within the laminectomy bed which exerts mass effect on the thecal sac causing severe central canal stenosis. Given the mass effect, hematoma is possible.   2.  At L5-S1 right hemilaminectomy and microdiscectomy with moderate mass effect on the thecal sac. There appears to be a hematocrit level  within this laminectomy bed raising the possibility of hematoma.   3.  There is a new CSF signaling fluid collection within the dorsal spinal canal extending from the mid L2 level through the low L3 level pushing the nerve roots of the cauda equina ventrally. This measures up to 9 mm in AP dimension and is suggestive of    a subdural CSF collection or CSF leak.   4.  Postop changes of microdiscectomy at L4-L5 and L5-S1 on the right with mixed signaling postsurgical changes. The presence of some residual disc material is difficult to determine in the perioperative period.   5.  At L5-S1 there is severe right foraminal narrowing. There is severe left neural foraminal narrowing at L4-L5.   6.  Progressive Modic type I changes at L4-L5.      Exam discussed with Dr. Clemente at 23:36 hours.        Report per radiology    Laboratory:  Labs Ordered and Resulted from Time of ED Arrival to Time of ED Departure   BASIC METABOLIC PANEL - Abnormal       Result Value    Sodium 138      Potassium 3.4      Chloride 104      Carbon Dioxide (CO2) 33 (*)     Anion Gap 1 (*)     Urea Nitrogen 7      Creatinine 0.85      Calcium 8.7      Glucose 96      GFR Estimate 80     CBC WITH PLATELETS AND DIFFERENTIAL    WBC Count 9.5      RBC Count 4.04      Hemoglobin 12.3      Hematocrit 39.0      MCV 97      MCH 30.4      MCHC 31.5      RDW 12.8      Platelet Count 248      % Neutrophils 46      % Lymphocytes 41      % Monocytes 10      % Eosinophils 3      % Basophils 0      % Immature Granulocytes 0      NRBCs per 100 WBC 0      Absolute Neutrophils 4.3      Absolute Lymphocytes 3.9      Absolute Monocytes 0.9      Absolute Eosinophils 0.3      Absolute Basophils 0.0      Absolute Immature Granulocytes 0.0      Absolute NRBCs 0.0     COVID-19 VIRUS (CORONAVIRUS) BY PCR      Emergency Department Course:      Reviewed:  I reviewed nursing notes, vitals, past medical history, Care Everywhere and [facility]    Assessments:  1740 I obtained history and  examined the patient as noted above.   2350 I rechecked the patient and explained findings.    Interventions:  Medications   HYDROmorphone (PF) (DILAUDID) injection 0.5-1 mg (1 mg Intravenous Given 22)   sodium chloride (PF) 0.9% PF flush 10 mL (has no administration in time range)   dexamethasone PF (DECADRON) injection 10 mg (has no administration in time range)   ketamine (KETALAR) injection 5 mg (has no administration in time range)   HYDROmorphone (PF) (DILAUDID) injection 0.5 mg (0.5 mg Intravenous Given 22)   HYDROmorphone (PF) (DILAUDID) injection 0.5 mg (0.5 mg Intravenous Given 22)   HYDROmorphone (DILAUDID) injection 1 mg (1 mg Intravenous Given 22)   gadobutrol (GADAVIST) injection 7 mL (7 mLs Intravenous Given 22)     Disposition:  Care of the patient was transferred to my colleague Dr. Hall pending MRI.     Impression & Plan     CMS Diagnoses: None.    Medical Decision Makin year old female presenting w/ increased lumbar back pain status post microdiscectomy     DDx includes postoperative pain, epidural hematoma, neuropathic pain secondary to recent procedure.  Less likely epidural abscess given short interval duration since operation.  Labs significant for no remarkable abnormality.  Imaging sig for findings as described above concerning for possible epidural hematoma..  Interventions as noted above.  I discussed patient with neurosurgery who recommended admission for pain control and dexamethasone 10 mg which was ordered in the emergency department.  The patient was subsequently admitted to the hospitalist for further evaluation and management including neurosurgery consult for definitive management.  Patient counseled on all results, disposition and diagnosis.  They are understanding and agreeable to plan. Patient admitted in stable condition.       Diagnosis:    ICD-10-CM    1. Epidural hematoma  S06.4XAA           Scribe Disclosure:  I,  Se Tejeda, am serving as a scribe at 5:31 PM on 11/16/2022 to document services personally performed by Rayshawn Clemente MD based on my observations and the provider's statements to me.     Rayshawn Clemente MD  11/17/22 0005

## 2022-11-17 ENCOUNTER — APPOINTMENT (OUTPATIENT)
Dept: PHYSICAL THERAPY | Facility: CLINIC | Age: 57
DRG: 909 | End: 2022-11-17
Attending: INTERNAL MEDICINE
Payer: COMMERCIAL

## 2022-11-17 ENCOUNTER — APPOINTMENT (OUTPATIENT)
Dept: ULTRASOUND IMAGING | Facility: CLINIC | Age: 57
DRG: 909 | End: 2022-11-17
Attending: INTERNAL MEDICINE
Payer: COMMERCIAL

## 2022-11-17 ENCOUNTER — TELEPHONE (OUTPATIENT)
Dept: NEUROSURGERY | Facility: CLINIC | Age: 57
End: 2022-11-17

## 2022-11-17 PROBLEM — G89.18 POSTOPERATIVE PAIN: Status: ACTIVE | Noted: 2022-11-17

## 2022-11-17 PROBLEM — S06.4XAA EPIDURAL HEMATOMA (H): Status: ACTIVE | Noted: 2022-11-17

## 2022-11-17 PROBLEM — G96.00 CSF LEAK: Status: ACTIVE | Noted: 2022-11-17

## 2022-11-17 LAB — SARS-COV-2 RNA RESP QL NAA+PROBE: NEGATIVE

## 2022-11-17 PROCEDURE — 250N000013 HC RX MED GY IP 250 OP 250 PS 637: Performed by: PHYSICIAN ASSISTANT

## 2022-11-17 PROCEDURE — 120N000001 HC R&B MED SURG/OB

## 2022-11-17 PROCEDURE — 258N000001 HC RX 258: Performed by: INTERNAL MEDICINE

## 2022-11-17 PROCEDURE — G0378 HOSPITAL OBSERVATION PER HR: HCPCS

## 2022-11-17 PROCEDURE — 250N000009 HC RX 250: Performed by: EMERGENCY MEDICINE

## 2022-11-17 PROCEDURE — 250N000011 HC RX IP 250 OP 636: Performed by: INTERNAL MEDICINE

## 2022-11-17 PROCEDURE — C9803 HOPD COVID-19 SPEC COLLECT: HCPCS

## 2022-11-17 PROCEDURE — 99223 1ST HOSP IP/OBS HIGH 75: CPT | Mod: AI | Performed by: INTERNAL MEDICINE

## 2022-11-17 PROCEDURE — 96375 TX/PRO/DX INJ NEW DRUG ADDON: CPT

## 2022-11-17 PROCEDURE — 96376 TX/PRO/DX INJ SAME DRUG ADON: CPT

## 2022-11-17 PROCEDURE — U0005 INFEC AGEN DETEC AMPLI PROBE: HCPCS | Performed by: EMERGENCY MEDICINE

## 2022-11-17 PROCEDURE — 93922 UPR/L XTREMITY ART 2 LEVELS: CPT

## 2022-11-17 PROCEDURE — 250N000011 HC RX IP 250 OP 636: Performed by: PHYSICIAN ASSISTANT

## 2022-11-17 PROCEDURE — 250N000011 HC RX IP 250 OP 636: Performed by: EMERGENCY MEDICINE

## 2022-11-17 PROCEDURE — 250N000013 HC RX MED GY IP 250 OP 250 PS 637: Performed by: INTERNAL MEDICINE

## 2022-11-17 PROCEDURE — 99024 POSTOP FOLLOW-UP VISIT: CPT | Performed by: PHYSICIAN ASSISTANT

## 2022-11-17 PROCEDURE — 97530 THERAPEUTIC ACTIVITIES: CPT | Mod: GP

## 2022-11-17 PROCEDURE — 97161 PT EVAL LOW COMPLEX 20 MIN: CPT | Mod: GP

## 2022-11-17 RX ORDER — NALOXONE HYDROCHLORIDE 0.4 MG/ML
0.4 INJECTION, SOLUTION INTRAMUSCULAR; INTRAVENOUS; SUBCUTANEOUS
Status: DISCONTINUED | OUTPATIENT
Start: 2022-11-17 | End: 2022-11-19 | Stop reason: HOSPADM

## 2022-11-17 RX ORDER — PROCHLORPERAZINE 25 MG
25 SUPPOSITORY, RECTAL RECTAL EVERY 12 HOURS PRN
Status: DISCONTINUED | OUTPATIENT
Start: 2022-11-17 | End: 2022-11-19 | Stop reason: HOSPADM

## 2022-11-17 RX ORDER — AMOXICILLIN 250 MG
1-2 CAPSULE ORAL 2 TIMES DAILY PRN
Status: DISCONTINUED | OUTPATIENT
Start: 2022-11-17 | End: 2022-11-19 | Stop reason: HOSPADM

## 2022-11-17 RX ORDER — CYCLOBENZAPRINE HCL 10 MG
10 TABLET ORAL 3 TIMES DAILY PRN
Status: DISCONTINUED | OUTPATIENT
Start: 2022-11-17 | End: 2022-11-19 | Stop reason: HOSPADM

## 2022-11-17 RX ORDER — PROCHLORPERAZINE MALEATE 5 MG
10 TABLET ORAL EVERY 6 HOURS PRN
Status: DISCONTINUED | OUTPATIENT
Start: 2022-11-17 | End: 2022-11-19 | Stop reason: HOSPADM

## 2022-11-17 RX ORDER — ACETAMINOPHEN 325 MG/1
650 TABLET ORAL EVERY 6 HOURS PRN
Status: DISCONTINUED | OUTPATIENT
Start: 2022-11-17 | End: 2022-11-18

## 2022-11-17 RX ORDER — ACETAMINOPHEN 650 MG/1
650 SUPPOSITORY RECTAL EVERY 6 HOURS PRN
Status: DISCONTINUED | OUTPATIENT
Start: 2022-11-17 | End: 2022-11-18

## 2022-11-17 RX ORDER — HYDROMORPHONE HYDROCHLORIDE 1 MG/ML
.5-1 INJECTION, SOLUTION INTRAMUSCULAR; INTRAVENOUS; SUBCUTANEOUS
Status: DISCONTINUED | OUTPATIENT
Start: 2022-11-17 | End: 2022-11-17

## 2022-11-17 RX ORDER — DEXTROSE MONOHYDRATE, SODIUM CHLORIDE, AND POTASSIUM CHLORIDE 50; 1.49; 9 G/1000ML; G/1000ML; G/1000ML
100 INJECTION, SOLUTION INTRAVENOUS CONTINUOUS
Status: DISCONTINUED | OUTPATIENT
Start: 2022-11-17 | End: 2022-11-19 | Stop reason: HOSPADM

## 2022-11-17 RX ORDER — ONDANSETRON 2 MG/ML
4 INJECTION INTRAMUSCULAR; INTRAVENOUS EVERY 6 HOURS PRN
Status: DISCONTINUED | OUTPATIENT
Start: 2022-11-17 | End: 2022-11-19 | Stop reason: HOSPADM

## 2022-11-17 RX ORDER — LAMOTRIGINE 100 MG/1
200 TABLET ORAL DAILY
Status: DISCONTINUED | OUTPATIENT
Start: 2022-11-17 | End: 2022-11-19 | Stop reason: HOSPADM

## 2022-11-17 RX ORDER — NALOXONE HYDROCHLORIDE 0.4 MG/ML
0.2 INJECTION, SOLUTION INTRAMUSCULAR; INTRAVENOUS; SUBCUTANEOUS
Status: DISCONTINUED | OUTPATIENT
Start: 2022-11-17 | End: 2022-11-19 | Stop reason: HOSPADM

## 2022-11-17 RX ORDER — HYDROMORPHONE HYDROCHLORIDE 1 MG/ML
.5-1 INJECTION, SOLUTION INTRAMUSCULAR; INTRAVENOUS; SUBCUTANEOUS
Status: DISCONTINUED | OUTPATIENT
Start: 2022-11-17 | End: 2022-11-19 | Stop reason: HOSPADM

## 2022-11-17 RX ORDER — OXYCODONE HYDROCHLORIDE 5 MG/1
5-10 TABLET ORAL
Status: DISCONTINUED | OUTPATIENT
Start: 2022-11-17 | End: 2022-11-19 | Stop reason: HOSPADM

## 2022-11-17 RX ORDER — BUPROPION HYDROCHLORIDE 150 MG/1
300 TABLET ORAL EVERY MORNING
Status: DISCONTINUED | OUTPATIENT
Start: 2022-11-18 | End: 2022-11-19 | Stop reason: HOSPADM

## 2022-11-17 RX ORDER — HYDROXYZINE HYDROCHLORIDE 25 MG/1
50 TABLET, FILM COATED ORAL 3 TIMES DAILY PRN
Status: DISCONTINUED | OUTPATIENT
Start: 2022-11-17 | End: 2022-11-19 | Stop reason: HOSPADM

## 2022-11-17 RX ORDER — ZOLPIDEM TARTRATE 5 MG/1
5 TABLET ORAL
Status: DISCONTINUED | OUTPATIENT
Start: 2022-11-17 | End: 2022-11-19 | Stop reason: HOSPADM

## 2022-11-17 RX ORDER — DEXAMETHASONE 2 MG/1
4 TABLET ORAL EVERY 6 HOURS SCHEDULED
Status: DISCONTINUED | OUTPATIENT
Start: 2022-11-17 | End: 2022-11-19 | Stop reason: HOSPADM

## 2022-11-17 RX ORDER — ALBUTEROL SULFATE 0.83 MG/ML
2.5 SOLUTION RESPIRATORY (INHALATION)
Status: DISCONTINUED | OUTPATIENT
Start: 2022-11-17 | End: 2022-11-19 | Stop reason: HOSPADM

## 2022-11-17 RX ORDER — ONDANSETRON 4 MG/1
4 TABLET, ORALLY DISINTEGRATING ORAL EVERY 6 HOURS PRN
Status: DISCONTINUED | OUTPATIENT
Start: 2022-11-17 | End: 2022-11-19 | Stop reason: HOSPADM

## 2022-11-17 RX ORDER — KETAMINE HYDROCHLORIDE 10 MG/ML
5 INJECTION, SOLUTION INTRAMUSCULAR; INTRAVENOUS ONCE
Status: COMPLETED | OUTPATIENT
Start: 2022-11-17 | End: 2022-11-17

## 2022-11-17 RX ORDER — OXYCODONE HYDROCHLORIDE 5 MG/1
5-10 TABLET ORAL EVERY 6 HOURS PRN
Status: DISCONTINUED | OUTPATIENT
Start: 2022-11-17 | End: 2022-11-17

## 2022-11-17 RX ORDER — POLYETHYLENE GLYCOL 3350 17 G/17G
17 POWDER, FOR SOLUTION ORAL DAILY PRN
Status: DISCONTINUED | OUTPATIENT
Start: 2022-11-17 | End: 2022-11-19 | Stop reason: HOSPADM

## 2022-11-17 RX ADMIN — CYCLOBENZAPRINE 10 MG: 10 TABLET, FILM COATED ORAL at 22:22

## 2022-11-17 RX ADMIN — KETAMINE HYDROCHLORIDE 5 MG: 10 INJECTION, SOLUTION INTRAMUSCULAR; INTRAVENOUS at 00:49

## 2022-11-17 RX ADMIN — HYDROMORPHONE HYDROCHLORIDE 1 MG: 1 INJECTION, SOLUTION INTRAMUSCULAR; INTRAVENOUS; SUBCUTANEOUS at 19:58

## 2022-11-17 RX ADMIN — DEXAMETHASONE 4 MG: 2 TABLET ORAL at 18:28

## 2022-11-17 RX ADMIN — POTASSIUM CHLORIDE, DEXTROSE MONOHYDRATE AND SODIUM CHLORIDE 100 ML/HR: 150; 5; 900 INJECTION, SOLUTION INTRAVENOUS at 14:35

## 2022-11-17 RX ADMIN — SENNOSIDES AND DOCUSATE SODIUM 2 TABLET: 50; 8.6 TABLET ORAL at 18:28

## 2022-11-17 RX ADMIN — HYDROMORPHONE HYDROCHLORIDE 1 MG: 1 INJECTION, SOLUTION INTRAMUSCULAR; INTRAVENOUS; SUBCUTANEOUS at 23:19

## 2022-11-17 RX ADMIN — FLUOXETINE 20 MG: 20 CAPSULE ORAL at 12:31

## 2022-11-17 RX ADMIN — DEXAMETHASONE 4 MG: 2 TABLET ORAL at 12:31

## 2022-11-17 RX ADMIN — KETAMINE HYDROCHLORIDE 5 MG: 10 INJECTION, SOLUTION INTRAMUSCULAR; INTRAVENOUS at 00:14

## 2022-11-17 RX ADMIN — LAMOTRIGINE 200 MG: 100 TABLET ORAL at 12:31

## 2022-11-17 RX ADMIN — OXYCODONE HYDROCHLORIDE 10 MG: 5 TABLET ORAL at 13:11

## 2022-11-17 RX ADMIN — HYDROMORPHONE HYDROCHLORIDE 1 MG: 1 INJECTION, SOLUTION INTRAMUSCULAR; INTRAVENOUS; SUBCUTANEOUS at 04:23

## 2022-11-17 RX ADMIN — OXYCODONE HYDROCHLORIDE 10 MG: 5 TABLET ORAL at 21:07

## 2022-11-17 RX ADMIN — POTASSIUM CHLORIDE, DEXTROSE MONOHYDRATE AND SODIUM CHLORIDE 100 ML/HR: 150; 5; 900 INJECTION, SOLUTION INTRAVENOUS at 02:27

## 2022-11-17 RX ADMIN — HYDROXYZINE HYDROCHLORIDE 50 MG: 25 TABLET, FILM COATED ORAL at 13:11

## 2022-11-17 RX ADMIN — DEXAMETHASONE SODIUM PHOSPHATE 10 MG: 10 INJECTION, SOLUTION INTRAMUSCULAR; INTRAVENOUS at 00:13

## 2022-11-17 RX ADMIN — HYDROMORPHONE HYDROCHLORIDE 1 MG: 1 INJECTION, SOLUTION INTRAMUSCULAR; INTRAVENOUS; SUBCUTANEOUS at 02:05

## 2022-11-17 RX ADMIN — OXYCODONE HYDROCHLORIDE 10 MG: 5 TABLET ORAL at 07:04

## 2022-11-17 RX ADMIN — DEXAMETHASONE 4 MG: 2 TABLET ORAL at 07:03

## 2022-11-17 RX ADMIN — CYCLOBENZAPRINE 10 MG: 10 TABLET, FILM COATED ORAL at 10:23

## 2022-11-17 RX ADMIN — DEXAMETHASONE 4 MG: 2 TABLET ORAL at 23:18

## 2022-11-17 RX ADMIN — HYDROMORPHONE HYDROCHLORIDE 1 MG: 1 INJECTION, SOLUTION INTRAMUSCULAR; INTRAVENOUS; SUBCUTANEOUS at 16:46

## 2022-11-17 RX ADMIN — HYDROMORPHONE HYDROCHLORIDE 1 MG: 1 INJECTION, SOLUTION INTRAMUSCULAR; INTRAVENOUS; SUBCUTANEOUS at 22:08

## 2022-11-17 RX ADMIN — POLYETHYLENE GLYCOL 3350 17 G: 17 POWDER, FOR SOLUTION ORAL at 18:28

## 2022-11-17 ASSESSMENT — ACTIVITIES OF DAILY LIVING (ADL)
ADLS_ACUITY_SCORE: 35
ADLS_ACUITY_SCORE: 37
ADLS_ACUITY_SCORE: 35
ADLS_ACUITY_SCORE: 37
ADLS_ACUITY_SCORE: 37
ADLS_ACUITY_SCORE: 35
ADLS_ACUITY_SCORE: 35
ADLS_ACUITY_SCORE: 37
ADLS_ACUITY_SCORE: 37
ADLS_ACUITY_SCORE: 35

## 2022-11-17 NOTE — H&P
[facility]    History and Physical - Hospitalist Service       Date of Admission:  11/16/2022    Assessment & Plan      Kareen Hernandez is a 57 year old female admitted on 11/16/2022. She presents to the emergency department for evaluation of progressive worsening low back pain and right lower extremity numbness without weakness after right L4-L5 and right L5-S1 microdiscectomy 11/15/2022 with Dr. Cash.  Pain is now limiting ability to ambulate.    Postoperative low back pain: Patient with bilateral low back pain following right L4-L5 and right L5-S1 microdiscectomy with Dr. Cash 11/15/2022.  Also with some right leg numbness.  Inability to ambulate secondary to pain led to ER evaluation after phone consultation with her neurosurgery clinic.  MRI of spine with concern for fluid and gas collection exerting mass-effect on thecal sac at L4-L5 level resulting in severe canal stenosis and concerning for possible hematoma.  Similarly, L5-S1 finding with a hematocrit level concerning for hematoma.  Patient also with CF signal from mid L2 to low L3 suggestive of subdural CSF collection or CSF leak resulting in pressure on nerve roots of the cauda equina ventrally.  Patient with no objective weakness, though does have decreased sensation to light touch involving her right leg, from thigh to foot by report.  -Neurosurgery consulted, aware of patient from emergency department  -I have continued IV Dilaudid 0.5-1 mg every 2 hours as needed as well as oxycodone 5 mg to 10 mg every 4 hours as needed for pain control with limited doses.  Defer postoperative pain management to neurosurgical team otherwise  -Received IV Decadron 10 mg x 1 in the emergency department.  I have scheduled Decadron 4 mg every 6 hours thereafter  -N.p.o. for now while awaiting neurosurgery evaluation and plan.  If no plan or consideration for operative intervention in the near term, advance diet as tolerated  -Trend CBC  -Intake and  output, bladder scan for urinary retention  -D5 normal saline plus potassium at 100/h while n.p.o.  -Atarax 50 mg 3 times daily as needed for itching, anxiety, adjuvant pain control  -Flexeril 10 mg 3 times daily as needed for muscle spasms  -Doppler ABIs ordered without exercise given right leg pain previously attributed to spine disease, though also with right foot cooler than left.  Note that generally exam is more concerning for radiculopathy with bilateral low back pain radiating down her legs bilaterally and MRI findings concerning for postoperative hematoma and CSF leak.    COPD:  -As needed albuterol nebulizer treatments available as needed  -Prior to admission Advair on hold while NPO    Tobacco use disorder: Uses a nicotine vape, is uncertain of her total nicotine use  -Continue to encourage tobacco cessation, especially in the setting of history of low back pain  -Offered nicotine replacement therapy, patient declines scheduled therapy  -Nicotine lozenges available as needed.  Patient aware    Marijuana use: History noted.    Major depressive disorder:  Bipolar affective disorder:  -Resume prior to admission Lamictal 200 mg daily when reconciled by pharmacy  -Continue prior to admission fluoxetine 40 mg daily when reconciled by pharmacy    History of gastric bypass:  -Resume prior to admission vitamin supplementation including cyanocobalamin injections monthly at discharge       Diet:    N.p.o. per anesthesia guidelines while awaiting neurosurgery evaluation and any potential operative plan.  If no plan for procedural intervention, advance diet as tolerated to regular adult diet.  DVT Prophylaxis: Ambulate every shift with assistance  Rene Catheter: Not present  Central Lines: None  Cardiac Monitoring: None  Code Status:  Full code, confirmed with patient on admission.    Clinically Significant Risk Factors Present on Admission                              Disposition Plan            The patient's care  "was discussed with the Patient and Dr. Clemente in the emergency department, María Elena Dominguez of neurosurgery in .    Liborio Putnam MD  [facility]  Securely message with the Vocera Web Console (learn more here)  Text page via [employer] Paging/Directory         ______________________________________________________________________    Chief Complaint   Low back pain    History is obtained from patient, chart review, discussion with Dr. Clemente in the emergency department.    History of Present Illness   Kareen Hernandez is a 57 year old female who presents to the emergency department for evaluation of worsening low back pain after multilevel right sided microdiscectomy 11/15/2022.    Patient with a history of low back pain.  She tells me that she has actually undergone microdiscectomy in the past with Dr. Cash several years ago with good results.  She also underwent a microdiscectomy of her lumbar spine on the right with a different neurosurgeon, though had pain following this, and her procedure on November 15 was for ongoing right leg pain attributed to her degenerative spine disease.    As above, right L4-L5 and right L5-S1 microdiscectomy.  Following this, patient was discharged as this was a same-day surgery.  She tells me that she was sent home with 5 to 10 mg of oxycodone every 6 hours as needed, though several hours after surgery, with patient describing this as \"when [her] anesthesia wore off,\" patient developed worsening low back pain just above the level of surgery, bilateral paraspinal discomfort, and numbness in her right leg.  Pain limited her ability to ambulate at home over the course of 11/16, and she contacted her neurosurgery team who recommended increasing her oxycodone dose to 10 mg every 4 hours.  Patient reports that there was no relief with this medication change.  A Medrol Dosepak was also ordered, though I do not believe patient ever started on this given " her limited ability to mobilize.  When patient called back because of her concern that she was unable to ambulate to the bathroom, she was encouraged to contact 911 to be evaluated in the emergency department.    In the emergency department, an MRI of her lumbar spine was performed with results as described above.  Concern for surgical bed hematoma resulting in severe canal stenosis at surgical site given mass-effect as well as possible CSF collection/leak at L2 level.    Patient tells me that she has never had pain like this before.  Tolerated her prior discectomies without similar difficulty.  She reports no known weakness, again describes her pain limiting her ability to ambulate.  She does have numbness throughout her right leg and foot, however.  This was similar to her primary complaint leading to discectomy.  On exam, no appreciable weakness with dorsi and plantar flexion against resistance.  She reports numbness of her right lower extremity with light touch as compared to left.    Discussed with neurosurgery.  Decadron has been initiated and I have ordered continued oral Decadron dosing every 6 hours.  Patient will remain n.p.o. while awaiting formal surgical plan after staffing.  Will defer postoperative pain control to procedural team.    Review of Systems    The 10 point Review of Systems is negative other than noted in the HPI or here.  No fevers or chills  No pain in her feet, specifically her right foot which appears to be cooler than her left.  Patient unaware if this is new or chronic.    Past Medical History    I have reviewed this patient's medical history and updated it with pertinent information if needed.   Past Medical History:   Diagnosis Date     Anxiety 03/21/2012     Calculus of kidney     Multiple     CARDIOVASCULAR SCREENING; LDL GOAL LESS THAN 130 10/31/2010     CARDIOVASCULAR SCREENING; LDL GOAL LESS THAN 160 10/31/2010     COPD (chronic obstructive pulmonary disease) (H)       Fibromyalgia 2006     Gastric bypass status for obesity 2009     Hypertension goal BP (blood pressure) < 140/90 2015     Insomnia      Major depressive disorder, single episode, severe, specified as with psychotic behavior      Migraine headache      Moderate major depression (H) 2006     Myalgia and myositis, unspecified     FIBROMYALGIA     Right maxillary sinusitis, chronic      Tobacco use disorder 2011    Quit [id number]     Vitamin B12 deficiency disease 2013     Vitamin D deficiency disease 2013       Past Surgical History   I have reviewed this patient's surgical history and updated it with pertinent information if needed.  Past Surgical History:   Procedure Laterality Date     ABDOMINOPLASTY  2013     CL AFF SURGICAL PATHOLOGY  2007    Redman's neuroma  - Right Foot     DISCECTOMY LUMBAR POSTERIOR MICROSCOPIC ONE LEVEL Left 2/15/2022    Procedure: Minimally Invasive Left Lumbar 5 to Sacral 1 microdiscectomy;  Surgeon: Eugenio Cash MD;  Location: [facility] OR     DISCECTOMY LUMBAR POSTERIOR MICROSCOPIC TWO LEVELS Right 11/15/2022    Procedure: Minimally Invasive Right Lumbar 4 to Lumbar 5 and Lumbar 5 to Sacral 1 microdiscectomies;  Surgeon: Eugenio Cash MD;  Location: [facility] OR     LITHOTRIPSY       SURGICAL PATHOLOGY EXAM      Lipoma Removal on her back     ZZC GASTRIC BYPASS,OBESE<100CM DAYAN-EN-Y  3-25-09    [facility] REMOVAL OF KIDNEY STONE      With kidney tents x 5 to 6 procedures.       Social History   I have reviewed this patient's social history and updated it with pertinent information if needed.  Social History     Tobacco Use     Smoking status: Former     Packs/day: 1.00     Years: 37.00     Pack years: 37.00     Types: Cigarettes     Quit date: 2013     Years since quittin.1     Smokeless tobacco: Current     Tobacco comments:     Vape e-cig   Vaping Use     Vaping Use: Every day     Substances: Nicotine, THC, CBD  "  Substance Use Topics     Alcohol use: Yes     Drug use: Yes     Comment: Marijuana - 3 times a week       Family History   I have reviewed this patient's family history and updated it with pertinent information if needed.  Family History   Problem Relation Age of Onset     Asthma Mother      Hypertension Mother      C.A.D. Mother      Genitourinary Problems Mother         kidney failure   age 80     Chronic Obstructive Pulmonary Disease Mother      Diabetes Father      Hypertension Father      C.A.D. Father      Chronic Obstructive Pulmonary Disease Father      Dementia Father      Genitourinary Problems Sister         kidney stones     Obesity Daughter         gastric sleeve     Cerebrovascular Disease No family hx of      Breast Cancer No family hx of      Cancer - colorectal No family hx of      Prostate Cancer No family hx of        Prior to Admission Medications   Prior to Admission Medications   Prescriptions Last Dose Informant Patient Reported? Taking?   ADVAIR -21 MCG/ACT inhaler   No No   Sig: INHALE 2 PUFFS INTO THE LUNGS TWICE A DAY   B-D SYRINGE LUER-BLAYNE 3 ML MISC   No No   Si SYRINGE EVERY 30 DAYS   Black Cohosh-SoyIsoflav-C Quad 40- MG CAPS   Yes No   Sig: Take 1 capsule by mouth daily   FLUoxetine 20 MG tablet   No No   Sig: TAKE 2 TABLETS (40 MG) BY MOUTH EVERY DAY   Needle, Disp, 25G X 1-1/2\" MISC   No No   Si Device every 30 days   SUMAtriptan (IMITREX) 100 MG tablet   No No   Sig: TAKE 1 TABLET BY MOUTH ON THE ONSET OF HEADACHE, MAY REPEAT IN 2 HOURS..*MAX 2 TABLETS DAILY*   Patient not taking: Reported on 2022   Syringe/Needle, Disp, (BD LUER-BLAYNE SYRINGE) 25G X 1-1/2\" 3 ML MISC   No No   Si Syringe every 30 days   acetaminophen (ACETAMINOPHEN 8 HOUR) 650 MG CR tablet   No No   Sig: Take 2 tablets (1,300 mg) by mouth every 8 hours as needed for pain   Patient not taking: Reported on 2022   albuterol (PROAIR HFA/PROVENTIL HFA/VENTOLIN HFA) 108 (90 Base) " MCG/ACT inhaler   No No   Sig: Inhale 2 puffs into the lungs every 4 hours as needed for shortness of breath / dyspnea or wheezing   buPROPion (WELLBUTRIN XL) 300 MG 24 hr tablet   No No   Sig: Take 1 tablet (300 mg) by mouth every morning   cyanocobalamin (CYANOCOBALAMIN) 1000 MCG/ML injection   No No   Sig: ADMINISTER 1 ML IN THE MUSCLE EVERY 30 DAYS   cyclobenzaprine (FLEXERIL) 10 MG tablet   No No   Sig: Take 1 tablet (10 mg) by mouth 3 times daily as needed for muscle spasms   fluticasone (FLONASE) 50 MCG/ACT nasal spray   No No   Sig: INSTILL 1 SPRAY INTO BOTH NOSTRILS DAILY   hydrOXYzine (ATARAX) 50 MG tablet   No No   Sig: Take 1 tablet (50 mg) by mouth 3 times daily as needed for itching   Patient not taking: Reported on 11/11/2022   hydrOXYzine (VISTARIL) 50 MG capsule   No No   Sig: Take 1 capsule (50 mg) by mouth 3 times daily as needed for anxiety   Patient not taking: Reported on 9/20/2022   lamoTRIgine (LAMICTAL) 100 MG tablet   No No   Sig: Take 2 tablets (200 mg) by mouth daily   medical cannabis (Patient's own supply)   Yes No   Sig: See Admin Instructions (The purpose of this order is to document that the patient reports taking medical cannabis.  This is not a prescription, and is not used to certify that the patient has a qualifying medical condition.)   methylPREDNISolone (MEDROL DOSEPAK) 4 MG tablet therapy pack   No No   Sig: Follow Package Directions   mometasone (NASONEX) 50 MCG/ACT nasal spray   No No   Sig: Spray 2 sprays into both nostrils daily   nystatin (MYCOSTATIN) 417512 UNIT/ML suspension   No No   Sig: Take 5 mLs (500,000 Units) by mouth 4 times daily   Patient not taking: Reported on 11/11/2022   order for DME   No No   Sig: Equipment being ordered: Splint right thumb   Patient not taking: Reported on 11/11/2022   oxyCODONE (ROXICODONE) 5 MG tablet   No No   Sig: Take 1-2 tablets (5-10 mg) by mouth every 6 hours as needed for pain   senna-docusate (SENOKOT-S/PERICOLACE) 8.6-50 MG  tablet   No No   Sig: Take 1-2 tablets by mouth 2 times daily as needed for constipation   vitamin D2 (ERGOCALCIFEROL) 07709 units (1250 mcg) capsule   No No   Sig: TAKE 1 CAPSULE BY MOUTH ONCE WEEKLY   zolpidem (AMBIEN) 10 MG tablet   No No   Sig: TAKE ONE-HALF TABLET TO ONE TABLET BY MOUTH AT BEDTIME FOR SLEEP      Facility-Administered Medications: None     Allergies   Allergies   Allergen Reactions     Lurasidone Diarrhea     Diarrhea     Morphine Rash       Physical Exam   Vital Signs: Temp: 98.1  F (36.7  C) Temp src: Oral BP: 108/66 Pulse: 85   Resp: 18 SpO2: 94 % O2 Device: Nasal cannula Oxygen Delivery: 2 LPM    General Appearance: Somewhat chronically ill-appearing 57-year-old female sitting on [facility].  She does not appear to be toxic or in acute distress at this time.  Eyes: No scleral icterus or injection  HEENT: Edentulous.  Atraumatic  Respiratory: Good effort, good air movement throughout lung fields.  Breath sounds clear  Cardiovascular: Regular rate and rhythm with heart rate in the 80s.  Right foot cooler than left on palpation.  This variation does not appear to be evident with palpation of distal lower extremities  Lymph/Hematologic: No lower extremity swelling  Genitourinary: Not examined  Skin: Patient with Steri-Strips overlying recent microdiscectomy site with minimal strikethrough.  No significant perioperative site ecchymoses  Musculoskeletal: Muscular tone and bulk appears generally intact in all extremities.  No notable muscular wasting on the right as compared to left.  Patient has bilateral paraspinal tenderness just at L2-L3 level, essentially superior to upper aspect of surgical incision and lateral  Neurologic: Patient reports numbness of her right foot and leg with light touch as compared to normal sensation in the left.  Plantar dorsiflexion intact and equal against resistance.  Alert, conversant, appropriate in conversation  Psychiatric: Very pleasant, normal  affect    Data   Data reviewed today: I reviewed all medications, new labs and imaging results over the last 24 hours. I personally reviewed the MRI lumbar spine image(s) showing Some canal narrowing at surgical site.  Radiology report describes potential hematoma.    Recent Labs   Lab 11/16/22  1758   WBC 9.5   HGB 12.3   MCV 97         POTASSIUM 3.4   CHLORIDE 104   CO2 33*   BUN 7   CR 0.85   ANIONGAP 1*   MADHU 8.7   GLC 96

## 2022-11-17 NOTE — ED NOTES
Pt reports mild improvement in back pain after dilaudid. Changed into hospital gown and lip rings removed in preparation for MRI.

## 2022-11-17 NOTE — CONSULTS
Consult Date: 11/16/2022    IMPRESSION:  Postoperative day #2 status post right L4 to L5 and L5 to S1 diskectomy for lumbar disk herniation, presenting with postoperative low back and bilateral leg pain with imaging findings revealing postoperative changes including fluid collection as described above.    PLAN:  From the neurosurgical standpoint, plan to admit for pain control.  No plan for immediate NS intervention. Recommended Decadron 10 mg followed by 4 mg IV every 6 hours.  PT/OT.      TYPE OF VISIT:  The neurosurgical service was consulted to see Mrs. Hernandez for low back and leg pain.    HISTORY OF PRESENT ILLNESS:  Mrs. Hernandez is a 57-year-old female who is status post right L4 to L5 and L5 to S1 diskectomy performed 11/15/2022 with Dr. Cash.  She states that prior to surgery, she had significant right leg pain.  She was discharged the same day of surgery. She has developed increasing low back and bilateral leg pain and after contacting our office was recommended  to present to the emergency room for evaluation.  She states her pain is a 10/10.  She describes the back and leg pain began equal, but the right leg is worse than the left leg.  She does have numbness in the right leg that she had prior to surgery, which is not different.  No new numbness.  No saddle numbness.  No incontinence or weakness.  However, she states she can only take a few steps and has to stop to rest due to pain.    PAST MEDICAL HISTORY:  Fibromyalgia, bipolar disorder, tobacco abuse, hypertension, migraines, hepatitis C.    PAST SURGICAL HISTORY:  Gastric bypass, 2 prior lumbar diskectomies, lithotripsy.    SOCIAL HISTORY:  Lives at home independently.  Current tobacco use.    MEDICATIONS:  Reviewed per the electronic medical record.    ALLERGIES:  REVIEWED, INCLUDING MORPHINE.    PHYSICAL EXAMINATION:    VITAL SIGNS:  Temperature is 98.1, blood pressure is 136/109, pulse is 87, respiratory rate is 18.  GENERAL:  She is awake and alert.  She appears uncomfortable, but she is cooperative with the exam.  Lumbar incision healing well.  Dressing removed.  No signs of infection.  She is able to move all 4 extremities well.  She has excellent strength to lower extremities in all muscle groups including iliopsoas, quadriceps, dorsi and plantar flexion.  Sensation is slightly decreased in the right L5 nerve root distribution, which the patient reports is similar to preop.  No saddle numbness.    IMAGING STUDIES:  Included a lumbar MRI performed earlier this evening, which shows postoperative changes of a right hemilaminectomy at L4 to L5 and L5 to S1.  There is postoperative fluid collection noted with differential being hematoma, CSF or normal postoperative fluid.    IMPRESSION:  1.  Right hemilaminectomy at L4-L5 and microdiscectomy. There is mixed signaling material and gas within the laminectomy bed which exerts mass effect on the thecal sac causing severe central canal stenosis. Given the mass effect, hematoma is possible.  2.  At L5-S1 right hemilaminectomy and microdiscectomy with moderate mass effect on the thecal sac. There appears to be a hematocrit level within this laminectomy bed raising the possibility of hematoma.  3.  There is a new CSF signaling fluid collection within the dorsal spinal canal extending from the mid L2 level through the low L3 level pushing the nerve roots of the cauda equina ventrally. This measures up to 9 mm in AP dimension and is suggestive of   a subdural CSF collection or CSF leak.  4.  Postop changes of microdiscectomy at L4-L5 and L5-S1 on the right with mixed signaling postsurgical changes. The presence of some residual disc material is difficult to determine in the perioperative period.  5.  At L5-S1 there is severe right foraminal narrowing. There is severe left neural foraminal narrowing at L4-L5.  6.  Progressive Modic type I changes at L4-L5.    LABORATORY DATA:  White count is 9.5, platelets 243.  Sodium is  138.    TOTAL TIME SPENT WITH THE PATIENT:  70 minutes, including 50 minutes of counseling and coordination of care.  Discussed with Dr. Cash.    Dictated by YAIMA POOLE        D: 2022   T: 2022   MT: MANDY    Name:     JUAN HA  MRN:      [id number]        Account:      [id number]   :      1965           Consult Date: 2022     Document: [id number]

## 2022-11-17 NOTE — ED NOTES
Pt states having pain in lower back, discussed with pt using muscle relaxer vs narcotic pain medicine, pt states wanting muscle relaxer at this time to see if make a difference with pain.  Provided to pt.  Pt ate breakfast, independently sitting up in bed.

## 2022-11-17 NOTE — PHARMACY-ADMISSION MEDICATION HISTORY
Pharmacy Medication History  Admission medication history interview status for the 11/16/2022  admission is complete. See EPIC admission navigator for prior to admission medications     Location of Interview: Phone  Medication history sources: Patient, Surescripts and Care Everywhere    Significant changes made to the medication list:  Changed:    - Nystatine, Flonase and Nasonex to PRN   - Advair BID to at bedtime   Removed: Tylenol    In the past week, patient estimated taking medication this percent of the time: greater than 90%    Additional medication history information:   None    Medication reconciliation completed by provider prior to medication history? No    Time spent in this activity: 10 mins    Prior to Admission medications    Medication Sig Last Dose Taking? Auth Provider Long Term End Date   ADVAIR -21 MCG/ACT inhaler INHALE 2 PUFFS INTO THE LUNGS TWICE A DAY  Patient taking differently: Inhale 2 puffs into the lungs At Bedtime 11/15/2022 Yes Leno Courtney MD Yes    albuterol (PROAIR HFA/PROVENTIL HFA/VENTOLIN HFA) 108 (90 Base) MCG/ACT inhaler Inhale 2 puffs into the lungs every 4 hours as needed for shortness of breath / dyspnea or wheezing  Yes Leno Courtney MD Yes    buPROPion (WELLBUTRIN XL) 300 MG 24 hr tablet Take 1 tablet (300 mg) by mouth every morning 11/16/2022 Yes Leno Luis MD Yes    cyanocobalamin (CYANOCOBALAMIN) 1000 MCG/ML injection ADMINISTER 1 ML IN THE MUSCLE EVERY 30 DAYS Past Month Yes Leno Luis MD     cyclobenzaprine (FLEXERIL) 10 MG tablet Take 1 tablet (10 mg) by mouth 3 times daily as needed for muscle spasms  Yes Hyacinth Timmons NP     FLUoxetine 20 MG tablet TAKE 2 TABLETS (40 MG) BY MOUTH EVERY DAY 11/16/2022 Yes Leno Luis MD Yes    fluticasone (FLONASE) 50 MCG/ACT nasal spray INSTILL 1 SPRAY INTO BOTH NOSTRILS DAILY  Patient taking differently: Spray 1 spray into both  "nostrils daily as needed  Yes Leno Luis MD     lamoTRIgine (LAMICTAL) 100 MG tablet Take 2 tablets (200 mg) by mouth daily 11/16/2022 Yes Leno Luis MD Yes    medical cannabis (Patient's own supply) See Admin Instructions (The purpose of this order is to document that the patient reports taking medical cannabis.  This is not a prescription, and is not used to certify that the patient has a qualifying medical condition.)  Yes Reported, Patient     mometasone (NASONEX) 50 MCG/ACT nasal spray Spray 2 sprays into both nostrils daily  Patient taking differently: Spray 2 sprays into both nostrils daily as needed  Yes Leno Luis MD     nystatin (MYCOSTATIN) 478814 UNIT/ML suspension Take 5 mLs (500,000 Units) by mouth 4 times daily  Patient taking differently: Take 500,000 Units by mouth 4 times daily as needed  Yes Leno Courtney MD     oxyCODONE (ROXICODONE) 5 MG tablet Take 1-2 tablets (5-10 mg) by mouth every 6 hours as needed for pain  Yes Hyacinth Timmons NP     senna-docusate (SENOKOT-S/PERICOLACE) 8.6-50 MG tablet Take 1-2 tablets by mouth 2 times daily as needed for constipation  Yes Hyacinth Timmons NP     SUMAtriptan (IMITREX) 100 MG tablet TAKE 1 TABLET BY MOUTH ON THE ONSET OF HEADACHE, MAY REPEAT IN 2 HOURS..*MAX 2 TABLETS DAILY*  Yes Leno Luis MD     vitamin D2 (ERGOCALCIFEROL) 24753 units (1250 mcg) capsule TAKE 1 CAPSULE BY MOUTH ONCE WEEKLY 11/16/2022 Yes Leno Courtney MD     B-D SYRINGE LUER-BLAYNE 3 ML MISC 1 SYRINGE EVERY 30 DAYS   Leno Luis MD     methylPREDNISolone (MEDROL DOSEPAK) 4 MG tablet therapy pack Follow Package Directions  at never started  Hyacinth Timmons NP     Needle, Disp, 25G X 1-1/2\" MISC 1 Device every 30 days   Leno Luis MD     order for DME Equipment being ordered: Splint right thumb  Patient not taking: Reported on 11/11/2022   Bunt, " "BETTINA Pink     Syringe/Needle, Disp, (BD LUER-BLAYNE SYRINGE) 25G X 1-1/2\" 3 ML MISC 1 Syringe every 30 days   Leno Luis MD     zolpidem (AMBIEN) 10 MG tablet TAKE ONE-HALF TABLET TO ONE TABLET BY MOUTH AT BEDTIME FOR SLEEP  Patient taking differently: Take 5-10 mg by mouth nightly as needed   Leno Luis MD         The information provided in this note is only as accurate as the sources available at the time of update(s)     "

## 2022-11-17 NOTE — PROGRESS NOTES
[facility]    Neurosurgery  Daily Note    Assessment & Plan   57F with severe right leg pain, 2 days s/p lumbar discectomy. She states the pain is not improving at all with current regimen.   States she can only take about 4 steps independently before she needs help or the walker.    Plan:  -Advance activity as tolerated  -Continue supportive and symptomatic treatment  -IV dilaudid increased to q1hr. Oxycodone increased to q3hrs. If no improvement this evening then plan to add to OR schedule tomorrow.     Barrington Watt PA-C    Interval History   poor pain control today. Adjustments made to meds.     Physical Exam   Temp: 98.7  F (37.1  C) Temp src: Oral BP: (!) 147/85 Pulse: 89   Resp: 18 SpO2: 97 % O2 Device: None (Room air) Oxygen Delivery: 2 LPM  There were no vitals filed for this visit.  Vital Signs with Ranges  Temp:  [98.1  F (36.7  C)-98.7  F (37.1  C)] 98.7  F (37.1  C)  Pulse:  [76-97] 89  Resp:  [18] 18  BP: (108-156)/() 147/85  SpO2:  [91 %-100 %] 97 %  I/O last 3 completed shifts:  In: 880 [P.O.:880]  Out: -     Alert and oriented.  Moves all extremities equally.      Medications     dextrose 5% and 0.9% NaCl with potassium chloride 20 mEq 100 mL/hr (11/17/22 1435)        [START ON 11/18/2022] buPROPion  300 mg Oral QAM     dexamethasone  4 mg Oral Q6H LIDYA     FLUoxetine  20 mg Oral Daily     lamoTRIgine  200 mg Oral Daily           Barrington Watt PA-C  [facility] Neurosurgery  [facility]  [address]  [address]  [place] [address]    Tel [phone]  Pager [phone]

## 2022-11-17 NOTE — PROGRESS NOTES
RECEIVING UNIT ED HANDOFF REVIEW    ED Nurse Handoff Report was reviewed by: Alex Calzada RN on November 17, 2022 at 12:10 PM

## 2022-11-17 NOTE — UTILIZATION REVIEW
Admission Status; Secondary Review Determination     Admission Date: 11/16/2022  5:07 PM       Under the authority of the Utilization Management Committee, the utilization review process indicated a secondary review on the above patient.  The review outcome is based on review of the medical records, discussions with staff, and applying clinical experience noted on the date of the review.        (x)      Inpatient Status Appropriate - This patient's medical care is consistent with medical management for inpatient care and reasonable inpatient medical practice.       RATIONALE FOR DETERMINATION      Brief clinical presentation, information copied from the chart, abbreviated and edited for relevant content:     Paged team to advance to IP.     Kareen Hernandez is a 57 year old female admitted on 11/16/2022. She presented for evaluation of progressive worsening low back pain and right lower extremity numbness without weakness after right L4-L5 and right L5-S1 microdiscectomy 11/15/2022 with Dr. Cash.  Pain is now limiting ability to ambulate.  MRI of spine with concern for fluid and gas collection exerting mass-effect on thecal sac at L4-L5 level resulting in severe canal stenosis and concerning for possible hematoma.  Similarly, L5-S1 finding with a hematocrit level concerning for hematoma.  Patient also with CF signal from mid L2 to low L3 suggestive of subdural CSF collection or CSF leak resulting in pressure on nerve roots of the cauda equina ventrally.  Patient with no objective weakness, though does have decreased sensation to light touch involving her right leg, from thigh to foot by report.Neurosurgery consulted,treating her pain but may have to go to OR tomorrow. Not discharging.      Currently, more than 2 nights hospital complex care was anticipated. Also, there was a risk of adverse outcome if patient was treated outpatient or observation. High intensity of services anticipated. Inpatient admission appropriate  based on Medicare guidelines.       The information on this document is developed by the utilization review team in order for the business office to ensure compliance.  This only denotes the appropriateness of proper admission status and does not reflect the quality of care rendered.         The definitions of Inpatient Status and Observation Status used in making the determination above are those provided in the CMS Coverage Manual, Chapter 1 and Chapter 6, section 70.4.      Sincerely,      Nilsa Bender MD   Utilization Review/ Case Management  [facility].

## 2022-11-17 NOTE — PROGRESS NOTES
11/17/22 7983   Appointment Info   Signing Clinician's Name / Credentials (PT) Samanta Hall, PT, DPT   Quick Adds   Quick Adds Certification   Living Environment   People in Home child(joceline), adult   Current Living Arrangements apartment   Home Accessibility no concerns   Transportation Anticipated family or friend will provide;car, drives self   Living Environment Comments Lives w/ daughter, who normally drives but pt does drive occcasionally.   Self-Care   Usual Activity Tolerance good   Current Activity Tolerance fair   Regular Exercise No   Equipment Currently Used at Home none   Fall history within last six months no   Activity/Exercise/Self-Care Comment Patient independent at baseline w/  dressing, toileting, self cares, and mobility without a device. Daughter helps w/ cooking/cleaning, she works during the days where pt's alone. Since pt returned home after her most recent back procedure (11/15), she's been in too much pain to be independent. She does not own any AD.   General Information   Onset of Illness/Injury or Date of Surgery 11/16/22   Referring Physician Putnam, Liborio Burden MD   Patient/Family Therapy Goals Statement (PT) to feel better, to decrease pain   Pertinent History of Current Problem (include personal factors and/or comorbidities that impact the POC) Per chart: Postoperative day #2 status post right L4 to L5 and L5 to S1 diskectomy for lumbar disk herniation, presenting with postoperative low back and bilateral leg pain with imaging findings revealing postoperative changes including fluid collection as described above.   Existing Precautions/Restrictions fall;spinal   Weight-Bearing Status - LLE full weight-bearing   Weight-Bearing Status - RLE full weight-bearing   Cognition   Affect/Mental Status (Cognition) WNL   Pain Assessment   Patient Currently in Pain Yes, see Vital Sign flowsheet  (R LBP 7/10, pt reports improved from 10/10 earlier today)   Integumentary/Edema    Integumentary/Edema no deficits were identifed   Integumentary/Edema Comments intact surgical site   Posture    Posture Kyphosis   Range of Motion (ROM)   ROM Comment spine ROM limited by pain and precautions, R LE limited by pain   Strength (Manual Muscle Testing)   Strength Comments limited B LE secondary to pain, R>L   Bed Mobility   Comment, (Bed Mobility) SBA to EOB, moves well w/ self selected caution due to pain   Transfers   Comment, (Transfers) SBA to stand to FWW and pivot to chair, flexed posture upon standing, steady but does not tolerate standing for very long   Gait/Stairs (Locomotion)   Comment, (Gait/Stairs) only able to ambulation x 2-4 steps to pivot to chair w/ FWW, unable/declined to ambulate futher d/t pain   Balance   Balance Comments difficult to assess, affected by pain and self limiting activity   Clinical Impression   Criteria for Skilled Therapeutic Intervention Yes, treatment indicated   PT Diagnosis (PT) impaired functional mobility   Influenced by the following impairments pain, decreased activity tolerance, strength, ROM   Functional limitations due to impairments bed mob, transfers, ambulation   Clinical Presentation (PT Evaluation Complexity) Stable/Uncomplicated   Clinical Presentation Rationale clinical judgement   Clinical Decision Making (Complexity) low complexity   Planned Therapy Interventions (PT) bed mobility training;gait training;home exercise program;patient/family education;strengthening;transfer training   Anticipated Equipment Needs at Discharge (PT) walker, rolling;wheelchair   Risk & Benefits of therapy have been explained evaluation/treatment results reviewed;care plan/treatment goals reviewed;risks/benefits reviewed;current/potential barriers reviewed;participants voiced agreement with care plan;participants included;patient   PT Total Evaluation Time   PT Eval, Low Complexity Minutes (42495) 12   Therapy Certification   Start of care date 11/16/22    Certification date from 11/17/22   Certification date to 11/24/22   Medical Diagnosis L4 to L5 and L5 to S1 diskectomy   Physical Therapy Goals   PT Frequency Daily   PT Predicted Duration/Target Date for Goal Attainment 11/24/22   PT Goals Bed Mobility;Transfers;Gait   PT: Bed Mobility Modified independent;Supine to/from sit;Rolling;Within precautions   PT: Transfers Modified independent;Sit to/from stand;Bed to/from chair;Assistive device;Within precautions   PT: Gait Minimal assist;Rolling walker;100 feet   Interventions   Interventions Quick Adds Therapeutic Activity   Therapeutic Activity   Therapeutic Activities: dynamic activities to improve functional performance Minutes (47908) 29   Symptoms Noted During/After Treatment Increased pain   Treatment Detail/Skilled Intervention Pt in long sitting upon PT entering reporting pain is more tolerable in this position, able to scoot over to seated at EOB w/ SBA, moves well but careful due to pain. Steady at EOB, reviewed spinal precautions which patient verbalizes understanding of. Sit<>stand transfer to FWW w/ SBA where pt steady, but difficulty achieving full upright position. Worked on pivot transfers back and forth from bed to chair x 3 reps w/ SBA, though pt declined to trial forward ambulation as bearing weight through R LE is painful. Discussed recommendations for positioning for back pain w/ spinal precautions. Pt agreeable to get up more often w/ nursing to commode, to chair etc. Potentially agreeable to ambulate when her pain is more tolerable.   PT Discharge Planning   PT Plan progress standing transfers, initiate ambulation w/ FWW w/ chair follow if needed   PT Discharge Recommendation (DC Rec) Transitional Care Facility;home with assist;home with home care physical therapy   PT Rationale for DC Rec Patient presents significantly below baseline for functional mobility where she is normally independent without a device. Currently she's only able to pivot  transfer w/ FWW, limited by pain. She will benefit from TCU in order to address this prior to going home, pending progress with pain control. If she were to go home instead, she would need a w/c for longer distances and assist of 1 for all mobility.   PT Brief overview of current status SBA bed mob, transfers w/ FWW; unable to ambulate d/t pain   Total Session Time   Timed Code Treatment Minutes 29   Total Session Time (sum of timed and untimed services) 41       [facility]  OUTPATIENT PHYSICAL THERAPY EVALUATION  PLAN OF TREATMENT FOR OUTPATIENT REHABILITATION  (COMPLETE FOR INITIAL CLAIMS ONLY)  Patient's Last Name, First Name, M.I.  YOB: 1965  Kareen Hernandez                           Provider's Name  [facility] Medical Record No.  [id number]                             Onset Date:  11/16/22   Start of Care Date:  11/16/22   Type:     _X_PT   ___OT   ___SLP Medical Diagnosis:  L4 to L5 and L5 to S1 diskectomy              PT Diagnosis:  impaired functional mobility Visits from SOC:  1     See note for plan of treatment, functional goals and certification details    I CERTIFY THE NEED FOR THESE SERVICES FURNISHED UNDER        THIS PLAN OF TREATMENT AND WHILE UNDER MY CARE     (Physician co-signature of this document indicates review and certification of the therapy plan).

## 2022-11-17 NOTE — ED NOTES
[facility]  ED Nurse Handoff Report    ED Chief complaint: Back Pain      ED Diagnosis:   Final diagnoses:   Epidural hematoma   Postoperative pain   CSF leak       Code Status: To be addressed by admitting provider    Allergies:   Allergies   Allergen Reactions     Lurasidone Diarrhea     Diarrhea     Morphine Rash       Patient Story:  Patient had lumbar microdiscectomy on 11/15. Last night after her anesthesia wore off from her lumbar procedure she had done yesterday, she noticed increase shooting pain in both legs with numbness in her right leg. She is unable to walk on her own due to the pain.     Focused Assessment:  A&Ox4. Pain uncontrolled with IV dilaudid. MRI shows epidural hematoma with CSF leak. Pt given IV ketamine for analgesia. Vitally stable, on end-tidal monitoring post ketamine. Calm, cooperative.    Lumbar spine MRI w & w/o contrast - surgery <10yrs   Final Result   IMPRESSION:   1.  Right hemilaminectomy at L4-L5 and microdiscectomy. There is mixed signaling material and gas within the laminectomy bed which exerts mass effect on the thecal sac causing severe central canal stenosis. Given the mass effect, hematoma is possible.   2.  At L5-S1 right hemilaminectomy and microdiscectomy with moderate mass effect on the thecal sac. There appears to be a hematocrit level within this laminectomy bed raising the possibility of hematoma.   3.  There is a new CSF signaling fluid collection within the dorsal spinal canal extending from the mid L2 level through the low L3 level pushing the nerve roots of the cauda equina ventrally. This measures up to 9 mm in AP dimension and is suggestive of    a subdural CSF collection or CSF leak.   4.  Postop changes of microdiscectomy at L4-L5 and L5-S1 on the right with mixed signaling postsurgical changes. The presence of some residual disc material is difficult to determine in the perioperative period.   5.  At L5-S1 there is severe right foraminal  narrowing. There is severe left neural foraminal narrowing at L4-L5.   6.  Progressive Modic type I changes at L4-L5.      Exam discussed with Dr. Clemente at 23:36 hours.        Labs Ordered and Resulted from Time of ED Arrival to Time of ED Departure   BASIC METABOLIC PANEL - Abnormal       Result Value    Sodium 138      Potassium 3.4      Chloride 104      Carbon Dioxide (CO2) 33 (*)     Anion Gap 1 (*)     Urea Nitrogen 7      Creatinine 0.85      Calcium 8.7      Glucose 96      GFR Estimate 80     CBC WITH PLATELETS AND DIFFERENTIAL    WBC Count 9.5      RBC Count 4.04      Hemoglobin 12.3      Hematocrit 39.0      MCV 97      MCH 30.4      MCHC 31.5      RDW 12.8      Platelet Count 248      % Neutrophils 46      % Lymphocytes 41      % Monocytes 10      % Eosinophils 3      % Basophils 0      % Immature Granulocytes 0      NRBCs per 100 WBC 0      Absolute Neutrophils 4.3      Absolute Lymphocytes 3.9      Absolute Monocytes 0.9      Absolute Eosinophils 0.3      Absolute Basophils 0.0      Absolute Immature Granulocytes 0.0      Absolute NRBCs 0.0     COVID-19 VIRUS (CORONAVIRUS) BY PCR     Treatments and/or interventions provided:   Medications   melatonin tablet 1 mg (has no administration in time range)   ondansetron (ZOFRAN ODT) ODT tab 4 mg (has no administration in time range)     Or   ondansetron (ZOFRAN) injection 4 mg (has no administration in time range)   zolpidem (AMBIEN) tablet 5 mg (has no administration in time range)   senna-docusate (SENOKOT-S/PERICOLACE) 8.6-50 MG per tablet 1-2 tablet (has no administration in time range)   oxyCODONE (ROXICODONE) tablet 5-10 mg (has no administration in time range)   dexamethasone (DECADRON) tablet 4 mg (has no administration in time range)   cyclobenzaprine (FLEXERIL) tablet 10 mg (has no administration in time range)   hydrOXYzine (ATARAX) tablet 50 mg (has no administration in time range)   albuterol (PROVENTIL) neb solution 2.5 mg (has no  administration in time range)   dextrose 5% and 0.9% NaCl + KCl 20 mEq/L infusion (has no administration in time range)   acetaminophen (TYLENOL) tablet 650 mg (has no administration in time range)     Or   acetaminophen (TYLENOL) Suppository 650 mg (has no administration in time range)   HYDROmorphone (PF) (DILAUDID) injection 0.5-1 mg (has no administration in time range)   polyethylene glycol (MIRALAX) Packet 17 g (has no administration in time range)   prochlorperazine (COMPAZINE) injection 10 mg (has no administration in time range)     Or   prochlorperazine (COMPAZINE) tablet 10 mg (has no administration in time range)     Or   prochlorperazine (COMPAZINE) suppository 25 mg (has no administration in time range)   HYDROmorphone (PF) (DILAUDID) injection 0.5 mg (0.5 mg Intravenous Given 11/16/22 1803)   HYDROmorphone (PF) (DILAUDID) injection 0.5 mg (0.5 mg Intravenous Given 11/16/22 1921)   HYDROmorphone (DILAUDID) injection 1 mg (1 mg Intravenous Given 11/16/22 1955)   gadobutrol (GADAVIST) injection 7 mL (7 mLs Intravenous Given 11/16/22 2308)   sodium chloride (PF) 0.9% PF flush 10 mL (10 mLs Intravenous Given 11/17/22 0013)   dexamethasone PF (DECADRON) injection 10 mg (10 mg Intravenous Given 11/17/22 0013)   ketamine (KETALAR) injection 5 mg (5 mg Intravenous Given 11/17/22 0049)     Patient's response to treatments and/or interventions: decreased pain    To be done/followed up on inpatient unit:  Continue with plan of care per admitting MD.    Does this patient have any cognitive concerns?: none    Activity level - Baseline/Home:  Independent  Activity Level - Current:   Stand with Assist    Patient's Preferred language: English   Needed?: No    Isolation: None  Infection: Not Applicable  Patient tested for COVID 19 prior to admission: YES  Bariatric?: No    Vital Signs:   Vitals:    11/17/22 0000 11/17/22 0015 11/17/22 0030 11/17/22 0045   BP: (!) 156/104  (!) 136/109 (!) 141/77   Pulse: 81   87 80   Resp:       Temp:       TempSrc:       SpO2: 98% 96% 94% 94%       Cardiac Rhythm:     Was the PSS-3 completed:   Yes  What interventions are required if any?               Family Comments: na  OBS brochure/video discussed/provided to patient/family: Yes               For the majority of the shift this patient's behavior was Green.   Behavioral interventions performed were na.    ED NURSE PHONE NUMBER: *[phone]

## 2022-11-18 ENCOUNTER — ANESTHESIA (OUTPATIENT)
Dept: SURGERY | Facility: CLINIC | Age: 57
DRG: 909 | End: 2022-11-18
Payer: COMMERCIAL

## 2022-11-18 ENCOUNTER — ANESTHESIA EVENT (OUTPATIENT)
Dept: SURGERY | Facility: CLINIC | Age: 57
DRG: 909 | End: 2022-11-18
Payer: COMMERCIAL

## 2022-11-18 ENCOUNTER — APPOINTMENT (OUTPATIENT)
Dept: GENERAL RADIOLOGY | Facility: CLINIC | Age: 57
DRG: 909 | End: 2022-11-18
Attending: INTERNAL MEDICINE
Payer: COMMERCIAL

## 2022-11-18 PROBLEM — F31.9 BIPOLAR AFFECTIVE DISORDER, REMISSION STATUS UNSPECIFIED (H): Status: ACTIVE | Noted: 2017-11-20

## 2022-11-18 LAB
ALBUMIN SERPL-MCNC: 3.1 G/DL (ref 3.4–5)
ALP SERPL-CCNC: 111 U/L (ref 40–150)
ALT SERPL W P-5'-P-CCNC: 18 U/L (ref 0–50)
ANION GAP SERPL CALCULATED.3IONS-SCNC: 5 MMOL/L (ref 3–14)
AST SERPL W P-5'-P-CCNC: 19 U/L (ref 0–45)
BILIRUB SERPL-MCNC: 0.6 MG/DL (ref 0.2–1.3)
BUN SERPL-MCNC: 7 MG/DL (ref 7–30)
CALCIUM SERPL-MCNC: 9.2 MG/DL (ref 8.5–10.1)
CHLORIDE BLD-SCNC: 107 MMOL/L (ref 94–109)
CO2 SERPL-SCNC: 28 MMOL/L (ref 20–32)
CREAT SERPL-MCNC: 0.68 MG/DL (ref 0.52–1.04)
ERYTHROCYTE [DISTWIDTH] IN BLOOD BY AUTOMATED COUNT: 12.8 % (ref 10–15)
GFR SERPL CREATININE-BSD FRML MDRD: >90 ML/MIN/1.73M2
GLUCOSE BLD-MCNC: 133 MG/DL (ref 70–99)
HCT VFR BLD AUTO: 36.2 % (ref 35–47)
HGB BLD-MCNC: 11.6 G/DL (ref 11.7–15.7)
MCH RBC QN AUTO: 30.9 PG (ref 26.5–33)
MCHC RBC AUTO-ENTMCNC: 32 G/DL (ref 31.5–36.5)
MCV RBC AUTO: 96 FL (ref 78–100)
PLATELET # BLD AUTO: 251 10E3/UL (ref 150–450)
POTASSIUM BLD-SCNC: 4.5 MMOL/L (ref 3.4–5.3)
PROT SERPL-MCNC: 6.8 G/DL (ref 6.8–8.8)
RBC # BLD AUTO: 3.76 10E6/UL (ref 3.8–5.2)
SODIUM SERPL-SCNC: 140 MMOL/L (ref 133–144)
WBC # BLD AUTO: 10.6 10E3/UL (ref 4–11)

## 2022-11-18 PROCEDURE — 250N000011 HC RX IP 250 OP 636: Performed by: PHYSICIAN ASSISTANT

## 2022-11-18 PROCEDURE — 85027 COMPLETE CBC AUTOMATED: CPT | Performed by: INTERNAL MEDICINE

## 2022-11-18 PROCEDURE — 01NB0ZZ RELEASE LUMBAR NERVE, OPEN APPROACH: ICD-10-PCS | Performed by: NEUROLOGICAL SURGERY

## 2022-11-18 PROCEDURE — 250N000011 HC RX IP 250 OP 636: Performed by: REGISTERED NURSE

## 2022-11-18 PROCEDURE — 69990 MICROSURGERY ADD-ON: CPT | Performed by: NEUROLOGICAL SURGERY

## 2022-11-18 PROCEDURE — 99233 SBSQ HOSP IP/OBS HIGH 50: CPT | Performed by: HOSPITALIST

## 2022-11-18 PROCEDURE — 250N000011 HC RX IP 250 OP 636: Performed by: NEUROLOGICAL SURGERY

## 2022-11-18 PROCEDURE — 250N000013 HC RX MED GY IP 250 OP 250 PS 637: Performed by: PHYSICIAN ASSISTANT

## 2022-11-18 PROCEDURE — 250N000009 HC RX 250: Performed by: REGISTERED NURSE

## 2022-11-18 PROCEDURE — 360N000084 HC SURGERY LEVEL 4 W/ FLUORO, PER MIN: Performed by: NEUROLOGICAL SURGERY

## 2022-11-18 PROCEDURE — 0SB40ZZ EXCISION OF LUMBOSACRAL DISC, OPEN APPROACH: ICD-10-PCS | Performed by: NEUROLOGICAL SURGERY

## 2022-11-18 PROCEDURE — 999N000179 XR SURGERY CARM FLUORO LESS THAN 5 MIN W STILLS

## 2022-11-18 PROCEDURE — 63267 EXCISE INTRSPINL LESION LMBR: CPT | Mod: 78 | Performed by: NEUROLOGICAL SURGERY

## 2022-11-18 PROCEDURE — 710N000009 HC RECOVERY PHASE 1, LEVEL 1, PER MIN: Performed by: NEUROLOGICAL SURGERY

## 2022-11-18 PROCEDURE — 36415 COLL VENOUS BLD VENIPUNCTURE: CPT | Performed by: INTERNAL MEDICINE

## 2022-11-18 PROCEDURE — 258N000003 HC RX IP 258 OP 636: Performed by: REGISTERED NURSE

## 2022-11-18 PROCEDURE — 63267 EXCISE INTRSPINL LESION LMBR: CPT | Mod: AS | Performed by: NURSE PRACTITIONER

## 2022-11-18 PROCEDURE — 370N000017 HC ANESTHESIA TECHNICAL FEE, PER MIN: Performed by: NEUROLOGICAL SURGERY

## 2022-11-18 PROCEDURE — 272N000001 HC OR GENERAL SUPPLY STERILE: Performed by: NEUROLOGICAL SURGERY

## 2022-11-18 PROCEDURE — 250N000013 HC RX MED GY IP 250 OP 250 PS 637: Performed by: INTERNAL MEDICINE

## 2022-11-18 PROCEDURE — 250N000009 HC RX 250: Performed by: NEUROLOGICAL SURGERY

## 2022-11-18 PROCEDURE — 258N000001 HC RX 258: Performed by: INTERNAL MEDICINE

## 2022-11-18 PROCEDURE — 99024 POSTOP FOLLOW-UP VISIT: CPT | Performed by: PHYSICIAN ASSISTANT

## 2022-11-18 PROCEDURE — 80053 COMPREHEN METABOLIC PANEL: CPT | Performed by: INTERNAL MEDICINE

## 2022-11-18 PROCEDURE — 120N000001 HC R&B MED SURG/OB

## 2022-11-18 PROCEDURE — 258N000003 HC RX IP 258 OP 636: Performed by: ANESTHESIOLOGY

## 2022-11-18 PROCEDURE — 250N000011 HC RX IP 250 OP 636: Performed by: INTERNAL MEDICINE

## 2022-11-18 PROCEDURE — 00CU0ZZ EXTIRPATION OF MATTER FROM SPINAL CANAL, OPEN APPROACH: ICD-10-PCS | Performed by: NEUROLOGICAL SURGERY

## 2022-11-18 PROCEDURE — 999N000141 HC STATISTIC PRE-PROCEDURE NURSING ASSESSMENT: Performed by: NEUROLOGICAL SURGERY

## 2022-11-18 PROCEDURE — 250N000025 HC SEVOFLURANE, PER MIN: Performed by: NEUROLOGICAL SURGERY

## 2022-11-18 RX ORDER — HYDROMORPHONE HCL IN WATER/PF 6 MG/30 ML
0.2 PATIENT CONTROLLED ANALGESIA SYRINGE INTRAVENOUS EVERY 5 MIN PRN
Status: DISCONTINUED | OUTPATIENT
Start: 2022-11-18 | End: 2022-11-18 | Stop reason: HOSPADM

## 2022-11-18 RX ORDER — ONDANSETRON 2 MG/ML
4 INJECTION INTRAMUSCULAR; INTRAVENOUS EVERY 30 MIN PRN
Status: DISCONTINUED | OUTPATIENT
Start: 2022-11-18 | End: 2022-11-18 | Stop reason: HOSPADM

## 2022-11-18 RX ORDER — HYDRALAZINE HYDROCHLORIDE 20 MG/ML
10-20 INJECTION INTRAMUSCULAR; INTRAVENOUS EVERY 30 MIN PRN
Status: DISCONTINUED | OUTPATIENT
Start: 2022-11-18 | End: 2022-11-19 | Stop reason: HOSPADM

## 2022-11-18 RX ORDER — CEFAZOLIN SODIUM/WATER 2 G/20 ML
2 SYRINGE (ML) INTRAVENOUS
Status: COMPLETED | OUTPATIENT
Start: 2022-11-18 | End: 2022-11-18

## 2022-11-18 RX ORDER — FENTANYL CITRATE 50 UG/ML
25 INJECTION, SOLUTION INTRAMUSCULAR; INTRAVENOUS EVERY 5 MIN PRN
Status: DISCONTINUED | OUTPATIENT
Start: 2022-11-18 | End: 2022-11-18 | Stop reason: HOSPADM

## 2022-11-18 RX ORDER — BUPIVACAINE HYDROCHLORIDE AND EPINEPHRINE 5; 5 MG/ML; UG/ML
INJECTION, SOLUTION PERINEURAL PRN
Status: DISCONTINUED | OUTPATIENT
Start: 2022-11-18 | End: 2022-11-18 | Stop reason: HOSPADM

## 2022-11-18 RX ORDER — DEXAMETHASONE SODIUM PHOSPHATE 4 MG/ML
INJECTION, SOLUTION INTRA-ARTICULAR; INTRALESIONAL; INTRAMUSCULAR; INTRAVENOUS; SOFT TISSUE PRN
Status: DISCONTINUED | OUTPATIENT
Start: 2022-11-18 | End: 2022-11-18

## 2022-11-18 RX ORDER — SODIUM CHLORIDE, SODIUM LACTATE, POTASSIUM CHLORIDE, CALCIUM CHLORIDE 600; 310; 30; 20 MG/100ML; MG/100ML; MG/100ML; MG/100ML
INJECTION, SOLUTION INTRAVENOUS CONTINUOUS
Status: DISCONTINUED | OUTPATIENT
Start: 2022-11-18 | End: 2022-11-18

## 2022-11-18 RX ORDER — PROPOFOL 10 MG/ML
INJECTION, EMULSION INTRAVENOUS CONTINUOUS PRN
Status: DISCONTINUED | OUTPATIENT
Start: 2022-11-18 | End: 2022-11-18

## 2022-11-18 RX ORDER — ACETAMINOPHEN 325 MG/1
650 TABLET ORAL EVERY 4 HOURS PRN
Status: DISCONTINUED | OUTPATIENT
Start: 2022-11-21 | End: 2022-11-19 | Stop reason: HOSPADM

## 2022-11-18 RX ORDER — CEFAZOLIN SODIUM 1 G/3ML
1 INJECTION, POWDER, FOR SOLUTION INTRAMUSCULAR; INTRAVENOUS EVERY 8 HOURS
Status: DISCONTINUED | OUTPATIENT
Start: 2022-11-19 | End: 2022-11-19 | Stop reason: HOSPADM

## 2022-11-18 RX ORDER — ONDANSETRON 2 MG/ML
4 INJECTION INTRAMUSCULAR; INTRAVENOUS EVERY 6 HOURS PRN
Status: DISCONTINUED | OUTPATIENT
Start: 2022-11-18 | End: 2022-11-18

## 2022-11-18 RX ORDER — LIDOCAINE 40 MG/G
CREAM TOPICAL
Status: DISCONTINUED | OUTPATIENT
Start: 2022-11-18 | End: 2022-11-19 | Stop reason: HOSPADM

## 2022-11-18 RX ORDER — SODIUM CHLORIDE, SODIUM LACTATE, POTASSIUM CHLORIDE, CALCIUM CHLORIDE 600; 310; 30; 20 MG/100ML; MG/100ML; MG/100ML; MG/100ML
INJECTION, SOLUTION INTRAVENOUS CONTINUOUS
Status: DISCONTINUED | OUTPATIENT
Start: 2022-11-18 | End: 2022-11-18 | Stop reason: HOSPADM

## 2022-11-18 RX ORDER — HYDROMORPHONE HCL IN WATER/PF 6 MG/30 ML
0.4 PATIENT CONTROLLED ANALGESIA SYRINGE INTRAVENOUS EVERY 5 MIN PRN
Status: DISCONTINUED | OUTPATIENT
Start: 2022-11-18 | End: 2022-11-18 | Stop reason: HOSPADM

## 2022-11-18 RX ORDER — METHYLPREDNISOLONE ACETATE 40 MG/ML
INJECTION, SUSPENSION INTRA-ARTICULAR; INTRALESIONAL; INTRAMUSCULAR; SOFT TISSUE PRN
Status: DISCONTINUED | OUTPATIENT
Start: 2022-11-18 | End: 2022-11-18 | Stop reason: HOSPADM

## 2022-11-18 RX ORDER — FENTANYL CITRATE 50 UG/ML
INJECTION, SOLUTION INTRAMUSCULAR; INTRAVENOUS PRN
Status: DISCONTINUED | OUTPATIENT
Start: 2022-11-18 | End: 2022-11-18

## 2022-11-18 RX ORDER — ACETAMINOPHEN 325 MG/1
975 TABLET ORAL EVERY 8 HOURS
Status: DISCONTINUED | OUTPATIENT
Start: 2022-11-18 | End: 2022-11-19 | Stop reason: HOSPADM

## 2022-11-18 RX ORDER — PROPOFOL 10 MG/ML
INJECTION, EMULSION INTRAVENOUS PRN
Status: DISCONTINUED | OUTPATIENT
Start: 2022-11-18 | End: 2022-11-18

## 2022-11-18 RX ORDER — ONDANSETRON 2 MG/ML
INJECTION INTRAMUSCULAR; INTRAVENOUS PRN
Status: DISCONTINUED | OUTPATIENT
Start: 2022-11-18 | End: 2022-11-18

## 2022-11-18 RX ORDER — ONDANSETRON 4 MG/1
4 TABLET, ORALLY DISINTEGRATING ORAL EVERY 30 MIN PRN
Status: DISCONTINUED | OUTPATIENT
Start: 2022-11-18 | End: 2022-11-18 | Stop reason: HOSPADM

## 2022-11-18 RX ORDER — PROCHLORPERAZINE MALEATE 10 MG
10 TABLET ORAL EVERY 6 HOURS PRN
Status: DISCONTINUED | OUTPATIENT
Start: 2022-11-18 | End: 2022-11-19

## 2022-11-18 RX ORDER — FENTANYL CITRATE 50 UG/ML
50 INJECTION, SOLUTION INTRAMUSCULAR; INTRAVENOUS EVERY 5 MIN PRN
Status: DISCONTINUED | OUTPATIENT
Start: 2022-11-18 | End: 2022-11-18 | Stop reason: HOSPADM

## 2022-11-18 RX ORDER — CEFAZOLIN SODIUM/WATER 2 G/20 ML
2 SYRINGE (ML) INTRAVENOUS SEE ADMIN INSTRUCTIONS
Status: DISCONTINUED | OUTPATIENT
Start: 2022-11-18 | End: 2022-11-18

## 2022-11-18 RX ORDER — LIDOCAINE HYDROCHLORIDE 20 MG/ML
INJECTION, SOLUTION INFILTRATION; PERINEURAL PRN
Status: DISCONTINUED | OUTPATIENT
Start: 2022-11-18 | End: 2022-11-18

## 2022-11-18 RX ORDER — NEOSTIGMINE METHYLSULFATE 1 MG/ML
VIAL (ML) INJECTION PRN
Status: DISCONTINUED | OUTPATIENT
Start: 2022-11-18 | End: 2022-11-18

## 2022-11-18 RX ORDER — LABETALOL HYDROCHLORIDE 5 MG/ML
10-40 INJECTION, SOLUTION INTRAVENOUS EVERY 10 MIN PRN
Status: DISCONTINUED | OUTPATIENT
Start: 2022-11-18 | End: 2022-11-19 | Stop reason: HOSPADM

## 2022-11-18 RX ORDER — AMOXICILLIN 250 MG
1 CAPSULE ORAL 2 TIMES DAILY
Status: DISCONTINUED | OUTPATIENT
Start: 2022-11-18 | End: 2022-11-19 | Stop reason: HOSPADM

## 2022-11-18 RX ORDER — BISACODYL 10 MG
10 SUPPOSITORY, RECTAL RECTAL DAILY PRN
Status: DISCONTINUED | OUTPATIENT
Start: 2022-11-18 | End: 2022-11-19 | Stop reason: HOSPADM

## 2022-11-18 RX ORDER — POLYETHYLENE GLYCOL 3350 17 G/17G
17 POWDER, FOR SOLUTION ORAL DAILY
Status: DISCONTINUED | OUTPATIENT
Start: 2022-11-19 | End: 2022-11-19 | Stop reason: HOSPADM

## 2022-11-18 RX ORDER — LIDOCAINE 40 MG/G
CREAM TOPICAL
Status: DISCONTINUED | OUTPATIENT
Start: 2022-11-18 | End: 2022-11-19

## 2022-11-18 RX ORDER — GLYCOPYRROLATE 0.2 MG/ML
INJECTION, SOLUTION INTRAMUSCULAR; INTRAVENOUS PRN
Status: DISCONTINUED | OUTPATIENT
Start: 2022-11-18 | End: 2022-11-18

## 2022-11-18 RX ORDER — ONDANSETRON 4 MG/1
4 TABLET, ORALLY DISINTEGRATING ORAL EVERY 6 HOURS PRN
Status: DISCONTINUED | OUTPATIENT
Start: 2022-11-18 | End: 2022-11-18

## 2022-11-18 RX ADMIN — MIDAZOLAM 2 MG: 1 INJECTION INTRAMUSCULAR; INTRAVENOUS at 16:43

## 2022-11-18 RX ADMIN — Medication 2 G: at 16:43

## 2022-11-18 RX ADMIN — ROCURONIUM BROMIDE 10 MG: 50 INJECTION, SOLUTION INTRAVENOUS at 17:15

## 2022-11-18 RX ADMIN — DEXAMETHASONE 4 MG: 2 TABLET ORAL at 12:04

## 2022-11-18 RX ADMIN — NEOSTIGMINE METHYLSULFATE 3.5 MG: 1 INJECTION, SOLUTION INTRAVENOUS at 17:57

## 2022-11-18 RX ADMIN — HYDROMORPHONE HYDROCHLORIDE 1 MG: 1 INJECTION, SOLUTION INTRAMUSCULAR; INTRAVENOUS; SUBCUTANEOUS at 02:50

## 2022-11-18 RX ADMIN — PROPOFOL 150 MG: 10 INJECTION, EMULSION INTRAVENOUS at 16:47

## 2022-11-18 RX ADMIN — PROPOFOL 50 MCG/KG/MIN: 10 INJECTION, EMULSION INTRAVENOUS at 16:52

## 2022-11-18 RX ADMIN — HYDROMORPHONE HYDROCHLORIDE 0.5 MG: 1 INJECTION, SOLUTION INTRAMUSCULAR; INTRAVENOUS; SUBCUTANEOUS at 21:41

## 2022-11-18 RX ADMIN — GLYCOPYRROLATE 0.5 MG: 0.2 INJECTION, SOLUTION INTRAMUSCULAR; INTRAVENOUS at 17:57

## 2022-11-18 RX ADMIN — ROCURONIUM BROMIDE 40 MG: 50 INJECTION, SOLUTION INTRAVENOUS at 16:47

## 2022-11-18 RX ADMIN — LAMOTRIGINE 200 MG: 100 TABLET ORAL at 08:47

## 2022-11-18 RX ADMIN — FENTANYL CITRATE 50 MCG: 50 INJECTION, SOLUTION INTRAMUSCULAR; INTRAVENOUS at 16:47

## 2022-11-18 RX ADMIN — ONDANSETRON 4 MG: 2 INJECTION INTRAMUSCULAR; INTRAVENOUS at 17:43

## 2022-11-18 RX ADMIN — OXYCODONE HYDROCHLORIDE 5 MG: 5 TABLET ORAL at 12:51

## 2022-11-18 RX ADMIN — HYDROXYZINE HYDROCHLORIDE 50 MG: 25 TABLET, FILM COATED ORAL at 22:57

## 2022-11-18 RX ADMIN — HYDROMORPHONE HYDROCHLORIDE 1 MG: 1 INJECTION, SOLUTION INTRAMUSCULAR; INTRAVENOUS; SUBCUTANEOUS at 08:47

## 2022-11-18 RX ADMIN — BUPROPION HYDROCHLORIDE 300 MG: 150 TABLET, FILM COATED, EXTENDED RELEASE ORAL at 08:46

## 2022-11-18 RX ADMIN — HYDROMORPHONE HYDROCHLORIDE 1 MG: 1 INJECTION, SOLUTION INTRAMUSCULAR; INTRAVENOUS; SUBCUTANEOUS at 14:12

## 2022-11-18 RX ADMIN — FENTANYL CITRATE 50 MCG: 50 INJECTION, SOLUTION INTRAMUSCULAR; INTRAVENOUS at 17:10

## 2022-11-18 RX ADMIN — SODIUM CHLORIDE, POTASSIUM CHLORIDE, SODIUM LACTATE AND CALCIUM CHLORIDE: 600; 310; 30; 20 INJECTION, SOLUTION INTRAVENOUS at 16:43

## 2022-11-18 RX ADMIN — DEXAMETHASONE SODIUM PHOSPHATE 4 MG: 4 INJECTION, SOLUTION INTRA-ARTICULAR; INTRALESIONAL; INTRAMUSCULAR; INTRAVENOUS; SOFT TISSUE at 16:52

## 2022-11-18 RX ADMIN — PROPOFOL 50 MG: 10 INJECTION, EMULSION INTRAVENOUS at 17:51

## 2022-11-18 RX ADMIN — OXYCODONE HYDROCHLORIDE 10 MG: 5 TABLET ORAL at 22:57

## 2022-11-18 RX ADMIN — POTASSIUM CHLORIDE, DEXTROSE MONOHYDRATE AND SODIUM CHLORIDE 100 ML/HR: 150; 5; 900 INJECTION, SOLUTION INTRAVENOUS at 00:52

## 2022-11-18 RX ADMIN — OXYCODONE HYDROCHLORIDE 10 MG: 5 TABLET ORAL at 04:14

## 2022-11-18 RX ADMIN — ACETAMINOPHEN 975 MG: 325 TABLET, FILM COATED ORAL at 21:40

## 2022-11-18 RX ADMIN — SENNOSIDES AND DOCUSATE SODIUM 1 TABLET: 50; 8.6 TABLET ORAL at 21:41

## 2022-11-18 RX ADMIN — LIDOCAINE HYDROCHLORIDE 100 MG: 20 INJECTION, SOLUTION INFILTRATION; PERINEURAL at 16:47

## 2022-11-18 RX ADMIN — FLUOXETINE 20 MG: 20 CAPSULE ORAL at 08:47

## 2022-11-18 RX ADMIN — DEXAMETHASONE 4 MG: 2 TABLET ORAL at 06:35

## 2022-11-18 RX ADMIN — HYDROMORPHONE HYDROCHLORIDE 1 MG: 1 INJECTION, SOLUTION INTRAMUSCULAR; INTRAVENOUS; SUBCUTANEOUS at 06:37

## 2022-11-18 RX ADMIN — DEXMEDETOMIDINE HYDROCHLORIDE 0.5 MCG/KG/HR: 100 INJECTION, SOLUTION INTRAVENOUS at 16:52

## 2022-11-18 RX ADMIN — POTASSIUM CHLORIDE, DEXTROSE MONOHYDRATE AND SODIUM CHLORIDE 100 ML/HR: 150; 5; 900 INJECTION, SOLUTION INTRAVENOUS at 12:06

## 2022-11-18 ASSESSMENT — ACTIVITIES OF DAILY LIVING (ADL)
ADLS_ACUITY_SCORE: 37

## 2022-11-18 ASSESSMENT — COPD QUESTIONNAIRES: COPD: 1

## 2022-11-18 NOTE — OP NOTE
Date of surgery: November 15, 2022  Surgeon: Eugenio Cash MD  Assistant: Jennifer Bell NP (Note: the assistant was present for and assisted with the entire surgery, and his/her role as an assistant was crucial for aid in positioning, exposure, suctioning, retraction, and closure)     Preoperative diagnosis: Lumbar disc herniations  Postoperative diagnosis: Lumbar disc herniations     Procedure:  1.  Right L4-5 MIS evacuation of hematoma  2.  Right L5-S1 MIS revision laminotomy/decompression and evacuation of hematoma  3.  Use of [employer] Metrx minimally invasive system  4.  Use of intraoperative microscope and fluoroscopy     EBL: 25 mL     Indications: 57 year old female who underwent right L4-S1 microdiscectomy 3 days ago, was admitted with severe leg pain.  MRI showed fluid collections causing stenosis concerning for hematoma.  Risks, benefits, indications, and alternatives were discussed with the patient and family in detail, and they wished to proceed.     Description of surgery: The patient was positioned prone.  Sterile prepping and draping procedures were performed.  Antibiotics were administered and timeout was performed.  A paramedian lumbar incision was performed.  The minimally invasive dilating system was used to dock on the hemilamina.  The microscope was brought into the field, and under microscopy, L4-5 was explored and hematoma was evacuated.  No residual stenosis or recurrent disc herniation were observed.  Hemostasis was achieved and antibiotic irrigation was performed.  Next, the minimally invasive dilating system was re-docked at L5-S1.  Hematoma was evacuated and the thecal sac and exiting nerve root were visualized.  The kerrison rongeur was used to widen the decompression and pituitary rongeurs were used to remove some residual disc bulging.  Hemostasis was achieved and antibiotic irrigation was performed.  There was no evidence of CSF leak at either level.  The fascia was closed with  0-Vicryl sutures, and the dermal layer was closed with 2-0 vicryl sutures.  The skin was closed with a running subcuticular stitch.

## 2022-11-18 NOTE — ANESTHESIA PROCEDURE NOTES
Airway       Patient location during procedure: OR       Procedure Start/Stop Times: 11/18/2022 4:51 PM  Staff -        CRNA: Anastasia Castro APRN CRNA       Performed By: CRNA  Consent for Airway        Urgency: elective  Indications and Patient Condition       Indications for airway management: eloina-procedural       Induction type:intravenous       Mask difficulty assessment: 1 - vent by mask    Final Airway Details       Final airway type: endotracheal airway       Successful airway: ETT - single  Endotracheal Airway Details        ETT size (mm): 7.0       Cuffed: yes       Successful intubation technique: direct laryngoscopy       DL Blade Type: Dominguez 2       Grade View of Cords: 1       Adjucts: stylet       Position: Right       Measured from: gums/teeth       Secured at (cm): 21       Bite block used: None    Post intubation assessment        Placement verified by: capnometry, equal breath sounds and chest rise        Number of attempts at approach: 1       Number of other approaches attempted: 0       Secured with: pink tape       Ease of procedure: easy       Dentition: Intact and Unchanged    Medication(s) Administered   Medication Administration Time: 11/18/2022 4:51 PM

## 2022-11-18 NOTE — CARE PLAN
Admitting Diagnosis: Epidural hematoma [S06.4XAA]  Postoperative pain [G89.18]  CSF leak [G96.00]      Pt A&OX4. Calm & cooperative this shift. VSS ex elevated BP on RA. CMS intact. NPO starts at Midnight. Dex 5% &0.9 NaCl+KCl 20 infusing. Pain managed with PRN  IV dilaudid, oxycodone, flexeril. Pt refused bed alarm education provided  regarding safety on fall. Uses walker to independently to bathroom. Voiding adequately.Slept partial overnight. Will continue to monitor.

## 2022-11-18 NOTE — PROGRESS NOTES
1400 Patient was in pain and q2 dilauded was the only available. I have check both pexis, unavailable. I have messaged the pharmacy tech got replied that I should call pharmacy.   I right away called pharmacy and I was told that she is going to fixed it.   1600 I have check again both pexis, still unavailable. I have asked an assistance from the charge since I got busy from my discharge and surgical.     Charge nurse called again pharmacy to get fix   Got repot from the charge that pain med's. Is already available. I have check both pexis again- not available still.     1640 called pharmacy again to get verified since new order was put in by MD.

## 2022-11-18 NOTE — PROGRESS NOTES
[facility]    Neurosurgery  Daily Note    Assessment & Plan   Procedure(s):  MICRODISCECTOMY, SPINE, LUMBAR, 2 LEVELS, POSTERIOR APPROACH   57 year old POD3 status post right L4 to L5 and L5 to S1 diskectomy for lumbar disk herniation presented 11/16 with increased low back pain and bilateral lower extremity pain with imaging findings of postop changes including fluid collection as demonstrated below.     Patient notes pain is ongoing despite medications. Dr. Cash has seen patient this AM and recommending surgical intervention today. Timing TBD. She has been NPO. Patient in full understanding of plans and had no further questions.               Plan:  -OR today with Dr. Cash. Timing TBD   -Pre op orders placed   -Plans reviewed with patient, DR. Cash, charge RN   -Call or page with questions    Aidee Sullivan PA-C  [facility]  [address] [address]    Tel [phone]  Pager [phone]    Principal Problem:    CSF leak  Active Problems:    Epidural hematoma    Postoperative pain      Aidee Sullivan PA-C    Interval History   Stable     Physical Exam   Temp: 98.8  F (37.1  C) Temp src: Oral BP: 130/77 Pulse: 78   Resp: 18 SpO2: 97 % O2 Device: None (Room air)    There were no vitals filed for this visit.  Vital Signs with Ranges  Temp:  [98  F (36.7  C)-99.5  F (37.5  C)] 98.8  F (37.1  C)  Pulse:  [] 78  Resp:  [18-20] 18  BP: (121-173)/() 130/77  SpO2:  [95 %-97 %] 97 %  I/O last 3 completed shifts:  In: 880 [P.O.:880]  Out: 800 [Urine:800]    Awake, alert, appropriate   5/5 motor strength BLE   Decreased sensation left L5 distribution  Negative clonus     Incision CDI with steri strips in place     Medications     dextrose 5% and 0.9% NaCl with potassium chloride 20 mEq 100 mL/hr (11/18/22 0052)        buPROPion  300 mg Oral QAM     dexamethasone  4 mg Oral Q6H LIDYA     FLUoxetine  20 mg Oral Daily      lamoTRIgine  200 mg Oral Daily       Plans discussed with Dr. Cash who was in agreement with plans    Aidee RO [facility]  [address] [address]    Tel [phone]  Pager [phone]

## 2022-11-18 NOTE — PLAN OF CARE
"Patient A/Ox4, VSS,RA. Pain refused bed and chair alarm. Stated she can manage by herself of going to the commode that is place int he bathroom. Patient uses walker independent. Good intake and output.   Dextrose running 100ml/hr.   Pain manage w/ PRN oxy, atarax and Dilaudid. All PRN pain medication updated on the board so she is aware what is available.     About 1600 when patient had express frustration about not having Dilaudid on time by screaming outside the door. Stated, \"in ED its  not like this. They right away give me a dilauded when I want it\".   Charge nurse has to come in the room and explained to the patient what is the process of getting the medication on the floor.  At the same time, active and listening provided and we said out sorry to her.   Patient was calm after getting Dilaudid.     "

## 2022-11-18 NOTE — PROGRESS NOTES
[facility]    Hospitalist Progress Note    Interval History       -Data reviewed today: I reviewed all new labs and imaging results over the last 24 hours. I personally reviewed the Lumbar MRI image(s) showing Possible epidural hematoma with CSF leak.    Physical Exam   Temp: 98.8  F (37.1  C) Temp src: Oral BP: 130/77 Pulse: 78   Resp: 18 SpO2: 97 % O2 Device: None (Room air)    There were no vitals filed for this visit.  Vital Signs with Ranges  Temp:  [98  F (36.7  C)-99.5  F (37.5  C)] 98.8  F (37.1  C)  Pulse:  [] 78  Resp:  [18-20] 18  BP: (121-173)/() 130/77  SpO2:  [95 %-97 %] 97 %  I/O last 3 completed shifts:  In: 880 [P.O.:880]  Out: 800 [Urine:800]    Physical Exam  Constitutional:       Appearance: Normal appearance.   HENT:      Head: Normocephalic.   Eyes:      Pupils: Pupils are equal, round, and reactive to light.   Cardiovascular:      Rate and Rhythm: Normal rate and regular rhythm.      Pulses: Normal pulses.      Heart sounds: Normal heart sounds.   Pulmonary:      Effort: Pulmonary effort is normal. No respiratory distress.      Breath sounds: Normal breath sounds.   Abdominal:      General: Abdomen is flat. Bowel sounds are normal. There is no distension.      Tenderness: There is no abdominal tenderness. There is no guarding.   Musculoskeletal:         General: Normal range of motion.      Cervical back: Normal range of motion.   Skin:     General: Skin is warm and dry.      Comments: Slight bruising around the surgical site lower mid back.  Tenderness to palpation.  No obvious erythema or swelling.   Neurological:      General: No focal deficit present.      Comments: Gait not assessed due to severe lower extremity pain.   Psychiatric:         Mood and Affect: Mood normal.           Medications     dextrose 5% and 0.9% NaCl with potassium chloride 20 mEq 100 mL/hr (11/18/22 1206)       buPROPion  300 mg Oral QAM     dexamethasone  4 mg Oral Q6H LIDYA      FLUoxetine  20 mg Oral Daily     lamoTRIgine  200 mg Oral Daily       Data   Recent Labs   Lab 11/18/22  0637 11/16/22  1758   WBC 10.6 9.5   HGB 11.6* 12.3   MCV 96 97    248    138   POTASSIUM 4.5 3.4   CHLORIDE 107 104   CO2 28 33*   BUN 7 7   CR 0.68 0.85   ANIONGAP 5 1*   MADHU 9.2 8.7   * 96   ALBUMIN 3.1*  --    PROTTOTAL 6.8  --    BILITOTAL 0.6  --    ALKPHOS 111  --    ALT 18  --    AST 19  --        No results found for this or any previous visit (from the past 24 hour(s)).      Assessment & Plan      Kareen Hernandez is a 57 year old female admitted on 11/16/2022. She presents to the emergency department for evaluation of progressive worsening low back pain and right lower extremity numbness without weakness after right L4-L5 and right L5-S1 microdiscectomy 11/15/2022 with Dr. Cash.  Pain is now limiting ability to ambulate.     MICRODISCECTOMY, SPINE, LUMBAR, 2 LEVELS, POSTERIOR APPROACH with Postoperative low back pain: Patient with bilateral low back pain following right L4-L5 and right L5-S1 microdiscectomy with Dr. Cash 11/15/2022.  Also with some right leg numbness.  Inability to ambulate secondary to pain led to ER evaluation after phone consultation with her neurosurgery clinic.  MRI of spine with concern for fluid and gas collection exerting mass-effect on thecal sac at L4-L5 level resulting in severe canal stenosis and concerning for possible hematoma.  Similarly, L5-S1 finding with a hematocrit level concerning for hematoma.  Patient also with CF signal from mid L2 to low L3 suggestive of subdural CSF collection or CSF leak resulting in pressure on nerve roots of the cauda equina ventrally.  Patient with no objective weakness, though does have decreased sensation to light touch involving her right leg, from thigh to foot by report.  -Neurosurgery consulted, aware of patient from emergency department  -Currently on IV Dilaudid and oral oxycodone for pain control.  -Received IV  Decadron 10 mg x 1 in the emergency department.  On Decadron 4 mg every 6 hours thereafter  -Neurosurgery decided to proceed with surgical intervention for epidural hematoma with possible CSF leak due to persistence of pain despite oral medications.  -Trend CBC  -Intake and output, bladder scan for urinary retention  -D5 normal saline plus potassium at 100/h while n.p.o.  -Atarax 50 mg 3 times daily as needed for itching, anxiety, adjuvant pain control  -Flexeril 10 mg 3 times daily as needed for muscle spasms  -Doppler ABIs ordered without exercise given right leg pain previously attributed to spine disease, though also with right foot cooler than left.    Negative for any stenosis.    COPD:  -As needed albuterol nebulizer treatments available as needed  -Prior to admission Advair on hold while NPO     Tobacco use disorder: Uses a nicotine vape, is uncertain of her total nicotine use  -Continue to encourage tobacco cessation, especially in the setting of history of low back pain  -Offered nicotine replacement therapy, patient declines scheduled therapy  -Nicotine lozenges available as needed.  Patient aware     Marijuana use: History noted.     Major depressive disorder:  Bipolar affective disorder:  -Resume prior to admission Lamictal 200 mg daily   -Continue prior to admission fluoxetine 40 mg daily      History of gastric bypass:  -Resume prior to admission vitamin supplementation including cyanocobalamin injections monthly at discharge           Diet:    N.p.o. per anesthesia guidelines while awaiting neurosurgery evaluation and any potential operative plan.  If no plan for procedural intervention, advance diet as tolerated to regular adult diet.  DVT Prophylaxis: Ambulate every shift with assistance  Rene Catheter: Not present  Central Lines: None  Cardiac Monitoring: None  Code Status:  Full code, confirmed with patient on admission.       Clinically Significant Risk Factors Present on Admission               # Hypoalbuminemia: Lowest albumin = 3.1 g/dL (Ref range: 3.5-5.2) at 11/18/2022  6:37 AM, will monitor as appropriate                   Enriqueta Caba MD, MD  [phone](p)

## 2022-11-19 VITALS
SYSTOLIC BLOOD PRESSURE: 140 MMHG | TEMPERATURE: 99 F | OXYGEN SATURATION: 100 % | HEART RATE: 78 BPM | DIASTOLIC BLOOD PRESSURE: 90 MMHG | RESPIRATION RATE: 16 BRPM

## 2022-11-19 LAB — GLUCOSE BLDC GLUCOMTR-MCNC: 126 MG/DL (ref 70–99)

## 2022-11-19 PROCEDURE — 250N000013 HC RX MED GY IP 250 OP 250 PS 637: Performed by: PHYSICIAN ASSISTANT

## 2022-11-19 PROCEDURE — 250N000011 HC RX IP 250 OP 636: Performed by: PHYSICIAN ASSISTANT

## 2022-11-19 PROCEDURE — 258N000001 HC RX 258: Performed by: PHYSICIAN ASSISTANT

## 2022-11-19 RX ORDER — MOMETASONE FUROATE MONOHYDRATE 50 UG/1
2 SPRAY, METERED NASAL DAILY PRN
COMMUNITY
Start: 2022-11-19 | End: 2023-10-09

## 2022-11-19 RX ORDER — DEXAMETHASONE 4 MG/1
TABLET ORAL
Qty: 20 TABLET | Refills: 0 | Status: SHIPPED | OUTPATIENT
Start: 2022-11-19 | End: 2022-12-20

## 2022-11-19 RX ORDER — NYSTATIN 100000/ML
500000 SUSPENSION, ORAL (FINAL DOSE FORM) ORAL 4 TIMES DAILY PRN
COMMUNITY
Start: 2022-11-19 | End: 2023-10-09

## 2022-11-19 RX ORDER — OXYCODONE HYDROCHLORIDE 5 MG/1
5-10 TABLET ORAL EVERY 4 HOURS PRN
Qty: 50 TABLET | Refills: 0 | Status: SHIPPED | OUTPATIENT
Start: 2022-11-19 | End: 2022-12-20

## 2022-11-19 RX ADMIN — BUPROPION HYDROCHLORIDE 300 MG: 150 TABLET, FILM COATED, EXTENDED RELEASE ORAL at 08:24

## 2022-11-19 RX ADMIN — FLUOXETINE 20 MG: 20 CAPSULE ORAL at 08:24

## 2022-11-19 RX ADMIN — DEXAMETHASONE 4 MG: 2 TABLET ORAL at 00:48

## 2022-11-19 RX ADMIN — LAMOTRIGINE 200 MG: 100 TABLET ORAL at 08:24

## 2022-11-19 RX ADMIN — SENNOSIDES AND DOCUSATE SODIUM 1 TABLET: 50; 8.6 TABLET ORAL at 08:24

## 2022-11-19 RX ADMIN — ACETAMINOPHEN 975 MG: 325 TABLET, FILM COATED ORAL at 03:32

## 2022-11-19 RX ADMIN — DEXAMETHASONE 4 MG: 2 TABLET ORAL at 06:44

## 2022-11-19 RX ADMIN — POTASSIUM CHLORIDE, DEXTROSE MONOHYDRATE AND SODIUM CHLORIDE 100 ML/HR: 150; 5; 900 INJECTION, SOLUTION INTRAVENOUS at 03:32

## 2022-11-19 RX ADMIN — ZOLPIDEM TARTRATE 5 MG: 5 TABLET ORAL at 00:48

## 2022-11-19 RX ADMIN — CEFAZOLIN 1 G: 1 INJECTION, POWDER, FOR SOLUTION INTRAMUSCULAR; INTRAVENOUS at 00:48

## 2022-11-19 RX ADMIN — CEFAZOLIN 1 G: 1 INJECTION, POWDER, FOR SOLUTION INTRAMUSCULAR; INTRAVENOUS at 08:24

## 2022-11-19 ASSESSMENT — ACTIVITIES OF DAILY LIVING (ADL)
ADLS_ACUITY_SCORE: 37

## 2022-11-19 NOTE — DISCHARGE SUMMARY
Admit Date: 2022  Discharge Date:     PRIMARY DIAGNOSIS:  Lumbar hematoma.    OPERATION PERFORMED:  Right L4-L5 minimally invasive evacuation of hematoma, with revision of laminectomy and decompression for hematoma performed by Dr. Cash 2022.    HISTORY OF PRESENT ILLNESS:  The patient is a 57-year-old female who underwent minimally invasive right L4-L5 and L5-S1 diskectomies 11/15/2022 with Dr. Cash.  She returned to the Emergency Room 2022 with low back and leg pain with imaging findings revealing lumbar hematoma.  She returned to the operating room 2022 and underwent the above-described procedure.  Please see the dictated operative report for further details.  She tolerated surgery well and there were no complications.    She has been adequate neurosurgical recovery.  She has remained afebrile with stable vital signs.  She is tolerating a regular diet without difficulty and ambulating well.  Her operative incision is healing well.  There are no signs of drainage, erythema or edema.  She is ready to discharge to home on 2022 in improved condition as compared to admission.    All instructions regarding diet, activity, wound care, bowel management and followup were given to the patient, she was released in good condition.    DISCHARGE MEDICATIONS:    1.  Oxycodone 5 mg, instructed to take 1-2 tablets every 4 hours as needed for pain, #50, no refills.    PLAN:  Followup in the Neurosurgery Nurse Clinic 2022.  Follow up in the Neurosurgery [facility] clinic 2022 and 2023.    As always, the patient was instructed to call or return to the clinic for any worsening or changes in symptoms.    Eugenio Cash MD    As Dictated by YAIMA POOLE        D: 2022   T: 2022   MT: JUAN    Name:     JUAN HA  MRN:      [id number]        Account:      [id number]   :      1965           Service Date: 2022       Document: [id number]   [FreeTextEntry1] : Penile discharge [de-identified] : Patient is a 32 year old M who tested +for Chlamydia on 09/26 s/p 10 day course of PO Doxy 100mg BID with subsequent negative urine Chlamydia presenting today for progressive increase in amount of penile discharge. Patient states discharge eventually cleared after completed PO Doxy then because frequent only in mornings and is now constant and associated with dysuria. Patient states his partner also tested +for Chlamydia, underwent the the same tx regimen and also tested negative afterwards. States they abstained from sex for 2 weeks from initial diagnosis and since have only used condoms. Expresses that the relationship is monogamous but they recently have not seen each other and he has not been active with other partners.

## 2022-11-19 NOTE — PLAN OF CARE
Goal Outcome Evaluation:  Pt is alert and oriented. VSS on RA, afebrile. CMS intact and dressing has a scant/small dried drainage. Up with assist of 1 using a GB and walker. Voiding adequately per BR. IV infusing. Pain managed by IV and oral medications. Will continue to monitor.

## 2022-11-19 NOTE — PLAN OF CARE
Goal Outcome Evaluation:       A&Ox4. VSS on room air. IV removed for discharge. Up SBA. Neuro and CMS intact ex for numbness in RLE. Discharge today to home. Ride provided by son. Discharge teaching and medications given. All questions were answered and pt verbalized understanding. Pt reported having all belongings.

## 2022-11-19 NOTE — ANESTHESIA CARE TRANSFER NOTE
Patient: Kareen Hernandez    Procedure: Procedure(s):  MICRODISCECTOMY, SPINE, LUMBAR, 2 LEVELS, POSTERIOR APPROACH       Diagnosis: Hematoma [T14.8XXA]  Diagnosis Additional Information: No value filed.    Anesthesia Type:   General     Note:    Oropharynx: oropharynx clear of all foreign objects  Level of Consciousness: drowsy  Oxygen Supplementation: face mask  Level of Supplemental Oxygen (L/min / FiO2): 6  Independent Airway: airway patency satisfactory and stable  Dentition: dentition unchanged  Vital Signs Stable: post-procedure vital signs reviewed and stable  Report to RN Given: handoff report given  Patient transferred to: PACU  Comments: Patient appears comfortable  Handoff Report: Identifed the Patient, Identified the Reponsible Provider, Reviewed the pertinent medical history, Discussed the surgical course, Reviewed Intra-OP anesthesia mangement and issues during anesthesia, Set expectations for post-procedure period and Allowed opportunity for questions and acknowledgement of understanding      Vitals:  Vitals Value Taken Time   BP     Temp     Pulse 84 11/18/22 1815   Resp 15 11/18/22 1815   SpO2 100 % 11/18/22 1815   Vitals shown include unvalidated device data.    Electronically Signed By: MESHA Rolon CRNA  November 18, 2022  6:16 PM

## 2022-11-19 NOTE — PLAN OF CARE
Physical Therapy Discharge Summary    Reason for therapy discharge:    Discharged to home.    Progress towards therapy goal(s). See goals on Care Plan in [facility] electronic health record for goal details.  Goals partially met.  Barriers to achieving goals:   discharge from facility.    Therapy recommendation(s):    Continued therapy is recommended.  Rationale/Recommendations: Patient presents significantly below baseline for functional mobility where she is normally independent without a device. Currently she's only able to pivot transfer w/ FWW, limited by pain. She will benefit from TCU in order to address this prior to going home, pending progress with pain control. If she were to go home instead, she would need a w/c for longer distances and assist of 1 for all mobility.  PT Brief overview of current status: SBA bed mob, transfers w/ FWW; unable to ambulate d/t pain

## 2022-11-19 NOTE — ANESTHESIA POSTPROCEDURE EVALUATION
Patient: Kareen Hernandez    Procedure: Procedure(s):  MICRODISCECTOMY, SPINE, LUMBAR, 2 LEVELS, POSTERIOR APPROACH       Anesthesia Type:  General    Note:     Postop Pain Control: Uneventful            Sign Out: Well controlled pain   PONV: No   Neuro/Psych: Uneventful            Sign Out: Acceptable/Baseline neuro status   Airway/Respiratory: Uneventful            Sign Out: Acceptable/Baseline resp. status   CV/Hemodynamics: Uneventful            Sign Out: Acceptable CV status; No obvious hypovolemia; No obvious fluid overload   Other NRE: NONE   DID A NON-ROUTINE EVENT OCCUR? No           Last vitals:  Vitals Value Taken Time   /82 11/18/22 1950   Temp 37.2  C (98.9  F) 11/18/22 1900   Pulse 96 11/18/22 1954   Resp 6 11/18/22 1954   SpO2 98 % 11/18/22 1954   Vitals shown include unvalidated device data.    Electronically Signed By: Leno Dumont MD  November 18, 2022  10:31 PM

## 2022-11-22 ENCOUNTER — PATIENT OUTREACH (OUTPATIENT)
Dept: CARE COORDINATION | Facility: CLINIC | Age: 57
End: 2022-11-22

## 2022-11-22 NOTE — PROGRESS NOTES
"Clinic Care Coordination Contact  [facility]: Post-Discharge Note  SITUATION                                                      Admission:    Admission Date: 11/16/22   Reason for Admission: Lumbar hematoma  Discharge:   Discharge Date: 11/19/22  Discharge Diagnosis: Lumbar hematoma    BACKGROUND                                                      Per hospital discharge summary and inpatient provider notes:    The patient is a 57-year-old female who underwent minimally invasive right L4-L5 and L5-S1 diskectomies 11/15/2022 with Dr. Cash.  She returned to the Emergency Room 11/16/2022 with low back and leg pain with imaging findings revealing lumbar hematoma.  She returned to the operating room 11/18/2022 and underwent the above-described procedure.  Please see the dictated operative report for further details.  She tolerated surgery well and there were no complications.     She has been adequate neurosurgical recovery.  She has remained afebrile with stable vital signs.  She is tolerating a regular diet without difficulty and ambulating well.  Her operative incision is healing well.  There are no signs of drainage, erythema or edema.  She is ready to discharge to home on 11/19/2022 in improved condition as compared to admission.     All instructions regarding diet, activity, wound care, bowel management and followup were given to the patient, she was released in good condition.    ASSESSMENT      Discharge Assessment  How are you doing now that you are home?: \"Doing good\"  How are your symptoms? (Red Flag symptoms escalate to triage hotline per guidelines): Improved  Do you feel your condition is stable enough to be safe at home until your provider visit?: Yes  Does the patient have their discharge instructions? : Yes  Does the patient have questions regarding their discharge instructions? : No  Were you started on any new medications or were there changes to any of your previous medications? : Yes  Does " the patient have all of their medications?: Yes  Do you have questions regarding any of your medications? : No  Do you have all of your needed medical supplies or equipment (DME)?  (i.e. oxygen tank, CPAP, cane, etc.): Yes  Discharge follow-up appointment scheduled within 14 calendar days? : No  Is patient agreeable to assistance with scheduling? : No    Post-op ([facility] Only)  If the patient had a surgery or procedure, do they have any questions for a nurse?: No    PLAN                                                      Outpatient Plan:    Followup in the Neurosurgery Nurse Clinic 12/01/2022.  Follow up in the Neurosurgery [facility] clinic 12/29/2022 and 02/16/2023.     As always, the patient was instructed to call or return to the clinic for any worsening or changes in symptoms.    Future Appointments   Date Time Provider Department Center   12/1/2022 10:45 AM NurseKatelyn Neuro [facility]   12/13/2022 10:15 AM PFT LAB [facility]   12/20/2022  2:00 PM Leno Malhotra MD [facility]   12/29/2022  9:30 AM Hyacinth Timmons NP [facility]   2/16/2023  9:30 AM Hyacinth Timmons NP [facility]         For any urgent concerns, please contact our 24 hour nurse triage line: [phone] ([phone])         DANE Hein  [phone]  [facility]

## 2022-11-29 ENCOUNTER — TELEPHONE (OUTPATIENT)
Dept: NEUROSURGERY | Facility: CLINIC | Age: 57
End: 2022-11-29

## 2022-11-29 NOTE — TELEPHONE ENCOUNTER
Patient would like a call back, she wants to know when she can remove bandages. Please call her at [phone]. Thank you~

## 2022-11-29 NOTE — TELEPHONE ENCOUNTER
DOS: 11/16/22  Surgery: right L4-L5 and L5-S1 diskectomies 11/15/2022  Surgeon:  Dr Cash    She returned to the Emergency Room 11/16/2022 with low back and leg pain with imaging findings revealing lumbar hematoma.      She returned to the operating room 11/18/2022 and underwent Right L4-5 MIS evacuation of hematoma  And Right L5-S1 MIS revision laminotomy/decompression and evacuation of hematoma    Assessment: She calls today to ask when she can remove the bandages    Recommendation given: leave steri strips off til appointment. Will be assessed then. Pt agrees. No other issues     Action needed from provider: na

## 2022-12-01 ENCOUNTER — OFFICE VISIT (OUTPATIENT)
Dept: NEUROSURGERY | Facility: CLINIC | Age: 57
End: 2022-12-01
Payer: COMMERCIAL

## 2022-12-01 VITALS — SYSTOLIC BLOOD PRESSURE: 134 MMHG | DIASTOLIC BLOOD PRESSURE: 81 MMHG | HEART RATE: 89 BPM

## 2022-12-01 DIAGNOSIS — Z98.890 S/P SPINAL SURGERY: Primary | ICD-10-CM

## 2022-12-01 PROCEDURE — 99207 PR NO CHARGE NURSE ONLY: CPT

## 2022-12-01 ASSESSMENT — PAIN SCALES - GENERAL: PAINLEVEL: NO PAIN (1)

## 2022-12-01 NOTE — NURSING NOTE
"Kareen Hernandez is a 57 year old female who presents for:  Chief Complaint   Patient presents with     Follow Up     2 WK F/U. DOS: 11/15/22. MIS Right L4-5 and L5-S1 microdiscectomies         Initial Vitals:  /81   Pulse 89   LMP  (LMP Unknown)  Estimated body mass index is 24.56 kg/m  as calculated from the following:    Height as of 11/15/22: 5' 7\" (1.702 m).    Weight as of 11/15/22: 156 lb 12.8 oz (71.1 kg).. There is no height or weight on file to calculate BSA. BP completed using cuff size: regular  No Pain (1)    Nursing Comments:     Lou Espinoza MA    "

## 2022-12-01 NOTE — PROGRESS NOTES
"Post-op Nurse Visit:    Patient seen today per the order of  Eugenio Cash MD .     DOS: 11/16/22  Surgery: right L4-L5 and L5-S1 diskectomies 11/15/2022  Surgeon:  Dr Cash     She returned to the Emergency Room 11/16/2022 with low back and leg pain with imaging findings revealing lumbar hematoma.      She returned to the operating room 11/18/2022 and underwent Right L4-5 MIS evacuation of hematoma  And Right L5-S1 MIS revision laminotomy/decompression and evacuation of hematoma    Pain/Neuro Assessment  1/10 in back in incision described as minor pain.   Numbness/tingling: Right leg numbness much improved   Strength: Equal strength to bilateral lower extremities. Denies weakness.   She reports she feels great    Pain Relief Measures:  Oxycodone: none  Tylenol: none  Flexeril: none  Ice: as needed    Incision  Incision inspected. Edges well-approximated. No redness, swelling, drainage, or warmth noted. Steri-strips present. Writer removed.     Activity  Following restrictions   Falls: none  Patient is walking frequently without difficulty.   Legs examined in clinic; no redness, swelling, or warmth noted. Patient denies any pain in bilateral calves.   Wearing brace? No    GI/  Difficulty swallowing? No  Patient's appetite is normal  Bowel/bladder problems? No  Taking stool softeners? No     Refills/x-rays/return to work  Refills given at this appointment? No  Sent for x-rays after this appointment? No  Ordered future x-rays? No  Return to work discussed at this appointment? No     All of patient's questions addressed today. Patient was instructed to call with any additional questions/concerns.     She details some concerns with her second inpatient admission. She reports that she heard comments from the staff that they were not able to monitor her as requested by the surgeon.  And overheard someone suggested she be given an Ambien \"so they didn't have to check on me\"   She also report that incision was not evaluated " by an RN overnight  She demanded to leave at noon the following day

## 2022-12-01 NOTE — PATIENT INSTRUCTIONS
Instructions for Patient    Incision  Steri-strips were removed today   Keep your incision clean and dry at all times.   It is okay to shower, just pat the incision dry   No submerging incision in water such as pools, hot tubs, or baths for at least 8 weeks and until the incision is healed  Do not apply lotions or ointments to incision    Activity  No lifting greater than 10 pounds. Limited bending, twisting, or overhead reaching.  Walking is the best way to start exercise after surgery. Take short frequent walks. You may gradually increase the distance as tolerated. If you feel pain, decrease your activity, but DO NOT stop walking. Walking will help you gain strength, prevent muscle soreness and spasms, and prevent blood clots.   Avoid bed rest and prolonged sitting for longer than 30 minutes (change positions frequently while awake)  No contact sports or high impact activities such as; running/jogging, snowmobile or 4 luis riding or any other recreational vehicles until after given clearance at one of your follow up visits    Medications   Avoid Aspirin and NSAIDs (ex: ibuprofen/Advil) until given clearance (likely at the 6-week post-op appointment).  Encouraged icing for at least 3-4 times throughout the day for 20-30 minutes at a time. Avoid heat to the incision area.   Taking stool softeners regularly can reduce constipation commonly caused by narcotic pain medications.    Contact clinic or Emergency Room if you develop:   Infection (redness, swelling, warmth, drainage, fever over 101 F)  New injury  Bladder or bowel changes or loss of control    Signs of blood clot:  Swelling and/or warmth in one or both legs  Pain or tenderness in your leg, ankle, foot, or arm   Red or discolored skin     Go to the Emergency Room   If sudden onset of severe headache, weakness, confusion, change in level of consciousness, pain, or loss of movement.  Chest pain  Trouble breathing     Post-operative appointments  6 week and 3  months post-op    [facility]  [facility]  [address]  [place] [address]  Telephone:  [phone]   Fax:  [phone]

## 2022-12-02 DIAGNOSIS — F33.1 MAJOR DEPRESSIVE DISORDER, RECURRENT EPISODE, MODERATE (H): ICD-10-CM

## 2022-12-02 DIAGNOSIS — F41.0 PANIC ATTACK: ICD-10-CM

## 2022-12-02 NOTE — TELEPHONE ENCOUNTER
Seen 7-8-22 by Dr. Courtney for annual and he is listed as primary, routing to his team for refills.     Lucero AVALOS RN  [facility]

## 2022-12-05 RX ORDER — FLUOXETINE 20 MG/1
TABLET, FILM COATED ORAL
Qty: 180 TABLET | Refills: 1 | Status: SHIPPED | OUTPATIENT
Start: 2022-12-05 | End: 2023-06-05

## 2022-12-13 ENCOUNTER — OFFICE VISIT (OUTPATIENT)
Dept: NURSING | Facility: CLINIC | Age: 57
End: 2022-12-13
Attending: FAMILY MEDICINE
Payer: COMMERCIAL

## 2022-12-13 VITALS — OXYGEN SATURATION: 98 % | HEIGHT: 67 IN | BODY MASS INDEX: 24.55 KG/M2 | WEIGHT: 156.4 LBS | HEART RATE: 74 BPM

## 2022-12-13 DIAGNOSIS — J42 CHRONIC BRONCHITIS, UNSPECIFIED CHRONIC BRONCHITIS TYPE (H): ICD-10-CM

## 2022-12-13 PROCEDURE — 94729 DIFFUSING CAPACITY: CPT | Performed by: INTERNAL MEDICINE

## 2022-12-13 PROCEDURE — 94726 PLETHYSMOGRAPHY LUNG VOLUMES: CPT | Performed by: INTERNAL MEDICINE

## 2022-12-13 PROCEDURE — 94060 EVALUATION OF WHEEZING: CPT | Performed by: INTERNAL MEDICINE

## 2022-12-13 NOTE — LETTER
December 15, 2022    Kareen Hernandez  [address]   [place] [address]          Dear ,    We are writing to inform you of your test results.    Function test shows normal lung function.       Resulted Orders   General PFT Lab (Please always keep checked)   Result Value Ref Range    FVC-Pred 3.68 L    FVC-Pre 4.66 L    FVC-%Pred-Pre 126 %    FEV1-Pre 3.37 L    FEV1-%Pred-Pre 116 %    FEV1FVC-Pred 80 %    FEV1FVC-Pre 72 %    FEFMax-Pred 7.01 L/sec    FEFMax-Pre 6.54 L/sec    FEFMax-%Pred-Pre 93 %    FEF2575-Pred 2.66 L/sec    FEF2575-Pre 2.41 L/sec    OAH4586-%Pred-Pre 90 %    FEF2575-Post 2.45 L/sec    HSM9956-%Pred-Post 91 %    ExpTime-Pre 7.59 sec    FIFMax-Pre 5.74 L/sec    VC-Pred 3.76 L    VC-Pre 4.47 L    VC-%Pred-Pre 118 %    IC-Pred 2.75 L    IC-Pre 2.13 L    IC-%Pred-Pre 77 %    ERV-Pred 1.00 L    ERV-Pre 2.34 L    ERV-%Pred-Pre 233 %    FEV1FEV6-Pred 82 %    FEV1FEV6-Pre 74 %    FRCPleth-Pred 2.87 L    FRCPleth-Pre 4.45 L    FRCPleth-%Pred-Pre 155 %    RVPleth-Pred 1.96 L    RVPleth-Pre 2.11 L    RVPleth-%Pred-Pre 107 %    TLCPleth-Pred 5.44 L    TLCPleth-Pre 6.58 L    TLCPleth-%Pred-Pre 120 %    DLCOunc-Pred 22.79 ml/min/mmHg    DLCOunc-Pre 23.74 ml/min/mmHg    DLCOunc-%Pred-Pre 104 %    VA-Pre 5.95 L    VA-%Pred-Pre 108 %    FEV1SVC-Pred 77 %    FEV1SVC-Pre 76 %    Narrative    The FVC, FEV1 and FEV1/FVC ratio are within normal limits.  The inspiratory flow rates are within normal limits.  While the SVC and TLC are within normal limits, the FRC is increased.  Following administration of bronchodilators, there is no significant   response.  The diffusing capacity is normal.  However, the diffusing capacity was not corrected for the patient's hemoglobin.        IMPRESSION:    As of 12 December 2022 our interpretation of pulmonary function testing follows 2022 ATS/ERS guidelines. This may result in a change in severity and significance designations compared to interpretations done under  previous guidelines.     Normal pulmonary function.      No significant change following bronchodilators but this does not rule out clinical efficacy.      No previous studies available for comparison.    Norm Jay MD              This interpretation has been electronically signed:  NORM JAY 12/14/2022  02:22:38 PM         If you have any questions or concerns, please call the clinic at the number listed above.       Sincerely,      Leno Courtney MD

## 2022-12-13 NOTE — LETTER
December 15, 2022    Kareen Hernandez  [address]   [place] [address]          Dear ,    We are writing to inform you of your test results.      Your pulmonary function test shows normal lung function.       Resulted Orders   General PFT Lab (Please always keep checked)   Result Value Ref Range    FVC-Pred 3.68 L    FVC-Pre 4.66 L    FVC-%Pred-Pre 126 %    FEV1-Pre 3.37 L    FEV1-%Pred-Pre 116 %    FEV1FVC-Pred 80 %    FEV1FVC-Pre 72 %    FEFMax-Pred 7.01 L/sec    FEFMax-Pre 6.54 L/sec    FEFMax-%Pred-Pre 93 %    FEF2575-Pred 2.66 L/sec    FEF2575-Pre 2.41 L/sec    QYK0771-%Pred-Pre 90 %    FEF2575-Post 2.45 L/sec    UAL5967-%Pred-Post 91 %    ExpTime-Pre 7.59 sec    FIFMax-Pre 5.74 L/sec    VC-Pred 3.76 L    VC-Pre 4.47 L    VC-%Pred-Pre 118 %    IC-Pred 2.75 L    IC-Pre 2.13 L    IC-%Pred-Pre 77 %    ERV-Pred 1.00 L    ERV-Pre 2.34 L    ERV-%Pred-Pre 233 %    FEV1FEV6-Pred 82 %    FEV1FEV6-Pre 74 %    FRCPleth-Pred 2.87 L    FRCPleth-Pre 4.45 L    FRCPleth-%Pred-Pre 155 %    RVPleth-Pred 1.96 L    RVPleth-Pre 2.11 L    RVPleth-%Pred-Pre 107 %    TLCPleth-Pred 5.44 L    TLCPleth-Pre 6.58 L    TLCPleth-%Pred-Pre 120 %    DLCOunc-Pred 22.79 ml/min/mmHg    DLCOunc-Pre 23.74 ml/min/mmHg    DLCOunc-%Pred-Pre 104 %    VA-Pre 5.95 L    VA-%Pred-Pre 108 %    FEV1SVC-Pred 77 %    FEV1SVC-Pre 76 %    Narrative    The FVC, FEV1 and FEV1/FVC ratio are within normal limits.  The inspiratory flow rates are within normal limits.  While the SVC and TLC are within normal limits, the FRC is increased.  Following administration of bronchodilators, there is no significant   response.  The diffusing capacity is normal.  However, the diffusing capacity was not corrected for the patient's hemoglobin.        IMPRESSION:    As of 12 December 2022 our interpretation of pulmonary function testing follows 2022 ATS/ERS guidelines. This may result in a change in severity and significance designations compared to  interpretations done under previous guidelines.     Normal pulmonary function.      No significant change following bronchodilators but this does not rule out clinical efficacy.      No previous studies available for comparison.    Norm Jay MD              This interpretation has been electronically signed:  NORM JAY 12/14/2022  02:22:38 PM         If you have any questions or concerns, please call the clinic at the number listed above.       Sincerely,      Leno Courtney MD

## 2022-12-14 LAB
DLCOUNC-%PRED-PRE: 104 %
DLCOUNC-PRE: 23.74 ML/MIN/MMHG
DLCOUNC-PRED: 22.79 ML/MIN/MMHG
ERV-%PRED-PRE: 233 %
ERV-PRE: 2.34 L
ERV-PRED: 1 L
EXPTIME-PRE: 7.59 SEC
FEF2575-%PRED-POST: 91 %
FEF2575-%PRED-PRE: 90 %
FEF2575-POST: 2.45 L/SEC
FEF2575-PRE: 2.41 L/SEC
FEF2575-PRED: 2.66 L/SEC
FEFMAX-%PRED-PRE: 93 %
FEFMAX-PRE: 6.54 L/SEC
FEFMAX-PRED: 7.01 L/SEC
FEV1-%PRED-PRE: 116 %
FEV1-PRE: 3.37 L
FEV1FEV6-PRE: 74 %
FEV1FEV6-PRED: 82 %
FEV1FVC-PRE: 72 %
FEV1FVC-PRED: 80 %
FEV1SVC-PRE: 76 %
FEV1SVC-PRED: 77 %
FIFMAX-PRE: 5.74 L/SEC
FRCPLETH-%PRED-PRE: 155 %
FRCPLETH-PRE: 4.45 L
FRCPLETH-PRED: 2.87 L
FVC-%PRED-PRE: 126 %
FVC-PRE: 4.66 L
FVC-PRED: 3.68 L
IC-%PRED-PRE: 77 %
IC-PRE: 2.13 L
IC-PRED: 2.75 L
RVPLETH-%PRED-PRE: 107 %
RVPLETH-PRE: 2.11 L
RVPLETH-PRED: 1.96 L
TLCPLETH-%PRED-PRE: 120 %
TLCPLETH-PRE: 6.58 L
TLCPLETH-PRED: 5.44 L
VA-%PRED-PRE: 108 %
VA-PRE: 5.95 L
VC-%PRED-PRE: 118 %
VC-PRE: 4.47 L
VC-PRED: 3.76 L

## 2022-12-20 ENCOUNTER — OFFICE VISIT (OUTPATIENT)
Dept: UROLOGY | Facility: CLINIC | Age: 57
End: 2022-12-20
Attending: FAMILY MEDICINE
Payer: COMMERCIAL

## 2022-12-20 VITALS — HEART RATE: 88 BPM | DIASTOLIC BLOOD PRESSURE: 78 MMHG | OXYGEN SATURATION: 99 % | SYSTOLIC BLOOD PRESSURE: 122 MMHG

## 2022-12-20 DIAGNOSIS — Z98.890 S/P LUMBAR LAMINECTOMY: ICD-10-CM

## 2022-12-20 DIAGNOSIS — N20.0 RECURRENT KIDNEY STONES: ICD-10-CM

## 2022-12-20 PROCEDURE — 99203 OFFICE O/P NEW LOW 30 MIN: CPT | Performed by: UROLOGY

## 2022-12-20 RX ORDER — OXYCODONE HYDROCHLORIDE 5 MG/1
5-10 TABLET ORAL EVERY 6 HOURS PRN
Qty: 40 TABLET | Refills: 0 | Status: SHIPPED | OUTPATIENT
Start: 2022-12-20 | End: 2023-01-03

## 2022-12-20 RX ORDER — METHYLPREDNISOLONE 4 MG
TABLET, DOSE PACK ORAL
Qty: 21 TABLET | Refills: 0 | Status: SHIPPED | OUTPATIENT
Start: 2022-12-20 | End: 2023-05-18

## 2022-12-20 NOTE — PATIENT INSTRUCTIONS
[employer] is for any [facility] looking for free support to quit smoking, vaping or chewing.   [employer] will offer many quit support options and resources so [place] residents can continue to find the way to quit that works best for them.   Free support includes personalized coaching, email and text support, educational materials, and quit medication (nicotine patches, gum or lozenges) delivered by mail.     Contact [employer] at 1-800-QUIT-NOW or online at [employer] to receive support on your quit journey.

## 2022-12-20 NOTE — TELEPHONE ENCOUNTER
Patient would like a call back regarding new pain she is having that is radiating into her legs. Please call her backat [phone]. Thank you~

## 2022-12-20 NOTE — TELEPHONE ENCOUNTER
Last Visit: 12/1/22    Next Visit: 12/29/22    Name of Provider:  Dr Cash    Assessment: Patient s/p Right L4-5 microdiscectomy on 11/16 and Right L4-5 hematoma evacuation 11/18  Pre op symptoms, right lower back and posterior right leg pain, and right leg numbness, had resolved initially. Now reporting return of right buttock, leg pain when ambulating(as bad as 8/10). Resolves when sitting. Also reporting similar pain on left leg in morning when she gets up from lying on that left side which resolves as day goes on. No reported precipitating injury. Following restrictions.   No incisional concerns.  Denies weakness, denies numbness/tingling, denies bowel/bladder issues.     Out of prescribed pain meds, so just utilizing OTCs and ice.     Recommended CTM symptoms, reviewed Red Flags.     Will send refill request to Providers and see if any other recommendations.

## 2022-12-20 NOTE — PROGRESS NOTES
S: Patient is a pleasant 57-year-old female who was requested to be seen by Dr. Leno Easton for a consultation with regard to patient's history of recurrent kidney stones.  Patient has had multiple treatments for kidney stones in the past.  She continues to form them.  Recent CT scan showed multiple small renal stones.  She has history of gastric bypass many years ago.  She has no recent flank pain, nausea, or vomiting.  Current Outpatient Medications   Medication Sig Dispense Refill     ADVAIR -21 MCG/ACT inhaler INHALE 2 PUFFS INTO THE LUNGS TWICE A DAY (Patient taking differently: Inhale 2 puffs into the lungs At Bedtime) 12 g 3     albuterol (PROAIR HFA/PROVENTIL HFA/VENTOLIN HFA) 108 (90 Base) MCG/ACT inhaler Inhale 2 puffs into the lungs every 4 hours as needed for shortness of breath / dyspnea or wheezing 18 g 1     B-D SYRINGE LUER-BLAYNE 3 ML MISC 1 SYRINGE EVERY 30 DAYS 25 each 1     buPROPion (WELLBUTRIN XL) 300 MG 24 hr tablet Take 1 tablet (300 mg) by mouth every morning 90 tablet 1     cyanocobalamin (CYANOCOBALAMIN) 1000 MCG/ML injection ADMINISTER 1 ML IN THE MUSCLE EVERY 30 DAYS 3 mL 3     cyclobenzaprine (FLEXERIL) 10 MG tablet Take 1 tablet (10 mg) by mouth 3 times daily as needed for muscle spasms 60 tablet 0     FLUoxetine 20 MG tablet TAKE 2 TABLETS (40 MG) BY MOUTH EVERY  tablet 1     fluticasone (FLONASE) 50 MCG/ACT nasal spray INSTILL 1 SPRAY INTO BOTH NOSTRILS DAILY (Patient taking differently: Spray 1 spray into both nostrils daily as needed) 16 mL 11     lamoTRIgine (LAMICTAL) 100 MG tablet Take 2 tablets (200 mg) by mouth daily 180 tablet 3     medical cannabis (Patient's own supply) See Admin Instructions (The purpose of this order is to document that the patient reports taking medical cannabis.  This is not a prescription, and is not used to certify that the patient has a qualifying medical condition.)       mometasone (NASONEX) 50 MCG/ACT nasal spray Spray 2 sprays  "into both nostrils daily as needed       Needle, Disp, 25G X 1-1/2\" MISC 1 Device every 30 days 50 each 1     nystatin (MYCOSTATIN) 768689 UNIT/ML suspension Take 5 mLs (500,000 Units) by mouth 4 times daily as needed       order for DME Equipment being ordered: Splint right thumb 1 Units 0     oxyCODONE (ROXICODONE) 5 MG tablet Take 1-2 tablets (5-10 mg) by mouth every 4 hours as needed for moderate pain (4-6) 50 tablet 0     senna-docusate (SENOKOT-S/PERICOLACE) 8.6-50 MG tablet Take 1-2 tablets by mouth 2 times daily as needed for constipation 30 tablet 0     SUMAtriptan (IMITREX) 100 MG tablet TAKE 1 TABLET BY MOUTH ON THE ONSET OF HEADACHE, MAY REPEAT IN 2 HOURS..*MAX 2 TABLETS DAILY* 9 tablet 3     Syringe/Needle, Disp, (BD LUER-BLAYNE SYRINGE) 25G X 1-1/2\" 3 ML MISC 1 Syringe every 30 days 12 each 1     vitamin D2 (ERGOCALCIFEROL) 74493 units (1250 mcg) capsule TAKE 1 CAPSULE BY MOUTH ONCE WEEKLY 4 capsule 1     zolpidem (AMBIEN) 10 MG tablet TAKE ONE-HALF TABLET TO ONE TABLET BY MOUTH AT BEDTIME FOR SLEEP (Patient taking differently: Take 5-10 mg by mouth nightly as needed) 30 tablet 3     Allergies   Allergen Reactions     Lurasidone Diarrhea     Diarrhea     Morphine Rash     Past Medical History:   Diagnosis Date     Anxiety 03/21/2012     Calculus of kidney     Multiple     CARDIOVASCULAR SCREENING; LDL GOAL LESS THAN 130 10/31/2010     CARDIOVASCULAR SCREENING; LDL GOAL LESS THAN 160 10/31/2010     COPD (chronic obstructive pulmonary disease) (H)      Fibromyalgia 11/30/2006     Gastric bypass status for obesity 04/16/2009     Hypertension goal BP (blood pressure) < 140/90 02/24/2015     Insomnia      Major depressive disorder, single episode, severe, specified as with psychotic behavior      Migraine headache      Moderate major depression (H) 11/30/2006     Myalgia and myositis, unspecified     FIBROMYALGIA     Right maxillary sinusitis, chronic      Tobacco use disorder 07/14/2011    Quit " [id number]     Vitamin B12 deficiency disease 2013     Vitamin D deficiency disease 2013     Past Surgical History:   Procedure Laterality Date     ABDOMINOPLASTY  2013     [facility] AFF SURGICAL PATHOLOGY  2007    Redman's neuroma  - Right Foot     DISCECTOMY LUMBAR POSTERIOR MICROSCOPIC ONE LEVEL Left 2/15/2022    Procedure: Minimally Invasive Left Lumbar 5 to Sacral 1 microdiscectomy;  Surgeon: Eugenio Cash MD;  Location: [facility] OR     DISCECTOMY LUMBAR POSTERIOR MICROSCOPIC TWO LEVELS Right 11/15/2022    Procedure: Minimally Invasive Right Lumbar 4 to Lumbar 5 and Lumbar 5 to Sacral 1 microdiscectomies;  Surgeon: Eugenio Cash MD;  Location: [facility] OR     DISCECTOMY LUMBAR POSTERIOR MICROSCOPIC TWO LEVELS Right 2022    Procedure: MICRODISCECTOMY, SPINE, LUMBAR, 2 LEVELS, POSTERIOR APPROACH;  Surgeon: Eugenio Cash MD;  Location: [facility] OR     LITHOTRIPSY       SURGICAL PATHOLOGY EXAM      Lipoma Removal on her back     ZZC GASTRIC BYPASS,OBESE<100CM DAYAN-EN-Y  3-25-09    [facility] REMOVAL OF KIDNEY STONE      With kidney tents x 5 to 6 procedures.      Family History   Problem Relation Age of Onset     Asthma Mother      Hypertension Mother      C.A.D. Mother      Genitourinary Problems Mother         kidney failure   age 80     Chronic Obstructive Pulmonary Disease Mother      Diabetes Father      Hypertension Father      C.A.D. Father      Chronic Obstructive Pulmonary Disease Father      Dementia Father      Genitourinary Problems Sister         kidney stones     Obesity Daughter         gastric sleeve     Cerebrovascular Disease No family hx of      Breast Cancer No family hx of      Cancer - colorectal No family hx of      Prostate Cancer No family hx of      Social History     Socioeconomic History     Marital status:      Spouse name: None     Number of children: 4     Years of education: None     Highest education level: None   Occupational History      Occupation: Mental health and Fibromyalgia     Employer: UNEMPLOYED     Comment: On disability     Employer: DISABLED   Tobacco Use     Smoking status: Former     Packs/day: 1.00     Years: 37.00     Pack years: 37.00     Types: Cigarettes     Quit date: 2013     Years since quittin.2     Smokeless tobacco: Current     Tobacco comments:     Vape e-cig   Vaping Use     Vaping Use: Every day     Substances: Nicotine, THC, CBD   Substance and Sexual Activity     Alcohol use: Yes     Drug use: Yes     Comment: Marijuana - 3 times a week     Sexual activity: Not Currently     Partners: Male   Other Topics Concern      Service No     Blood Transfusions No     Caffeine Concern No     Comment: Pop - 1 to 2 a day     Occupational Exposure No     Hobby Hazards No     Sleep Concern Yes     Comment: trouble falling asleep     Stress Concern No     Weight Concern Yes     Special Diet No     Back Care No     Exercise No     Comment: No regular exercise program     Bike Helmet Yes     Seat Belt Yes     Self-Exams Yes     Parent/sibling w/ CABG, MI or angioplasty before 65F 55M? No   Social History Narrative    Live with daughter.       REVIEW OF SYSTEMS  =================  C: NEGATIVE for fever, chills, change in weight  I: NEGATIVE for worrisome rashes, moles or lesions  E/M: NEGATIVE for ear, mouth and throat problems  R: NEGATIVE for significant cough or SHORTNESS OF BREATH  CV:  NEGATIVE for chest pain, palpitations or peripheral edema  GI: NEGATIVE for nausea, abdominal pain, heartburn, or change in bowel habits  NEURO: NEGATIVE numbness/weakness  : see HPI  PSYCH: NEGATIVE depression/anxiety  LYmph: no new enlarged lymph nodes  Ortho: no new trauma/movements      Physical Exam:  /78   Pulse 88   LMP  (LMP Unknown)   SpO2 99%    Patient is pleasant, in no acute distress, good general condition.  Heart:  negative, PMI normal  Lung: no evidence of respiratory distress     Exam: normal  female  Skin: Warm and dry.  No redness.  Neuro: grossly normal  Musculaskeletal: moving all extremities  Psych normal mood and affect  Musculoskeletal  moving all extremities  Hematologic/Lymphatic/Immunologic: normal ant/post cervical, axillary, supraclavicular and inguinal nodes    CT scan reviewed.    Assessment/Plan:     Pleasant 57-year-old  female with history of recurrent kidney stones with history of gastric bypass in the past.  This is due to calcium malabsorption usually.  I will start patient on calcium citrate 2 g daily.  I will schedule patient for metabolic evaluation next also.

## 2022-12-29 ENCOUNTER — OFFICE VISIT (OUTPATIENT)
Dept: NEUROSURGERY | Facility: CLINIC | Age: 57
End: 2022-12-29
Payer: COMMERCIAL

## 2022-12-29 VITALS — SYSTOLIC BLOOD PRESSURE: 129 MMHG | OXYGEN SATURATION: 97 % | DIASTOLIC BLOOD PRESSURE: 77 MMHG | HEART RATE: 85 BPM

## 2022-12-29 DIAGNOSIS — Z98.890 S/P LUMBAR MICRODISCECTOMY: Primary | ICD-10-CM

## 2022-12-29 PROCEDURE — 99024 POSTOP FOLLOW-UP VISIT: CPT | Performed by: NURSE PRACTITIONER

## 2022-12-29 ASSESSMENT — PAIN SCALES - GENERAL: PAINLEVEL: MODERATE PAIN (5)

## 2022-12-29 NOTE — PROGRESS NOTES
[facility] Neurosurgery Follow-Up:     HPI: 6 weeks s/p MIS right L4-5 and L5-S1 microdiscectomy on 11/15/22 with Dr. Cash, then MIS right L4-5 evacuation of hematoma and L5-S1 revision laminotomy/decompression and evacuation of hematoma on 11/18/22. Patient reports intermittent right leg pain when walking, but overall improved compared to pre op. Pre op low back pain resolved. She reports intermittent posterior left thigh pain that occurs after sleeping on the left side. Denies any incision concerns. Currently using Oxycodone at bedtime for pain management. She was also given MDP but did not take due to fear of side effects. She would like to start post op PT. Overall, patient feels better than prior to surgery.     Current pain: 5/10    Exam:  Constitutional:  Alert, well nourished, NAD.  HEENT: Normocephalic, atraumatic.   Pulm:  Without shortness of breath   CV:  No pitting edema of BLE.      /77   Pulse 85   LMP  (LMP Unknown)   SpO2 97%     Neurological:  Awake  Alert  Oriented x 3  Motor exam:  Hip Flexor:                Right: 5/5  Left:  5/5  Quadriceps:              Right:  5/5  Left:  5/5  Hamstrings:              Right:  5/5  Left:  5/5  Gastroc Soleus:        Right:  5/5  Left:  5/5  Tib/Ant:                      Right:  5/5  Left:  5/5  EHL:                          Right:  5/5  Left:  5/5      Able to spontaneously move BLE  Sensation intact throughout BLE  Incision: Well healed     Assessment/Plan:   6 weeks s/p MIS right L4-5 and L5-S1 microdiscectomy on 11/15/22, then MIS right L4-5 evacuation of hematoma and L5-S1 revision laminotomy/decompression and evacuation of hematoma on 11/18/22    - Continue with routine post operative care plan   - Referral for post op PT  - May gradually increase activity and lifting restriction to 20 pounds, as tolerated  - Pain control measures as needed  - Follow up in 6 weeks   - Call our clinic for any incisional concerns, increased pain, new  symptoms, or any other post operative questions or concerns    Discussed red flag symptoms and advised to seek medical attention if these develop. Patient voiced understanding and agreement.      Hyacinth Timmons CNP  [facility]  [address] [place] [id number]  Tel [phone]  Pager [phone]

## 2022-12-29 NOTE — LETTER
12/29/2022         RE: Kareen Hernandez  [address]  [place] [address]        Dear Colleague,    Thank you for referring your patient, Kareen Hernandez, to the [facility]. Please see a copy of my visit note below.    [facility] Follow-Up:     HPI: 6 weeks s/p MIS right L4-5 and L5-S1 microdiscectomy on 11/15/22 with Dr. Cash, then MIS right L4-5 evacuation of hematoma and L5-S1 revision laminotomy/decompression and evacuation of hematoma on 11/18/22. Patient reports intermittent right leg pain when walking, but overall improved compared to pre op. Pre op low back pain resolved. She reports intermittent posterior left thigh pain that occurs after sleeping on the left side. Denies any incision concerns. Currently using Oxycodone at bedtime for pain management. She was also given MDP but did not take due to fear of side effects. She would like to start post op PT. Overall, patient feels better than prior to surgery.     Current pain: 5/10    Exam:  Constitutional:  Alert, well nourished, NAD.  HEENT: Normocephalic, atraumatic.   Pulm:  Without shortness of breath   CV:  No pitting edema of BLE.      /77   Pulse 85   LMP  (LMP Unknown)   SpO2 97%     Neurological:  Awake  Alert  Oriented x 3  Motor exam:  Hip Flexor:                Right: 5/5  Left:  5/5  Quadriceps:              Right:  5/5  Left:  5/5  Hamstrings:              Right:  5/5  Left:  5/5  Gastroc Soleus:        Right:  5/5  Left:  5/5  Tib/Ant:                      Right:  5/5  Left:  5/5  EHL:                          Right:  5/5  Left:  5/5      Able to spontaneously move BLE  Sensation intact throughout BLE  Incision: Well healed     Assessment/Plan:   6 weeks s/p MIS right L4-5 and L5-S1 microdiscectomy on 11/15/22, then MIS right L4-5 evacuation of hematoma and L5-S1 revision laminotomy/decompression and evacuation of hematoma on 11/18/22    - Continue with routine post  operative care plan   - Referral for post op PT  - May gradually increase activity and lifting restriction to 20 pounds, as tolerated  - Pain control measures as needed  - Follow up in 6 weeks   - Call our clinic for any incisional concerns, increased pain, new symptoms, or any other post operative questions or concerns    Discussed red flag symptoms and advised to seek medical attention if these develop. Patient voiced understanding and agreement.      Hyacinth Timmons CNP  [facility] Neurosurgery  [address] [place] [address]  Tel [phone]  Pager [phone]          Again, thank you for allowing me to participate in the care of your patient.        Sincerely,        Hyacinth Timmons, NP

## 2022-12-29 NOTE — NURSING NOTE
"Kareen Hernandez is a 57 year old female who presents for:  Chief Complaint   Patient presents with     Consult     6 WK F/U. DOS: 11/15/22. MIS Right L4-5 and L5-S1 microdiscectomies         Initial Vitals:  /77   Pulse 85   LMP  (LMP Unknown)   SpO2 97%  Estimated body mass index is 24.5 kg/m  as calculated from the following:    Height as of 12/13/22: 1.702 m (5' 7\").    Weight as of 12/13/22: 70.9 kg (156 lb 6.4 oz).. There is no height or weight on file to calculate BSA. BP completed using cuff size: regular  Moderate Pain (5)    Nursing Comments:     Lou Espinoza MA    "

## 2022-12-29 NOTE — PATIENT INSTRUCTIONS
- Continue with routine post operative care plan   - Referral for post op PT  - May gradually increase activity and lifting restriction to 20 pounds, as tolerated  - Pain control measures as needed  - Follow up in 6 weeks   - Call our clinic for any incisional concerns, increased pain, new symptoms, or any other post operative questions or concerns

## 2023-01-03 DIAGNOSIS — Z98.890 S/P LUMBAR LAMINECTOMY: ICD-10-CM

## 2023-01-03 RX ORDER — OXYCODONE HYDROCHLORIDE 5 MG/1
5-10 TABLET ORAL EVERY 6 HOURS PRN
Qty: 40 TABLET | Refills: 0 | Status: SHIPPED | OUTPATIENT
Start: 2023-01-03 | End: 2023-01-26

## 2023-01-03 NOTE — CONFIDENTIAL NOTE
Patient calling for a refill of oxycodone.     DOS: 11/18/22  Procedure: MIS right L4-5 and L5-S1 microdiscectomy on 11/15/22 with Dr. Cash, then MIS right L4-5 evacuation of hematoma and L5-S1 revision laminotomy/decompression and evacuation of hematoma on 11/18/22  Surgeon: Dr. Cash  Weeks Post Op: 6 weeks 4 days    Current symptom(s): 6/10 located in left side of back. Same symptoms as office visit 12/29. Denies new numbness, tingling, weakness.   Current pain management:   Oxycodone - Approximately 2 pills per day. 1 pill AM, 1 pill PM  Ice: PRN    Last fill: 12/20/22 #40  Next visit: 2/16/23    Medication pended for your approval, if appropriate. Pharmacy verified.     Any patient questions or concerns:     Informed patient request will be forwarded to care team.

## 2023-01-04 DIAGNOSIS — M79.7 FIBROMYALGIA: ICD-10-CM

## 2023-01-05 RX ORDER — CYCLOBENZAPRINE HCL 10 MG
TABLET ORAL
Qty: 60 TABLET | Refills: 1 | Status: SHIPPED | OUTPATIENT
Start: 2023-01-05 | End: 2023-02-21

## 2023-01-10 ENCOUNTER — TELEPHONE (OUTPATIENT)
Dept: FAMILY MEDICINE | Facility: CLINIC | Age: 58
End: 2023-01-10

## 2023-01-10 DIAGNOSIS — E55.9 VITAMIN D DEFICIENCY DISEASE: ICD-10-CM

## 2023-01-10 RX ORDER — ERGOCALCIFEROL 1.25 MG/1
CAPSULE, LIQUID FILLED ORAL
Qty: 4 CAPSULE | Refills: 1 | Status: SHIPPED | OUTPATIENT
Start: 2023-01-10 | End: 2023-12-07

## 2023-01-24 NOTE — TELEPHONE ENCOUNTER
Pt wants to know if pills can be split in half, states she has a hard time cutting them herself. This writer attempted to contact Kareen on 08/17/18      Reason for call triage plugged ears and left message.      If patient calls back:   Patient contacted by a Registered Nurse. Inform patient that someone from the RN group will contact them, document that pt called and route to P DYAD 3 RN POOL [[id number]]      Amirah Poon RN

## 2023-01-26 ENCOUNTER — TELEPHONE (OUTPATIENT)
Dept: NEUROSURGERY | Facility: CLINIC | Age: 58
End: 2023-01-26

## 2023-01-26 DIAGNOSIS — Z98.890 S/P LUMBAR MICRODISCECTOMY: ICD-10-CM

## 2023-01-26 DIAGNOSIS — Z98.890 S/P LUMBAR LAMINECTOMY: ICD-10-CM

## 2023-01-26 RX ORDER — OXYCODONE HYDROCHLORIDE 5 MG/1
5-10 TABLET ORAL EVERY 6 HOURS PRN
Qty: 40 TABLET | Refills: 0 | Status: SHIPPED | OUTPATIENT
Start: 2023-01-26 | End: 2023-06-08

## 2023-01-26 RX ORDER — AMOXICILLIN 250 MG
1-2 CAPSULE ORAL 2 TIMES DAILY PRN
Qty: 30 TABLET | Refills: 0 | Status: SHIPPED | OUTPATIENT
Start: 2023-01-26 | End: 2023-06-08

## 2023-01-26 NOTE — TELEPHONE ENCOUNTER
Last Visit: 12/29/22    Next Visit: 2/16/23    Name of Provider:  Dr. Cash    Assessment:   Patient calling for oxycodone and stool softener refills and possible injection to help with her pain.     S/p MIS right L4-5 and L5-S1 microdiscectomy on 11/15/22 with Dr. Cash, then MIS right L4-5 evacuation of hematoma and L5-S1 revision laminotomy/decompression and evacuation of hematoma on 11/18/22.    10 weeks post op    Pain/Symptoms:  -Right low back pain that is mild. Reports right buttock and right posterior leg pain taht stops at the knee that is pretty excruciating. Said she had all these symptoms preop, nothing is new.   -Constant pain described as an ache  -Muscle tightness in buttock and leg  -States she was better post op for about 4-6 weeks then pain returned 1st week of January and feels like its getting worse.   -Pain worse when ambulating  -can't stand longer than 5 min before needing to sit.  -9.5/10 pain rating  - report RLE weakness, had pre op. Denies any falls.     Any new symptoms/injuries: NO. Patient denies any activity that would have caused her pain to come back.    -Current restrictions in place as of 12/29/22: May gradually increase activity and lifting restriction to 20 pounds, as tolerated    Patient is compliant with current restrictions     Pain Management:  Oxycodone: 4 tabs per day, (2 in am and 2 in evening)  Oxy Last refilled on 1/3/23 #40  Flexeril:  TID (prescribed by her PCP)  Tylenol/Ibuprofen:No, patient instructed by her bariatric surgeon to not take these. Patient had gastric bypass surgery.   Ice/heat: yes     Pharmacy: [facility]     Was prescribed MDP on 12/20/22 but per Hyacinth Timmons, CNP OV notes patient did not take d/t fear of side effects. Said oral steroids makes her extremely jittery and suicidal.     Has not started PT pain is too much.    Recommendation given:   Reviewed that we will only refill until 12 weeks and after that PCP or pain clinic will need to  take over pain meds. Patient said she will have her PCP take over.     Reviewed red flag symptoms. Patient denies new numbness, tingling, new or worsening weakness, foot drag/drop, saddle anesthesia, incontinence, intractable pain, or shortness of breath. Informed patient to seek emergency care should these symptoms arise.    Will send message to care team to review and advise.       Action needed from provider: Approval/Sign off for Oxy and stool softener refills and recommendations for her pain.

## 2023-01-26 NOTE — TELEPHONE ENCOUNTER
Patient is requesting for an order be put in for an injection for her back. Patient has not started PT due to being in so much pain. Patient describes that she can only stand up for 4 minutes and experiences excruciating pain in her back. She is requesting that she also be prescribed pain medication till her next appointment.

## 2023-01-27 ENCOUNTER — TELEPHONE (OUTPATIENT)
Dept: NEUROSURGERY | Facility: CLINIC | Age: 58
End: 2023-01-27
Payer: COMMERCIAL

## 2023-01-27 NOTE — TELEPHONE ENCOUNTER
LVM for patient to offer sooner appointment per suggestion from Hyacinth Timmons. Advised the only sooner option is on 2/8/23 at [place]. Nothing in [place] sooner than what is scheduled.

## 2023-01-27 NOTE — TELEPHONE ENCOUNTER
Hyacinth Timmons CNP signed off on refill. Per Hyacinth, patient's most recent MRI was from pre op. She is scheduled to see us on 2/16. Hyacinth wants her appt moved up so we can evaluate symptoms and discuss possible new MRI.     Called and reviewed above with patient. Message routed to scheduling team to move up appt. Reviewed red flag symptoms. Patient denies new or worsening numbness, tingling, weakness, foot drag/drop, saddle anesthesia, incontinence, intractable pain, or shortness of breath. Informed patient to seek emergency care should these symptoms arise. She verbalized understanding.

## 2023-02-16 ENCOUNTER — OFFICE VISIT (OUTPATIENT)
Dept: NEUROSURGERY | Facility: CLINIC | Age: 58
End: 2023-02-16
Payer: COMMERCIAL

## 2023-02-16 VITALS
SYSTOLIC BLOOD PRESSURE: 122 MMHG | DIASTOLIC BLOOD PRESSURE: 80 MMHG | BODY MASS INDEX: 24.48 KG/M2 | HEIGHT: 67 IN | WEIGHT: 156 LBS | HEART RATE: 83 BPM | OXYGEN SATURATION: 100 %

## 2023-02-16 DIAGNOSIS — Z98.890 S/P LUMBAR LAMINECTOMY: Primary | ICD-10-CM

## 2023-02-16 DIAGNOSIS — Z98.890 S/P LUMBAR MICRODISCECTOMY: ICD-10-CM

## 2023-02-16 PROCEDURE — 99207 PR NO CHARGE LOS: CPT | Performed by: NURSE PRACTITIONER

## 2023-02-16 ASSESSMENT — PAIN SCALES - GENERAL: PAINLEVEL: MILD PAIN (3)

## 2023-02-16 NOTE — PATIENT INSTRUCTIONS
- May increase lifting and activity as tolerated.  - Physical therapy as previously recommended. You may contact them at [phone] to schedule.  - Follow up as needed.   - Call the clinic at [phone] for increased pain or any other questions and concerns.

## 2023-02-16 NOTE — NURSING NOTE
"Kareen Hernandez is a 57 year old female who presents for:  Chief Complaint   Patient presents with     RECHECK     12 wk f/u, lower back pain extending down right leg with numbness and tingling las day goes on        Initial Vitals:  /80   Pulse 83   Ht 5' 7\" (1.702 m)   Wt 156 lb (70.8 kg)   LMP  (LMP Unknown)   SpO2 100%   BMI 24.43 kg/m   Estimated body mass index is 24.43 kg/m  as calculated from the following:    Height as of this encounter: 5' 7\" (1.702 m).    Weight as of this encounter: 156 lb (70.8 kg).. Body surface area is 1.83 meters squared. BP completed using cuff size: regular  Mild Pain (3)    Nursing Comments:     Kelly Walls    "

## 2023-02-16 NOTE — PROGRESS NOTES
"[facility] Neurosurgery  Neurosurgery Follow Up:    HPI: 12 weeks s/p MIS right L4-5 and L5-S1 microdiscectomy with Dr. Cash on 11/15/2022 revision laminotomy/decompression and evacuation of hematoma on 11/18/2022.  Had a flare of preopoerative pain a couple weeks ago. She states she thinks she \"tweaked\" her back. Since that time her symptoms have improved. She now states her pain has subsided and is now better than it was before surgery. She reports some soreness in her right lateral thigh as well as some associated numbness. Occurs most often after more activity and later into the day. No overt weakness. She would like to begin the PT that was previously ordered for her. Incision intact. No concerns today.    Medical, surgical, family, and social history unchanged since prior exam.  Exam:  Constitutional:  Alert, well nourished, NAD.  HEENT: Normocephalic, atraumatic.   Pulm:  Without shortness of breath   CV:  No pitting edema of BLE.      Vital Signs:  /80   Pulse 83   Ht 5' 7\" (1.702 m)   Wt 156 lb (70.8 kg)   LMP  (LMP Unknown)   SpO2 100%   BMI 24.43 kg/m      Neurological:  Awake  Alert  Oriented x 3  Motor exam:        IP Q DF PF EHL  R   5  5   5   5    5  L   5  5   5   5    5     Able to spontaneously move L/E bilaterally  Sensation intact throughout all L/E dermatomes     Incisions:  Healing nicely.  Imaging: No new imaging to review.    A/P: s/p lumbar microdiscectomy with subsequent laminotomy/decompression of postoperative hematoma.   Discussed typical postoperative recovery. Activity as tolerated. Ok to begin PT. Phone number provided. Follow up as needed. She verbalized understanding and agreement.  Patient Instructions   - May increase lifting and activity as tolerated.  - Physical therapy as previously recommended. You may contact them at [phone] to schedule.  - Follow up as needed.   - Call the clinic at [phone] for increased pain or any other questions and " concerns.    Jennifer Bell, [place] [id number] [address] [place] [address]  Tel [phone]  Fax [phone]

## 2023-02-19 DIAGNOSIS — M79.7 FIBROMYALGIA: ICD-10-CM

## 2023-02-21 RX ORDER — CYCLOBENZAPRINE HCL 10 MG
TABLET ORAL
Qty: 60 TABLET | Refills: 1 | Status: SHIPPED | OUTPATIENT
Start: 2023-02-21 | End: 2023-04-07

## 2023-03-09 ENCOUNTER — TELEPHONE (OUTPATIENT)
Dept: FAMILY MEDICINE | Facility: CLINIC | Age: 58
End: 2023-03-09
Payer: COMMERCIAL

## 2023-03-09 DIAGNOSIS — E55.9 VITAMIN D DEFICIENCY DISEASE: ICD-10-CM

## 2023-03-09 RX ORDER — CHOLECALCIFEROL (VITAMIN D3) 50 MCG
1 TABLET ORAL DAILY
Status: CANCELLED | OUTPATIENT
Start: 2023-03-09

## 2023-03-09 RX ORDER — ERGOCALCIFEROL 1.25 MG/1
CAPSULE, LIQUID FILLED ORAL
Qty: 4 CAPSULE | Refills: 1 | Status: CANCELLED | OUTPATIENT
Start: 2023-03-09

## 2023-03-09 RX ORDER — ERGOCALCIFEROL 1.25 MG/1
CAPSULE, LIQUID FILLED ORAL
Qty: 4 CAPSULE | Refills: 1 | OUTPATIENT
Start: 2023-03-09

## 2023-03-09 NOTE — TELEPHONE ENCOUNTER
"Patient has appointment with Leno Reyna on 3/16/2023- added to appointment notes that patient needs labs    Patient stated that she cannot afford meds OTC.  She stated that she is on disability and all her providers know that and always send in prescriptions for her, \"they (insurance) will cover it\"  Per patient, if Leno Reyna wants to wait til appointment to discuss this further, she \"will be glad to tell him the same thing\"  Explained that writer will send the request to provider to send in prescription    Raquel Dia RN  [facility]      "

## 2023-03-09 NOTE — TELEPHONE ENCOUNTER
Received call from patient. She is wondering if Dr. Courtney realized that the dose that she is currently on for vitamin D is from her gastric bypass surgeon. She is wondering why Dr. Courtney is recommending to change this now because no other provider has advised changing this and she has been on this does for almost 15 years. She states that it irritates her and she thinks that her gastric bypass surgeon would know more about this. She is currently doing Vit D2 96879 once a week. She is totally out of medication. She doesn't like to be out. She would like a refill sent of the 07570 international unit(s). States that she is coming into clinic 03/16/2023 and can do labs and discuss further at that time.    Micheline Davis RN   [facility]

## 2023-03-10 NOTE — TELEPHONE ENCOUNTER
Spoke with patient. Patient states she plans on following up with PCP regarding vitamin D levels and medication.    ESTEVAN Schneider RN  [facility]

## 2023-03-10 NOTE — TELEPHONE ENCOUNTER
Attempted to call left vm asking return call.     Thanks,  YESSENIA Cosme  [facility]

## 2023-03-10 NOTE — TELEPHONE ENCOUNTER
Perhaps obtaining a refill from her surgical team is more appropriate. For me to refill this, I need to know her vitamin d level as she's at risk for vitamin d toxicity with chronic high dose use.    Leno Robbins MD

## 2023-03-16 ENCOUNTER — LAB (OUTPATIENT)
Dept: LAB | Facility: CLINIC | Age: 58
End: 2023-03-16
Payer: COMMERCIAL

## 2023-03-16 ENCOUNTER — OFFICE VISIT (OUTPATIENT)
Dept: FAMILY MEDICINE | Facility: CLINIC | Age: 58
End: 2023-03-16
Payer: COMMERCIAL

## 2023-03-16 VITALS
WEIGHT: 153.4 LBS | TEMPERATURE: 98.7 F | HEART RATE: 84 BPM | BODY MASS INDEX: 24.03 KG/M2 | SYSTOLIC BLOOD PRESSURE: 125 MMHG | OXYGEN SATURATION: 98 % | DIASTOLIC BLOOD PRESSURE: 85 MMHG

## 2023-03-16 DIAGNOSIS — M85.642: ICD-10-CM

## 2023-03-16 DIAGNOSIS — E53.8 VITAMIN B12 DEFICIENCY (NON ANEMIC): ICD-10-CM

## 2023-03-16 DIAGNOSIS — M85.641: ICD-10-CM

## 2023-03-16 DIAGNOSIS — N20.0 RECURRENT KIDNEY STONES: ICD-10-CM

## 2023-03-16 DIAGNOSIS — F41.0 PANIC ATTACK: ICD-10-CM

## 2023-03-16 DIAGNOSIS — E55.9 VITAMIN D DEFICIENCY: Primary | ICD-10-CM

## 2023-03-16 DIAGNOSIS — Z23 NEED FOR PROPHYLACTIC VACCINATION AND INOCULATION AGAINST INFLUENZA: ICD-10-CM

## 2023-03-16 DIAGNOSIS — F33.1 MAJOR DEPRESSIVE DISORDER, RECURRENT EPISODE, MODERATE (H): ICD-10-CM

## 2023-03-16 LAB
BASOPHILS # BLD AUTO: 0 10E3/UL (ref 0–0.2)
BASOPHILS NFR BLD AUTO: 0 %
EOSINOPHIL # BLD AUTO: 0.2 10E3/UL (ref 0–0.7)
EOSINOPHIL NFR BLD AUTO: 3 %
ERYTHROCYTE [DISTWIDTH] IN BLOOD BY AUTOMATED COUNT: 13.6 % (ref 10–15)
HCT VFR BLD AUTO: 43.3 % (ref 35–47)
HGB BLD-MCNC: 14.3 G/DL (ref 11.7–15.7)
LYMPHOCYTES # BLD AUTO: 2.7 10E3/UL (ref 0.8–5.3)
LYMPHOCYTES NFR BLD AUTO: 43 %
MCH RBC QN AUTO: 31.2 PG (ref 26.5–33)
MCHC RBC AUTO-ENTMCNC: 33 G/DL (ref 31.5–36.5)
MCV RBC AUTO: 94 FL (ref 78–100)
MONOCYTES # BLD AUTO: 0.5 10E3/UL (ref 0–1.3)
MONOCYTES NFR BLD AUTO: 8 %
NEUTROPHILS # BLD AUTO: 2.9 10E3/UL (ref 1.6–8.3)
NEUTROPHILS NFR BLD AUTO: 46 %
PLATELET # BLD AUTO: 293 10E3/UL (ref 150–450)
RBC # BLD AUTO: 4.59 10E6/UL (ref 3.8–5.2)
VIT B12 SERPL-MCNC: 493 PG/ML (ref 232–1245)
WBC # BLD AUTO: 6.3 10E3/UL (ref 4–11)

## 2023-03-16 PROCEDURE — 84560 ASSAY OF URINE/URIC ACID: CPT | Mod: 90

## 2023-03-16 PROCEDURE — 36415 COLL VENOUS BLD VENIPUNCTURE: CPT | Performed by: FAMILY MEDICINE

## 2023-03-16 PROCEDURE — 84300 ASSAY OF URINE SODIUM: CPT | Mod: 90

## 2023-03-16 PROCEDURE — 82140 ASSAY OF AMMONIA: CPT | Mod: 90

## 2023-03-16 PROCEDURE — 84133 ASSAY OF URINE POTASSIUM: CPT | Mod: 90

## 2023-03-16 PROCEDURE — 83945 ASSAY OF OXALATE: CPT | Mod: 90

## 2023-03-16 PROCEDURE — 83735 ASSAY OF MAGNESIUM: CPT | Mod: 90

## 2023-03-16 PROCEDURE — 82507 ASSAY OF CITRATE: CPT | Mod: 90

## 2023-03-16 PROCEDURE — 82607 VITAMIN B-12: CPT | Performed by: FAMILY MEDICINE

## 2023-03-16 PROCEDURE — 82570 ASSAY OF URINE CREATININE: CPT | Mod: 90

## 2023-03-16 PROCEDURE — 82306 VITAMIN D 25 HYDROXY: CPT | Performed by: FAMILY MEDICINE

## 2023-03-16 PROCEDURE — 96127 BRIEF EMOTIONAL/BEHAV ASSMT: CPT | Performed by: FAMILY MEDICINE

## 2023-03-16 PROCEDURE — 82436 ASSAY OF URINE CHLORIDE: CPT | Mod: 90

## 2023-03-16 PROCEDURE — 84392 ASSAY OF URINE SULFATE: CPT | Mod: 90

## 2023-03-16 PROCEDURE — 99214 OFFICE O/P EST MOD 30 MIN: CPT | Mod: 25 | Performed by: FAMILY MEDICINE

## 2023-03-16 PROCEDURE — 83986 ASSAY PH BODY FLUID NOS: CPT | Mod: 90

## 2023-03-16 PROCEDURE — 99000 SPECIMEN HANDLING OFFICE-LAB: CPT

## 2023-03-16 PROCEDURE — 83935 ASSAY OF URINE OSMOLALITY: CPT | Mod: 90

## 2023-03-16 PROCEDURE — 90682 RIV4 VACC RECOMBINANT DNA IM: CPT | Performed by: FAMILY MEDICINE

## 2023-03-16 PROCEDURE — 85025 COMPLETE CBC W/AUTO DIFF WBC: CPT | Performed by: FAMILY MEDICINE

## 2023-03-16 PROCEDURE — 84540 ASSAY OF URINE/UREA-N: CPT | Mod: 90

## 2023-03-16 PROCEDURE — G0008 ADMIN INFLUENZA VIRUS VAC: HCPCS | Performed by: FAMILY MEDICINE

## 2023-03-16 PROCEDURE — 84105 ASSAY OF URINE PHOSPHORUS: CPT | Mod: 90

## 2023-03-16 PROCEDURE — 82340 ASSAY OF CALCIUM IN URINE: CPT | Mod: 90

## 2023-03-16 ASSESSMENT — PATIENT HEALTH QUESTIONNAIRE - PHQ9
SUM OF ALL RESPONSES TO PHQ QUESTIONS 1-9: 10
10. IF YOU CHECKED OFF ANY PROBLEMS, HOW DIFFICULT HAVE THESE PROBLEMS MADE IT FOR YOU TO DO YOUR WORK, TAKE CARE OF THINGS AT HOME, OR GET ALONG WITH OTHER PEOPLE: SOMEWHAT DIFFICULT
SUM OF ALL RESPONSES TO PHQ QUESTIONS 1-9: 10

## 2023-03-16 NOTE — PROGRESS NOTES
Assessment & Plan       ICD-10-CM    1. Vitamin D deficiency  E55.9 Vitamin D Deficiency     Vitamin D Deficiency      2. Major depressive disorder, recurrent episode, moderate (H)  F33.1       3. Panic attack  F41.0       4. Bone cyst of both hands  M85.641 Orthopedic  Referral    M85.642       5. Vitamin B12 deficiency (non anemic)  E53.8 Vitamin B12     CBC with Platelets & Differential     Vitamin B12     CBC with Platelets & Differential      6. Need for prophylactic vaccination and inoculation against influenza  Z23 INFLUENZA QUAD, RECOMBINANT, P-FREE (RIV4) (FLUBLOK)            Review of external notes as documented elsewhere in note         There are no Patient Instructions on file for this visit.    Leno Robbins MD  [facility] Mathur is a 57 year old, presenting for the following health issues:  Recheck Medication and Derm Problem (Possible ganglion cyst)    Needing a letter/call from PCP for insurance to cover FLUoxetine 20 MG tablet    History of Present Illness       Reason for visit:  Med check    She eats 0-1 servings of fruits and vegetables daily.She consumes 3 sweetened beverage(s) daily.She exercises with enough effort to increase her heart rate 9 or less minutes per day.  She exercises with enough effort to increase her heart rate 3 or less days per week.   She is taking medications regularly.    Today's PHQ-9         PHQ-9 Total Score: 10    PHQ-9 Q9 Thoughts of better off dead/self-harm past 2 weeks :   Not at all    How difficult have these problems made it for you to do your work, take care of things at home, or get along with other people: Somewhat difficult             Review of Systems   Constitutional, HEENT, cardiovascular, pulmonary, gi and gu systems are negative, except as otherwise noted.      Objective    /85   Pulse 84   Temp 98.7  F (37.1  C) (Oral)   Wt 69.6 kg (153 lb 6.4 oz)   LMP  (LMP Unknown)   SpO2 98%   BMI  24.03 kg/m    Body mass index is 24.03 kg/m .  Physical Exam  Vitals reviewed.   Constitutional:       General: She is not in acute distress.     Appearance: Normal appearance. She is well-developed.   HENT:      Head: Normocephalic and atraumatic.      Right Ear: External ear normal.      Left Ear: External ear normal.      Nose: Nose normal.   Eyes:      General: No scleral icterus.     Extraocular Movements: Extraocular movements intact.      Conjunctiva/sclera: Conjunctivae normal.   Cardiovascular:      Rate and Rhythm: Normal rate.   Pulmonary:      Effort: Pulmonary effort is normal.   Musculoskeletal:         General: No deformity. Normal range of motion.      Cervical back: Normal range of motion.   Skin:     General: Skin is warm and dry.      Findings: No rash.   Neurological:      Mental Status: She is alert and oriented to person, place, and time. Mental status is at baseline.      Gait: Gait normal.   Psychiatric:         Behavior: Behavior normal.         Thought Content: Thought content normal.         Judgment: Judgment normal.

## 2023-03-16 NOTE — LETTER
March 20, 2023    Kareen Hernandez  [address]   [place] [address]          Dear ,    We are writing to inform you of your test results.  Your results are normal thus far.         Resulted Orders   Vitamin B12   Result Value Ref Range    Vitamin B12 493 232 - 1,245 pg/mL   CBC with platelets and differential   Result Value Ref Range    WBC Count 6.3 4.0 - 11.0 10e3/uL    RBC Count 4.59 3.80 - 5.20 10e6/uL    Hemoglobin 14.3 11.7 - 15.7 g/dL    Hematocrit 43.3 35.0 - 47.0 %    MCV 94 78 - 100 fL    MCH 31.2 26.5 - 33.0 pg    MCHC 33.0 31.5 - 36.5 g/dL    RDW 13.6 10.0 - 15.0 %    Platelet Count 293 150 - 450 10e3/uL    % Neutrophils 46 %    % Lymphocytes 43 %    % Monocytes 8 %    % Eosinophils 3 %    % Basophils 0 %    Absolute Neutrophils 2.9 1.6 - 8.3 10e3/uL    Absolute Lymphocytes 2.7 0.8 - 5.3 10e3/uL    Absolute Monocytes 0.5 0.0 - 1.3 10e3/uL    Absolute Eosinophils 0.2 0.0 - 0.7 10e3/uL    Absolute Basophils 0.0 0.0 - 0.2 10e3/uL           If you have any questions or concerns, please call the clinic at the number listed above.       Sincerely,      Leno Courtney MD

## 2023-03-17 LAB — DEPRECATED CALCIDIOL+CALCIFEROL SERPL-MC: 35 UG/L (ref 20–75)

## 2023-03-19 LAB
AMMONIA 24H UR-SRATE: 18 MMOL/24 H (ref 15–56)
BSA: ABNORMAL 1.73M(2)
CA H2 PHOS DIHYD 24H SATFR UR: 1.41 DG
CALCIUM 24H UR-MRATE: 187 MG/24 H
CAOX 24H ENGDIFF UR: 2.1 DG
CHLORIDE 24H UR-SRATE: 117 MMOL/24 H (ref 34–286)
CITRATE 24H UR-MRATE: 787 MG/24 H (ref 399–1191)
COLLECT DURATION TIME UR: 24 H
CREAT 24H UR-MRATE: 1700 MG/24 H (ref 603–1783)
HYDROXYAPATITE 24H ENGDIFF UR: 6.93 DG
MAGNESIUM 24H UR-MRATE: 153 MG/24 H (ref 51–269)
OSMOLALITY 24H UR: 368 MOSM/KG (ref 150–1150)
OXALATE 24H UR-MRATE: 32.6 MG/24 H (ref 9.7–40.5)
OXALATE 24H UR-SRATE: 0.37 MMOL/24 H (ref 0.11–0.46)
PH 24H UR: 6.8 [PH] (ref 4.5–8)
PHOSPHATE 24H UR-MRATE: 782 MG/24 H (ref 226–1797)
POTASSIUM 24H UR-SRATE: 48 MMOL/24 H (ref 16–105)
PROTEIN CATABOLIC RATE 24H UR-MRATE: 58 G/24 H (ref 56–125)
SODIUM 24H UR-SRATE: 148 MMOL/24 H (ref 22–328)
SPECIMEN VOL 24H UR: 1700 ML
SULFATE 24H UR-SRATE: 3 MMOL/24 H (ref 7–47)
URATE 24H SATFR UR: -4.26 DG
URATE 24H UR-MRATE: 442 MG/24 H (ref 250–750)
URINALYSIS SPECIALIST REVIEW: ABNORMAL
UUN 24H UR-MRATE: 5.3 G/24 H (ref 7–42)

## 2023-03-22 ENCOUNTER — TELEPHONE (OUTPATIENT)
Dept: ORTHOPEDICS | Facility: CLINIC | Age: 58
End: 2023-03-22

## 2023-03-22 NOTE — TELEPHONE ENCOUNTER
Pt has a referral for bone cyst of both hands, per protocol schedule within 72 hours. No available appts within timeframe, please advise.

## 2023-03-23 ENCOUNTER — TELEPHONE (OUTPATIENT)
Dept: ORTHOPEDICS | Facility: CLINIC | Age: 58
End: 2023-03-23
Payer: COMMERCIAL

## 2023-03-24 ENCOUNTER — TELEPHONE (OUTPATIENT)
Dept: ORTHOPEDICS | Facility: CLINIC | Age: 58
End: 2023-03-24
Payer: COMMERCIAL

## 2023-03-24 NOTE — TELEPHONE ENCOUNTER
DIAGNOSIS: Bone cyst of both hands    APPOINTMENT DATE: 04/28/2023   NOTES STATUS DETAILS   OFFICE NOTE from referring provider Internal 03/16/2023 Dr Courtney [facility]    OFFICE NOTE from other specialist N/A    DISCHARGE SUMMARY from hospital N/A    DISCHARGE REPORT from the ER N/A    OPERATIVE REPORT N/A    MEDICATION LIST N/A    EMG (for Spine) N/A    IMPLANT RECORD/STICKER N/A    LABS     CBC/DIFF N/A    CULTURES N/A    INJECTIONS DONE IN RADIOLOGY N/A    MRI N/A    CT SCAN N/A    XRAYS (IMAGES & REPORTS) N/A    TUMOR     PATHOLOGY  Slides & report N/A

## 2023-03-26 DIAGNOSIS — F33.1 MAJOR DEPRESSIVE DISORDER, RECURRENT EPISODE, MODERATE (H): ICD-10-CM

## 2023-03-28 RX ORDER — BUPROPION HYDROCHLORIDE 300 MG/1
TABLET ORAL
Qty: 90 TABLET | Refills: 1 | Status: SHIPPED | OUTPATIENT
Start: 2023-03-28 | End: 2023-09-22

## 2023-04-06 DIAGNOSIS — M79.7 FIBROMYALGIA: ICD-10-CM

## 2023-04-07 RX ORDER — CYCLOBENZAPRINE HCL 10 MG
TABLET ORAL
Qty: 60 TABLET | Refills: 1 | Status: SHIPPED | OUTPATIENT
Start: 2023-04-07 | End: 2023-05-18

## 2023-04-14 ENCOUNTER — TRANSFERRED RECORDS (OUTPATIENT)
Dept: HEALTH INFORMATION MANAGEMENT | Facility: CLINIC | Age: 58
End: 2023-04-14

## 2023-04-22 DIAGNOSIS — F31.9 BIPOLAR AFFECTIVE DISORDER, REMISSION STATUS UNSPECIFIED (H): ICD-10-CM

## 2023-04-25 RX ORDER — LAMOTRIGINE 100 MG/1
TABLET ORAL
Qty: 180 TABLET | Refills: 3 | Status: SHIPPED | OUTPATIENT
Start: 2023-04-25 | End: 2023-06-29

## 2023-04-28 ENCOUNTER — ANCILLARY PROCEDURE (OUTPATIENT)
Dept: GENERAL RADIOLOGY | Facility: CLINIC | Age: 58
End: 2023-04-28
Attending: STUDENT IN AN ORGANIZED HEALTH CARE EDUCATION/TRAINING PROGRAM
Payer: COMMERCIAL

## 2023-04-28 ENCOUNTER — OFFICE VISIT (OUTPATIENT)
Dept: ORTHOPEDICS | Facility: CLINIC | Age: 58
End: 2023-04-28
Payer: COMMERCIAL

## 2023-04-28 ENCOUNTER — PRE VISIT (OUTPATIENT)
Dept: ORTHOPEDICS | Facility: CLINIC | Age: 58
End: 2023-04-28

## 2023-04-28 DIAGNOSIS — M85.641: ICD-10-CM

## 2023-04-28 DIAGNOSIS — M85.642: Primary | ICD-10-CM

## 2023-04-28 DIAGNOSIS — M85.642: ICD-10-CM

## 2023-04-28 DIAGNOSIS — M85.641: Primary | ICD-10-CM

## 2023-04-28 PROCEDURE — 99204 OFFICE O/P NEW MOD 45 MIN: CPT | Performed by: STUDENT IN AN ORGANIZED HEALTH CARE EDUCATION/TRAINING PROGRAM

## 2023-04-28 PROCEDURE — 73130 X-RAY EXAM OF HAND: CPT | Mod: RT | Performed by: RADIOLOGY

## 2023-04-28 RX ORDER — MELOXICAM 7.5 MG/1
7.5 TABLET ORAL DAILY
Qty: 30 TABLET | Refills: 1 | Status: SHIPPED | OUTPATIENT
Start: 2023-04-28

## 2023-04-28 ASSESSMENT — PAIN SCALES - GENERAL: PAINLEVEL: SEVERE PAIN (6)

## 2023-04-28 NOTE — NURSING NOTE
Chief Complaint   Patient presents with     Left Hand - Pain, New Patient     Right Hand - Pain, New Patient       There were no vitals filed for this visit.    There is no height or weight on file to calculate BMI.      TASHA Ordoñez NREMT

## 2023-04-28 NOTE — LETTER
4/28/2023         RE: Kareen Hernandez  [address]  [place] [address]        Dear Colleague,    Thank you for referring your patient, Kareen Hernandez, to the [facility]. Please see a copy of my visit note below.    Ortho Hand    HPI: 58 year old RHD S p/w diffuse bilateral distal small joint pain in the hands.  She noticed this several months ago.  It does not inhibit her from performing any activity.  She has not tried anything.  Otherwise, she is functioning rather well.    ROS: Negative, see HPI  PMH: None, nondiabetic  PSH: No prior surgeries to the hands or wrists  Medications: No blood thinners  Allergies: None  SH: Smokes e-cigarettes daily with nicotine  FH: No bleeding or clotting issues, or problems with anesthesia    Examination:  Nonlabored breathing  Not distressed  Can make bilateral composite fists and has some mild swelling in the distal interphalangeal joints of several fingertips bilaterally  Median, ulnar and radial distributed sensation intact bilaterally    XR: Small joint osteoarthritis of the fingers, including the distal interphalangeal joints    A/P: 58 year old RHD S p/w diffuse bilateral hand small joint osteoarthritis    -Discussed the etiology, natural history and treatment options for small joint osteoarthritis of the hands. Options for treatment include activity modification, nonsteroidal anti-inflammatory medications, topical ointment and eventually possibly surgery. Patient would prefer to avoid surgery at this point.  -Prescribed meloxicam 7.5 mg daily  -Return to clinic as needed  -A total of 45 minutes was devoted to review of chart, direct face-to-face patient counseling and documentation during this encounter    Lizandro Santos MD, PhD      Again, thank you for allowing me to participate in the care of your patient.        Sincerely,        Lizandro Santos MD

## 2023-04-28 NOTE — PROGRESS NOTES
Ortho Hand    HPI: 58 year old RHD S p/w diffuse bilateral distal small joint pain in the hands.  She noticed this several months ago.  It does not inhibit her from performing any activity.  She has not tried anything.  Otherwise, she is functioning rather well.    ROS: Negative, see HPI  PMH: None, nondiabetic  PSH: No prior surgeries to the hands or wrists  Medications: No blood thinners  Allergies: None  SH: Smokes e-cigarettes daily with nicotine  FH: No bleeding or clotting issues, or problems with anesthesia    Examination:  Nonlabored breathing  Not distressed  Can make bilateral composite fists and has some mild swelling in the distal interphalangeal joints of several fingertips bilaterally  Median, ulnar and radial distributed sensation intact bilaterally    XR: Small joint osteoarthritis of the fingers, including the distal interphalangeal joints    A/P: 58 year old RHD S p/w diffuse bilateral hand small joint osteoarthritis    -Discussed the etiology, natural history and treatment options for small joint osteoarthritis of the hands. Options for treatment include activity modification, nonsteroidal anti-inflammatory medications, topical ointment and eventually possibly surgery. Patient would prefer to avoid surgery at this point.  -Prescribed meloxicam 7.5 mg daily  -Return to clinic as needed  -A total of 45 minutes was devoted to review of chart, direct face-to-face patient counseling and documentation during this encounter    Lizandro Santos MD, PhD

## 2023-05-18 DIAGNOSIS — M79.7 FIBROMYALGIA: ICD-10-CM

## 2023-05-18 RX ORDER — CYCLOBENZAPRINE HCL 10 MG
TABLET ORAL
Qty: 60 TABLET | Refills: 0 | Status: SHIPPED | OUTPATIENT
Start: 2023-05-18 | End: 2023-06-29

## 2023-06-01 ENCOUNTER — VIRTUAL VISIT (OUTPATIENT)
Dept: FAMILY MEDICINE | Facility: CLINIC | Age: 58
End: 2023-06-01
Payer: COMMERCIAL

## 2023-06-01 DIAGNOSIS — Z91.199 NO-SHOW FOR APPOINTMENT: Primary | ICD-10-CM

## 2023-06-01 NOTE — PROGRESS NOTES
This patient was a no show for this scheduled appointment.    Called and x 3 and left message.    Leno Luis MD

## 2023-06-08 ENCOUNTER — TELEPHONE (OUTPATIENT)
Dept: FAMILY MEDICINE | Facility: CLINIC | Age: 58
End: 2023-06-08

## 2023-06-08 ENCOUNTER — OFFICE VISIT (OUTPATIENT)
Dept: FAMILY MEDICINE | Facility: CLINIC | Age: 58
End: 2023-06-08
Payer: COMMERCIAL

## 2023-06-08 VITALS
DIASTOLIC BLOOD PRESSURE: 81 MMHG | BODY MASS INDEX: 25.11 KG/M2 | OXYGEN SATURATION: 96 % | WEIGHT: 160 LBS | RESPIRATION RATE: 12 BRPM | HEIGHT: 67 IN | SYSTOLIC BLOOD PRESSURE: 120 MMHG | HEART RATE: 68 BPM | TEMPERATURE: 97 F

## 2023-06-08 DIAGNOSIS — Z87.891 PERSONAL HISTORY OF TOBACCO USE: ICD-10-CM

## 2023-06-08 DIAGNOSIS — F33.1 MAJOR DEPRESSIVE DISORDER, RECURRENT EPISODE, MODERATE (H): Primary | ICD-10-CM

## 2023-06-08 DIAGNOSIS — J43.2 CENTRILOBULAR EMPHYSEMA (H): ICD-10-CM

## 2023-06-08 DIAGNOSIS — Z12.31 VISIT FOR SCREENING MAMMOGRAM: ICD-10-CM

## 2023-06-08 DIAGNOSIS — Z98.84 GASTRIC BYPASS STATUS FOR OBESITY: ICD-10-CM

## 2023-06-08 DIAGNOSIS — E53.8 VITAMIN B12 DEFICIENCY DISEASE: ICD-10-CM

## 2023-06-08 DIAGNOSIS — E55.9 VITAMIN D DEFICIENCY DISEASE: ICD-10-CM

## 2023-06-08 DIAGNOSIS — F41.0 PANIC ATTACK: ICD-10-CM

## 2023-06-08 PROBLEM — J20.9 ACUTE BRONCHITIS WITH SYMPTOMS > 10 DAYS: Status: RESOLVED | Noted: 2023-06-08 | Resolved: 2023-06-08

## 2023-06-08 PROBLEM — J20.9 ACUTE BRONCHITIS WITH SYMPTOMS > 10 DAYS: Status: ACTIVE | Noted: 2023-06-08

## 2023-06-08 LAB
ALBUMIN SERPL BCG-MCNC: 4.2 G/DL (ref 3.5–5.2)
ALP SERPL-CCNC: 109 U/L (ref 35–104)
ALT SERPL W P-5'-P-CCNC: 10 U/L (ref 10–35)
ANION GAP SERPL CALCULATED.3IONS-SCNC: 7 MMOL/L (ref 7–15)
AST SERPL W P-5'-P-CCNC: 24 U/L (ref 10–35)
BASOPHILS # BLD AUTO: 0 10E3/UL (ref 0–0.2)
BASOPHILS NFR BLD AUTO: 1 %
BILIRUB SERPL-MCNC: 0.2 MG/DL
BUN SERPL-MCNC: 7.2 MG/DL (ref 6–20)
CALCIUM SERPL-MCNC: 9.1 MG/DL (ref 8.6–10)
CHLORIDE SERPL-SCNC: 106 MMOL/L (ref 98–107)
CREAT SERPL-MCNC: 0.83 MG/DL (ref 0.51–0.95)
DEPRECATED CALCIDIOL+CALCIFEROL SERPL-MC: 22 UG/L (ref 20–75)
DEPRECATED HCO3 PLAS-SCNC: 28 MMOL/L (ref 22–29)
EOSINOPHIL # BLD AUTO: 0.3 10E3/UL (ref 0–0.7)
EOSINOPHIL NFR BLD AUTO: 5 %
ERYTHROCYTE [DISTWIDTH] IN BLOOD BY AUTOMATED COUNT: 12.6 % (ref 10–15)
GFR SERPL CREATININE-BSD FRML MDRD: 82 ML/MIN/1.73M2
GLUCOSE SERPL-MCNC: 85 MG/DL (ref 70–99)
HCT VFR BLD AUTO: 37.8 % (ref 35–47)
HGB BLD-MCNC: 12.1 G/DL (ref 11.7–15.7)
IMM GRANULOCYTES # BLD: 0 10E3/UL
IMM GRANULOCYTES NFR BLD: 0 %
LYMPHOCYTES # BLD AUTO: 2.2 10E3/UL (ref 0.8–5.3)
LYMPHOCYTES NFR BLD AUTO: 39 %
MCH RBC QN AUTO: 31.3 PG (ref 26.5–33)
MCHC RBC AUTO-ENTMCNC: 32 G/DL (ref 31.5–36.5)
MCV RBC AUTO: 98 FL (ref 78–100)
MONOCYTES # BLD AUTO: 0.4 10E3/UL (ref 0–1.3)
MONOCYTES NFR BLD AUTO: 8 %
NEUTROPHILS # BLD AUTO: 2.7 10E3/UL (ref 1.6–8.3)
NEUTROPHILS NFR BLD AUTO: 48 %
PLATELET # BLD AUTO: 232 10E3/UL (ref 150–450)
POTASSIUM SERPL-SCNC: 4.2 MMOL/L (ref 3.4–5.3)
PROT SERPL-MCNC: 6.6 G/DL (ref 6.4–8.3)
RBC # BLD AUTO: 3.87 10E6/UL (ref 3.8–5.2)
SODIUM SERPL-SCNC: 141 MMOL/L (ref 136–145)
VIT B12 SERPL-MCNC: 519 PG/ML (ref 232–1245)
WBC # BLD AUTO: 5.5 10E3/UL (ref 4–11)

## 2023-06-08 PROCEDURE — 83921 ORGANIC ACID SINGLE QUANT: CPT | Performed by: FAMILY MEDICINE

## 2023-06-08 PROCEDURE — 82607 VITAMIN B-12: CPT | Performed by: FAMILY MEDICINE

## 2023-06-08 PROCEDURE — 82306 VITAMIN D 25 HYDROXY: CPT | Performed by: FAMILY MEDICINE

## 2023-06-08 PROCEDURE — 85025 COMPLETE CBC W/AUTO DIFF WBC: CPT | Performed by: FAMILY MEDICINE

## 2023-06-08 PROCEDURE — 36415 COLL VENOUS BLD VENIPUNCTURE: CPT | Performed by: FAMILY MEDICINE

## 2023-06-08 PROCEDURE — 80053 COMPREHEN METABOLIC PANEL: CPT | Performed by: FAMILY MEDICINE

## 2023-06-08 PROCEDURE — G0296 VISIT TO DETERM LDCT ELIG: HCPCS | Performed by: FAMILY MEDICINE

## 2023-06-08 PROCEDURE — 99214 OFFICE O/P EST MOD 30 MIN: CPT | Performed by: FAMILY MEDICINE

## 2023-06-08 RX ORDER — FLUOXETINE 20 MG/1
40 TABLET, FILM COATED ORAL DAILY
Qty: 180 TABLET | Refills: 3 | Status: SHIPPED | OUTPATIENT
Start: 2023-06-08 | End: 2024-03-19

## 2023-06-08 RX ORDER — ALBUTEROL SULFATE 90 UG/1
2 AEROSOL, METERED RESPIRATORY (INHALATION) EVERY 4 HOURS PRN
Qty: 18 G | Refills: 1 | Status: SHIPPED | OUTPATIENT
Start: 2023-06-08 | End: 2023-12-01

## 2023-06-08 ASSESSMENT — ANXIETY QUESTIONNAIRES
4. TROUBLE RELAXING: SEVERAL DAYS
2. NOT BEING ABLE TO STOP OR CONTROL WORRYING: SEVERAL DAYS
5. BEING SO RESTLESS THAT IT IS HARD TO SIT STILL: SEVERAL DAYS
GAD7 TOTAL SCORE: 8
1. FEELING NERVOUS, ANXIOUS, OR ON EDGE: SEVERAL DAYS
GAD7 TOTAL SCORE: 8
8. IF YOU CHECKED OFF ANY PROBLEMS, HOW DIFFICULT HAVE THESE MADE IT FOR YOU TO DO YOUR WORK, TAKE CARE OF THINGS AT HOME, OR GET ALONG WITH OTHER PEOPLE?: SOMEWHAT DIFFICULT
GAD7 TOTAL SCORE: 8
7. FEELING AFRAID AS IF SOMETHING AWFUL MIGHT HAPPEN: SEVERAL DAYS
IF YOU CHECKED OFF ANY PROBLEMS ON THIS QUESTIONNAIRE, HOW DIFFICULT HAVE THESE PROBLEMS MADE IT FOR YOU TO DO YOUR WORK, TAKE CARE OF THINGS AT HOME, OR GET ALONG WITH OTHER PEOPLE: SOMEWHAT DIFFICULT
7. FEELING AFRAID AS IF SOMETHING AWFUL MIGHT HAPPEN: SEVERAL DAYS
6. BECOMING EASILY ANNOYED OR IRRITABLE: MORE THAN HALF THE DAYS
3. WORRYING TOO MUCH ABOUT DIFFERENT THINGS: SEVERAL DAYS

## 2023-06-08 ASSESSMENT — PATIENT HEALTH QUESTIONNAIRE - PHQ9
10. IF YOU CHECKED OFF ANY PROBLEMS, HOW DIFFICULT HAVE THESE PROBLEMS MADE IT FOR YOU TO DO YOUR WORK, TAKE CARE OF THINGS AT HOME, OR GET ALONG WITH OTHER PEOPLE: SOMEWHAT DIFFICULT
SUM OF ALL RESPONSES TO PHQ QUESTIONS 1-9: 5
SUM OF ALL RESPONSES TO PHQ QUESTIONS 1-9: 5

## 2023-06-08 NOTE — PATIENT INSTRUCTIONS
Lung Cancer Screening   Frequently Asked Questions  If you are at high-risk for lung cancer, getting screened with low-dose computed tomography (LDCT) every year can help save your life. This handout offers answers to some of the most common questions about lung cancer screening. If you have other questions, please call [phone] ([phone]).     What is it?  Lung cancer screening uses special X-ray technology to create an image of your lung tissue. The exam is quick and easy and takes less than 10 seconds. We don t give you any medicine or use any needles. You can eat before and after the exam. You don t need to change your clothes as long as the clothing on your chest doesn t contain metal. But, you do need to be able to hold your breath for at least 6 seconds during the exam.    What is the goal of lung cancer screening?  The goal of lung cancer screening is to save lives. Many times, lung cancer is not found until a person starts having physical symptoms. Lung cancer screening can help detect lung cancer in the earliest stages when it may be easier to treat.    Who should be screened for lung cancer?  We suggest lung cancer screening for anyone who is at high-risk for lung cancer. You are in the high-risk group if you:      are between the ages of 55 and 79, and    have smoked at least 1 pack of cigarettes a day for 20 or more years, and    still smoke or have quit within the past 15 years.    However, if you have a new cough or shortness of breath, you should talk to your doctor before being screened.    Why does it matter if I have symptoms?  Certain symptoms can be a sign that you have a condition in your lungs that should be checked and treated by your doctor. These symptoms include fever, chest pain, a new or changing cough, shortness of breath that you have never felt before, coughing up blood or unexplained weight loss. Having any of these symptoms can greatly affect the results of lung  cancer screening.       Should all smokers get an LDCT lung cancer screening exam?  It depends. Lung cancer screening is for a very specific group of men and women who have a history of heavy smoking over a long period of time (see  Who should be screened for lung cancer  above).  I am in the high-risk group, but have been diagnosed with cancer in the past. Is LDCT lung cancer screening right for me?  In some cases, you should not have LDCT lung screening, such as when your doctor is already following your cancer with CT scan studies. Your doctor will help you decide if LDCT lung screening is right for you.  Do I need to have a screening exam every year?  Yes. If you are in the high-risk group described earlier, you should get an LDCT lung cancer screening exam every year until you are 79, or are no longer willing or able to undergo screening and possible procedures to diagnose and treat lung cancer.  How effective is LDCT at preventing death from lung cancer?  Studies have shown that LDCT lung cancer screening can lower the risk of death from lung cancer by 20 percent in people who are at high-risk.  What are the risks?  There are some risks and limitations of LDCT lung cancer screening. We want to make sure you understand the risks and benefits, so please let us know if you have any questions. Your doctor may want to talk with you more about these risks.    Radiation exposure: As with any exam that uses radiation, there is a very small increased risk of cancer. The amount of radiation in LDCT is small--about the same amount a person would get from a mammogram. Your doctor orders the exam when he or she feels the potential benefits outweigh the risks.    False negatives: No test is perfect, including LDCT. It is possible that you may have a medical condition, including lung cancer, that is not found during your exam. This is called a false negative result.    False positives and more testing: LDCT very often finds  something in the lung that could be cancer, but in fact is not. This is called a false positive result. False positive tests often cause anxiety. To make sure these findings are not cancer, you may need to have more tests. These tests will be done only if you give us permission. Sometimes patients need a treatment that can have side effects, such as a biopsy. For more information on false positives, see  What can I expect from the results?     Findings not related to lung cancer: Your LDCT exam also takes pictures of areas of your body next to your lungs. In a very small number of cases, the CT scan will show an abnormal finding in one of these areas, such as your kidneys, adrenal glands, liver or thyroid. This finding may not be serious, but you may need more tests. Your doctor can help you decide what other tests you may need, if any.  What can I expect from the results?  About 1 out of 4 LDCT exams will find something that may need more tests. Most of the time, these findings are lung nodules. Lung nodules are very small collections of tissue in the lung. These nodules are very common, and the vast majority--more than 97 percent--are not cancer (benign). Most are normal lymph nodes or small areas of scarring from past infections.  But, if a small lung nodule is found to be cancer, the cancer can be cured more than 90 percent of the time. To know if the nodule is cancer, we may need to get more images before your next yearly screening exam. If the nodule has suspicious features (for example, it is large, has an odd shape or grows over time), we will refer you to a specialist for further testing.  Will my doctor also get the results?  Yes. Your doctor will get a copy of your results.  Is it okay to keep smoking now that there s a cancer screening exam?  No. Tobacco is one of the strongest cancer-causing agents. It causes not only lung cancer, but other cancers and cardiovascular (heart) diseases as well. The damage  caused by smoking builds over time. This means that the longer you smoke, the higher your risk of disease. While it is never too late to quit, the sooner you quit, the better.  Where can I find help to quit smoking?  The best way to prevent lung cancer is to stop smoking. If you have already quit smoking, congratulations and keep it up! For help on quitting smoking, please call [employer] at 9-193-QUITNOW (1-690.585.3524) or the [employer] Society at 1-238.334.6710 to find local resources near you.  One-on-one health coaching:  If you d prefer to work individually with a health care provider on tobacco cessation, we offer:      Medication Therapy Management:  Our specially trained pharmacists work closely with you and your doctor to help you quit smoking.  Call [phone] or [phone] (toll free).

## 2023-06-08 NOTE — PROGRESS NOTES
Assessment & Plan     Major depressive disorder, recurrent episode, moderate (H)  Fluoxetine was refilled at this time.  There may need to be initiation of a prior authorization I do not see any evidence that this has been done recently.  Fluoxetine is very helpful for the patient's mood and she was intolerant after developing significant esophagitis from the capsules and has done much better on the tablet.  - FLUoxetine 20 MG tablet; Take 2 tablets (40 mg) by mouth daily    Visit for screening mammogram  Order was placed for screening mammogram.  - MA Screen Bilateral w/Nathaniel; Future    Personal history of tobacco use  She does meet criteria for lung cancer screening and has had it done in the past so understands the risks.  We reviewed last years CT screening for lung cancer.  - Prof fee: Shared Decision Making for Lung Cancer Screening  - CT Chest Lung Cancer Scrn Low Dose wo; Future    Panic attack  Fluoxetine was refilled today.  - FLUoxetine 20 MG tablet; Take 2 tablets (40 mg) by mouth daily    Centrilobular emphysema (H)  Chart was updated to reflect a diagnosis of emphysema based on findings from CT scan done last year.  Her use of the albuterol is probably once or twice a day.  It sounds like she has used a maintenance inhaler in the past but it caused a lot of burning in her throat.  - albuterol (PROAIR HFA/PROVENTIL HFA/VENTOLIN HFA) 108 (90 Base) MCG/ACT inhaler; Inhale 2 puffs into the lungs every 4 hours as needed for shortness of breath or wheezing    Gastric bypass status for obesity  CMP will be done to assess nutritional status.  - Comprehensive metabolic panel (BMP + Alb, Alk Phos, ALT, AST, Total. Bili, TP); Future  - Comprehensive metabolic panel (BMP + Alb, Alk Phos, ALT, AST, Total. Bili, TP)    Vitamin B12 deficiency disease  She was on B12 injections for a long time and this was recently discontinued per her report because levels were normal.  She would like to recheck levels at this time  "and see if she needs to continue B12 supplementation as the bariatric surgeon told her she would need to be on it for her life.  - Vitamin B12; Future  - Methylmalonic Acid; Future  - CBC with platelets and differential; Future  - Vitamin B12  - Methylmalonic Acid  - CBC with platelets and differential    Vitamin D deficiency disease  As above, also a deficiency related to the bariatric surgery and she was told she needed to be on this for the rest of her life.  We will check the vitamin D level today and determine if continued supplementation is needed.  - Vitamin D Deficiency; Future  - Vitamin D Deficiency             BMI:   Estimated body mass index is 25.06 kg/m  as calculated from the following:    Height as of this encounter: 1.702 m (5' 7\").    Weight as of this encounter: 72.6 kg (160 lb).           Leno Luis MD  [facility] Institute[facility]ORTES Mathur is a 58 year old, presenting for the following health issues:    Follow Up (COPD-thinks may be due for lung Xray for COPD), Recheck Medication (Out of Duloxetine-has refills left but provider needs to call [facility] to approve the tablet instead of capsule as unable to swallow capsule, causes her to vomit), and Blood Draw (Needs to have Vit D and B12 levels checked, taken off due to levels being good, has gastric bypass and was told to have them rechecked to see if needs to go back on them )         View : No data to display.              History of Present Illness       Mental Health Follow-up:  Patient presents to follow-up on Depression & Anxiety.Patient's depression since last visit has been:  Better  The patient is not having other symptoms associated with depression.  Patient's anxiety since last visit has been:  Better  The patient is not having other symptoms associated with anxiety.  Any significant life events: health concerns  Patient is not feeling anxious or having panic attacks.  Patient has no concerns about alcohol " or drug use.    She eats 0-1 servings of fruits and vegetables daily.She consumes 2 sweetened beverage(s) daily.She exercises with enough effort to increase her heart rate 9 or less minutes per day.  She exercises with enough effort to increase her heart rate 3 or less days per week.   She is taking medications regularly.    Today's PHQ-9         PHQ-9 Total Score: 5    PHQ-9 Q9 Thoughts of better off dead/self-harm past 2 weeks :   Not at all    How difficult have these problems made it for you to do your work, take care of things at home, or get along with other people: Somewhat difficult  Today's CHARAN-7 Score: 8         Patient is here for follow-up.  She would like to do a follow-up lung cancer screening CT as noted above.  She has had this done in the past.  She is also out of fluoxetine which she uses for treatment of depression and bipolar disorder.  She developed pretty significant dysphagia and esophagitis related to use of the capsules but is found the fluoxetine tablets well-tolerated and would like to continue those at this time.  Been very helpful for her mood as noted by her PHQ-9 and CHARAN-7 scores.  She would also like to check vitamin D and B12 levels.  She has had Jason-en-Y gastric bypass and was told she would need to be on these for the rest of her life but another doctor discontinued them telling her that the levels were normal.  She like to recheck levels to see if she needs to go back on supplements at this time.  She is generally feeling well at this time.  She had been seeing another doctor closer to her home for the convenience of transportation but now so she is on Medicare and can use [employer] so reports an intent to come back down to this clinic.      Review of Systems   Constitutional, HEENT, cardiovascular, pulmonary, gi and gu systems are negative, except as otherwise noted.      Objective    /81 (BP Location: Left arm, Patient Position: Sitting, Cuff Size: Adult Regular)    "Pulse 68   Temp 97  F (36.1  C) (Temporal)   Resp 12   Ht 1.702 m (5' 7\")   Wt 72.6 kg (160 lb)   LMP  (LMP Unknown)   SpO2 96%   BMI 25.06 kg/m    Body mass index is 25.06 kg/m .  Physical Exam   GENERAL: healthy, alert, no distress and appears older than stated age  EYES: Eyes grossly normal to inspection, PERRL and conjunctivae and sclerae normal  HENT: normal cephalic/atraumatic, nose and mouth without ulcers or lesions, oropharynx clear, oral mucous membranes moist and edentulous  NECK: no adenopathy, no asymmetry, masses, or scars and thyroid normal to palpation  RESP: lungs clear to auscultation - no rales, rhonchi or wheezes  CV: regular rate and rhythm, normal S1 S2, no S3 or S4, no murmur, click or rub, no peripheral edema and peripheral pulses strong  MS: no gross musculoskeletal defects noted, no edema  SKIN: Bruise noted left upper arm, patient states she bruises easily.  NEURO: Normal strength and tone, mentation intact and speech normal  PSYCH: mentation appears normal, affect normal/bright                    "

## 2023-06-08 NOTE — PROGRESS NOTES
Lung Cancer Screening Shared Decision Making Visit     Kareen Hernandez, a 58 year old female, is eligible for lung cancer screening    History   Smoking Status     Former     Packs/day: 1.00     Years: 37.00     Types: Cigarettes     Quit date: 9/22/2013   Smokeless Tobacco     Current       I have discussed with patient the risks and benefits of screening for lung cancer with low-dose CT.     The risks include:    radiation exposure: one low dose chest CT has as much ionizing radiation as about 15 chest x-rays, or 6 months of background radiation living in [place]      false positives: most findings/nodules are NOT cancer, but some might still require additional diagnostic evaluation, including biopsy    over-diagnosis: some slow growing cancers that might never have been clinically significant will be detected and treated unnecessarily     The benefit of early detection of lung cancer is contingent upon adherence to annual screening or more frequent follow up if indicated.     Furthermore, to benefit from screening, Kareen must be willing and able to undergo diagnostic procedures, if indicated. Although no specific guide is available for determining severity of comorbidities, it is reasonable to withhold screening in patients who have greater mortality risk from other diseases.     We did discuss that the best way to prevent lung cancer is to not smoke.    Some patients may value a numeric estimation of lung cancer risk when evaluating if lung cancer screening is right for them, here is one calculator:    ShouldIScreen

## 2023-06-09 ENCOUNTER — TELEPHONE (OUTPATIENT)
Dept: FAMILY MEDICINE | Facility: CLINIC | Age: 58
End: 2023-06-09
Payer: COMMERCIAL

## 2023-06-09 NOTE — TELEPHONE ENCOUNTER
Pt calling stating that she needs a PA. Pt was told that the request was sent to the prior auth team yesterday. Will call pt once we hear back whether it had been approved.     Pt states that if we call [phone] they might get it done sooner.     Kath Zavala RN  [facility]

## 2023-06-10 DIAGNOSIS — F33.1 MAJOR DEPRESSIVE DISORDER, RECURRENT EPISODE, MODERATE (H): ICD-10-CM

## 2023-06-10 DIAGNOSIS — F41.0 PANIC ATTACK: ICD-10-CM

## 2023-06-11 NOTE — TELEPHONE ENCOUNTER
Patient has not tolerated capsules in the past, they caused esophagitis.  Fluoxetine has worked well.  Tablets do not cause the esophagitis, and other selective serotonin reuptake inhibitor meds have failed in the distant past.    Leno Luis MD

## 2023-06-12 ENCOUNTER — ANCILLARY PROCEDURE (OUTPATIENT)
Dept: CT IMAGING | Facility: CLINIC | Age: 58
End: 2023-06-12
Attending: FAMILY MEDICINE
Payer: COMMERCIAL

## 2023-06-12 DIAGNOSIS — Z87.891 PERSONAL HISTORY OF TOBACCO USE: ICD-10-CM

## 2023-06-12 PROCEDURE — 71271 CT THORAX LUNG CANCER SCR C-: CPT | Mod: GC | Performed by: RADIOLOGY

## 2023-06-12 RX ORDER — FLUOXETINE 40 MG/1
CAPSULE ORAL
Refills: 0 | OUTPATIENT
Start: 2023-06-12

## 2023-06-12 NOTE — TELEPHONE ENCOUNTER
Pt picked up her prescription today. Thanks    Kath Zavala RN  [facility]

## 2023-06-12 NOTE — TELEPHONE ENCOUNTER
Pt calling to inform us a fax will be sent over, needs to be completed and faxed back regarding medication PA    Please keep eye out for form     Pt has been out of fluoxitine since last Thursday     Lyudmila AVALOS RN  [facility]

## 2023-06-12 NOTE — TELEPHONE ENCOUNTER
I got a note from you care stating that the fluoxetine tablets were approved for 1 year starting in May.  Please call the patient and have her check with the pharmacy to see if the prescription is gone through.    Leno Luis MD

## 2023-06-14 LAB — METHYLMALONATE SERPL-SCNC: 0.23 UMOL/L (ref 0–0.4)

## 2023-06-23 DIAGNOSIS — E53.8 VITAMIN B12 DEFICIENCY DISEASE: ICD-10-CM

## 2023-06-23 RX ORDER — NEEDLES, DISPOSABLE 25GX5/8"
NEEDLE, DISPOSABLE MISCELLANEOUS
Qty: 3 EACH | Refills: 32 | Status: SHIPPED | OUTPATIENT
Start: 2023-06-23

## 2023-06-29 DIAGNOSIS — M79.7 FIBROMYALGIA: ICD-10-CM

## 2023-06-29 DIAGNOSIS — F31.9 BIPOLAR AFFECTIVE DISORDER, REMISSION STATUS UNSPECIFIED (H): ICD-10-CM

## 2023-06-29 RX ORDER — LAMOTRIGINE 100 MG/1
200 TABLET ORAL DAILY
Qty: 180 TABLET | Refills: 3 | Status: SHIPPED | OUTPATIENT
Start: 2023-06-29 | End: 2024-01-16

## 2023-06-29 RX ORDER — CYCLOBENZAPRINE HCL 10 MG
10 TABLET ORAL 3 TIMES DAILY PRN
Qty: 60 TABLET | Refills: 1 | Status: SHIPPED | OUTPATIENT
Start: 2023-06-29 | End: 2023-08-31

## 2023-08-27 ENCOUNTER — HEALTH MAINTENANCE LETTER (OUTPATIENT)
Age: 58
End: 2023-08-27

## 2023-08-30 DIAGNOSIS — M79.7 FIBROMYALGIA: ICD-10-CM

## 2023-08-31 RX ORDER — CYCLOBENZAPRINE HCL 10 MG
10 TABLET ORAL 3 TIMES DAILY PRN
Qty: 60 TABLET | Refills: 1 | Status: SHIPPED | OUTPATIENT
Start: 2023-08-31 | End: 2023-10-09

## 2023-09-22 DIAGNOSIS — F33.1 MAJOR DEPRESSIVE DISORDER, RECURRENT EPISODE, MODERATE (H): ICD-10-CM

## 2023-09-22 RX ORDER — BUPROPION HYDROCHLORIDE 300 MG/1
300 TABLET ORAL EVERY MORNING
Qty: 90 TABLET | Refills: 1 | Status: SHIPPED | OUTPATIENT
Start: 2023-09-22 | End: 2023-12-20

## 2023-10-09 ENCOUNTER — OFFICE VISIT (OUTPATIENT)
Dept: FAMILY MEDICINE | Facility: CLINIC | Age: 58
End: 2023-10-09
Attending: FAMILY MEDICINE
Payer: COMMERCIAL

## 2023-10-09 VITALS
RESPIRATION RATE: 15 BRPM | WEIGHT: 158.1 LBS | BODY MASS INDEX: 24.81 KG/M2 | DIASTOLIC BLOOD PRESSURE: 80 MMHG | SYSTOLIC BLOOD PRESSURE: 121 MMHG | OXYGEN SATURATION: 97 % | HEIGHT: 67 IN | HEART RATE: 85 BPM | TEMPERATURE: 98.2 F

## 2023-10-09 DIAGNOSIS — Z23 NEED FOR PROPHYLACTIC VACCINATION AGAINST HEPATITIS A AND HEPATITIS B IN ADULT: ICD-10-CM

## 2023-10-09 DIAGNOSIS — E55.9 VITAMIN D DEFICIENCY DISEASE: ICD-10-CM

## 2023-10-09 DIAGNOSIS — Z13.6 SCREENING FOR HEART DISEASE: ICD-10-CM

## 2023-10-09 DIAGNOSIS — B18.2 CHRONIC HEPATITIS C WITHOUT HEPATIC COMA (H): ICD-10-CM

## 2023-10-09 DIAGNOSIS — Z00.00 ENCOUNTER FOR MEDICARE ANNUAL WELLNESS EXAM: Primary | ICD-10-CM

## 2023-10-09 DIAGNOSIS — M79.7 FIBROMYALGIA: ICD-10-CM

## 2023-10-09 DIAGNOSIS — E53.8 VITAMIN B12 DEFICIENCY DISEASE: ICD-10-CM

## 2023-10-09 DIAGNOSIS — Z12.31 ENCOUNTER FOR SCREENING MAMMOGRAM FOR BREAST CANCER: ICD-10-CM

## 2023-10-09 DIAGNOSIS — R06.09 DOE (DYSPNEA ON EXERTION): ICD-10-CM

## 2023-10-09 DIAGNOSIS — F33.1 MAJOR DEPRESSIVE DISORDER, RECURRENT EPISODE, MODERATE (H): ICD-10-CM

## 2023-10-09 DIAGNOSIS — G43.709 CHRONIC MIGRAINE WITHOUT AURA WITHOUT STATUS MIGRAINOSUS, NOT INTRACTABLE: ICD-10-CM

## 2023-10-09 DIAGNOSIS — Z23 NEED FOR PROPHYLACTIC VACCINATION AND INOCULATION AGAINST INFLUENZA: ICD-10-CM

## 2023-10-09 PROBLEM — Z98.890 S/P LUMBAR LAMINECTOMY: Status: RESOLVED | Noted: 2018-06-04 | Resolved: 2023-10-09

## 2023-10-09 PROBLEM — M54.41 ACUTE RIGHT-SIDED LOW BACK PAIN WITH RIGHT-SIDED SCIATICA: Status: RESOLVED | Noted: 2018-05-08 | Resolved: 2023-10-09

## 2023-10-09 PROBLEM — G89.18 POSTOPERATIVE PAIN: Status: RESOLVED | Noted: 2022-11-17 | Resolved: 2023-10-09

## 2023-10-09 PROBLEM — G96.00 CSF LEAK: Status: RESOLVED | Noted: 2022-11-17 | Resolved: 2023-10-09

## 2023-10-09 LAB
BASO+EOS+MONOS # BLD AUTO: NORMAL 10*3/UL
BASO+EOS+MONOS NFR BLD AUTO: NORMAL %
BASOPHILS # BLD AUTO: 0 10E3/UL (ref 0–0.2)
BASOPHILS NFR BLD AUTO: 0 %
CHOLEST SERPL-MCNC: 206 MG/DL
EOSINOPHIL # BLD AUTO: 0.2 10E3/UL (ref 0–0.7)
EOSINOPHIL NFR BLD AUTO: 4 %
ERYTHROCYTE [DISTWIDTH] IN BLOOD BY AUTOMATED COUNT: 12.3 % (ref 10–15)
HCT VFR BLD AUTO: 39.5 % (ref 35–47)
HDLC SERPL-MCNC: 88 MG/DL
HGB BLD-MCNC: 12.7 G/DL (ref 11.7–15.7)
IMM GRANULOCYTES # BLD: 0 10E3/UL
IMM GRANULOCYTES NFR BLD: 0 %
LDLC SERPL CALC-MCNC: 107 MG/DL
LYMPHOCYTES # BLD AUTO: 2.1 10E3/UL (ref 0.8–5.3)
LYMPHOCYTES NFR BLD AUTO: 45 %
MCH RBC QN AUTO: 30.8 PG (ref 26.5–33)
MCHC RBC AUTO-ENTMCNC: 32.2 G/DL (ref 31.5–36.5)
MCV RBC AUTO: 96 FL (ref 78–100)
MONOCYTES # BLD AUTO: 0.4 10E3/UL (ref 0–1.3)
MONOCYTES NFR BLD AUTO: 8 %
NEUTROPHILS # BLD AUTO: 2 10E3/UL (ref 1.6–8.3)
NEUTROPHILS NFR BLD AUTO: 42 %
NONHDLC SERPL-MCNC: 118 MG/DL
PLATELET # BLD AUTO: 228 10E3/UL (ref 150–450)
RBC # BLD AUTO: 4.13 10E6/UL (ref 3.8–5.2)
TRIGL SERPL-MCNC: 53 MG/DL
VIT B12 SERPL-MCNC: 519 PG/ML (ref 232–1245)
VIT D+METAB SERPL-MCNC: 39 NG/ML (ref 20–50)
WBC # BLD AUTO: 4.7 10E3/UL (ref 4–11)

## 2023-10-09 PROCEDURE — 82607 VITAMIN B-12: CPT | Performed by: FAMILY MEDICINE

## 2023-10-09 PROCEDURE — 85025 COMPLETE CBC W/AUTO DIFF WBC: CPT | Performed by: FAMILY MEDICINE

## 2023-10-09 PROCEDURE — 36415 COLL VENOUS BLD VENIPUNCTURE: CPT | Performed by: FAMILY MEDICINE

## 2023-10-09 PROCEDURE — 90682 RIV4 VACC RECOMBINANT DNA IM: CPT | Performed by: FAMILY MEDICINE

## 2023-10-09 PROCEDURE — 80061 LIPID PANEL: CPT | Performed by: FAMILY MEDICINE

## 2023-10-09 PROCEDURE — 99214 OFFICE O/P EST MOD 30 MIN: CPT | Mod: 25 | Performed by: FAMILY MEDICINE

## 2023-10-09 PROCEDURE — 87522 HEPATITIS C REVRS TRNSCRPJ: CPT | Performed by: FAMILY MEDICINE

## 2023-10-09 PROCEDURE — 82306 VITAMIN D 25 HYDROXY: CPT | Performed by: FAMILY MEDICINE

## 2023-10-09 PROCEDURE — G0008 ADMIN INFLUENZA VIRUS VAC: HCPCS | Performed by: FAMILY MEDICINE

## 2023-10-09 PROCEDURE — G0439 PPPS, SUBSEQ VISIT: HCPCS | Performed by: FAMILY MEDICINE

## 2023-10-09 RX ORDER — CYCLOBENZAPRINE HCL 10 MG
10 TABLET ORAL 3 TIMES DAILY PRN
Qty: 60 TABLET | Refills: 11 | Status: SHIPPED | OUTPATIENT
Start: 2023-10-09 | End: 2023-12-20

## 2023-10-09 RX ORDER — SUMATRIPTAN 50 MG/1
50 TABLET, FILM COATED ORAL
Qty: 10 TABLET | Refills: 3 | Status: SHIPPED | OUTPATIENT
Start: 2023-10-09

## 2023-10-09 ASSESSMENT — PATIENT HEALTH QUESTIONNAIRE - PHQ9
10. IF YOU CHECKED OFF ANY PROBLEMS, HOW DIFFICULT HAVE THESE PROBLEMS MADE IT FOR YOU TO DO YOUR WORK, TAKE CARE OF THINGS AT HOME, OR GET ALONG WITH OTHER PEOPLE: VERY DIFFICULT
SUM OF ALL RESPONSES TO PHQ QUESTIONS 1-9: 6
SUM OF ALL RESPONSES TO PHQ QUESTIONS 1-9: 6

## 2023-10-09 ASSESSMENT — ENCOUNTER SYMPTOMS
FEVER: 0
JOINT SWELLING: 1
SHORTNESS OF BREATH: 1
DIARRHEA: 0
ARTHRALGIAS: 1
COUGH: 1
ABDOMINAL PAIN: 0
EYE PAIN: 1
NERVOUS/ANXIOUS: 1
DIZZINESS: 1
SORE THROAT: 1
PALPITATIONS: 1
FREQUENCY: 0
HEADACHES: 1
WEAKNESS: 1
HEMATURIA: 0
MYALGIAS: 1
HEMATOCHEZIA: 0

## 2023-10-09 ASSESSMENT — PAIN SCALES - GENERAL: PAINLEVEL: SEVERE PAIN (6)

## 2023-10-09 ASSESSMENT — ACTIVITIES OF DAILY LIVING (ADL): CURRENT_FUNCTION: SHOPPING REQUIRES ASSISTANCE

## 2023-10-09 NOTE — PATIENT INSTRUCTIONS
Patient Education   Personalized Prevention Plan  You are due for the preventive services outlined below.  Your care team is available to assist you in scheduling these services.  If you have already completed any of these items, please share that information with your care team to update in your medical record.  Health Maintenance Due   Topic Date Due     COPD Action Plan  Never done     Hepatitis B Vaccine (1 of 3 - 3-dose series) Never done     Hepatitis A Vaccine (1 of 2 - Risk 2-dose series) Never done     Talk to your care team about options to quit tobacco use.  02/24/2016     Mammogram  07/07/2023     Flu Vaccine (1) 09/01/2023     COVID-19 Vaccine (4 - 2023-24 season) 09/01/2023     Your Health Risk Assessment indicates you feel you are not in good health    A healthy lifestyle helps keep the body fit and the mind alert. It helps protect you from disease, helps you fight disease, and helps prevent chronic disease (disease that doesn't go away) from getting worse. This is important as you get older and begin to notice twinges in muscles and joints and a decline in the strength and stamina you once took for granted. A healthy lifestyle includes good healthcare, good nutrition, weight control, recreation, and regular exercise. Avoid harmful substances and do what you can to keep safe. Another part of a healthy lifestyle is stay mentally active and socially involved.    Good healthcare   Have a wellness visit every year.   If you have new symptoms, let us know right away. Don't wait until the next checkup.   Take medicines exactly as prescribed and keep your medicines in a safe place. Tell us if your medicine causes problems.   Healthy diet and weight control   Eat 3 or 4 small, nutritious, low-fat, high-fiber meals a day. Include a variety of fruits, vegetables, and whole-grain foods.   Make sure you get enough calcium in your diet. Calcium, vitamin D, and exercise help prevent osteoporosis (bone thinning).    If you live alone, try eating with others when you can. That way you get a good meal and have company while you eat it.   Try to keep a healthy weight. If you eat more calories than your body uses for energy, it will be stored as fat and you will gain weight.     Recreation   Recreation is not limited to sports and team events. It includes any activity that provides relaxation, interest, enjoyment, and exercise. Recreation provides an outlet for physical, mental, and social energy. It can give a sense of worth and achievement. It can help you stay healthy.    Mental Exercise and Social Involvement  Mental and emotional health is as important as physical health. Keep in touch with friends and family. Stay as active as possible. Continue to learn and challenge yourself.   Things you can do to stay mentally active are:  Learn something new, like a foreign language or musical instrument.   Play SCRABBLE or do crossword puzzles. If you cannot find people to play these games with you at home, you can play them with others on your computer through the Internet.   Join a games club--anything from card games to chess or checkers or lawn bowling.   Start a new hobby.   Go back to school.   Volunteer.   Read.   Keep up with world events.  Learning About Dietary Guidelines  What are the Dietary Guidelines for Americans?     Dietary Guidelines for Americans provide tips for eating well and staying healthy. This helps reduce the risk for long-term (chronic) diseases.  These guidelines recommend that you:  Eat and drink the right amount for you. The [place] government's food guide is called MyPlate. It can help you make your own well-balanced eating plan.  Try to balance your eating with your activity. This helps you stay at a healthy weight.  Drink alcohol in moderation, if at all.  Limit foods high in salt, saturated fat, trans fat, and added sugar.  These guidelines are from the [place] Department of Agriculture and the [place]  "Department of Health and Human Services. They are updated every 5 years.  What is MyPlate?  MyPlate is the [place] government's food guide. It can help you make your own well-balanced eating plan. A balanced eating plan means that you eat enough, but not too much, and that your food gives you the nutrients you need to stay healthy.  MyPlate focuses on eating plenty of whole grains, fruits, and vegetables, and on limiting fat and sugar. It is available online at www.ChooseMyPlate.gov.  How can you get started?  If you're trying to eat healthier, you can slowly change your eating habits over time. You don't have to make big changes all at once. Start by adding one or two healthy foods to your meals each day.  Grains  Choose whole-grain breads and cereals and whole-wheat pasta and whole-grain crackers.  Vegetables  Eat a variety of vegetables every day. They have lots of nutrients and are part of a heart-healthy diet.  Fruits  Eat a variety of fruits every day. Fruits contain lots of nutrients. Choose fresh fruit instead of fruit juice.  Protein foods  Choose fish and lean poultry more often. Eat red meat and fried meats less often. Dried beans, tofu, and nuts are also good sources of protein.  Dairy  Choose low-fat or fat-free products from this food group. If you have problems digesting milk, try eating cheese or yogurt instead.  Fats and oils  Limit fats and oils if you're trying to cut calories. Choose healthy fats when you cook. These include canola oil and olive oil.  Where can you learn more?  Go to https://www.[employer].com.net/patiented  Enter D676 in the search box to learn more about \"Learning About Dietary Guidelines.\"  Current as of: March 1, 2023               Content Version: 13.7    8289-1594 [employer].   Care instructions adapted under license by your healthcare professional. If you have questions about a medical condition or this instruction, always ask your healthcare professional. " [employer] disclaims any warranty or liability for your use of this information.      Activities of Daily Living    Your Health Risk Assessment indicates you have difficulties with activities of daily living such as housework, bathing, preparing meals, taking medication, etc. Please make a follow up appointment for us to address this issue in more detail.  Hearing Loss: Care Instructions  Overview     Hearing loss is a sudden or slow decrease in how well you hear. It can range from slight to profound. Permanent hearing loss can occur with aging. It also can happen when you are exposed long-term to loud noise. Examples include listening to loud music, riding motorcycles, or being around other loud machines.  Hearing loss can affect your work and home life. It can make you feel lonely or depressed. You may feel that you have lost your independence. But hearing aids and other devices can help you hear better and feel connected to others.  Follow-up care is a key part of your treatment and safety. Be sure to make and go to all appointments, and call your doctor if you are having problems. It's also a good idea to know your test results and keep a list of the medicines you take.  How can you care for yourself at home?  Avoid loud noises whenever possible. This helps keep your hearing from getting worse.  Always wear hearing protection around loud noises.  Wear a hearing aid as directed.  A professional can help you pick a hearing aid that will work best for you.  You can also get hearing aids over the counter for mild to moderate hearing loss.  Have hearing tests as your doctor suggests. They can show whether your hearing has changed. Your hearing aid may need to be adjusted.  Use other devices as needed. These may include:  Telephone amplifiers and hearing aids that can connect to a television, stereo, radio, or microphone.  Devices that use lights or vibrations. These alert you to the doorbell, a ringing  "telephone, or a baby monitor.  Television closed-captioning. This shows the words at the bottom of the screen. Most new TVs can do this.  TTY (text telephone). This lets you type messages back and forth on the telephone instead of talking or listening. These devices are also called TDD. When messages are typed on the keyboard, they are sent over the phone line to a receiving TTY. The message is shown on a monitor.  Use text messaging, social media, and email if it is hard for you to communicate by telephone.  Try to learn a listening technique called speechreading. It is not lipreading. You pay attention to people's gestures, expressions, posture, and tone of voice. These clues can help you understand what a person is saying. Face the person you are talking to, and have them face you. Make sure the lighting is good. You need to see the other person's face clearly.  Think about counseling if you need help to adjust to your hearing loss.  When should you call for help?  Watch closely for changes in your health, and be sure to contact your doctor if:    You think your hearing is getting worse.     You have new symptoms, such as dizziness or nausea.   Where can you learn more?  Go to https://www.[employer].net/patiented  Enter R798 in the search box to learn more about \"Hearing Loss: Care Instructions.\"  Current as of: March 1, 2023               Content Version: 13.7    [id number] [employer].   Care instructions adapted under license by your healthcare professional. If you have questions about a medical condition or this instruction, always ask your healthcare professional. [employer] disclaims any warranty or liability for your use of this information.      Learning About Depression Screening  What is depression screening?  Depression screening is a way to see if you have depression symptoms. It may be done by a doctor or counselor. It's often part of a routine checkup. That's because your " "mental health is just as important as your physical health.  Depression is a mental health condition that affects how you feel, think, and act. You may:  Have less energy.  Lose interest in your daily activities.  Feel sad and grouchy for a long time.  Depression is very common. It affects people of all ages.  Many things can lead to depression. Some people become depressed after they have a stroke or find out they have a major illness like cancer or heart disease. The death of a loved one or a breakup may lead to depression. It can run in families. Most experts believe that a combination of inherited genes and stressful life events can cause it.  What happens during screening?  You may be asked to fill out a form about your depression symptoms. You and the doctor will discuss your answers. The doctor may ask you more questions to learn more about how you think, act, and feel.  What happens after screening?  If you have symptoms of depression, your doctor will talk to you about your options.  Doctors usually treat depression with medicines or counseling. Often, combining the two works best. Many people don't get help because they think that they'll get over the depression on their own. But people with depression may not get better unless they get treatment.  The cause of depression is not well understood. There may be many factors involved. But if you have depression, it's not your fault.  A serious symptom of depression is thinking about death or suicide. If you or someone you care about talks about this or about feeling hopeless, get help right away.  It's important to know that depression can be treated. Medicine, counseling, and self-care may help.  Where can you learn more?  Go to https://www.[employer].net/patiented  Enter T185 in the search box to learn more about \"Learning About Depression Screening.\"  Current as of: October 20, 2022               Content Version: 13.7    [id number] [employer]. "   Care instructions adapted under license by your healthcare professional. If you have questions about a medical condition or this instruction, always ask your healthcare professional. [employer] disclaims any warranty or liability for your use of this information.

## 2023-10-09 NOTE — PROGRESS NOTES
"SUBJECTIVE:   Kareen is a 58 year old who presents for Preventive Visit.      10/9/2023     9:05 AM   Additional Questions   Roomed by Jinny Nina       Are you in the first 12 months of your Medicare coverage?  No    Healthy Habits:     In general, how would you rate your overall health?  Fair    Frequency of exercise:  2-3 days/week    Duration of exercise:  15-30 minutes    Do you usually eat at least 4 servings of fruit and vegetables a day, include whole grains    & fiber and avoid regularly eating high fat or \"junk\" foods?  No    Taking medications regularly:  Yes    Medication side effects:  None    Ability to successfully perform activities of daily living:  Shopping requires assistance    Home Safety:  No safety concerns identified    Hearing Impairment:  Need to ask people to speak up or repeat themselves and no hearing concerns    In the past 6 months, have you been bothered by leaking of urine?  No    In general, how would you rate your overall mental or emotional health?  Good    Additional concerns today:  No      Today's PHQ-9 Score:       10/9/2023     8:48 AM   PHQ-9 SCORE   PHQ-9 Total Score MyChart 6 (Mild depression)   PHQ-9 Total Score 6           Have you ever done Advance Care Planning? (For example, a Health Directive, POLST, or a discussion with a medical provider or your loved ones about your wishes): No, advance care planning information given to patient to review.  Patient plans to discuss their wishes with loved ones or provider.         Fall risk:    The patient has not had any falls or falls with injury in the last year.     Cognitive Screening   1) Repeat 3 items (Leader, Season, Table)    2) Clock draw: NORMAL  3) 3 item recall: Recalls 3 objects  Results: 3 items recalled: COGNITIVE IMPAIRMENT LESS LIKELY    Mini-CogTM Copyright S Americo. Licensed by the author for use in [facility]; reprinted with permission (jesus@.[facility]). All rights reserved.      Do you have sleep " apnea, excessive snoring or daytime drowsiness? : no    Reviewed and updated as needed this visit by clinical staff   Tobacco  Allergies  Meds  Problems  Med Hx  Surg Hx  Fam Hx  Soc   Hx        Reviewed and updated as needed this visit by Provider   Tobacco  Allergies  Meds  Problems  Med Hx  Surg Hx  Fam Hx  Soc   Hx       Social History     Tobacco Use     Smoking status: Former     Packs/day: 1.00     Years: 37.00     Pack years: 37.00     Types: Cigarettes     Quit date: 9/22/2013     Years since quitting: 10.0     Smokeless tobacco: Current     Tobacco comments:     Vape e-cig   Substance Use Topics     Alcohol use: Not Currently             10/9/2023     8:54 AM   Alcohol Use   Prescreen: >3 drinks/day or >7 drinks/week? Not Applicable          No data to display              Do you have a current opioid prescription? No  Do you use any other controlled substances or medications that are not prescribed by a provider? Cannabis          Patient is here today for an annual wellness visit.  She has been doing well since my last visit with her.  She does note some shortness of breath after walking for about 30 minutes.  She will sit down and it resolves pretty quickly.  Albuterol inhaler seems to help with this.  There is no associated symptoms suggestive of angina such as tightness or pressure in the chest and there are no symptoms of claudication.  She is interested in doing some follow-up vitamin levels as she is currently off vitamin D and her B12 injections.  No other significant concerns today.    Current providers sharing in care for this patient include:   Patient Care Team:  Leno Courtney MD as PCP - General (Family Medicine)  Jerry Espinoza PA-C as Physician Assistant (Gastroenterology)  Monika Luna APRN CNP as Referring Physician (Family Medicine)  Hyacinth Timmons NP as Assigned Neuroscience Provider  Lizandro Santos MD as Assigned Surgical  Provider  Leno Courtney MD as Assigned PCP    The following health maintenance items are reviewed in Epic and correct as of today:  Health Maintenance   Topic Date Due     COPD ACTION PLAN  Never done     HEPATITIS B IMMUNIZATION (1 of 3 - 3-dose series) Never done     HEPATITIS A IMMUNIZATION (1 of 2 - Risk 2-dose series) Never done     NICOTINE/TOBACCO CESSATION COUNSELING Q 1 YR  02/24/2016     MAMMO SCREENING  07/07/2023     INFLUENZA VACCINE (1) 09/01/2023     COVID-19 Vaccine (4 - 2023-24 season) 09/01/2023     COLORECTAL CANCER SCREENING  05/12/2024     LUNG CANCER SCREENING  06/12/2024     MEDICARE ANNUAL WELLNESS VISIT  10/09/2024     ANNUAL REVIEW OF HM ORDERS  10/09/2024     HPV TEST  10/24/2024     PAP  10/24/2024     LIPID  07/08/2027     DTAP/TDAP/TD IMMUNIZATION (3 - Td or Tdap) 08/04/2027     ADVANCE CARE PLANNING  10/09/2028     SPIROMETRY  Completed     HIV SCREENING  Completed     MIGRAINE ACTION PLAN  Completed     Pneumococcal Vaccine: Pediatrics (0 to 5 Years) and At-Risk Patients (6 to 64 Years)  Completed     ZOSTER IMMUNIZATION  Completed     IPV IMMUNIZATION  Aged Out     HPV IMMUNIZATION  Aged Out     MENINGITIS IMMUNIZATION  Aged Out     Patient Active Problem List   Diagnosis     Fibromyalgia     Gastric bypass status for obesity     CARDIOVASCULAR SCREENING; LDL GOAL LESS THAN 130     Anxiety     Vitamin D deficiency disease     Vitamin B12 deficiency disease     Insomnia     Gastroesophageal reflux disease without esophagitis     Chronic migraine without aura without status migrainosus, not intractable     Major depressive disorder, recurrent episode, moderate (H)     History of kidney stones     Tobacco use disorder     Bipolar affective disorder, remission status unspecified (H)     Hepatitis C, chronic (H)     Epidural hematoma (H)     Past Surgical History:   Procedure Laterality Date     ABDOMINOPLASTY  12/2/2013     [facility] AFF SURGICAL PATHOLOGY  Feb 2007     Feroz's neuroma  - Right Foot     DISCECTOMY LUMBAR POSTERIOR MICROSCOPIC ONE LEVEL Left 2/15/2022    Procedure: Minimally Invasive Left Lumbar 5 to Sacral 1 microdiscectomy;  Surgeon: Eugenio Cash MD;  Location: [facility] OR     DISCECTOMY LUMBAR POSTERIOR MICROSCOPIC TWO LEVELS Right 11/15/2022    Procedure: Minimally Invasive Right Lumbar 4 to Lumbar 5 and Lumbar 5 to Sacral 1 microdiscectomies;  Surgeon: Eugenio Cash MD;  Location: [facility] OR     DISCECTOMY LUMBAR POSTERIOR MICROSCOPIC TWO LEVELS Right 2022    Procedure: MICRODISCECTOMY, SPINE, LUMBAR, 2 LEVELS, POSTERIOR APPROACH;  Surgeon: Eugenio Cash MD;  Location: [facility] OR     LITHOTRIPSY       SURGICAL PATHOLOGY EXAM      Lipoma Removal on her back     ZZC GASTRIC BYPASS,OBESE<100CM DAYAN-EN-Y  3-25-09    [facility]     Z REMOVAL OF KIDNEY STONE      With kidney tents x 5 to 6 procedures.       Social History     Tobacco Use     Smoking status: Former     Packs/day: 1.00     Years: 37.00     Pack years: 37.00     Types: Cigarettes     Quit date: 2013     Years since quitting: 10.0     Smokeless tobacco: Current     Tobacco comments:     Vape e-cig   Substance Use Topics     Alcohol use: Not Currently     Family History   Problem Relation Age of Onset     Asthma Mother      Hypertension Mother      C.A.D. Mother      Genitourinary Problems Mother         kidney failure   age 80     Chronic Obstructive Pulmonary Disease Mother      Diabetes Father      Hypertension Father      C.A.D. Father      Chronic Obstructive Pulmonary Disease Father      Dementia Father      Other - See Comments Sister         no contact     Genitourinary Problems Sister         kidney stones     Other - See Comments Sister         no contact     Other - See Comments Brother         no contact     Obesity Daughter         gastric sleeve     Cerebrovascular Disease No family hx of      Breast Cancer No family hx of      Cancer - colorectal No family hx of       "Prostate Cancer No family hx of          Current Outpatient Medications   Medication Sig Dispense Refill     albuterol (PROAIR HFA/PROVENTIL HFA/VENTOLIN HFA) 108 (90 Base) MCG/ACT inhaler Inhale 2 puffs into the lungs every 4 hours as needed for shortness of breath or wheezing 18 g 1     cyclobenzaprine (FLEXERIL) 10 MG tablet Take 1 tablet (10 mg) by mouth 3 times daily as needed for muscle spasms 60 tablet 11     FLUoxetine 20 MG tablet Take 2 tablets (40 mg) by mouth daily 180 tablet 3     lamoTRIgine (LAMICTAL) 100 MG tablet Take 2 tablets (200 mg) by mouth daily 180 tablet 3     medical cannabis (Patient's own supply) See Admin Instructions (The purpose of this order is to document that the patient reports taking medical cannabis.  This is not a prescription, and is not used to certify that the patient has a qualifying medical condition.)       meloxicam (MOBIC) 7.5 MG tablet Take 1 tablet (7.5 mg) by mouth daily 30 tablet 1     SUMAtriptan (IMITREX) 50 MG tablet Take 1 tablet (50 mg) by mouth at onset of headache for migraine May repeat once after two hours for persistent headache 10 tablet 3     B-D SYRINGE LUER-BLAYNE 3 ML MISC 1 SYRINGE EVERY 30 DAYS 25 each 1     BD HYPODERMIC NEEDLE 25G X 1-1/2\" MISC USE 1 EVERY 30 DAYS 3 each 32     buPROPion (WELLBUTRIN XL) 300 MG 24 hr tablet TAKE 1 TABLET BY MOUTH EVERY DAY IN THE MORNING 90 tablet 1     cyanocobalamin (CYANOCOBALAMIN) 1000 MCG/ML injection ADMINISTER 1 ML IN THE MUSCLE EVERY 30 DAYS (Patient not taking: Reported on 4/28/2023) 3 mL 3     order for DME Equipment being ordered: Splint right thumb 1 Units 0     Syringe/Needle, Disp, (BD LUER-BLAYNE SYRINGE) 25G X 1-1/2\" 3 ML MISC 1 Syringe every 30 days 12 each 1     vitamin D2 (ERGOCALCIFEROL) 40135 units (1250 mcg) capsule TAKE 1 CAPSULE BY MOUTH ONCE WEEKLY.  Needs visit/labs for future refills (Patient not taking: Reported on 4/28/2023) 4 capsule 1     Allergies   Allergen Reactions     Lurasidone " "Diarrhea     Diarrhea     Morphine Rash     Mammogram Screening: Mammogram Screening: Recommended mammography every 1-2 years with patient discussion and risk factor consideration  Last 3 Pap and HPV Results:       Latest Ref Rng & Units 10/24/2019     3:18 PM 10/24/2019     2:55 PM 2/24/2015    12:00 AM   PAP / HPV   PAP (Historical)  NIL   NIL    HPV 16 DNA NEG^Negative  Negative     HPV 18 DNA NEG^Negative  Negative     Other HR HPV NEG^Negative  Negative             2/14/2022    11:13 AM   Breast CA Risk Assessment (FHS-7)   Do you have a family history of breast, colon, or ovarian cancer? No / Unknown         Mammogram Screening: Recommended mammography every 1-2 years with patient discussion and risk factor consideration  Pertinent mammograms are reviewed under the imaging tab.    Review of Systems   Constitutional:  Negative for fever.   HENT:  Positive for congestion and sore throat. Negative for ear pain.    Eyes:  Positive for pain.   Respiratory:  Positive for cough and shortness of breath.    Cardiovascular:  Positive for palpitations. Negative for chest pain.   Gastrointestinal:  Negative for abdominal pain, diarrhea and hematochezia.   Genitourinary:  Negative for frequency, genital sores and hematuria.   Musculoskeletal:  Positive for arthralgias, joint swelling and myalgias.   Neurological:  Positive for dizziness, weakness and headaches.   Psychiatric/Behavioral:  Positive for mood changes. The patient is nervous/anxious.          OBJECTIVE:   /80 (BP Location: Right arm, Patient Position: Sitting, Cuff Size: Adult Regular)   Pulse 85   Temp 98.2  F (36.8  C) (Temporal)   Resp 15   Ht 1.7 m (5' 6.93\")   Wt 71.7 kg (158 lb 1.6 oz)   LMP  (LMP Unknown)   SpO2 97%   BMI 24.81 kg/m   Estimated body mass index is 24.81 kg/m  as calculated from the following:    Height as of this encounter: 1.7 m (5' 6.93\").    Weight as of this encounter: 71.7 kg (158 lb 1.6 oz).  Physical Exam  GENERAL " APPEARANCE: healthy, alert and no distress  EYES: Eyes grossly normal to inspection, PERRL and conjunctivae and sclerae normal  HENT: ear canals and TM's normal, nose and mouth without ulcers or lesions, oropharynx clear and oral mucous membranes moist  NECK: no adenopathy, no asymmetry, masses, or scars and thyroid normal to palpation  RESP: lungs clear to auscultation - no rales, rhonchi or wheezes  CV: regular rate and rhythm, normal S1 S2, no S3 or S4, no murmur, click or rub, no peripheral edema and peripheral pulses strong  ABDOMEN: soft, nontender, no hepatosplenomegaly, no masses and bowel sounds normal  MS: no musculoskeletal defects are noted and gait is age appropriate without ataxia  SKIN: no suspicious lesions or rashes  NEURO: Normal strength and tone, sensory exam grossly normal, mentation intact and speech normal  PSYCH: mentation appears normal and affect normal/bright    Diagnostic Test Results:  Labs reviewed in Epic    ASSESSMENT / PLAN:   (Z00.00) Encounter for Medicare annual wellness exam  (primary encounter diagnosis)  Comment: Wellness issues reviewed.  Follow-up is recommended for a wellness visit in 1 year.  Plan:     (G43.709) Chronic migraine without aura without status migrainosus, not intractable  Comment: She gets significant side effects from the 100 mg dose of Imitrex although she does find it effective.  We will decrease to 50 mg dose today and see if that is helpful.  Plan: SUMAtriptan (IMITREX) 50 MG tablet             (F33.1) Major depressive disorder, recurrent episode, moderate (H)  Comment: Patient reports mood is doing well on the combination of bupropion and fluoxetine.  No changes were made today.  Plan:      (B18.2) Chronic hepatitis C without hepatic coma (H)  Comment: Has history of hepatitis C and will check an RNA level today.  She is status posttreatment and has had 2 previous negative RNA levels in close proximity to completion of treatment.  If today's level is  undetectable then I do not anticipate further testing would be needed.  Plan: Hepatitis C RNA, quantitative             (Z23) Need for prophylactic vaccination and inoculation against influenza  Comment: Flu shot was provided today.  Plan: INFLUENZA QUAD, RECOMBINANT, P-FREE (RIV4)         (FLUBLOK)             (Z12.31) Encounter for screening mammogram for breast cancer  Comment: Order was placed for screening mammogram today.  Plan: MA Screen Bilateral w/Nathaniel             (Z23) Need for prophylactic vaccination against hepatitis A and hepatitis B in adult  Comment: Recommend she consider getting Twinrix at the pharmacy as it is not covered in the office.  Plan:      (Z13.6) Screening for heart disease  Comment: Lipid profile will be done today.  Plan: Lipid panel reflex to direct LDL Fasting             (E53.8) Vitamin B12 deficiency disease  Comment: We will check vitamin B12 and CBC today.  Plan: CBC with platelets and differential, Vitamin         B12             (E55.9) Vitamin D deficiency disease  Comment: We will check vitamin D level and follow-up.  Plan: Vitamin D Deficiency             (M79.7) Fibromyalgia  Comment: Patient finds the cyclobenzaprine helpful and takes it most days so refill for 1 year was sent to the pharmacy.  Plan: cyclobenzaprine (FLEXERIL) 10 MG tablet             (R06.09) IVY (dyspnea on exertion)  Comment: Suspect is related to her COPD.  She did have trace coronary artery calcification on CT lung cancer screening.  Recommend she could continue her albuterol inhaler and reach out to me if she experiences other symptoms associated with this.  Relatively good exercise tolerance since it happens 30 minutes into walking.  Plan:     Patient has been advised of split billing requirements and indicates understanding: Yes      COUNSELING:  Reviewed preventive health counseling, as reflected in patient instructions       Regular exercise       Healthy diet/nutrition       Vision screening        Hearing screening       Bladder control       Fall risk prevention       Immunizations  Vaccinated for: Influenza and Declined: Covid-19 due to Concerns about side effects/safety             Consider lung cancer screening for ages 55-80 years (77 for Medicare) and 20 pack-year smoking history         Colon cancer screening       Hepatitis C screening        She reports that she quit smoking about 10 years ago. Her smoking use included cigarettes. She has a 37.00 pack-year smoking history. She uses smokeless tobacco.      Appropriate preventive services were discussed with this patient, including applicable screening as appropriate for fall prevention, nutrition, physical activity, Tobacco-use cessation, weight loss and cognition.  Checklist reviewing preventive services available has been given to the patient.    Reviewed patients plan of care and provided an AVS. The Basic Care Plan (routine screening as documented in Health Maintenance) for Kareen meets the Care Plan requirement. This Care Plan has been established and reviewed with the Patient.          Leno Luis MD  [facility]    Identified Health Risks:  I have reviewed Opioid Use Disorder and Substance Use Disorder risk factors and made any needed referrals. The patient was provided with suggestions to help her develop a healthy physical lifestyle.  The patient was counseled and encouraged to consider modifying their diet and eating habits. She was provided with information on recommended healthy diet options.  The patient reports that she has difficulty with activities of daily living. I have asked that the patient make a follow up appointment in 26 weeks where this issue will be further evaluated and addressed.  The patient was provided with written information regarding signs of hearing loss.  The patient's PHQ-9 score is consistent with mild depression. She was provided with information regarding depression and was  advised to schedule a follow up appointment in 26 weeks to further address this issue.

## 2023-10-10 LAB — HCV RNA SERPL NAA+PROBE-ACNC: NOT DETECTED IU/ML

## 2023-11-05 ENCOUNTER — HEALTH MAINTENANCE LETTER (OUTPATIENT)
Age: 58
End: 2023-11-05

## 2023-11-09 DIAGNOSIS — M85.641: ICD-10-CM

## 2023-11-09 DIAGNOSIS — M85.642: ICD-10-CM

## 2023-11-10 RX ORDER — MELOXICAM 7.5 MG/1
7.5 TABLET ORAL DAILY
Qty: 30 TABLET | Refills: 1 | OUTPATIENT
Start: 2023-11-10

## 2023-11-10 NOTE — TELEPHONE ENCOUNTER
meloxicam (MOBIC) 7.5 MG tablet       Last Written Prescription Date:  4/28/23  Last Fill Quantity: 30,   # refills: 1  Last Office Visit: 4/28/23  Future Office visit: none    Declined prescription request. If medication is still beneficial, she should speak with PCP and make sure it is still safe for her to take long term.    Moo Jimenez RN

## 2023-12-01 DIAGNOSIS — J43.2 CENTRILOBULAR EMPHYSEMA (H): ICD-10-CM

## 2023-12-01 RX ORDER — ALBUTEROL SULFATE 90 UG/1
2 AEROSOL, METERED RESPIRATORY (INHALATION) EVERY 4 HOURS PRN
Qty: 18 G | Refills: 2 | Status: SHIPPED | OUTPATIENT
Start: 2023-12-01

## 2023-12-07 ENCOUNTER — OFFICE VISIT (OUTPATIENT)
Dept: OPHTHALMOLOGY | Facility: CLINIC | Age: 58
End: 2023-12-07
Payer: COMMERCIAL

## 2023-12-07 DIAGNOSIS — Z01.00 EXAMINATION OF EYES AND VISION: Primary | ICD-10-CM

## 2023-12-07 DIAGNOSIS — H52.4 PRESBYOPIA: ICD-10-CM

## 2023-12-07 PROCEDURE — 92014 COMPRE OPH EXAM EST PT 1/>: CPT | Performed by: OPHTHALMOLOGY

## 2023-12-07 PROCEDURE — 92015 DETERMINE REFRACTIVE STATE: CPT | Performed by: OPHTHALMOLOGY

## 2023-12-07 ASSESSMENT — REFRACTION_MANIFEST
OS_ADD: +2.75
OD_AXIS: 172
OS_SPHERE: +0.75
OD_CYLINDER: +0.50
OD_ADD: +2.75
OS_CYLINDER: SPHERE
OD_SPHERE: +1.00

## 2023-12-07 ASSESSMENT — CONF VISUAL FIELD
OD_INFERIOR_NASAL_RESTRICTION: 0
OS_INFERIOR_NASAL_RESTRICTION: 0
OD_SUPERIOR_NASAL_RESTRICTION: 0
OD_INFERIOR_TEMPORAL_RESTRICTION: 0
OS_NORMAL: 1
OS_SUPERIOR_TEMPORAL_RESTRICTION: 0
OD_SUPERIOR_TEMPORAL_RESTRICTION: 0
OS_SUPERIOR_NASAL_RESTRICTION: 0
OD_NORMAL: 1
OS_INFERIOR_TEMPORAL_RESTRICTION: 0

## 2023-12-07 ASSESSMENT — TONOMETRY
OD_IOP_MMHG: 15
IOP_METHOD: APPLANATION
OS_IOP_MMHG: 17

## 2023-12-07 ASSESSMENT — REFRACTION_WEARINGRX
OD_CYLINDER: SPHERE
SPECS_TYPE: OTC
OS_SPHERE: +3.25
OS_CYLINDER: SPHERE
OD_SPHERE: +3.25

## 2023-12-07 ASSESSMENT — VISUAL ACUITY
OS_CC: J1
OS_PH_SC+: -3
OD_SC+: -1
OD_CC: J1
METHOD: SNELLEN - LINEAR
OS_PH_SC: 20/25
OD_SC: 20/50
OD_PH_SC: 20/25
OS_SC+: +1
OD_PH_SC+: -2
OS_SC: 20/50

## 2023-12-07 ASSESSMENT — EXTERNAL EXAM - RIGHT EYE: OD_EXAM: 1+ BROW PTOSIS

## 2023-12-07 ASSESSMENT — CUP TO DISC RATIO
OS_RATIO: 0.6
OD_RATIO: 0.5

## 2023-12-07 ASSESSMENT — EXTERNAL EXAM - LEFT EYE: OS_EXAM: 1+ BROW PTOSIS

## 2023-12-07 NOTE — PATIENT INSTRUCTIONS
"Glasses prescription given - optional    May use artificial tears up to four times a day (like Refresh Optive, Systane Balance, or TheraTears. Avoid \"get the red out\" drops and generic artifical tears).     Call in August 2025 for an appointment in December 2025 for Complete Exam    Dr. Wells [phone]    " DISPLAY PLAN FREE TEXT

## 2023-12-07 NOTE — PROGRESS NOTES
" Current Eye Medications:  none.       Subjective:  Comprehensive Eye Exam.  Her prescription bifocal glasses broke, so she is wearing over-the-counter readers again.  She felt her reading vision was getting a little blurry with her prescription glasses.       Objective:  See Ophthalmology Exam.       Assessment:  Stable eye exam.      ICD-10-CM    1. Examination of eyes and vision  Z01.00       2. Presbyopia  H52.4            Plan:  Glasses prescription given - optional    May use artificial tears up to four times a day (like Refresh Optive, Systane Balance, or TheraTears. Avoid \"get the red out\" drops and generic artifical tears).     Call in August 2025 for an appointment in December 2025 for Complete Exam    Dr. Wells [phone]    "

## 2023-12-12 ENCOUNTER — TELEPHONE (OUTPATIENT)
Dept: FAMILY MEDICINE | Facility: CLINIC | Age: 58
End: 2023-12-12
Payer: COMMERCIAL

## 2023-12-12 DIAGNOSIS — F33.1 MAJOR DEPRESSIVE DISORDER, RECURRENT EPISODE, MODERATE (H): ICD-10-CM

## 2023-12-12 NOTE — TELEPHONE ENCOUNTER
[facility] Call Center    Phone Message    May a detailed message be left on voicemail: yes     Reason for Call: Other: Pt needs her prescriptions sent to [facility] now, but she doesn't want to cancel them at [facility] until she knows they have been transferred. Please contact pt.      Action Taken: Message routed to:  Other: RD    Travel Screening: Not Applicable

## 2023-12-12 NOTE — TELEPHONE ENCOUNTER
Called pt and she states she does not drive anymore and [facility] is her covered mail pharmacy per insurance. Advised to call us back or [facility] for them to send a fax for refills when she needs refills.    Thanks!  Shravan Escamilla RN   [facility]

## 2023-12-15 ENCOUNTER — OFFICE VISIT (OUTPATIENT)
Dept: NEUROSURGERY | Facility: CLINIC | Age: 58
End: 2023-12-15
Payer: COMMERCIAL

## 2023-12-15 VITALS — DIASTOLIC BLOOD PRESSURE: 87 MMHG | HEART RATE: 81 BPM | OXYGEN SATURATION: 100 % | SYSTOLIC BLOOD PRESSURE: 138 MMHG

## 2023-12-15 DIAGNOSIS — Z98.890 S/P LUMBAR LAMINECTOMY: ICD-10-CM

## 2023-12-15 DIAGNOSIS — M54.16 LUMBAR RADICULOPATHY: Primary | ICD-10-CM

## 2023-12-15 PROCEDURE — 99214 OFFICE O/P EST MOD 30 MIN: CPT | Performed by: NURSE PRACTITIONER

## 2023-12-15 RX ORDER — METHYLPREDNISOLONE 4 MG
TABLET, DOSE PACK ORAL
Qty: 21 TABLET | Refills: 0 | Status: SHIPPED | OUTPATIENT
Start: 2023-12-15

## 2023-12-15 ASSESSMENT — PAIN SCALES - GENERAL: PAINLEVEL: SEVERE PAIN (6)

## 2023-12-15 NOTE — LETTER
12/15/2023         RE: Kareen Hernandez  [address]  [place] [address]        Dear Colleague,    Thank you for referring your patient, Kareen Hernandez, to the [facility]. Please see a copy of my visit note below.    Neurosurgery Clinic  Follow up    Assessment:  Patient arrives in clinic today complaining of new tingling starting in her lower back next to her surgical incisions radiating into her buttock and down the back of her leg stopping at her knee.  She states that she has been feeling this for approximately 4 months.  She says her symptoms are not constant but worsened with activity such as lifting her grandchild or vacuuming.  On November 15, 2022 patient had L4-S1 microdiscectomy.  Patient's symptoms worsen postoperatively imaging obtained, patient was taken back to the OR 3 days later for RL4-5 hematoma evacuation and right L5-S1 revision of laminotomy decompression.     Exam:  Patient states that she has intermittent right lower back tingling that radiates to the right side going into her glutes down her the back of her right leg stopping at her knee. This is in accordance with the S1 distribution.  She also states she has occasional right sole of foot numbness.  Presently the patient is not experiencing any of the symptoms stated.    Plan:  -MRI with and without contrast-concern for reherniation  -Medrol Dosepak trial  -Will await MRI results before consideration for physical therapy or CHIVO injection.  -Patient would like to avoid another surgery at all costs    Richard Waldrop DNP  [address] [address]    Tel [phone]         Again, thank you for allowing me to participate in the care of your patient.        Sincerely,        Richard Waldrop, ERNESTO

## 2023-12-15 NOTE — NURSING NOTE
"Kareen Hernandez is a 58 year old female who presents for:  Chief Complaint   Patient presents with    RECHECK     LBP and goes down upper right leg - lvl 6        Initial Vitals:  /87   Pulse 81   LMP  (LMP Unknown)   SpO2 100%  Estimated body mass index is 24.81 kg/m  as calculated from the following:    Height as of 10/9/23: 5' 6.93\" (1.7 m).    Weight as of 10/9/23: 158 lb 1.6 oz (71.7 kg).. There is no height or weight on file to calculate BSA. BP completed using cuff size: regular  Severe Pain (6)    Nursing Comments:     Bora Mendoza    "

## 2023-12-15 NOTE — PROGRESS NOTES
Neurosurgery Clinic  Follow up    Assessment:  Patient arrives in clinic today complaining of new tingling starting in her lower back next to her surgical incisions radiating into her buttock and down the back of her leg stopping at her knee.  She states that she has been feeling this for approximately 4 months.  She says her symptoms are not constant but worsened with activity such as lifting her grandchild or vacuuming.  On November 15, 2022 patient had L4-S1 microdiscectomy.  Patient's symptoms worsen postoperatively imaging obtained, patient was taken back to the OR 3 days later for RL4-5 hematoma evacuation and right L5-S1 revision of laminotomy decompression.     Exam:  Patient states that she has intermittent right lower back tingling that radiates to the right side going into her glutes down her the back of her right leg stopping at her knee. This is in accordance with the S1 distribution.  She also states she has occasional right sole of foot numbness.  Presently the patient is not experiencing any of the symptoms stated.    Plan:  -MRI with and without contrast-concern for reherniation  -Medrol Dosepak trial  -Will await MRI results before consideration for physical therapy or CHIVO injection.  -Patient would like to avoid another surgery at all costs    Richard Waldrop DNP  [address] [address]    Tel [phone]

## 2023-12-20 DIAGNOSIS — F33.1 MAJOR DEPRESSIVE DISORDER, RECURRENT EPISODE, MODERATE (H): ICD-10-CM

## 2023-12-20 DIAGNOSIS — M79.7 FIBROMYALGIA: ICD-10-CM

## 2023-12-20 RX ORDER — BUPROPION HYDROCHLORIDE 300 MG/1
300 TABLET ORAL EVERY MORNING
Qty: 90 TABLET | Refills: 3 | Status: SHIPPED | OUTPATIENT
Start: 2023-12-20 | End: 2024-02-09

## 2023-12-20 RX ORDER — CYCLOBENZAPRINE HCL 10 MG
10 TABLET ORAL 3 TIMES DAILY PRN
Qty: 60 TABLET | Refills: 11 | Status: SHIPPED | OUTPATIENT
Start: 2023-12-20 | End: 2024-01-15

## 2023-12-20 RX ORDER — AMLODIPINE BESYLATE 5 MG/1
1 TABLET ORAL DAILY
COMMUNITY
Start: 2023-12-16 | End: 2024-01-11

## 2023-12-20 NOTE — TELEPHONE ENCOUNTER
Pending Prescriptions:                       Disp   Refills    cyclobenzaprine (FLEXERIL) 10 MG tablet   60 tab*11           Sig: Take 1 tablet (10 mg) by mouth 3 times daily as           needed for muscle spasms    buPROPion (WELLBUTRIN XL) 300 MG 24 hr ta*90 tab*1            Sig: Take 1 tablet (300 mg) by mouth every morning

## 2023-12-20 NOTE — TELEPHONE ENCOUNTER
Pending Prescriptions:                       Disp   Refills    cyclobenzaprine (FLEXERIL) 10 MG tablet   60 tab*11           Sig: Take 1 tablet (10 mg) by mouth 3 times daily as           needed for muscle spasms

## 2024-01-02 ENCOUNTER — APPOINTMENT (OUTPATIENT)
Dept: OPTOMETRY | Facility: CLINIC | Age: 59
End: 2024-01-02
Payer: COMMERCIAL

## 2024-01-02 PROCEDURE — 92341 FIT SPECTACLES BIFOCAL: CPT | Performed by: OPHTHALMOLOGY

## 2024-01-11 ENCOUNTER — OFFICE VISIT (OUTPATIENT)
Dept: FAMILY MEDICINE | Facility: CLINIC | Age: 59
End: 2024-01-11
Payer: COMMERCIAL

## 2024-01-11 VITALS
WEIGHT: 162.5 LBS | BODY MASS INDEX: 25.51 KG/M2 | RESPIRATION RATE: 16 BRPM | SYSTOLIC BLOOD PRESSURE: 128 MMHG | HEIGHT: 67 IN | DIASTOLIC BLOOD PRESSURE: 85 MMHG | OXYGEN SATURATION: 99 % | TEMPERATURE: 97.6 F | HEART RATE: 80 BPM

## 2024-01-11 DIAGNOSIS — I10 ESSENTIAL HYPERTENSION: Primary | ICD-10-CM

## 2024-01-11 DIAGNOSIS — R22.2 MASS OF ANTERIOR ABDOMINAL WALL: ICD-10-CM

## 2024-01-11 DIAGNOSIS — R00.2 PALPITATIONS: ICD-10-CM

## 2024-01-11 DIAGNOSIS — R19.06 EPIGASTRIC FULLNESS: ICD-10-CM

## 2024-01-11 PROCEDURE — 36415 COLL VENOUS BLD VENIPUNCTURE: CPT | Performed by: FAMILY MEDICINE

## 2024-01-11 PROCEDURE — 99214 OFFICE O/P EST MOD 30 MIN: CPT | Performed by: FAMILY MEDICINE

## 2024-01-11 PROCEDURE — 80048 BASIC METABOLIC PNL TOTAL CA: CPT | Performed by: FAMILY MEDICINE

## 2024-01-11 PROCEDURE — 83690 ASSAY OF LIPASE: CPT | Performed by: FAMILY MEDICINE

## 2024-01-11 RX ORDER — AMLODIPINE BESYLATE 5 MG/1
5 TABLET ORAL DAILY
Qty: 90 TABLET | Refills: 3 | Status: SHIPPED | OUTPATIENT
Start: 2024-01-11 | End: 2024-01-16

## 2024-01-11 ASSESSMENT — PATIENT HEALTH QUESTIONNAIRE - PHQ9
SUM OF ALL RESPONSES TO PHQ QUESTIONS 1-9: 8
SUM OF ALL RESPONSES TO PHQ QUESTIONS 1-9: 8
10. IF YOU CHECKED OFF ANY PROBLEMS, HOW DIFFICULT HAVE THESE PROBLEMS MADE IT FOR YOU TO DO YOUR WORK, TAKE CARE OF THINGS AT HOME, OR GET ALONG WITH OTHER PEOPLE: SOMEWHAT DIFFICULT

## 2024-01-11 ASSESSMENT — PAIN SCALES - GENERAL: PAINLEVEL: SEVERE PAIN (6)

## 2024-01-11 NOTE — PROGRESS NOTES
"  Assessment & Plan     Essential hypertension  Refilled the amlodipine today.  Future order was placed for a basic metabolic panel.  Follow-up is suggested for recheck in about 3 months.  - Basic metabolic panel  (Ca, Cl, CO2, Creat, Gluc, K, Na, BUN); Future  - amLODIPine (NORVASC) 5 MG tablet; Take 1 tablet (5 mg) by mouth daily  - Basic metabolic panel  (Ca, Cl, CO2, Creat, Gluc, K, Na, BUN)    Epigastric fullness  She has some fullness that seems to be primarily tissues in the abdominal wall.  She is concerned about lipomas or growth of fatty tumors.  She has had similar in the past.  I put an ultrasound order and so this can be done of the anterior abdominal wall and we will recheck a lipase today given the mild elevation in the emergency room.  I do not believe the current discomfort, given that it seems to be pretty superficial, would be indicative of pancreatitis.  - US Soft Tissue Abdominal Wall or Lower Back; Future  - Lipase; Future  - Lipase    Mass of anterior abdominal wall  As above.  - US Soft Tissue Abdominal Wall or Lower Back; Future    Palpitations  These have improved.  If she continues to have episodes I have asked her to let me know and we could arrange for a leadless EKG.    Review of prior external note(s) from - [facility] reviewed       MED REC REQUIRED  Post Medication Reconciliation Status: discharge medications reconciled and changed, per note/orders  BMI:   Estimated body mass index is 25.36 kg/m  as calculated from the following:    Height as of this encounter: 1.705 m (5' 7.13\").    Weight as of this encounter: 73.7 kg (162 lb 8 oz).       FUTURE APPOINTMENTS:       - Follow-up visit in 3 months    Leno Luis MD  [facility] KENNY Mathur is a 58 year old, presenting for the following health issues:  Hypertension        1/11/2024    12:30 PM   Additional Questions   Roomed by OBED " "  Accompanied by SELF       HPI         Patient is here today for an emergency room follow-up.  She was seen in the emergency room at [facility] on December 16 after developing symptoms of lightheadedness and nausea.  She was actually discovered to be hypertensive and started on amlodipine.  Testing was unremarkable except for a mildly elevated lipase.  She has had no further symptoms since starting the amlodipine.  Details of the emergency room HPI were reviewed with the patient and confirmed including the fact that the night before she had felt some more rapid heartbeat associated with the lightheadedness.  She has had episodes like that before but they were very short-lived, however the one the night before the emergency room visit was longer lived.  What prompted her to go into the emergency room is that when she woke up in the morning and was sitting, she felt somewhat faint or lightheaded and then had some pain in the epigastric area.  She has started the amlodipine and has not noticed any side effects.  She did have questions about some pain in the upper abdomen.  It is very superficial and she attributes it to some fatty areas just below the costal margin in the upper abdomen bilaterally.  Pain in this area makes it difficult to wear a bra.  She has had some episodes of palpitations in the past, feeling that her heart was beating rapidly.  They were not associated with any chest pain, pressure, tightness, nausea, diaphoresis, or shortness of breath.      Review of Systems   Constitutional, HEENT, cardiovascular, pulmonary, gi and gu systems are negative, except as otherwise noted.      Objective    /85   Pulse 80   Temp 97.6  F (36.4  C) (Temporal)   Resp 16   Ht 1.705 m (5' 7.13\")   Wt 73.7 kg (162 lb 8 oz)   LMP  (LMP Unknown)   SpO2 99%   BMI 25.36 kg/m    Body mass index is 25.36 kg/m .  Physical Exam   GENERAL: healthy, alert and no distress  EYES: Eyes grossly normal to " inspection, PERRL and conjunctivae and sclerae normal  HENT: ear canals and TM's normal, nose and mouth without ulcers or lesions  NECK: no adenopathy, no asymmetry, masses, or scars and thyroid normal to palpation  RESP: lungs clear to auscultation - no rales, rhonchi or wheezes  ABDOMEN: She has symmetric prominence to subcutaneous fat just below the costal margin bilaterally in the upper abdomen.  Mildly tender to palpation but I do not appreciate any discrete masses.  MS: no gross musculoskeletal defects noted, no edema  SKIN: no suspicious lesions or rashes  NEURO: Normal strength and tone, mentation intact and speech normal  PSYCH: mentation appears normal, affect normal/bright                      Answers submitted by the patient for this visit:  Patient Health Questionnaire (Submitted on 1/11/2024)  If you checked off any problems, how difficult have these problems made it for you to do your work, take care of things at home, or get along with other people?: Somewhat difficult  PHQ9 TOTAL SCORE: 8

## 2024-01-12 LAB
ANION GAP SERPL CALCULATED.3IONS-SCNC: 8 MMOL/L (ref 7–15)
BUN SERPL-MCNC: 7.2 MG/DL (ref 6–20)
CALCIUM SERPL-MCNC: 9.4 MG/DL (ref 8.6–10)
CHLORIDE SERPL-SCNC: 103 MMOL/L (ref 98–107)
CREAT SERPL-MCNC: 0.86 MG/DL (ref 0.51–0.95)
DEPRECATED HCO3 PLAS-SCNC: 30 MMOL/L (ref 22–29)
EGFRCR SERPLBLD CKD-EPI 2021: 79 ML/MIN/1.73M2
GLUCOSE SERPL-MCNC: 90 MG/DL (ref 70–99)
LIPASE SERPL-CCNC: 24 U/L (ref 13–60)
POTASSIUM SERPL-SCNC: 4.7 MMOL/L (ref 3.4–5.3)
SODIUM SERPL-SCNC: 141 MMOL/L (ref 135–145)

## 2024-01-15 DIAGNOSIS — I10 ESSENTIAL HYPERTENSION: ICD-10-CM

## 2024-01-15 DIAGNOSIS — F31.9 BIPOLAR AFFECTIVE DISORDER, REMISSION STATUS UNSPECIFIED (H): ICD-10-CM

## 2024-01-15 DIAGNOSIS — M79.7 FIBROMYALGIA: ICD-10-CM

## 2024-01-15 RX ORDER — AMLODIPINE BESYLATE 5 MG/1
5 TABLET ORAL DAILY
Qty: 90 TABLET | Refills: 3 | Status: CANCELLED | OUTPATIENT
Start: 2024-01-15

## 2024-01-15 NOTE — TELEPHONE ENCOUNTER
S-(situation): Patient calling to let team know that insurance no longer uses [facility] for mail order, and now uses [facility] mail order.       Medications T'd up that need the pharmacy changed.     Izzy Wild RN

## 2024-01-16 ENCOUNTER — ANCILLARY PROCEDURE (OUTPATIENT)
Dept: MRI IMAGING | Facility: CLINIC | Age: 59
End: 2024-01-16
Attending: NURSE PRACTITIONER
Payer: COMMERCIAL

## 2024-01-16 DIAGNOSIS — Z98.890 S/P LUMBAR LAMINECTOMY: ICD-10-CM

## 2024-01-16 DIAGNOSIS — M54.16 LUMBAR RADICULOPATHY: ICD-10-CM

## 2024-01-16 PROCEDURE — A9585 GADOBUTROL INJECTION: HCPCS | Performed by: RADIOLOGY

## 2024-01-16 PROCEDURE — 72158 MRI LUMBAR SPINE W/O & W/DYE: CPT | Performed by: RADIOLOGY

## 2024-01-16 RX ORDER — LAMOTRIGINE 100 MG/1
200 TABLET ORAL DAILY
Qty: 180 TABLET | Refills: 3 | Status: CANCELLED | OUTPATIENT
Start: 2024-01-16

## 2024-01-16 RX ORDER — CYCLOBENZAPRINE HCL 10 MG
10 TABLET ORAL 3 TIMES DAILY PRN
Qty: 60 TABLET | Refills: 11 | Status: SHIPPED | OUTPATIENT
Start: 2024-01-16 | End: 2024-02-09

## 2024-01-16 RX ORDER — GADOBUTROL 604.72 MG/ML
7.5 INJECTION INTRAVENOUS ONCE
Status: COMPLETED | OUTPATIENT
Start: 2024-01-16 | End: 2024-01-16

## 2024-01-16 RX ORDER — AMLODIPINE BESYLATE 5 MG/1
5 TABLET ORAL DAILY
Qty: 90 TABLET | Refills: 3 | Status: SHIPPED | OUTPATIENT
Start: 2024-01-16 | End: 2024-02-09

## 2024-01-16 RX ORDER — LAMOTRIGINE 100 MG/1
200 TABLET ORAL DAILY
Qty: 180 TABLET | Refills: 0 | Status: SHIPPED | OUTPATIENT
Start: 2024-01-16 | End: 2024-02-09

## 2024-01-16 RX ADMIN — GADOBUTROL 7.5 ML: 604.72 INJECTION INTRAVENOUS at 13:21

## 2024-01-17 ENCOUNTER — TELEPHONE (OUTPATIENT)
Dept: NEUROSURGERY | Facility: CLINIC | Age: 59
End: 2024-01-17
Payer: COMMERCIAL

## 2024-01-17 ENCOUNTER — ANCILLARY PROCEDURE (OUTPATIENT)
Dept: MAMMOGRAPHY | Facility: CLINIC | Age: 59
End: 2024-01-17
Attending: FAMILY MEDICINE
Payer: COMMERCIAL

## 2024-01-17 ENCOUNTER — TELEPHONE (OUTPATIENT)
Dept: FAMILY MEDICINE | Facility: CLINIC | Age: 59
End: 2024-01-17

## 2024-01-17 DIAGNOSIS — M54.16 LUMBAR RADICULOPATHY: Primary | ICD-10-CM

## 2024-01-17 DIAGNOSIS — Z12.31 ENCOUNTER FOR SCREENING MAMMOGRAM FOR BREAST CANCER: ICD-10-CM

## 2024-01-17 PROCEDURE — 77063 BREAST TOMOSYNTHESIS BI: CPT | Performed by: RADIOLOGY

## 2024-01-17 PROCEDURE — 99207 PR NO BILLABLE SERVICE THIS VISIT: CPT | Performed by: NURSE PRACTITIONER

## 2024-01-17 PROCEDURE — 77067 SCR MAMMO BI INCL CAD: CPT | Performed by: RADIOLOGY

## 2024-01-17 NOTE — TELEPHONE ENCOUNTER
Called pt and spoke pt again. Pt now saying that there was a comment make by someone who was taking her back from the MRI.     Pt was told to follow up with Neuro. Pt was told that the MRI report did not mention anything about metal. But, needs to talk with Neuro. Thanks    Kath Zavala RN  [facility]

## 2024-01-17 NOTE — TELEPHONE ENCOUNTER
I do not see any comments about metal in her back but she may have vascular clips from previous surgery.    Was something said to her by the tech?    Leno Luis MD

## 2024-01-17 NOTE — TELEPHONE ENCOUNTER
Health Call Center    Phone Message    May a detailed message be left on voicemail: yes     Reason for Call: Pt is requesting a call back to discuss results from her MRI that was done on 1/16/23.  Please call her back to discuss.  Thanks.

## 2024-01-17 NOTE — TELEPHONE ENCOUNTER
I read the report again.  Maybe someone else should read it but I see no mention of any metallic bodies mentioned in the MRI report.  Not sure what the patient is seeing that might be different.    Leno Luis MD

## 2024-01-17 NOTE — TELEPHONE ENCOUNTER
Pt calling regarding her MRI for her back yesterday. Pt is requesting that her PCP to let her know what the results mean. Pt concerned that she has metal in her back. She was not aware of this. She is very concerned that something was left from a prior surgery.    Kath Zavala RN  [facility]

## 2024-01-17 NOTE — TELEPHONE ENCOUNTER
Pt states that is how she was reading the report. Did you want me to relay it may just be vascular clips? She did state she is also waiting for her surgeon to  get back to her. But, just wanted your opinion. Thanks    Kath Zavala RN  [facility]

## 2024-01-18 NOTE — TELEPHONE ENCOUNTER
Requested Prescriptions   Pending Prescriptions Disp Refills     gabapentin (NEURONTIN) 300 MG capsule 60 capsule 1     Sig: Take 1 capsule (300 mg) by mouth 3 times daily as needed for neuropathic pain       There is no refill protocol information for this order        Routing refill request to provider for review/approval because:  Drug not on the [facility] refill protocol     Carmelina Up RN  [facility]            Depressed

## 2024-01-25 ENCOUNTER — ANCILLARY PROCEDURE (OUTPATIENT)
Dept: ULTRASOUND IMAGING | Facility: CLINIC | Age: 59
End: 2024-01-25
Attending: FAMILY MEDICINE
Payer: COMMERCIAL

## 2024-01-25 DIAGNOSIS — R19.06 EPIGASTRIC FULLNESS: ICD-10-CM

## 2024-01-25 DIAGNOSIS — R22.2 MASS OF ANTERIOR ABDOMINAL WALL: ICD-10-CM

## 2024-01-25 PROCEDURE — 76705 ECHO EXAM OF ABDOMEN: CPT | Performed by: STUDENT IN AN ORGANIZED HEALTH CARE EDUCATION/TRAINING PROGRAM

## 2024-02-09 ENCOUNTER — TELEPHONE (OUTPATIENT)
Dept: NEUROSURGERY | Facility: CLINIC | Age: 59
End: 2024-02-09
Payer: COMMERCIAL

## 2024-02-09 DIAGNOSIS — M54.16 LUMBAR RADICULOPATHY: Primary | ICD-10-CM

## 2024-02-09 DIAGNOSIS — F31.9 BIPOLAR AFFECTIVE DISORDER, REMISSION STATUS UNSPECIFIED (H): ICD-10-CM

## 2024-02-09 DIAGNOSIS — M51.379 DDD (DEGENERATIVE DISC DISEASE), LUMBOSACRAL: ICD-10-CM

## 2024-02-09 DIAGNOSIS — Z98.890 S/P LUMBAR LAMINECTOMY: ICD-10-CM

## 2024-02-09 DIAGNOSIS — I10 ESSENTIAL HYPERTENSION: ICD-10-CM

## 2024-02-09 DIAGNOSIS — M79.7 FIBROMYALGIA: ICD-10-CM

## 2024-02-09 DIAGNOSIS — F33.1 MAJOR DEPRESSIVE DISORDER, RECURRENT EPISODE, MODERATE (H): ICD-10-CM

## 2024-02-09 DIAGNOSIS — Z98.890 S/P LUMBAR MICRODISCECTOMY: ICD-10-CM

## 2024-02-09 RX ORDER — LAMOTRIGINE 100 MG/1
200 TABLET ORAL DAILY
Qty: 180 TABLET | Refills: 0 | Status: SHIPPED | OUTPATIENT
Start: 2024-02-09 | End: 2024-07-21

## 2024-02-09 RX ORDER — CYCLOBENZAPRINE HCL 10 MG
10 TABLET ORAL 3 TIMES DAILY PRN
Qty: 60 TABLET | Refills: 11 | Status: SHIPPED | OUTPATIENT
Start: 2024-02-09

## 2024-02-09 RX ORDER — BUPROPION HYDROCHLORIDE 300 MG/1
300 TABLET ORAL EVERY MORNING
Qty: 90 TABLET | Refills: 3 | Status: SHIPPED | OUTPATIENT
Start: 2024-02-09

## 2024-02-09 RX ORDER — AMLODIPINE BESYLATE 5 MG/1
5 TABLET ORAL DAILY
Qty: 90 TABLET | Refills: 3 | Status: SHIPPED | OUTPATIENT
Start: 2024-02-09

## 2024-02-09 NOTE — TELEPHONE ENCOUNTER
Pt requesting refills be sent to a new pharmacy. Thanks    Kath Zavala RN  [facility]

## 2024-02-09 NOTE — TELEPHONE ENCOUNTER
[facility] Call Center    Phone Message    May a detailed message be left on voicemail: yes     Reason for Call: Other: Pt calling stating that last visit they had said they were going to put in an order for her to get an injection. No order currently placed for any type of injection. Please put in order and call Pt to schedule     Action Taken: Message routed to:  Other: [facility]    Travel Screening: Not Applicable

## 2024-02-12 NOTE — TELEPHONE ENCOUNTER
Per chart review, Richard Waldrop CNP called called pt with MRI findings and recc CHIVO with Dr. Oseguera.     Routed to Dr. Oseguera to place order and contact pt to schedule.

## 2024-02-12 NOTE — TELEPHONE ENCOUNTER
Per Richard Waldrop NP (neurosurgery), ordered right L5-S1 and right S1-S2 transforaminal epidural corticosteroid injections for Ms. Kareen Hernandez for right lumbosacral radiculopathy, lumbosacral degenerative disc disease, lumbosacral disc herniations, anterolisthesis of L4 on L5.    Leno Oseguera MD

## 2024-02-13 ENCOUNTER — TELEPHONE (OUTPATIENT)
Dept: PALLIATIVE MEDICINE | Facility: CLINIC | Age: 59
End: 2024-02-13
Payer: COMMERCIAL

## 2024-02-13 DIAGNOSIS — M54.16 LUMBAR RADICULOPATHY: Primary | ICD-10-CM

## 2024-02-13 NOTE — TELEPHONE ENCOUNTER
"Screening Questions for Radiology Injections:    Injection to be done at which interventional clinic site? [facility]    If choosing [facility] for location, please inform patient:  \"[facility] is a Hospital based clinic. Before your visit, you should check with your insurance about how it covers the charges for facility services in a hospital-based clinic.     Procedure ordered by Dr. Oseguera     Procedure ordered? Right L5-S1 S1-2 Transforaminal CHIVO?    Transforaminal Cervical CHIVO - Send to [facility] - No [facility] providers perform this procedure    What insurance would patient like us to bill for this procedure? [employer]     IF SCHEDULING IN [place] [facility] PLEASE SCHEDULE AT LEAST 7-10 BUSINESS DAYS OUT SO A PA CAN BE OBTAINED    Worker's comp or MVA (motor vehicle accident) -Any injection DO NOT SCHEDULE and route to Keeley Dobbins.      [employer] insurance - For SI joint injections, DO NOT SCHEDULE and route to Rylee Cardenas.       ALL BCBS, [employer] and [employer] - DO NOT SCHEDULE and route to Rylee Cardenas    MEDICA- facet joint injections, route to [facility]    Is patient scheduled at [facility]? NO    If YES, route every encounter to [facility] CARE NAVIGATION POOL [[phone]]    Is an  needed? No     Patient has a  home? (Review Grid) YES: Informed     Any chance of pregnancy? NO   If YES, do NOT schedule and route to RN pool  - Dr. Siu route to Cady Hagan and PM&R Nurse  [[id number]]      Is patient actively being treated for cancer or immunocompromised? No  If YES, do NOT schedule and route to RN pool/ Dr. Siu's Team    Does the patient have a bleeding or clotting disorder? No     If YES, okay to schedule AND route to RN nurse / Dr. Siu's Team     (For any patients with platelet count <100, RN must forward to provider)    Is patient taking any Blood Thinners OR Antiplatelet medication?  No   If hold needed, do NOT " schedule, route to YESSENIA torres/ Dr. Siu's Team    Examples:   o Blood Thinners: (Coumadin, Warfarin, Jantoven, Pradaxa, Xarelto, Eliquis, Edoxaban, Enoxaparin, Lovenox, Heparin, Arixtra, Fondaparinux or Fragmin)  o Antiplatelet Medications: (Plavix, Brilinta or Effient)     Is patient taking any aspirin products (includes Excedrin and Fiorinal)? No     If more than 325mg/day, OK to schedule; Instruct Pt to decrease to less than 325 mg for 7 days AND route to RN pool/ Dr. Siu's Team     For CERVICAL procedures, hold all aspirin products for 6 days.     Tell Pt that if aspirin product is not held for 6 days, the procedure WILL BE cancelled.     Any allergies to contrast dye, iodine, shellfish, or numbing and steroid medications? No    If YES, schedule and add allergy information to appointment notes AND route to the RN pool/ Dr. Siu's Team    If CHIVO and Contrast Dye / Iodine Allergy? DO NOT SCHEDULE, route to RN pool/ Dr. Siu's Team    Allergies: Lurasidone and Morphine     Does patient have an active infection or treated for one within the past week? No    Is patient currently taking any antibiotics or steroid medications?  No     For patients on chronic, preventative, or prophylactic antibiotics, procedures may be scheduled.     For patients on antibiotics for active or recent infection, schedule 4 days after completed.    For patients on steroid medications, schedule 4 days after completed.     Has the patient had a flu shot or any other vaccinations within the past 7 days? No  If yes, explain that for the vaccine to work best they need to:       wait 1 week before and 1 week after getting any Vaccine    wait 1 week before and 2 weeks after getting any Covid Vaccine     If patient has concerns about the timing, send to RN pool/ Dr. Siu's Team    Does patient have an MRI/CT?  YES: 01/16/24  Include Date and Check Procedure Scheduling Grid to see if required.    Was the MRI/CT done within the last 3 years?   Yes     If no route to RN Pool/ Dr. Siu's Team    If yes, where was the MRI/CT done? [facility]      Refer to PACS Transmissions list for approved external locations and route to RN Pool High Priority/ Dr. Gaticas Team    If MRI was not done at approved external location do NOT schedule and route to RN pool/ Dr. Siu's Team      If patient has an imaging disc, the injection MAY be scheduled but patient must bring disc to appt or appt will be cancelled.    Is patient able to transfer to a procedure table with minimal or no assistance? Yes     If no, do NOT schedule and route to RN Pool/ Dr. Siu's Team    Procedure Specific Instructions:    If celiac plexus block, informed patient NPO for 6 hours and that it is okay to take medications with sips of water, especially blood pressure medications Not Applicable         If this is for a cervical procedure, informed patient that aspirin needs to be held for 6 days.   Not Applicable      Sedation, If Sedation is ordered for any procedure, patient must be NPO for 6 hours prior to procedure Not Applicable      If IV needed:    Do not schedule procedures requiring IV placement in the first appointment of the day or first appointment after lunch. Do NOT schedule at 0745, 0815 or 1245.       Instructed patient to arrive 30 minutes early for IV start if required. (Check Procedure Scheduling Grid)  Not Applicable    Reminders:    If you are started on any steroids or antibiotics between now and your appointment, you must contact us because the procedure may need to be cancelled.  Yes      As a reminder, receiving steroids can decrease your body's ability to fight infection.   Would you still like to move forward with scheduling the injection?  Yes      IV Sedation is not provided for procedures. If oral anti-anxiety medication is needed, the patient should request this from their referring provider.      Instruct patient to arrive as directed prior to the scheduled appointment  time:  If IV needed 30 minutes before appointment time       For patients 85 or older we recommend having an adult stay w/ them for the remainder of the day.       If the patient is Diabetic, remind them to bring their glucometer.      Does the patient have any questions?  NO  Diamante Girard  [facility]

## 2024-02-22 ENCOUNTER — RADIOLOGY INJECTION OFFICE VISIT (OUTPATIENT)
Dept: PALLIATIVE MEDICINE | Facility: CLINIC | Age: 59
End: 2024-02-22
Attending: PHYSICAL MEDICINE & REHABILITATION
Payer: COMMERCIAL

## 2024-02-22 VITALS — HEART RATE: 83 BPM | OXYGEN SATURATION: 96 % | SYSTOLIC BLOOD PRESSURE: 141 MMHG | DIASTOLIC BLOOD PRESSURE: 85 MMHG

## 2024-02-22 DIAGNOSIS — M51.379 DDD (DEGENERATIVE DISC DISEASE), LUMBOSACRAL: ICD-10-CM

## 2024-02-22 DIAGNOSIS — M54.16 LUMBAR RADICULOPATHY: ICD-10-CM

## 2024-02-22 DIAGNOSIS — Z98.890 S/P LUMBAR MICRODISCECTOMY: ICD-10-CM

## 2024-02-22 DIAGNOSIS — Z98.890 S/P LUMBAR LAMINECTOMY: ICD-10-CM

## 2024-02-22 PROCEDURE — 64483 NJX AA&/STRD TFRM EPI L/S 1: CPT | Mod: RT | Performed by: PHYSICAL MEDICINE & REHABILITATION

## 2024-02-22 PROCEDURE — 64484 NJX AA&/STRD TFRM EPI L/S EA: CPT | Mod: RT | Performed by: PHYSICAL MEDICINE & REHABILITATION

## 2024-02-22 RX ORDER — DEXAMETHASONE SODIUM PHOSPHATE 10 MG/ML
7.5 INJECTION, SOLUTION INTRAMUSCULAR; INTRAVENOUS ONCE
Status: COMPLETED | OUTPATIENT
Start: 2024-02-22 | End: 2024-02-22

## 2024-02-22 RX ORDER — LIDOCAINE HYDROCHLORIDE 10 MG/ML
8.5 INJECTION, SOLUTION EPIDURAL; INFILTRATION; INTRACAUDAL; PERINEURAL ONCE
Status: COMPLETED | OUTPATIENT
Start: 2024-02-22 | End: 2024-02-22

## 2024-02-22 RX ADMIN — DEXAMETHASONE SODIUM PHOSPHATE 7.5 MG: 10 INJECTION, SOLUTION INTRAMUSCULAR; INTRAVENOUS at 11:00

## 2024-02-22 RX ADMIN — LIDOCAINE HYDROCHLORIDE 8.5 ML: 10 INJECTION, SOLUTION EPIDURAL; INFILTRATION; INTRACAUDAL; PERINEURAL at 11:00

## 2024-02-22 NOTE — NURSING NOTE
Discharge Information    IV Discontiued Time:  NA    Amount of Fluid Infused:  NA    Discharge Criteria = When patient returns to baseline or as per MD order    Consciousness:  Pt is fully awake    Circulation:  BP +/- 20% of pre-procedure level    Respiration:  Patient is able to breathe deeply    O2 Sat:  Patient is able to maintain O2 Sat >92% on room air    Activity:  Moves 4 extremities on command    Ambulation:  Patient is able to stand and walk or stand and pivot into wheelchair    Dressing:  Clean/dry or No Dressing    Notes:   Discharge instructions and AVS given to patient    Patient meets criteria for discharge?  YES    Admitted to PCU?  No    Responsible adult present to accompany patient home?  Yes    Signature/Title:    Elen Menard RN  RN Care Coordinator  [facility]

## 2024-02-22 NOTE — PATIENT INSTRUCTIONS
[facility] Procedure Discharge Instructions    Today you saw:   Dr. Alexander Oseguera    Your procedure:  Epidural steroid injection      Medications used:  Lidocaine (anesthetic)  Dexamethasone (steroid)  Omnipaque (contrast)   Saline       Be cautious when walking as numbness and/or weakness in the legs may occur up to 6-8 hours after the procedure due to effect of the local anesthetic  Do not drive for 6 hours. The effect of the local anesthetic could slow your reflexes.   Avoid strenuous activity for the first 24 hours. You may resume your regular activities after that.   You may shower, however avoid swimming, tub baths or hot tubs for 24 hours following your procedure  You may have a mild to moderate increase in pain for several days following the injection.    You may use ice packs for 10-15 minutes, 3 to 4 times a day at the injection site for comfort  Do not use heat to painful areas for 6 to 8 hours. This will give the local anesthetic time to wear off and prevent you from accidentally burning your skin.  Unless you have been directed to avoid the use of anti-inflammatory medications (NSAIDS-ibuprofen, Aleve, Motrin), you may use these medications or Tylenol for pain control if needed.   Possible side effects of steroids that you may experience include flushing, elevated blood pressure, increased appetite, mild headaches and restlessness.  All of these symptoms will get better with time.  It may take up to 14 days for the steroid medication to start working although you may feel the effect as early as a few days after the procedure.   Follow up with your referring provider (Richard Waldrop NP) in 2-3 weeks    If you experience any of the following, call the Spine Center line during work hours at [phone]:  -Fever over 100 degree F  -Swelling, bleeding, redness, drainage, warmth at the injection site  -Progressive weakness or numbness in your legs or arms  -Loss of bowel or bladder  function  -Unusual headache that is not relieved by Tylenol or your regular headache medication  -Unusual new onset of pain that is not improving

## 2024-02-22 NOTE — NURSING NOTE
Pre-procedure Intake    If YES to any questions or NO to having a   Please complete laminated checklist and leave on the computer keyboard for Provider, verbally inform provider if able.    For SCS Trial, RFA's or any sedation procedure: Have you been fasting? NA  If yes, for how long?    NA     Are you taking any any blood thinners such as Coumadin, Warfarin, Jantoven, Pradaxa Xarelto, Eliquis, Edoxaban, Enoxaparin, Lovenox, Heparin, Arixtra, Fondaparinux, or Fragmin? OR Antiplatelet medication such as Plavix, Brilinta, or Effient? N/A  If yes, when did you take your last dose?   NA     Do you take aspirin?   NO  If cervical procedure, have you held aspirin for 6 days?   NA    Do you have any allergies to contrast dye, iodine, steroid and/or numbing medications?  NO     Are you currently taking antibiotics or have an active infection?  NO     Have you had a fever/elevated temperature within the past week? NO     Are you currently taking oral steroids? NO     Do you have a ? Yes     Are you pregnant or breastfeeding? No    Have you received any vaccines in the past 2 weeks? NO       Notify provider and RNs if systolic BP >170, diastolic BP >100, P >100 or O2 sats < 90%

## 2024-02-22 NOTE — PROGRESS NOTES
"PHYSICAL MEDICINE & REHABILITATION / MEDICAL SPINE      PROCEDURE DATE:  Feb 22, 2024      PATIENT NAME:  Kareen Hernandez  MRN:  [id number]  YOB: 1965      PRE-PROCEDURE DIAGNOSIS:  1. Lumbar radiculopathy    2. DDD (degenerative disc disease), lumbosacral    3. S/P lumbar laminectomy    4. S/P lumbar microdiscectomy        POST-PROCEDURE DIAGNOSIS:  1. Lumbar radiculopathy    2. DDD (degenerative disc disease), lumbosacral    3. S/P lumbar laminectomy    4. S/P lumbar microdiscectomy        PROCEDURE:  Fluoroscopic-guided right L5-S1 and S1-S2 transforaminal epidural steroid injection.  (CPT code/s:  77934, 27025)      PROCEDURE IN DETAIL:  Prior to the procedure, educational material was provided for the patient to review and take home.  After a discussion of the risks, benefits, and alternatives to the procedure, the patient expressed understanding and wished to proceed.  Consent form was signed.  The patient was brought to the fluoroscopy suite and placed in the prone position.  Procedural pause was conducted to verify:  patient identity, procedure to be performed, laterality, site, and patient position.    The skin was sterilely prepped using chlorhexidine and allowed to dry.  The skin was draped in the usual sterile fashion.    After identifying the right L5 pedicle with fluoroscopy, the skin and subcutaneous tissue were infiltrated with 2 ml 1% preservative-free lidocaine.  Then, 22g 3.5\" Quincke needle was advanced under fluoroscopic guidance to the posterior aspect of the right L5-S1 neuroforamen.  Appropriate foraminal depth was determined with a lateral fluoroscopic view, and anterior-posterior visualization confirmed needle positioning at approximately the 6 o'clock position relative to the pedicle.  After negative aspiration, 0.4 ml Omnipaque 300 (iohexol) was injected using live fluoroscopy, which did not demonstrate ideal spread.  The needle was repositioned.  After negative aspiration, " "0.3 ml Omnipaque 300 (iohexol) was injected using live fluoroscopy, confirming appropriate transforaminal spread without evidence of intravascular or intrathecal uptake.  Next, 1 ml 1% lidocaine was injected.  After 90 seconds, a brief assessment of sensation and strength was performed.  There were no gross changes in sensation or strength.  Then, 7.5 mg dexamethasone followed by 1 ml 1% lidocaine was injected.  Following the injection, the needle was withdrawn slightly and flushed with 0.5 ml preservative-free 1% lidocaine as it was fully extracted.    After identifying the right S1 pedicle with fluoroscopy, the skin and subcutaneous tissue were infiltrated with 1.5 ml 1% preservative-free lidocaine.  Then, 22g 3.5\" Quincke needle was advanced under fluoroscopic guidance to the posterior aspect of the right S1-S2 neuroforamen.  Appropriate foraminal depth was determined with a lateral fluoroscopic view, and anterior-posterior visualization confirmed needle positioning at approximately the 6 o'clock position relative to the pedicle.  After negative aspiration, 0.2 ml Omnipaque 300 (iohexol) was injected using live fluoroscopy, confirming appropriate transforaminal spread without evidence of intravascular or intrathecal uptake.  Next, 1 ml 1% lidocaine was injected.  After 90 seconds, a brief assessment of sensation and strength was performed.  There were no gross changes in sensation or strength.  Then, 7.5 mg dexamethasone followed by 1 ml 1% lidocaine was injected.  Following the injection, the needle was withdrawn slightly and flushed with 0.5 ml preservative-free 1% lidocaine as it was fully extracted.    The patient tolerated the procedure well, and there were no apparent complications.  The patient was escorted back to the postprocedure room.  The patient was monitored for side effects.  No reactions were noted.  After appropriate observation, the patient was dismissed from the clinic in good " condition.    Preprocedure pain level: 5/10.  Postprocedure pain level: 1/10.      Leno Oseguera MD

## 2024-03-18 DIAGNOSIS — M85.641: ICD-10-CM

## 2024-03-18 DIAGNOSIS — M85.642: ICD-10-CM

## 2024-03-19 DIAGNOSIS — F33.1 MAJOR DEPRESSIVE DISORDER, RECURRENT EPISODE, MODERATE (H): ICD-10-CM

## 2024-03-19 DIAGNOSIS — F41.0 PANIC ATTACK: ICD-10-CM

## 2024-03-19 RX ORDER — MELOXICAM 7.5 MG/1
7.5 TABLET ORAL DAILY
Qty: 30 TABLET | Refills: 1 | OUTPATIENT
Start: 2024-03-19

## 2024-03-19 NOTE — TELEPHONE ENCOUNTER
Centralized Medication Refill Team note: chart reviewed: previously denied> Last Office Visit: 4/28/23  Future Office visit: none     Declined prescription request. If medication is still beneficial, she should speak with PCP and make sure it is still safe for her to take long term.

## 2024-03-20 RX ORDER — FLUOXETINE 20 MG/1
40 TABLET, FILM COATED ORAL DAILY
Qty: 180 TABLET | Refills: 3 | Status: SHIPPED | OUTPATIENT
Start: 2024-03-20 | End: 2024-07-05

## 2024-03-27 ENCOUNTER — TELEPHONE (OUTPATIENT)
Dept: FAMILY MEDICINE | Facility: CLINIC | Age: 59
End: 2024-03-27
Payer: COMMERCIAL

## 2024-03-27 DIAGNOSIS — J30.1 ALLERGIC RHINITIS DUE TO POLLEN, UNSPECIFIED SEASONALITY: Primary | ICD-10-CM

## 2024-03-27 RX ORDER — FLUTICASONE PROPIONATE 50 MCG
1 SPRAY, SUSPENSION (ML) NASAL DAILY
Qty: 16 G | Refills: 1 | Status: SHIPPED | OUTPATIENT
Start: 2024-03-27 | End: 2024-05-20

## 2024-03-27 NOTE — TELEPHONE ENCOUNTER
Pt calling requesting rx for flonase to be sent to the [facility] in [facility].    Thanks!  Shravan Escamilla RN   [facility]

## 2024-04-16 NOTE — TELEPHONE ENCOUNTER
Prescription for syringes sent over to the pharmacy.    Leno Luis MD     You may receive a survey regarding the care you received during your visit.  Your input is valuable to us.  We encourage you to complete and return your survey.  We hope you will choose us in the future for your healthcare needs.      [facility]: [phone]  [facility] ([place]): [phone]  [facility]: [phone]  [facility] [place]: [phone]  [facility] ([place]): [phone]  [phone]  [facility] ([place]): [phone]  [facility]: [phone]  [facility]: [phone]

## 2024-05-20 DIAGNOSIS — J30.1 ALLERGIC RHINITIS DUE TO POLLEN, UNSPECIFIED SEASONALITY: ICD-10-CM

## 2024-05-20 RX ORDER — FLUTICASONE PROPIONATE 50 MCG
1 SPRAY, SUSPENSION (ML) NASAL DAILY
Qty: 24 ML | Refills: 2 | Status: SHIPPED | OUTPATIENT
Start: 2024-05-20 | End: 2024-06-14

## 2024-05-31 DIAGNOSIS — Z12.11 COLON CANCER SCREENING: ICD-10-CM

## 2024-06-05 ENCOUNTER — TELEPHONE (OUTPATIENT)
Dept: FAMILY MEDICINE | Facility: CLINIC | Age: 59
End: 2024-06-05
Payer: COMMERCIAL

## 2024-06-05 NOTE — TELEPHONE ENCOUNTER
Reason for Call:  Appointment Request    Patient requesting this type of appt:  Office visit     Requested provider: Leno Luis    Reason patient unable to be scheduled: Not within requested timeframe    When does patient want to be seen/preferred time:  ASAP -- but needs two day notice for a ride.     Comments: Throat pain for approx 1-2 wks     Could we [facility] this [facility] you [facility] or would you prefer to receive a phone call?:   Patient would prefer a phone call   Okay to leave a detailed message?: Yes at Cell number on file:    Telephone Information:   Mobile [phone]       Call taken on 6/5/2024 at 5:05 PM by Hafsa Echeverria

## 2024-06-13 ENCOUNTER — OFFICE VISIT (OUTPATIENT)
Dept: FAMILY MEDICINE | Facility: CLINIC | Age: 59
End: 2024-06-13
Payer: COMMERCIAL

## 2024-06-13 VITALS
DIASTOLIC BLOOD PRESSURE: 80 MMHG | OXYGEN SATURATION: 98 % | WEIGHT: 162 LBS | BODY MASS INDEX: 25.43 KG/M2 | HEIGHT: 67 IN | RESPIRATION RATE: 16 BRPM | TEMPERATURE: 98.4 F | HEART RATE: 79 BPM | SYSTOLIC BLOOD PRESSURE: 124 MMHG

## 2024-06-13 DIAGNOSIS — R41.3 MEMORY CHANGES: ICD-10-CM

## 2024-06-13 DIAGNOSIS — M67.90 NODULE OF FLEXOR TENDON SHEATH: ICD-10-CM

## 2024-06-13 DIAGNOSIS — R68.2 DRY MOUTH: Primary | ICD-10-CM

## 2024-06-13 LAB
BASOPHILS # BLD AUTO: 0 10E3/UL (ref 0–0.2)
BASOPHILS NFR BLD AUTO: 1 %
EOSINOPHIL # BLD AUTO: 0.4 10E3/UL (ref 0–0.7)
EOSINOPHIL NFR BLD AUTO: 8 %
ERYTHROCYTE [DISTWIDTH] IN BLOOD BY AUTOMATED COUNT: 12.5 % (ref 10–15)
HCT VFR BLD AUTO: 40.4 % (ref 35–47)
HGB BLD-MCNC: 13 G/DL (ref 11.7–15.7)
IMM GRANULOCYTES # BLD: 0 10E3/UL
IMM GRANULOCYTES NFR BLD: 0 %
LYMPHOCYTES # BLD AUTO: 2 10E3/UL (ref 0.8–5.3)
LYMPHOCYTES NFR BLD AUTO: 43 %
MCH RBC QN AUTO: 31.2 PG (ref 26.5–33)
MCHC RBC AUTO-ENTMCNC: 32.2 G/DL (ref 31.5–36.5)
MCV RBC AUTO: 97 FL (ref 78–100)
MONOCYTES # BLD AUTO: 0.5 10E3/UL (ref 0–1.3)
MONOCYTES NFR BLD AUTO: 10 %
NEUTROPHILS # BLD AUTO: 1.8 10E3/UL (ref 1.6–8.3)
NEUTROPHILS NFR BLD AUTO: 38 %
PLATELET # BLD AUTO: 261 10E3/UL (ref 150–450)
RBC # BLD AUTO: 4.17 10E6/UL (ref 3.8–5.2)
T PALLIDUM AB SER QL: NONREACTIVE
WBC # BLD AUTO: 4.6 10E3/UL (ref 4–11)

## 2024-06-13 PROCEDURE — 36415 COLL VENOUS BLD VENIPUNCTURE: CPT | Performed by: FAMILY MEDICINE

## 2024-06-13 PROCEDURE — G2211 COMPLEX E/M VISIT ADD ON: HCPCS | Performed by: FAMILY MEDICINE

## 2024-06-13 PROCEDURE — 84165 PROTEIN E-PHORESIS SERUM: CPT | Performed by: PATHOLOGY

## 2024-06-13 PROCEDURE — 84155 ASSAY OF PROTEIN SERUM: CPT | Performed by: FAMILY MEDICINE

## 2024-06-13 PROCEDURE — 86780 TREPONEMA PALLIDUM: CPT | Performed by: FAMILY MEDICINE

## 2024-06-13 PROCEDURE — 82607 VITAMIN B-12: CPT | Performed by: FAMILY MEDICINE

## 2024-06-13 PROCEDURE — 99214 OFFICE O/P EST MOD 30 MIN: CPT | Performed by: FAMILY MEDICINE

## 2024-06-13 PROCEDURE — 85025 COMPLETE CBC W/AUTO DIFF WBC: CPT | Performed by: FAMILY MEDICINE

## 2024-06-13 PROCEDURE — 86235 NUCLEAR ANTIGEN ANTIBODY: CPT | Performed by: FAMILY MEDICINE

## 2024-06-13 PROCEDURE — 84443 ASSAY THYROID STIM HORMONE: CPT | Performed by: FAMILY MEDICINE

## 2024-06-13 PROCEDURE — 86038 ANTINUCLEAR ANTIBODIES: CPT | Performed by: FAMILY MEDICINE

## 2024-06-13 PROCEDURE — 80053 COMPREHEN METABOLIC PANEL: CPT | Performed by: FAMILY MEDICINE

## 2024-06-13 RX ORDER — PILOCARPINE HYDROCHLORIDE 5 MG/1
5 TABLET, FILM COATED ORAL 3 TIMES DAILY
Qty: 90 TABLET | Refills: 1 | Status: SHIPPED | OUTPATIENT
Start: 2024-06-13 | End: 2024-07-09

## 2024-06-13 ASSESSMENT — PAIN SCALES - GENERAL: PAINLEVEL: NO PAIN (0)

## 2024-06-13 ASSESSMENT — PATIENT HEALTH QUESTIONNAIRE - PHQ9
SUM OF ALL RESPONSES TO PHQ QUESTIONS 1-9: 7
10. IF YOU CHECKED OFF ANY PROBLEMS, HOW DIFFICULT HAVE THESE PROBLEMS MADE IT FOR YOU TO DO YOUR WORK, TAKE CARE OF THINGS AT HOME, OR GET ALONG WITH OTHER PEOPLE: SOMEWHAT DIFFICULT
SUM OF ALL RESPONSES TO PHQ QUESTIONS 1-9: 7

## 2024-06-13 ASSESSMENT — ENCOUNTER SYMPTOMS: SORE THROAT: 1

## 2024-06-13 NOTE — PROGRESS NOTES
Assessment & Plan     Dry mouth  I suspect the sore throat is probably more representative of dryness and lack of saliva production.  I did check some autoimmune antibodies today to see if there might be a consideration for Sjogren's.  Most likely, I suspect this is a medication side effect.  I recommended consultation with an ENT for examination of the deeper oropharynx but have also suggested starting her on pilocarpine to see if this would be helpful to increase saliva production.  She reports that her dentures, particularly the upper plate, no longer fit and seem to cause some constriction and narrowing of the opening with swallowing.  Longer-term I suspect this is related to remodeling of the upper and lower mandible given the absence of teeth for over 20 years.  - Adult ENT  Referral; Future  - pilocarpine (SALAGEN) 5 MG tablet; Take 1 tablet (5 mg) by mouth 3 times daily  - SSA Ro ALBIN Antibody IgG; Future  - SSB La ALBIN Antibody IgG; Future  - Anti Nuclear Keshia IgG by IFA with Reflex; Future  - SSA Ro ALBIN Antibody IgG  - SSB La ALBIN Antibody IgG  - Anti Nuclear Kesiha IgG by IFA with Reflex    Memory changes  She was concerned about some memory changes.  I conducted a MoCA on her today and her score was 24/30.  The self-described lapses in memory seem more consistent with benign forgetfulness rather than cognitive impairment.  I offered to do some laboratory testing today for potentially metabolic reversible causes and she was interested in pursuing that.  - TSH with free T4 reflex; Future  - CBC with platelets and differential; Future  - Vitamin B12; Future  - Comprehensive metabolic panel (BMP + Alb, Alk Phos, ALT, AST, Total. Bili, TP); Future  - Protein electrophoresis; Future  - Treponema Abs w Reflex to RPR and Titer; Future  - TSH with free T4 reflex  - CBC with platelets and differential  - Vitamin B12  - Comprehensive metabolic panel (BMP + Alb, Alk Phos, ALT, AST, Total. Bili, TP)  - Protein  "electrophoresis  - Treponema Abs w Reflex to RPR and Titer    Nodule of flexor tendon sheath  Reassured regarding the palpable tendon nodule involving the ring finger on her hand.  Could represent early Dupuytren's contracture but she has no problems getting her fingers completely flat on the table so would monitor at this time.  No triggering was noted as the nodules seem to be proximal to the distal pulley.      The longitudinal plan of care for the diagnosis(es)/condition(s) as documented were addressed during this visit. Due to the added complexity in care, I will continue to support Kareen in the subsequent management and with ongoing continuity of care.      FUTURE APPOINTMENTS:       - Follow-up for annual visit or as needed    Subjective   Kareen is a 59 year old, presenting for the following health issues:  Pharyngitis (1-2 wks/Fatigue/Cough drops for relief /Sore throat staying the same./Unable to wear dentures as it feels like \"it chokes me\"./Dentist referred pt to an implant specialty to avoid this problem in the future. /)        6/13/2024     9:21 AM   Additional Questions   Roomed by Jorge CASAS     History of Present Illness       Reason for visit:  Throat lungs    She eats 0-1 servings of fruits and vegetables daily.She consumes 1 sweetened beverage(s) daily.She exercises with enough effort to increase her heart rate 9 or less minutes per day.  She exercises with enough effort to increase her heart rate 3 or less days per week.   She is taking medications regularly.           Patient came in today with a few concerns.  The first concern is a sore throat that is been present for over a year.  It is not sore all the time but there seem to be certain things that trigger it like inhaling marijuana smoke.  She consumes a lot of cough drops not because of a cough but because it seems to increase saliva production and lubricate her mouth, relieving symptoms in the throat.  She has no weight loss.  She has " "been edentulous for 20+ years and has a difficult time wearing her plates right now because she feels that the upper plate in particular constricts the opening to her oropharynx and makes it difficult to swallow food.  Weight has been stable.  She has no dysphagia of solids or liquids.  She has no odynophagia.  The dentist that she saw recommended that she see her regular doctor but also could consider getting implants.    Her next concern is memory loss.  Her father suffers from dementia and she is worried about that.  She notes simple acts of forgetting like thinking of something in 1 room and then walking into the next room to accomplish that and not remembering what she needed to get.  That is her main example of a memory concern.  She does not get lost driving although she has been to this clinic a number of times feels she needs to write down the address and floor of the clinic.  She does not drive.  She does not have any issues with overdue bills or financial considerations.  Long-term memory she has no concerns.    Finally, she noted some nodules that she can feel in the palm of her hand.  She also has prominent Heberden's and Loretta's nodes without significant symptoms of arthritis in multiple fingers.      Review of Systems  Constitutional, HEENT, cardiovascular, pulmonary, gi and gu systems are negative, except as otherwise noted.      Objective    /80 (BP Location: Left arm, Patient Position: Sitting, Cuff Size: Adult Regular)   Pulse 79   Temp 98.4  F (36.9  C) (Temporal)   Resp 16   Ht 1.702 m (5' 7\")   Wt 73.5 kg (162 lb)   LMP  (LMP Unknown)   SpO2 98%   BMI 25.37 kg/m    Body mass index is 25.37 kg/m .  Physical Exam   GENERAL: alert and no distress  EYES: Eyes grossly normal to inspection, PERRL and conjunctivae and sclerae normal  HENT: normal cephalic/atraumatic, ear canals and TM's normal, nose and mouth without ulcers or lesions, oropharynx clear, and oropharyngeal mucosa is " dry.  I did not appreciate any ulcerations, asymmetry, or abnormal masses/appearing skin in the oropharynx.  NECK: no adenopathy, no asymmetry, masses, or scars  RESP: lungs clear to auscultation - no rales, rhonchi or wheezes  CV: regular rate and rhythm, normal S1 S2, no S3 or S4, no murmur, click or rub, no peripheral edema   RECTAL (female): normal sphincter tone, no rectal masses  MS: no gross musculoskeletal defects noted, no edema  MS: Both hands are normal in appearance with the exception of the Heberden's and Loretta's nodes.  There is a palpable tendon nodule along the ring finger just proximal to the distal skin crease on the hand.  No triggering is noted and she is able to get all fingers flat on the table  SKIN: no suspicious lesions or rashes  NEURO: Normal strength and tone, mentation intact and speech normal  PSYCH: mentation appears normal, affect normal/bright    MoCA test was performed today and score was 24/30 with points off for copying the cube and abstraction.        Signed Electronically by: Leno Luis MD

## 2024-06-14 ENCOUNTER — ORDERS ONLY (AUTO-RELEASED) (OUTPATIENT)
Dept: ADMISSION | Facility: CLINIC | Age: 59
End: 2024-06-14
Payer: COMMERCIAL

## 2024-06-14 DIAGNOSIS — J30.1 ALLERGIC RHINITIS DUE TO POLLEN, UNSPECIFIED SEASONALITY: ICD-10-CM

## 2024-06-14 DIAGNOSIS — Z12.11 COLON CANCER SCREENING: ICD-10-CM

## 2024-06-14 LAB
ALBUMIN SERPL BCG-MCNC: 4.4 G/DL (ref 3.5–5.2)
ALBUMIN SERPL ELPH-MCNC: 4.2 G/DL (ref 3.7–5.1)
ALP SERPL-CCNC: 127 U/L (ref 40–150)
ALPHA1 GLOB SERPL ELPH-MCNC: 0.3 G/DL (ref 0.2–0.4)
ALPHA2 GLOB SERPL ELPH-MCNC: 0.8 G/DL (ref 0.5–0.9)
ALT SERPL W P-5'-P-CCNC: 16 U/L (ref 0–50)
ANA SER QL IF: NEGATIVE
ANION GAP SERPL CALCULATED.3IONS-SCNC: 10 MMOL/L (ref 7–15)
AST SERPL W P-5'-P-CCNC: 22 U/L (ref 0–45)
B-GLOBULIN SERPL ELPH-MCNC: 0.8 G/DL (ref 0.6–1)
BILIRUB SERPL-MCNC: 0.2 MG/DL
BUN SERPL-MCNC: 9.2 MG/DL (ref 6–20)
CALCIUM SERPL-MCNC: 9.2 MG/DL (ref 8.6–10)
CHLORIDE SERPL-SCNC: 104 MMOL/L (ref 98–107)
CREAT SERPL-MCNC: 0.82 MG/DL (ref 0.51–0.95)
DEPRECATED HCO3 PLAS-SCNC: 26 MMOL/L (ref 22–29)
EGFRCR SERPLBLD CKD-EPI 2021: 83 ML/MIN/1.73M2
GAMMA GLOB SERPL ELPH-MCNC: 0.8 G/DL (ref 0.7–1.6)
GLUCOSE SERPL-MCNC: 101 MG/DL (ref 70–99)
M PROTEIN SERPL ELPH-MCNC: 0 G/DL
POTASSIUM SERPL-SCNC: 4.3 MMOL/L (ref 3.4–5.3)
PROT PATTERN SERPL ELPH-IMP: NORMAL
PROT SERPL-MCNC: 7.1 G/DL (ref 6.4–8.3)
SODIUM SERPL-SCNC: 140 MMOL/L (ref 135–145)
TOTAL PROTEIN SERUM FOR ELP: 6.9 G/DL (ref 6.4–8.3)
TSH SERPL DL<=0.005 MIU/L-ACNC: 1.47 UIU/ML (ref 0.3–4.2)
VIT B12 SERPL-MCNC: 803 PG/ML (ref 232–1245)

## 2024-06-14 RX ORDER — FLUTICASONE PROPIONATE 50 MCG
SPRAY, SUSPENSION (ML) NASAL
Qty: 48 ML | Refills: 0 | Status: SHIPPED | OUTPATIENT
Start: 2024-06-14 | End: 2024-08-06

## 2024-06-15 LAB
ENA SS-A AB SER IA-ACNC: <0.5 U/ML
ENA SS-A AB SER IA-ACNC: NEGATIVE
ENA SS-B IGG SER IA-ACNC: <0.6 U/ML
ENA SS-B IGG SER IA-ACNC: NEGATIVE

## 2024-06-21 ASSESSMENT — ENCOUNTER SYMPTOMS
RESPIRATORY NEGATIVE: 1
CONSTITUTIONAL NEGATIVE: 1
EYES NEGATIVE: 1

## 2024-06-21 NOTE — PROGRESS NOTES
"Chief Complaint   Patient presents with    Throat Problem     Burning throat when smoking, when dentures are in, feels like she can't swallow solid foods/chokes, no problems swallowing liquids. Smoker x 45 years      PCP: Leno Luis     Referring Provider: Leno Luis    LMP  (LMP Unknown)     ENT Problem List:  Patient Active Problem List   Diagnosis    Fibromyalgia    Gastric bypass status for obesity    CARDIOVASCULAR SCREENING; LDL GOAL LESS THAN 130    Anxiety    Vitamin D deficiency disease    Vitamin B12 deficiency disease    Insomnia    Gastroesophageal reflux disease without esophagitis    Chronic migraine without aura without status migrainosus, not intractable    Major depressive disorder, recurrent episode, moderate (H)    History of kidney stones    Tobacco use disorder    Bipolar affective disorder, remission status unspecified (H)    Hepatitis C, chronic (H)    Epidural hematoma (H)      Current Medications:  Current Outpatient Medications   Medication Sig Dispense Refill    albuterol (PROAIR HFA/PROVENTIL HFA/VENTOLIN HFA) 108 (90 Base) MCG/ACT inhaler INHALE 2 PUFFS INTO THE LUNGS EVERY 4 HOURS AS NEEDED FOR SHORTNESS OF BREATH OR WHEEZING 18 g 2    amLODIPine (NORVASC) 5 MG tablet Take 1 tablet (5 mg) by mouth daily 90 tablet 3    B-D SYRINGE LUER-BLAYNE 3 ML MISC 1 SYRINGE EVERY 30 DAYS 25 each 1    BD HYPODERMIC NEEDLE 25G X 1-1/2\" MISC USE 1 EVERY 30 DAYS 3 each 32    buPROPion (WELLBUTRIN XL) 300 MG 24 hr tablet Take 1 tablet (300 mg) by mouth every morning 90 tablet 3    cyclobenzaprine (FLEXERIL) 10 MG tablet Take 1 tablet (10 mg) by mouth 3 times daily as needed for muscle spasms 60 tablet 11    FLUoxetine 20 MG tablet Take 2 tablets (40 mg) by mouth daily 180 tablet 3    fluticasone (FLONASE) 50 MCG/ACT nasal spray SPRAY 1 SPRAY INTO EACH NOSTRIL DAILY 48 mL 0    lamoTRIgine (LAMICTAL) 100 MG tablet Take 2 tablets (200 mg) by mouth daily 180 tablet 0    medical " "cannabis (Patient's own supply) See Admin Instructions (The purpose of this order is to document that the patient reports taking medical cannabis.  This is not a prescription, and is not used to certify that the patient has a qualifying medical condition.)      meloxicam (MOBIC) 7.5 MG tablet Take 1 tablet (7.5 mg) by mouth daily 30 tablet 1    methylPREDNISolone (MEDROL DOSEPAK) 4 MG tablet therapy pack Follow Package Directions 21 tablet 0    order for DME Equipment being ordered: Splint right thumb 1 Units 0    pilocarpine (SALAGEN) 5 MG tablet Take 1 tablet (5 mg) by mouth 3 times daily 90 tablet 1    SUMAtriptan (IMITREX) 50 MG tablet Take 1 tablet (50 mg) by mouth at onset of headache for migraine May repeat once after two hours for persistent headache 10 tablet 3    Syringe/Needle, Disp, (BD LUER-BLAYNE SYRINGE) 25G X 1-1/2\" 3 ML MISC 1 Syringe every 30 days 12 each 1     No current facility-administered medications for this visit.     CT SINUS W/O CONTRAST 11/10/2020     Provided History: Sinusitis, chronic, possible surgery; chronic sinus congestion and blockage, drainage; Chronic sinusitis, unspecified location  ICD-10: Chronic sinusitis, unspecified location     Comparison: None      Technique:  Using thin collimation multidetector helical acquisition technique, axial, coronal, and sagittal thin section CT images were reconstructed through the paranasal sinuses. Images were reviewed in bone and soft tissue windows.     Findings:   Maxillary sinuses: Mild mucosal thickening of the right maxillary  sinus. Clear left maxillary sinus.  Sphenoid sinus: Clear.  Frontal sinus: Clear.  Ethmoid air cells: Moderate posterior right ethmoid mucosal thickening. Clear left ethmoid air cells.     Mastoid air cells are clear.     Mild rightward nasal septal deviation. Mucosal thickening narrows the right ostiomeatal unit. The ostiomeatal unit is patent on the left. The bony walls of the paranasal sinuses are intact without " thickening or erosion.     Normal retromaxillary and pterygopalatine fat.     The adenoid tonsils in the nasopharynx are unremarkable.                                                                    Impression:  Mild paranasal sinus mucosal thickening on the right with mucosal  thickening narrowing the right ostiomeatal unit.    XR NECK SOFT TISSUE 3/10/2022    IMPRESSION:     1.  No radiopaque foreign body.   2.  No prevertebral soft tissue swelling.   3.  Epiglottis unremarkable.     DATE:  2:42 PM     CLINICAL DATA : Swelling. . .-     VIEWS: 1     FINDINGS/     XR CHEST PA & LAT  3/20/2022    IMPRESSION:   1. No acute abnormality.   2.  Lungs are hyperinflated suggesting chronic obstructive disease.     CLINICAL DATA: Shortness of breath     COMPARISON: Chest radiograph 2010     TECHNIQUE:   Frontal and lateral views of the chest were performed.     FINDINGS:   Soft tissues:  Normal.   Bones: No significant abnormalities.   Cardiac and mediastinal contours:No significant abnormalities.   Lungs: Lungs are hyperinflated. Pulmonary vascularity is normal. Mild biapical scarring. The lungs are otherwise clear.    Pleura/diaphragms:Normal.     CT Low Dose Lung Cancer Screening 23     History:  Personal history of tobacco use Screening for lung cancer,  smoking.     Number of packs-year of smokin  Current or former smoker?: Former  If former, number of years since quit?: 10     Comparison: CT 2022. Radiograph 2010.     Technique: Images reviewed in lung, soft tissue and bone windows.  DLP: 60 (mGy*cm)  CTDIvol: 1.71 (mGy)     Findings: [All follow up of nodules are based on ACR guidelines for lung cancer screening and measurements of each nodule size must be the mean of the longest axial plane measurement by its perpendicular measured to the nearest decimal and rounded up to the nearest whole number. ]  Nodules:     - 2  mm solid nodule in the right upper lobe on series:  4 image: 109.  Unchanged.     Emphysema: Mild centrilobular and paraseptal emphysema     Coronary artery calcium: trace     Additional findings:      Lungs: Scattered bilateral calcified granulomas are present Biapical scarring. The central tracheobronchial tree is patent. Central bronchial wall thickening. No areas of bronchiectasis or architectural distortion. No pleural effusion, pulmonary consolidation, or pneumothorax.      Mediastinum: The visualized thyroid is unremarkable.Heart size is within normal limits. No pericardial effusion. , Origin brachiocephalic and left common carotid arteries. The thoracic aorta and main pulmonary artery are within normal limits. Standard branching pattern of the great vessels. No abnormal thoracic lymph nodes.     Bones and soft tissues: No suspicious osseous lesion. Mild degenerative changes of the thoracolumbar spine.     Upper abdomen:  Limited evaluation of the upper abdomen. No acute pathology of the visualized solid organs. Changes of prior gastric bypass. Tiny splenule.                                                                    Impression:   1. ACR Assessment Category (2022):  Lung-RADS Category 2. Benign appearance or behavior.    Recommendation:  Lung-RADS Category 2. Benign appearance or behavior.  Recommendation:  continue annual screening with Lung cancer screening CT (please order exam code [id number]).   2. Significant Incidental Finding(s):  Category S: No.  3. Avoidance of tobacco smoke is strongly advised. Please consider referral for smoking cessation to [facility] (MTM) if clinically appropriate.     HPI  Pleasant 59 year old female presents today as a(n) new patient for throat concerns. She states that she was prescribed medical marijuana and smoked it for 45 years. However, now she reports throat burning when smoking. She states that she smokes an e-cigarette.  She states that when her dentures are in, she feels as if she has dysphagia and  chokes. She states that her throat feels restricted. She states that she wants to puke with her dysphagia. She reports no problems swallowing liquids. She states that she has not been able to use her dentures for over a year, and she saw her dentist that referred her to a specialist for potential implants.  She reports a hx of COPD and asthma.  She reports a hx of sinus infections and right sided nasal congestion.    She denies pain when swallowing, voice concerns, hx of nose surgeries.    Review of Systems   Constitutional: Negative.    HENT:  Positive for congestion and sore throat.    Eyes: Negative.    Respiratory: Negative.     Gastrointestinal:  Positive for nausea.   Skin: Negative.    Endo/Heme/Allergies: Negative.        Physical Exam  Vitals and nursing note reviewed.   Constitutional:       Appearance: Normal appearance.   HENT:      Head: Normocephalic and atraumatic.      Jaw: There is normal jaw occlusion.      Right Ear: Hearing, tympanic membrane and ear canal normal.      Left Ear: Hearing, tympanic membrane and ear canal normal.      Nose: No mucosal edema, congestion or rhinorrhea.      Right Nostril: No occlusion.      Left Nostril: No occlusion.      Right Turbinates: Not enlarged or swollen.      Left Turbinates: Not enlarged or swollen.      Right Sinus: No maxillary sinus tenderness or frontal sinus tenderness.      Left Sinus: No maxillary sinus tenderness or frontal sinus tenderness.      Mouth/Throat:      Mouth: Mucous membranes are moist.      Pharynx: Oropharynx is clear. Uvula midline.   Eyes:      Extraocular Movements: Extraocular movements intact.      Pupils: Pupils are equal, round, and reactive to light.   Neurological:      Mental Status: She is alert.       Diagnostic nasal endoscopy:    The patient was seen in the room and identified. Pros and cons of the procedure were explained to the patient. The procedure and its alternatives were explained to the patient in lay terms.  Patient's questions were answered. Symptoms required a diagnostic endoscopic evaluation under local anesthesia. After obtaining an informed consent from the patient, 2% Lidocaine spray was applied to each nostril. Then a flexible scopic exam was performed. Septum is deviated to the right and then to the left. Ostiomeatal complexes are free of disease. No pus or polyp seen. Inferior turbinates are enlarged. Nasopharynx is normal. Rosenmüller fossa and torus tubarius are normal. Epiglottis, hypopharynx, false vocal folds are normal. No pooling in pyriform fossae. Vocal cords are mobile and normal other than slight edematous appearance of posterior commissure. Patient tolerated the procedure well and left the room with no complications.    A/P  This pleasant patient has laryngo-pharyngeal reflux and dysphagia with a choking sensation.    For laryngo-pharyngeal reflux, she is prescribed pantoprazole (PROTONIX) 40 MG EC tablet, take 1 tablet (40 mg) by mouth daily, today. She is advised to not take this medication with food. Adult lifestyle changes to prevent LPR reviewed:   Avoid eating and drinking within two to three hours prior to bedtime   Do not drink alcohol   Eat small meals and slowly   Limit problem foods:    o Caffeine  o Carbonated drinks  o Chocolate  o Peppermint  o Tomato  o Citrus fruits  o Fatty and fried foods     Lose weight   Quit smoking   Wear loose clothing   Elevate the upper part of the body when laying down    Functional improper cord use and deconditioning may also contribute.  Over 80% of patients with LPRD do not have heartburn or clear symptoms of GERD. The throat symptoms of LPRD can be caused by irritation from direct contact with stomach secretions, or by reflexive cricopharyngeal spasm from reflux into lower portions of the esophagus. I counseled the patient today on the importance of diet and the timing and volume of meals for nonpharmaceutical control of reflux diseases. I also advised  frequent sips of water instead of throat clearing to manage the globus sensation and reduce cricopharyngeal spasm.    For dysphagia with a choking sensation, further investigation is needed. As such, the following are ordered;  Speech Therapy  Referral  XR Video Swallow with SLP or OT  She will receive a phone call to review the results of this test and future potential treatment options.    Follow up in clinic prn.    Scribe/Staff:    Scribe Disclosure:   I, Symone Damian, am serving as a scribe; to document services personally performed by Mango Field MD based on data collection and the provider's statements to me.     Provider Disclosure:  I agree with above History, Review of Systems, Physical exam and Plan.  I have reviewed the content of the documentation and have edited it as needed. I have personally performed the services documented here and the documentation accurately represents those services and the decisions I have made.      Electronically signed by:  Mango Field MD

## 2024-06-25 ENCOUNTER — TELEPHONE (OUTPATIENT)
Dept: FAMILY MEDICINE | Facility: CLINIC | Age: 59
End: 2024-06-25
Payer: COMMERCIAL

## 2024-06-25 DIAGNOSIS — G89.29 CHRONIC BILATERAL LOW BACK PAIN WITHOUT SCIATICA: Primary | ICD-10-CM

## 2024-06-25 DIAGNOSIS — M54.50 CHRONIC BILATERAL LOW BACK PAIN WITHOUT SCIATICA: Primary | ICD-10-CM

## 2024-06-25 NOTE — TELEPHONE ENCOUNTER
Referral placed.  Information sent to patient through [employer]    Please print copy of the referral order and fax it to the requested number.  Thanks    Leno Luis MD

## 2024-06-26 ENCOUNTER — OFFICE VISIT (OUTPATIENT)
Dept: OTOLARYNGOLOGY | Facility: CLINIC | Age: 59
End: 2024-06-26
Payer: COMMERCIAL

## 2024-06-26 DIAGNOSIS — R13.19 ESOPHAGEAL DYSPHAGIA: ICD-10-CM

## 2024-06-26 DIAGNOSIS — R68.2 DRY MOUTH: ICD-10-CM

## 2024-06-26 DIAGNOSIS — K21.9 LPRD (LARYNGOPHARYNGEAL REFLUX DISEASE): Primary | ICD-10-CM

## 2024-06-26 PROCEDURE — 99203 OFFICE O/P NEW LOW 30 MIN: CPT | Mod: 25 | Performed by: OTOLARYNGOLOGY

## 2024-06-26 PROCEDURE — 31575 DIAGNOSTIC LARYNGOSCOPY: CPT | Performed by: OTOLARYNGOLOGY

## 2024-06-26 RX ORDER — PANTOPRAZOLE SODIUM 40 MG/1
40 TABLET, DELAYED RELEASE ORAL DAILY
Qty: 90 TABLET | Refills: 0 | Status: SHIPPED | OUTPATIENT
Start: 2024-06-26 | End: 2024-08-07

## 2024-06-26 ASSESSMENT — ENCOUNTER SYMPTOMS
SORE THROAT: 1
NAUSEA: 1

## 2024-06-26 ASSESSMENT — PAIN SCALES - GENERAL: PAINLEVEL: NO PAIN (0)

## 2024-06-26 NOTE — LETTER
"6/26/2024      Kareen Hernandez  [address]  [facility] [address]      Dear Colleague,    Thank you for referring your patient, Kareen Hernandez, to the [facility]. Please see a copy of my visit note below.    Chief Complaint   Patient presents with     Throat Problem     Burning throat when smoking, when dentures are in, feels like she can't swallow solid foods/chokes, no problems swallowing liquids. Smoker x 45 years      PCP: Leno Luis     Referring Provider: Leno Luis    LMP  (LMP Unknown)     ENT Problem List:  Patient Active Problem List   Diagnosis     Fibromyalgia     Gastric bypass status for obesity     CARDIOVASCULAR SCREENING; LDL GOAL LESS THAN 130     Anxiety     Vitamin D deficiency disease     Vitamin B12 deficiency disease     Insomnia     Gastroesophageal reflux disease without esophagitis     Chronic migraine without aura without status migrainosus, not intractable     Major depressive disorder, recurrent episode, moderate (H)     History of kidney stones     Tobacco use disorder     Bipolar affective disorder, remission status unspecified (H)     Hepatitis C, chronic (H)     Epidural hematoma (H)      Current Medications:  Current Outpatient Medications   Medication Sig Dispense Refill     albuterol (PROAIR HFA/PROVENTIL HFA/VENTOLIN HFA) 108 (90 Base) MCG/ACT inhaler INHALE 2 PUFFS INTO THE LUNGS EVERY 4 HOURS AS NEEDED FOR SHORTNESS OF BREATH OR WHEEZING 18 g 2     amLODIPine (NORVASC) 5 MG tablet Take 1 tablet (5 mg) by mouth daily 90 tablet 3     B-D SYRINGE LUER-BLAYNE 3 ML MISC 1 SYRINGE EVERY 30 DAYS 25 each 1     BD HYPODERMIC NEEDLE 25G X 1-1/2\" MISC USE 1 EVERY 30 DAYS 3 each 32     buPROPion (WELLBUTRIN XL) 300 MG 24 hr tablet Take 1 tablet (300 mg) by mouth every morning 90 tablet 3     cyclobenzaprine (FLEXERIL) 10 MG tablet Take 1 tablet (10 mg) by mouth 3 times daily as needed for muscle spasms 60 tablet 11     " "FLUoxetine 20 MG tablet Take 2 tablets (40 mg) by mouth daily 180 tablet 3     fluticasone (FLONASE) 50 MCG/ACT nasal spray SPRAY 1 SPRAY INTO EACH NOSTRIL DAILY 48 mL 0     lamoTRIgine (LAMICTAL) 100 MG tablet Take 2 tablets (200 mg) by mouth daily 180 tablet 0     medical cannabis (Patient's own supply) See Admin Instructions (The purpose of this order is to document that the patient reports taking medical cannabis.  This is not a prescription, and is not used to certify that the patient has a qualifying medical condition.)       meloxicam (MOBIC) 7.5 MG tablet Take 1 tablet (7.5 mg) by mouth daily 30 tablet 1     methylPREDNISolone (MEDROL DOSEPAK) 4 MG tablet therapy pack Follow Package Directions 21 tablet 0     order for DME Equipment being ordered: Splint right thumb 1 Units 0     pilocarpine (SALAGEN) 5 MG tablet Take 1 tablet (5 mg) by mouth 3 times daily 90 tablet 1     SUMAtriptan (IMITREX) 50 MG tablet Take 1 tablet (50 mg) by mouth at onset of headache for migraine May repeat once after two hours for persistent headache 10 tablet 3     Syringe/Needle, Disp, (BD LUER-BLAYNE SYRINGE) 25G X 1-1/2\" 3 ML MISC 1 Syringe every 30 days 12 each 1     No current facility-administered medications for this visit.     CT SINUS W/O CONTRAST 11/10/2020     Provided History: Sinusitis, chronic, possible surgery; chronic sinus congestion and blockage, drainage; Chronic sinusitis, unspecified location  ICD-10: Chronic sinusitis, unspecified location     Comparison: None      Technique:  Using thin collimation multidetector helical acquisition technique, axial, coronal, and sagittal thin section CT images were reconstructed through the paranasal sinuses. Images were reviewed in bone and soft tissue windows.     Findings:   Maxillary sinuses: Mild mucosal thickening of the right maxillary  sinus. Clear left maxillary sinus.  Sphenoid sinus: Clear.  Frontal sinus: Clear.  Ethmoid air cells: Moderate posterior right ethmoid " mucosal thickening. Clear left ethmoid air cells.     Mastoid air cells are clear.     Mild rightward nasal septal deviation. Mucosal thickening narrows the right ostiomeatal unit. The ostiomeatal unit is patent on the left. The bony walls of the paranasal sinuses are intact without thickening or erosion.     Normal retromaxillary and pterygopalatine fat.     The adenoid tonsils in the nasopharynx are unremarkable.                                                                    Impression:  Mild paranasal sinus mucosal thickening on the right with mucosal  thickening narrowing the right ostiomeatal unit.    XR NECK SOFT TISSUE 3/10/2022    IMPRESSION:     1.  No radiopaque foreign body.   2.  No prevertebral soft tissue swelling.   3.  Epiglottis unremarkable.     DATE:  2:42 PM     CLINICAL DATA : Swelling. . .-     VIEWS: 1     FINDINGS/     XR CHEST PA & LAT  3/20/2022    IMPRESSION:   1. No acute abnormality.   2.  Lungs are hyperinflated suggesting chronic obstructive disease.     CLINICAL DATA: Shortness of breath     COMPARISON: Chest radiograph 2010     TECHNIQUE:   Frontal and lateral views of the chest were performed.     FINDINGS:   Soft tissues:  Normal.   Bones: No significant abnormalities.   Cardiac and mediastinal contours:No significant abnormalities.   Lungs: Lungs are hyperinflated. Pulmonary vascularity is normal. Mild biapical scarring. The lungs are otherwise clear.    Pleura/diaphragms:Normal.     CT Low Dose Lung Cancer Screening 23     History:  Personal history of tobacco use Screening for lung cancer,  smoking.     Number of packs-year of smokin  Current or former smoker?: Former  If former, number of years since quit?: 10     Comparison: CT 2022. Radiograph 2010.     Technique: Images reviewed in lung, soft tissue and bone windows.  DLP: 60 (mGy*cm)  CTDIvol: 1.71 (mGy)     Findings: [All follow up of nodules are based on ACR guidelines for lung cancer screening  and measurements of each nodule size must be the mean of the longest axial plane measurement by its perpendicular measured to the nearest decimal and rounded up to the nearest whole number. ]  Nodules:     - 2  mm solid nodule in the right upper lobe on series:  4 image: 109. Unchanged.     Emphysema: Mild centrilobular and paraseptal emphysema     Coronary artery calcium: trace     Additional findings:      Lungs: Scattered bilateral calcified granulomas are present Biapical scarring. The central tracheobronchial tree is patent. Central bronchial wall thickening. No areas of bronchiectasis or architectural distortion. No pleural effusion, pulmonary consolidation, or pneumothorax.      Mediastinum: The visualized thyroid is unremarkable.Heart size is within normal limits. No pericardial effusion. , Origin brachiocephalic and left common carotid arteries. The thoracic aorta and main pulmonary artery are within normal limits. Standard branching pattern of the great vessels. No abnormal thoracic lymph nodes.     Bones and soft tissues: No suspicious osseous lesion. Mild degenerative changes of the thoracolumbar spine.     Upper abdomen:  Limited evaluation of the upper abdomen. No acute pathology of the visualized solid organs. Changes of prior gastric bypass. Tiny splenule.                                                                    Impression:   1. ACR Assessment Category (2022):  Lung-RADS Category 2. Benign appearance or behavior.    Recommendation:  Lung-RADS Category 2. Benign appearance or behavior.  Recommendation:  continue annual screening with Lung cancer screening CT (please order exam code [id number]).   2. Significant Incidental Finding(s):  Category S: No.  3. Avoidance of tobacco smoke is strongly advised. Please consider referral for smoking cessation to [facility] Medication Therapy Management (MTM) if clinically appropriate.     HPI  Pleasant 59 year old female presents today as a(n) new patient for  throat concerns. She states that she was prescribed medical marijuana and smoked it for 45 years. However, now she reports throat burning when smoking. She states that she smokes an e-cigarette.  She states that when her dentures are in, she feels as if she has dysphagia and chokes. She states that her throat feels restricted. She states that she wants to puke with her dysphagia. She reports no problems swallowing liquids. She states that she has not been able to use her dentures for over a year, and she saw her dentist that referred her to a specialist for potential implants.  She reports a hx of COPD and asthma.  She reports a hx of sinus infections and right sided nasal congestion.    She denies pain when swallowing, voice concerns, hx of nose surgeries.    Review of Systems   Constitutional: Negative.    HENT:  Positive for congestion and sore throat.    Eyes: Negative.    Respiratory: Negative.     Gastrointestinal:  Positive for nausea.   Skin: Negative.    Endo/Heme/Allergies: Negative.        Physical Exam  Vitals and nursing note reviewed.   Constitutional:       Appearance: Normal appearance.   HENT:      Head: Normocephalic and atraumatic.      Jaw: There is normal jaw occlusion.      Right Ear: Hearing, tympanic membrane and ear canal normal.      Left Ear: Hearing, tympanic membrane and ear canal normal.      Nose: No mucosal edema, congestion or rhinorrhea.      Right Nostril: No occlusion.      Left Nostril: No occlusion.      Right Turbinates: Not enlarged or swollen.      Left Turbinates: Not enlarged or swollen.      Right Sinus: No maxillary sinus tenderness or frontal sinus tenderness.      Left Sinus: No maxillary sinus tenderness or frontal sinus tenderness.      Mouth/Throat:      Mouth: Mucous membranes are moist.      Pharynx: Oropharynx is clear. Uvula midline.   Eyes:      Extraocular Movements: Extraocular movements intact.      Pupils: Pupils are equal, round, and reactive to light.    Neurological:      Mental Status: She is alert.       Diagnostic nasal endoscopy:    The patient was seen in the room and identified. Pros and cons of the procedure were explained to the patient. The procedure and its alternatives were explained to the patient in lay terms. Patient's questions were answered. Symptoms required a diagnostic endoscopic evaluation under local anesthesia. After obtaining an informed consent from the patient, 2% Lidocaine spray was applied to each nostril. Then a flexible scopic exam was performed. Septum is deviated to the right and then to the left. Ostiomeatal complexes are free of disease. No pus or polyp seen. Inferior turbinates are enlarged. Nasopharynx is normal. Rosenmüller fossa and torus tubarius are normal. Epiglottis, hypopharynx, false vocal folds are normal. No pooling in pyriform fossae. Vocal cords are mobile and normal other than slight edematous appearance of posterior commissure. Patient tolerated the procedure well and left the room with no complications.    A/P  This pleasant patient has laryngo-pharyngeal reflux and dysphagia with a choking sensation.    For laryngo-pharyngeal reflux, she is prescribed pantoprazole (PROTONIX) 40 MG EC tablet, take 1 tablet (40 mg) by mouth daily, today. She is advised to not take this medication with food. Adult lifestyle changes to prevent LPR reviewed:    Avoid eating and drinking within two to three hours prior to bedtime    Do not drink alcohol    Eat small meals and slowly    Limit problem foods:    o Caffeine  o Carbonated drinks  o Chocolate  o Peppermint  o Tomato  o Citrus fruits  o Fatty and fried foods      Lose weight    Quit smoking    Wear loose clothing    Elevate the upper part of the body when laying down    Functional improper cord use and deconditioning may also contribute.  Over 80% of patients with LPRD do not have heartburn or clear symptoms of GERD. The throat symptoms of LPRD can be caused by irritation  from direct contact with stomach secretions, or by reflexive cricopharyngeal spasm from reflux into lower portions of the esophagus. I counseled the patient today on the importance of diet and the timing and volume of meals for nonpharmaceutical control of reflux diseases. I also advised frequent sips of water instead of throat clearing to manage the globus sensation and reduce cricopharyngeal spasm.    For dysphagia with a choking sensation, further investigation is needed. As such, the following are ordered;  Speech Therapy  Referral  XR Video Swallow with SLP or OT  She will receive a phone call to review the results of this test and future potential treatment options.    Follow up in clinic prn.    Scribe/Staff:    Scribe Disclosure:   I, Symone Damian, am serving as a scribe; to document services personally performed by Mango Field MD based on data collection and the provider's statements to me.     Provider Disclosure:  I agree with above History, Review of Systems, Physical exam and Plan.  I have reviewed the content of the documentation and have edited it as needed. I have personally performed the services documented here and the documentation accurately represents those services and the decisions I have made.      Electronically signed by:  Mango Field MD         Again, thank you for allowing me to participate in the care of your patient.        Sincerely,        Mango Field MD

## 2024-06-26 NOTE — NURSING NOTE
Kareen Hernandez's goals for this visit include:   Chief Complaint   Patient presents with    Throat Problem     Burning throat when smoking, when dentures are in, feels like she can't swallow solid foods/chokes, no problems swallowing liquids. Smoker x 45 years       She requests these members of her care team be copied on today's visit information:     PCP: Leno Luis    Referring Provider:  Leno Luis MD  [address]  [place] [address]    LMP  (LMP Unknown)     Do you need any medication refills at today's visit? No    Jocelynn Cantrell RN

## 2024-06-27 ENCOUNTER — ANCILLARY PROCEDURE (OUTPATIENT)
Dept: CT IMAGING | Facility: CLINIC | Age: 59
End: 2024-06-27
Attending: FAMILY MEDICINE
Payer: COMMERCIAL

## 2024-06-27 DIAGNOSIS — Z87.891 PERSONAL HISTORY OF TOBACCO USE: ICD-10-CM

## 2024-06-27 PROCEDURE — 71271 CT THORAX LUNG CANCER SCR C-: CPT | Performed by: RADIOLOGY

## 2024-07-04 DIAGNOSIS — F41.0 PANIC ATTACK: ICD-10-CM

## 2024-07-04 DIAGNOSIS — F33.1 MAJOR DEPRESSIVE DISORDER, RECURRENT EPISODE, MODERATE (H): ICD-10-CM

## 2024-07-05 ENCOUNTER — MYC REFILL (OUTPATIENT)
Dept: FAMILY MEDICINE | Facility: CLINIC | Age: 59
End: 2024-07-05
Payer: COMMERCIAL

## 2024-07-05 ENCOUNTER — TELEPHONE (OUTPATIENT)
Dept: FAMILY MEDICINE | Facility: CLINIC | Age: 59
End: 2024-07-05
Payer: COMMERCIAL

## 2024-07-05 DIAGNOSIS — F41.0 PANIC ATTACK: ICD-10-CM

## 2024-07-05 DIAGNOSIS — F33.1 MAJOR DEPRESSIVE DISORDER, RECURRENT EPISODE, MODERATE (H): ICD-10-CM

## 2024-07-05 RX ORDER — FLUOXETINE 20 MG/1
40 TABLET, FILM COATED ORAL DAILY
Qty: 180 TABLET | Refills: 3 | Status: CANCELLED | OUTPATIENT
Start: 2024-07-05

## 2024-07-05 RX ORDER — FLUOXETINE 20 MG/1
40 TABLET, FILM COATED ORAL DAILY
Qty: 180 TABLET | Refills: 3 | OUTPATIENT
Start: 2024-07-05

## 2024-07-05 NOTE — TELEPHONE ENCOUNTER
Prior auth has already been requested in a previous encounter. Thanks    Kath Zavala RN  [facility]

## 2024-07-05 NOTE — TELEPHONE ENCOUNTER
Pt is needing a PA for:      Requested Prescriptions   Pending Prescriptions Disp Refills    FLUoxetine 20 MG tablet 180 tablet 3     Sig: Take 2 tablets (40 mg) by mouth daily       There is no refill protocol information for this order           Kath Zavala RN  [facility]

## 2024-07-08 DIAGNOSIS — F33.1 MAJOR DEPRESSIVE DISORDER, RECURRENT EPISODE, MODERATE (H): ICD-10-CM

## 2024-07-08 DIAGNOSIS — F41.0 PANIC ATTACK: ICD-10-CM

## 2024-07-08 RX ORDER — FLUOXETINE 20 MG/1
40 TABLET, FILM COATED ORAL DAILY
Qty: 180 TABLET | Refills: 3 | Status: SHIPPED | OUTPATIENT
Start: 2024-07-08

## 2024-07-08 NOTE — TELEPHONE ENCOUNTER
Pt needs tablets not capsules as makes her sick.   Orders pended,   Thanks,   Mikhail Santos RN  [facility]

## 2024-07-09 DIAGNOSIS — R68.2 DRY MOUTH: ICD-10-CM

## 2024-07-09 RX ORDER — PILOCARPINE HYDROCHLORIDE 5 MG/1
5 TABLET, FILM COATED ORAL 3 TIMES DAILY
Qty: 270 TABLET | Refills: 0 | Status: SHIPPED | OUTPATIENT
Start: 2024-07-09 | End: 2024-08-05

## 2024-07-10 NOTE — TELEPHONE ENCOUNTER
Prior Authorization Not Needed per Insurance    Medication:   Insurance Company: [employer] - Phone [phone] Fax [phone]  Expected CoPay:      Pharmacy Filling the Rx: [facility] #[id number] - [place] - [address]  Pharmacy Notified:  yes  Patient Notified:  yes- Pharmacy will contact patient when ready to /ship    PA approval already on file per insurance. Called [facility] pharmacy and they have a paid claim, patient picked up yesterday.  No further action needed.

## 2024-07-20 DIAGNOSIS — F31.9 BIPOLAR AFFECTIVE DISORDER, REMISSION STATUS UNSPECIFIED (H): ICD-10-CM

## 2024-07-21 RX ORDER — LAMOTRIGINE 100 MG/1
200 TABLET ORAL DAILY
Qty: 180 TABLET | Refills: 3 | Status: SHIPPED | OUTPATIENT
Start: 2024-07-21

## 2024-08-05 DIAGNOSIS — M85.641: ICD-10-CM

## 2024-08-05 DIAGNOSIS — M85.642: ICD-10-CM

## 2024-08-05 DIAGNOSIS — R68.2 DRY MOUTH: ICD-10-CM

## 2024-08-05 DIAGNOSIS — J30.1 ALLERGIC RHINITIS DUE TO POLLEN, UNSPECIFIED SEASONALITY: ICD-10-CM

## 2024-08-05 RX ORDER — PILOCARPINE HYDROCHLORIDE 5 MG/1
5 TABLET, FILM COATED ORAL 3 TIMES DAILY
Qty: 270 TABLET | Refills: 0 | Status: SHIPPED | OUTPATIENT
Start: 2024-08-05

## 2024-08-05 NOTE — PROGRESS NOTES
SPEECH LANGUAGE PATHOLOGY EVALUATION       Fall Risk Screen:  Fall screen completed by: SLP  Have you fallen 2 or more times in the past year?: No  Have you fallen and had an injury in the past year?: No  Is patient a fall risk?: No    Subjective      Presenting condition or subjective complaint: Pt presents today for video swallow study referred by ENT.  Date of onset:  (reports ongoing for a long time related to her dentures)    Relevant medical history:     Past Medical History:   Diagnosis Date    Acute right-sided low back pain with right-sided sciatica 05/08/2018    Anxiety 03/21/2012    Calculus of kidney     Multiple    CARDIOVASCULAR SCREENING; LDL GOAL LESS THAN 130 10/31/2010    CARDIOVASCULAR SCREENING; LDL GOAL LESS THAN 160 10/31/2010    COPD (chronic obstructive pulmonary disease) (H)     CSF leak 11/17/2022    Fibromyalgia 11/30/2006    Gastric bypass status for obesity 04/16/2009    History of hypertension 05/03/2016    resolved with weight loss 2016    Hypertension goal BP (blood pressure) < 140/90 02/24/2015    Insomnia     Major depressive disorder, single episode, severe, specified as with psychotic behavior     Migraine headache     Moderate major depression (H) 11/30/2006    Myalgia and myositis, unspecified     FIBROMYALGIA    Postoperative pain 11/17/2022    Right maxillary sinusitis, chronic     S/P lumbar laminectomy 06/04/2018    Tobacco use disorder 07/14/2011    Quit oezwyql39/01/2013    Vitamin B12 deficiency disease 08/14/2013    Vitamin D deficiency disease 08/13/2013     Dates & types of surgery:    Past Surgical History:   Procedure Laterality Date    ABDOMINOPLASTY  12/2/2013    [facility]FF SURGICAL PATHOLOGY  Feb 2007    Redman's neuroma  - Right Foot    DISCECTOMY LUMBAR POSTERIOR MICROSCOPIC ONE LEVEL Left 2/15/2022    Procedure: Minimally Invasive Left Lumbar 5 to Sacral 1 microdiscectomy;  Surgeon: Eugenio Cash MD;  Location: [facility] OR    DISCECTOMY LUMBAR POSTERIOR MICROSCOPIC  "TWO LEVELS Right 11/15/2022    Procedure: Minimally Invasive Right Lumbar 4 to Lumbar 5 and Lumbar 5 to Sacral 1 microdiscectomies;  Surgeon: Eugenio Cash MD;  Location: [facility] OR    DISCECTOMY LUMBAR POSTERIOR MICROSCOPIC TWO LEVELS Right 11/18/2022    Procedure: MICRODISCECTOMY, SPINE, LUMBAR, 2 LEVELS, POSTERIOR APPROACH;  Surgeon: Eugenio Cash MD;  Location: [facility] OR    LITHOTRIPSY      SURGICAL PATHOLOGY EXAM      Lipoma Removal on her back    ZZC GASTRIC BYPASS,OBESE<100CM DAYAN-EN-Y  3-25-09    [facility] REMOVAL OF KIDNEY STONE      With kidney tents x 5 to 6 procedures.     Prior diagnostic imaging/testing results: no      Prior therapy history for the same diagnosis, illness or injury: no       Patient goals for therapy:  get her dentures fixed    Pain assessment: Pain denied     Objective     SWALLOW EVALUTION  Dysphagia history: Pt reports issues with her dentures that make her swallowing impaired. States that when she wears her top denture she feels the back of it goes \"too far back\" into the mouth and makes it feel like there is a constriction with swallowing. This then causes her to not be able to swallow solids or the solids get stuck. The bottom denture does not cause her issues. Denies issues with taking pills with liquid or odynophagia. States her dentist told her there were no adjustments to be made to her dentures to help.    Current Diet/Method of Nutritional Intake:  patient struggles to eat solids textures because of difficulty with her top denture; thin liquids       CLINICAL SWALLOW EVALUATION  Oral Motor Function:   Dentition: edentulous; has dentures but reports she cannot swallowing well with them in  Secretion management: WFL  Mucosal quality: adequate  Mandibular function: intact  Oral labial function: WFL  Lingual function: WFL  Velar function: intact   Buccal function: intact  Laryngeal function: cough, throat clear, volitional swallow, voicing WFL     Level of assist " required for feeding: no assistance needed   Textures Trialed:   Clinical Swallow Eval: Thin Liquids  Mode of presentation: cup, self-fed   Volume presented: 3oz (with dentures OUT)  Preparatory Phase: WFL  Oral Phase: WFL  Pharyngeal phase of swallow: intact   Diagnostic statement: No clinical s/sx of penetration/aspiration with dentures out.      ADDITIONAL EVAL COMPLETED TODAY : yes; rationale: to further assess pharyngeal phase    VIDEOFLUOROSCOPIC SWALLOW STUDY  Radiologist: Resident  Views Taken: left lateral, A/P   [facility]  [facility] sitting upright on chair/stool     VFSS textures trialed:   VFSS Eval: Thin Liquids  Mode of Presentation: cup, self-fed   Order of Presentation: Series 3 (dentures ON), Series 7 (dentures OFF)  Preparatory Phase: WFL  Oral Phase: WFL  Bolus Location When Swallow Initiated: posterior angle of ramus  Pharyngeal Phase: WFL  Rosenbeck's Penetration Aspiration Scale: 1 - no aspiration, contrast does not enter airway  Strategies and Compensations: not applicable  Diagnostic Statement: Of note, pt had significant difficulty tolerating taste of barium and spit/threw up multiple times during exam. With dentures both ON and OFF, no penetration/aspiration or significant residuals.    VFSS Eval: Purees  Mode of Presentation: spoon, self-fed   Order of Presentation: Series 4 (dentures ON), Series 8 (dentures OFF)  Preparatory Phase: WFL  Oral Phase: WFL  Bolus Location When Swallow Initiated: n/a  Pharyngeal Phase:  n/a  Rosenbeck s Penetration Aspiration Scale: n/a  Diagnostic Statement: with dentures both ON and OFF, pt could not tolerate taste/texture of barium and ended up spitting/throwing bolus up.    VFSS Eval: Solids  Mode of Presentation: self-fed   Order of Presentation: Series 5 (dentures ON), Series 9 (dentures OFF)  Preparatory Phase: WFL  Oral Phase: WFL  Bolus Location When Swallow Initiated: posterior angle of ramus  Pharyngeal Phase: WFL    Rosenbeck s Penetration Aspiration Scale: 1 - no aspiration, contrast does not enter airway  Strategies and Compensations: not applicable  Diagnostic Statement: With dentures on, pt not able to swallow solid and ended up spitting/throwing it back up. With dentures off and cookie crumbled in small bite of puree, no penetration/aspiration or significant residuals.    VFSS Eval: Barium Tablet  Mode of Presentation:  whole tablet taken with thin water by cup (dentures OUT)    Order of Presentation: Series 10  Preparatory Phase: WFL  Oral Phase: WFL  Bolus Location When Swallow Initiated: posterior angle of ramus  Pharyngeal Phase: WFL  Rosenbeck s Penetration Aspiration Scale: 1 - no aspiration, contrast does not enter airway  Strategies and Compensations: not applicable  Diagnostic Statement: Barium tablet passed through oropharynx without difficulty.     ESOPHAGEAL PHASE OF SWALLOW  no observed or reported concerns related to esophageal function     SWALLOW ASSESSMENT CLINICAL IMPRESSIONS AND RATIONALE  Diet Consistency Recommendations: thin liquids (level 0), minced & moist (level 5), if patient cannot tolerate dentures in     Recommended Feeding/Eating Techniques: small bolus size, alternate food and liquid intake, minimize distractions during oral intake   Medication Administration Recommendations: whole with thin liquid  Instrumental Assessment Recommendations: instrumental evaluation not recommended at this time     Assessment & Plan   CLINICAL IMPRESSIONS   Medical Diagnosis: esophageal dysphagia    Treatment Diagnosis: Oral phase dysphagia when dentures are IN; otherwise safe, functional oropharyngeal swallow   Impression/Assessment: Pt is a 59 year old female with swallow complaints. T Of note, exam is limited as the taste/texture of the barium caused the patient to gag and spit up most of the trials. Trials completed both with dentures IN and OUT. With dentures in, pt does appear to gag more when trying to  swallow anything more than liquids. With dentures in, pt is able to swallow mashed up solid with no penetration/aspiration or significant residuals. On limited exam oropharyngeal muscular function appears WFL. Suspect pt's upper denture is causing a gagging/constricted sensation. Recommend consult with Prosthodontics to see what other options there are for her upper dentures.     PLAN OF CARE  Evaluation only    Recommended Referrals to Other Professionals:  Prosthodontics     Education Assessment:   Learner/Method: Patient;Listening;No Barriers to Learning  Education Comments: patient declined to review radiographic images    Risks and benefits of evaluation/treatment have been explained.   Patient/Family/caregiver agrees with Plan of Care.     Evaluation Time:    SLP Eval: oral/pharyngeal swallow function, clinical minutes (35100): 12  SLP Eval: VideoFluoroscopic Swallow function Minutes (99220): 15    Signing Clinician: NICOLE Knapp

## 2024-08-06 ENCOUNTER — ANCILLARY PROCEDURE (OUTPATIENT)
Dept: GENERAL RADIOLOGY | Facility: CLINIC | Age: 59
End: 2024-08-06
Attending: OTOLARYNGOLOGY
Payer: COMMERCIAL

## 2024-08-06 ENCOUNTER — THERAPY VISIT (OUTPATIENT)
Dept: SPEECH THERAPY | Facility: CLINIC | Age: 59
End: 2024-08-06
Attending: OTOLARYNGOLOGY
Payer: COMMERCIAL

## 2024-08-06 DIAGNOSIS — R13.19 ESOPHAGEAL DYSPHAGIA: ICD-10-CM

## 2024-08-06 DIAGNOSIS — K21.9 LPRD (LARYNGOPHARYNGEAL REFLUX DISEASE): ICD-10-CM

## 2024-08-06 PROCEDURE — 92611 MOTION FLUOROSCOPY/SWALLOW: CPT | Mod: GN | Performed by: SPEECH-LANGUAGE PATHOLOGIST

## 2024-08-06 PROCEDURE — 92610 EVALUATE SWALLOWING FUNCTION: CPT | Mod: GN | Performed by: SPEECH-LANGUAGE PATHOLOGIST

## 2024-08-06 PROCEDURE — 74230 X-RAY XM SWLNG FUNCJ C+: CPT | Mod: GC | Performed by: RADIOLOGY

## 2024-08-06 RX ORDER — BARIUM SULFATE 400 MG/ML
SUSPENSION ORAL ONCE
Status: COMPLETED | OUTPATIENT
Start: 2024-08-06 | End: 2024-08-06

## 2024-08-06 RX ORDER — FLUTICASONE PROPIONATE 50 MCG
SPRAY, SUSPENSION (ML) NASAL
Qty: 48 ML | Refills: 0 | Status: SHIPPED | OUTPATIENT
Start: 2024-08-06

## 2024-08-06 RX ORDER — MELOXICAM 7.5 MG/1
7.5 TABLET ORAL DAILY
Qty: 30 TABLET | Refills: 1 | OUTPATIENT
Start: 2024-08-06

## 2024-08-06 RX ADMIN — BARIUM SULFATE 240 ML: 400 SUSPENSION ORAL at 09:03

## 2024-08-06 NOTE — Clinical Note
Limited exam due to pt gagging/throwing up from taste/texture of the barium. However, her swallow function looks WFL. The upper denture is making her gag. Recommend consult with Prosthodontics to see what other options are available. We often have patients see Esperanza Mauricio here at the  for this. Thanks! Archana, SLP

## 2024-08-06 NOTE — TELEPHONE ENCOUNTER
Prescription refill requested for:   meloxicam (MOBIC) 7.5 MG tablet             Last Written Prescription Date:  4/28/23  Last Fill Quantity: 30,   # refills: 1  Last Office Visit: 4/28/23  Future Office visit:           Reagan Pinto ATC

## 2024-08-06 NOTE — TELEPHONE ENCOUNTER
Pt has not been seen in over one year and medication is meant to be short term. She may use OTC medication, discuss with PCP, or come into clinic to discuss treatment plan.     Moo Jimenez, RNCC

## 2024-08-07 DIAGNOSIS — K21.9 LPRD (LARYNGOPHARYNGEAL REFLUX DISEASE): ICD-10-CM

## 2024-08-07 RX ORDER — PANTOPRAZOLE SODIUM 40 MG/1
40 TABLET, DELAYED RELEASE ORAL DAILY
Qty: 90 TABLET | Refills: 1 | Status: SHIPPED | OUTPATIENT
Start: 2024-08-07

## 2024-08-07 NOTE — PROGRESS NOTES
Refill request for pantoprazole received from [facility].  Last appointment 6/26/24.  Medication refilled as requested.  Jocelynn Cantrell RN

## 2024-11-14 DIAGNOSIS — F33.1 MAJOR DEPRESSIVE DISORDER, RECURRENT EPISODE, MODERATE (H): ICD-10-CM

## 2024-11-15 RX ORDER — BUPROPION HYDROCHLORIDE 300 MG/1
300 TABLET ORAL EVERY MORNING
Qty: 90 TABLET | Refills: 3 | OUTPATIENT
Start: 2024-11-15

## 2024-12-05 DIAGNOSIS — J30.1 ALLERGIC RHINITIS DUE TO POLLEN, UNSPECIFIED SEASONALITY: ICD-10-CM

## 2024-12-05 DIAGNOSIS — F33.1 MAJOR DEPRESSIVE DISORDER, RECURRENT EPISODE, MODERATE (H): ICD-10-CM

## 2024-12-05 DIAGNOSIS — R68.2 DRY MOUTH: ICD-10-CM

## 2024-12-05 RX ORDER — PILOCARPINE HYDROCHLORIDE 5 MG/1
5 TABLET, FILM COATED ORAL 3 TIMES DAILY
Qty: 270 TABLET | Refills: 0 | Status: SHIPPED | OUTPATIENT
Start: 2024-12-05

## 2024-12-05 RX ORDER — FLUTICASONE PROPIONATE 50 MCG
SPRAY, SUSPENSION (ML) NASAL
Qty: 48 ML | Refills: 0 | Status: SHIPPED | OUTPATIENT
Start: 2024-12-05

## 2024-12-05 RX ORDER — BUPROPION HYDROCHLORIDE 300 MG/1
300 TABLET ORAL EVERY MORNING
Qty: 90 TABLET | Refills: 3 | OUTPATIENT
Start: 2024-12-05

## 2024-12-22 ENCOUNTER — HEALTH MAINTENANCE LETTER (OUTPATIENT)
Age: 59
End: 2024-12-22

## 2025-01-15 ENCOUNTER — OFFICE VISIT (OUTPATIENT)
Dept: OPTOMETRY | Facility: CLINIC | Age: 60
End: 2025-01-15
Payer: COMMERCIAL

## 2025-01-15 DIAGNOSIS — H52.4 HYPEROPIA WITH PRESBYOPIA OF BOTH EYES: Primary | ICD-10-CM

## 2025-01-15 DIAGNOSIS — H52.03 HYPEROPIA WITH PRESBYOPIA OF BOTH EYES: Primary | ICD-10-CM

## 2025-01-15 ASSESSMENT — REFRACTION_MANIFEST
OD_CYLINDER: +0.50
OD_SPHERE: +1.25
OD_AXIS: 160
OS_ADD: +2.75
OS_SPHERE: +0.75
OD_ADD: +2.75
METHOD_AUTOREFRACTION: 1
OS_CYLINDER: SPHERE
OD_SPHERE: +1.00
OD_CYLINDER: +1.00
OS_SPHERE: +1.00
OD_AXIS: 168

## 2025-01-15 ASSESSMENT — VISUAL ACUITY
OS_PH_SC: 20/40
OS_SC: 20/200
OD_SC+: +2
OS_SC+: -1
OD_SC: 20/200
OD_PH_SC: 20/30
OD_SC: 20/50
OS_SC: 20/40
OS_PH_SC+: +2
METHOD: SNELLEN - LINEAR

## 2025-01-15 ASSESSMENT — CONF VISUAL FIELD
OD_INFERIOR_TEMPORAL_RESTRICTION: 0
OD_INFERIOR_NASAL_RESTRICTION: 0
OS_INFERIOR_NASAL_RESTRICTION: 0
OS_SUPERIOR_NASAL_RESTRICTION: 0
OS_NORMAL: 1
OD_NORMAL: 1
OD_SUPERIOR_NASAL_RESTRICTION: 0
OS_INFERIOR_TEMPORAL_RESTRICTION: 0
OS_SUPERIOR_TEMPORAL_RESTRICTION: 0
OD_SUPERIOR_TEMPORAL_RESTRICTION: 0

## 2025-01-15 ASSESSMENT — KERATOMETRY
OD_K1POWER_DIOPTERS: 43.25
OD_AXISANGLE_DEGREES: 113
OS_AXISANGLE2_DEGREES: 6
OS_K2POWER_DIOPTERS: 44.00
OS_AXISANGLE_DEGREES: 96
OS_K1POWER_DIOPTERS: 43.25
OD_K2POWER_DIOPTERS: 43.75
OD_AXISANGLE2_DEGREES: 23

## 2025-01-15 ASSESSMENT — REFRACTION_WEARINGRX
OS_CYLINDER: SPHERE
OD_CYLINDER: +0.50
OS_ADD: +2.75
OD_SPHERE: +1.00
SPECS_TYPE: BROKE
OD_ADD: +2.75
OS_SPHERE: +0.75
OD_AXIS: 172

## 2025-01-15 ASSESSMENT — TONOMETRY
IOP_METHOD: TONOPEN
OD_IOP_MMHG: 15
OS_IOP_MMHG: 16

## 2025-01-15 ASSESSMENT — CUP TO DISC RATIO
OS_RATIO: 0.6
OD_RATIO: 0.5

## 2025-01-15 ASSESSMENT — EXTERNAL EXAM - RIGHT EYE: OD_EXAM: NORMAL

## 2025-01-15 ASSESSMENT — EXTERNAL EXAM - LEFT EYE: OS_EXAM: NORMAL

## 2025-01-15 NOTE — LETTER
1/15/2025      Kareen Hernandez  [address]  [place] [address]      Dear Colleague,    Thank you for referring your patient, Kareen Hernandez, to the [facility]. Please see a copy of my visit note below.    Chief Complaint   Patient presents with     Annual Eye Exam         Last Eye Exam: 2023  Dilated Previously: Yes    What are you currently using to see?  Glasses broke 6 months ago        Distance Vision Acuity: Satisfied with vision    Near Vision Acuity: Not satisfied     Eye Comfort: good  Do you use eye drops? : No      Denelle Neymar - Optometric Assistant          Medical, surgical and family histories reviewed and updated 1/15/2025.       OBJECTIVE: See Ophthalmology exam    ASSESSMENT:    ICD-10-CM    1. Hyperopia with presbyopia of both eyes  H52.03 REFRACTION    H52.4 EYE EXAM (SIMPLE-NONBILLABLE)          PLAN:     Patient Instructions   Hyperopia with presbyopia, both eyes: Updated bifocal glasses prescription for full-time wear.  Monitor annually.       Maria Quinonez, WAI      Again, thank you for allowing me to participate in the care of your patient.        Sincerely,        Maria Quinonez, OD    Electronically signed

## 2025-01-15 NOTE — PATIENT INSTRUCTIONS
Hyperopia with presbyopia, both eyes: Updated bifocal glasses prescription for full-time wear.  Monitor annually.

## 2025-01-15 NOTE — PROGRESS NOTES
Chief Complaint   Patient presents with    Annual Eye Exam         Last Eye Exam: 2023  Dilated Previously: Yes    What are you currently using to see?  Glasses broke 6 months ago        Distance Vision Acuity: Satisfied with vision    Near Vision Acuity: Not satisfied     Eye Comfort: good  Do you use eye drops? : No      Denelle Neymar - Optometric Assistant          Medical, surgical and family histories reviewed and updated 1/15/2025.       OBJECTIVE: See Ophthalmology exam    ASSESSMENT:    ICD-10-CM    1. Hyperopia with presbyopia of both eyes  H52.03 REFRACTION    H52.4 EYE EXAM (SIMPLE-NONBILLABLE)          PLAN:     Patient Instructions   Hyperopia with presbyopia, both eyes: Updated bifocal glasses prescription for full-time wear.  Monitor annually.       Maria Quinonez, OD

## 2025-01-24 NOTE — NURSING NOTE
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  Work status?  On disability.     Sports: no  Activities: walking    /75   Pulse 95   Ht 1.702 m (5' 7\")   Wt 69.4 kg (153 lb)   LMP  (LMP Unknown)   SpO2 98%   BMI 23.96 kg/m        Allergies   Allergen Reactions     Lurasidone Diarrhea     Diarrhea     Morphine Rash       Current Outpatient Medications   Medication     acetaminophen (ACETAMINOPHEN 8 HOUR) 650 MG CR tablet     Black Cohosh-SoyIsoflav-C Quad 40- MG CAPS     buPROPion (WELLBUTRIN XL) 300 MG 24 hr tablet     cyanocobalamin (CYANOCOBALAMIN) 1000 MCG/ML injection     cyclobenzaprine (FLEXERIL) 10 MG tablet     gabapentin (NEURONTIN) 600 MG tablet     medical cannabis (Patient's own supply)     mometasone (NASONEX) 50 MCG/ACT nasal spray     SUMAtriptan (IMITREX) 100 MG tablet     vitamin D2 (ERGOCALCIFEROL) 61101 units (1250 mcg) capsule     zolpidem (AMBIEN) 10 MG tablet     lamoTRIgine (LAMICTAL) 100 MG tablet     order for DME     Syringe/Needle, Disp, (BD LUER-BLAYNE SYRINGE) 25G X 1-1/2\" 3 ML MISC     No current facility-administered medications for this visit.         Darla Severin-Brown, LPN  "
- - -

## 2025-02-10 ENCOUNTER — LAB (OUTPATIENT)
Dept: LAB | Facility: CLINIC | Age: 60
End: 2025-02-10
Payer: COMMERCIAL

## 2025-02-10 DIAGNOSIS — M62.81 GENERALIZED MUSCLE WEAKNESS: ICD-10-CM

## 2025-02-10 PROCEDURE — 99000 SPECIMEN HANDLING OFFICE-LAB: CPT

## 2025-02-10 PROCEDURE — 86042 ACETYLCHOLN RCPTR BLCKG ANTB: CPT | Mod: 90

## 2025-02-10 PROCEDURE — 86041 ACETYLCHOLN RCPTR BNDNG ANTB: CPT | Mod: 90

## 2025-02-10 PROCEDURE — 36415 COLL VENOUS BLD VENIPUNCTURE: CPT

## 2025-02-10 PROCEDURE — 86043 ACETYLCHOLN RCPTR MODLG ANTB: CPT | Mod: 90

## 2025-02-13 LAB
ACHR BIND AB SER-SCNC: 0 NMOL/L
ACHR BLOCK AB/ACHR TOTAL SFR SER: 12 %
ACHR MOD AB/ACHR TOTAL SFR SER: 0 %

## 2025-02-25 ENCOUNTER — APPOINTMENT (OUTPATIENT)
Dept: OPTOMETRY | Facility: CLINIC | Age: 60
End: 2025-02-25
Payer: COMMERCIAL

## 2025-02-25 PROCEDURE — 92341 FIT SPECTACLES BIFOCAL: CPT | Performed by: OPTOMETRIST

## 2025-02-27 DIAGNOSIS — M79.7 FIBROMYALGIA: ICD-10-CM

## 2025-02-27 RX ORDER — CYCLOBENZAPRINE HCL 10 MG
10 TABLET ORAL 3 TIMES DAILY PRN
Qty: 60 TABLET | Refills: 11 | Status: SHIPPED | OUTPATIENT
Start: 2025-02-27

## 2025-02-27 NOTE — TELEPHONE ENCOUNTER
Pt needs new script as old one has not .   Orders pended. Scheduled visit for 3/13/25   Thanks.   Mikhail Santos RN  [facility]

## 2025-03-02 DIAGNOSIS — R68.2 DRY MOUTH: ICD-10-CM

## 2025-03-02 RX ORDER — PILOCARPINE HYDROCHLORIDE 5 MG/1
5 TABLET, FILM COATED ORAL 3 TIMES DAILY
Qty: 270 TABLET | Refills: 3 | Status: SHIPPED | OUTPATIENT
Start: 2025-03-02

## 2025-03-03 DIAGNOSIS — J30.1 ALLERGIC RHINITIS DUE TO POLLEN, UNSPECIFIED SEASONALITY: ICD-10-CM

## 2025-03-03 RX ORDER — FLUTICASONE PROPIONATE 50 MCG
SPRAY, SUSPENSION (ML) NASAL
Qty: 48 ML | Refills: 0 | Status: SHIPPED | OUTPATIENT
Start: 2025-03-03

## 2025-03-13 ENCOUNTER — ORDERS ONLY (AUTO-RELEASED) (OUTPATIENT)
Dept: FAMILY MEDICINE | Facility: CLINIC | Age: 60
End: 2025-03-13

## 2025-03-13 ENCOUNTER — OFFICE VISIT (OUTPATIENT)
Dept: FAMILY MEDICINE | Facility: CLINIC | Age: 60
End: 2025-03-13
Payer: COMMERCIAL

## 2025-03-13 VITALS
SYSTOLIC BLOOD PRESSURE: 117 MMHG | RESPIRATION RATE: 18 BRPM | DIASTOLIC BLOOD PRESSURE: 78 MMHG | HEART RATE: 78 BPM | WEIGHT: 163.5 LBS | OXYGEN SATURATION: 99 % | TEMPERATURE: 97.7 F | BODY MASS INDEX: 25.66 KG/M2 | HEIGHT: 67 IN

## 2025-03-13 DIAGNOSIS — Z12.11 SPECIAL SCREENING FOR MALIGNANT NEOPLASMS, COLON: ICD-10-CM

## 2025-03-13 DIAGNOSIS — Z12.4 CERVICAL CANCER SCREENING: ICD-10-CM

## 2025-03-13 DIAGNOSIS — F31.9 BIPOLAR AFFECTIVE DISORDER, REMISSION STATUS UNSPECIFIED (H): ICD-10-CM

## 2025-03-13 DIAGNOSIS — Z00.00 ENCOUNTER FOR MEDICARE ANNUAL WELLNESS EXAM: Primary | ICD-10-CM

## 2025-03-13 DIAGNOSIS — E55.9 VITAMIN D DEFICIENCY: ICD-10-CM

## 2025-03-13 DIAGNOSIS — Z23 NEEDS FLU SHOT: ICD-10-CM

## 2025-03-13 DIAGNOSIS — Z98.84 GASTRIC BYPASS STATUS FOR OBESITY: ICD-10-CM

## 2025-03-13 DIAGNOSIS — F33.1 MAJOR DEPRESSIVE DISORDER, RECURRENT EPISODE, MODERATE (H): ICD-10-CM

## 2025-03-13 DIAGNOSIS — I10 ESSENTIAL HYPERTENSION: ICD-10-CM

## 2025-03-13 DIAGNOSIS — G43.709 CHRONIC MIGRAINE WITHOUT AURA WITHOUT STATUS MIGRAINOSUS, NOT INTRACTABLE: ICD-10-CM

## 2025-03-13 PROBLEM — B18.2 HEPATITIS C, CHRONIC (H): Status: RESOLVED | Noted: 2021-05-19 | Resolved: 2025-03-13

## 2025-03-13 LAB
BASOPHILS # BLD AUTO: 0 10E3/UL (ref 0–0.2)
BASOPHILS NFR BLD AUTO: 0 %
EOSINOPHIL # BLD AUTO: 0.2 10E3/UL (ref 0–0.7)
EOSINOPHIL NFR BLD AUTO: 3 %
ERYTHROCYTE [DISTWIDTH] IN BLOOD BY AUTOMATED COUNT: 12.3 % (ref 10–15)
HCT VFR BLD AUTO: 41 % (ref 35–47)
HGB BLD-MCNC: 13.2 G/DL (ref 11.7–15.7)
IMM GRANULOCYTES # BLD: 0 10E3/UL
IMM GRANULOCYTES NFR BLD: 0 %
LYMPHOCYTES # BLD AUTO: 2.3 10E3/UL (ref 0.8–5.3)
LYMPHOCYTES NFR BLD AUTO: 47 %
MCH RBC QN AUTO: 30.4 PG (ref 26.5–33)
MCHC RBC AUTO-ENTMCNC: 32.2 G/DL (ref 31.5–36.5)
MCV RBC AUTO: 95 FL (ref 78–100)
MONOCYTES # BLD AUTO: 0.5 10E3/UL (ref 0–1.3)
MONOCYTES NFR BLD AUTO: 9 %
NEUTROPHILS # BLD AUTO: 2 10E3/UL (ref 1.6–8.3)
NEUTROPHILS NFR BLD AUTO: 40 %
PLATELET # BLD AUTO: 280 10E3/UL (ref 150–450)
RBC # BLD AUTO: 4.34 10E6/UL (ref 3.8–5.2)
WBC # BLD AUTO: 4.9 10E3/UL (ref 4–11)

## 2025-03-13 PROCEDURE — 90673 RIV3 VACCINE NO PRESERV IM: CPT | Performed by: FAMILY MEDICINE

## 2025-03-13 PROCEDURE — 99459 PELVIC EXAMINATION: CPT | Performed by: FAMILY MEDICINE

## 2025-03-13 PROCEDURE — 99213 OFFICE O/P EST LOW 20 MIN: CPT | Mod: 25 | Performed by: FAMILY MEDICINE

## 2025-03-13 PROCEDURE — 99396 PREV VISIT EST AGE 40-64: CPT | Mod: 25 | Performed by: FAMILY MEDICINE

## 2025-03-13 PROCEDURE — 36415 COLL VENOUS BLD VENIPUNCTURE: CPT | Performed by: FAMILY MEDICINE

## 2025-03-13 PROCEDURE — 1125F AMNT PAIN NOTED PAIN PRSNT: CPT | Performed by: FAMILY MEDICINE

## 2025-03-13 PROCEDURE — 82607 VITAMIN B-12: CPT | Performed by: FAMILY MEDICINE

## 2025-03-13 PROCEDURE — 82306 VITAMIN D 25 HYDROXY: CPT | Performed by: FAMILY MEDICINE

## 2025-03-13 PROCEDURE — G0008 ADMIN INFLUENZA VIRUS VAC: HCPCS | Performed by: FAMILY MEDICINE

## 2025-03-13 PROCEDURE — 80053 COMPREHEN METABOLIC PANEL: CPT | Performed by: FAMILY MEDICINE

## 2025-03-13 PROCEDURE — 3074F SYST BP LT 130 MM HG: CPT | Performed by: FAMILY MEDICINE

## 2025-03-13 PROCEDURE — 87624 HPV HI-RISK TYP POOLED RSLT: CPT | Performed by: FAMILY MEDICINE

## 2025-03-13 PROCEDURE — 3078F DIAST BP <80 MM HG: CPT | Performed by: FAMILY MEDICINE

## 2025-03-13 PROCEDURE — 82728 ASSAY OF FERRITIN: CPT | Performed by: FAMILY MEDICINE

## 2025-03-13 PROCEDURE — 85025 COMPLETE CBC W/AUTO DIFF WBC: CPT | Performed by: FAMILY MEDICINE

## 2025-03-13 PROCEDURE — G0145 SCR C/V CYTO,THINLAYER,RESCR: HCPCS | Performed by: FAMILY MEDICINE

## 2025-03-13 RX ORDER — BUPROPION HYDROCHLORIDE 300 MG/1
300 TABLET ORAL EVERY MORNING
Qty: 90 TABLET | Refills: 3 | Status: SHIPPED | OUTPATIENT
Start: 2025-03-13

## 2025-03-13 RX ORDER — AMLODIPINE BESYLATE 5 MG/1
5 TABLET ORAL DAILY
Qty: 90 TABLET | Refills: 3 | Status: SHIPPED | OUTPATIENT
Start: 2025-03-13

## 2025-03-13 RX ORDER — SUMATRIPTAN 50 MG/1
50 TABLET, FILM COATED ORAL
Qty: 10 TABLET | Refills: 3 | Status: SHIPPED | OUTPATIENT
Start: 2025-03-13

## 2025-03-13 SDOH — HEALTH STABILITY: PHYSICAL HEALTH: ON AVERAGE, HOW MANY DAYS PER WEEK DO YOU ENGAGE IN MODERATE TO STRENUOUS EXERCISE (LIKE A BRISK WALK)?: 1 DAY

## 2025-03-13 SDOH — HEALTH STABILITY: PHYSICAL HEALTH: ON AVERAGE, HOW MANY MINUTES DO YOU ENGAGE IN EXERCISE AT THIS LEVEL?: 10 MIN

## 2025-03-13 ASSESSMENT — SOCIAL DETERMINANTS OF HEALTH (SDOH): HOW OFTEN DO YOU GET TOGETHER WITH FRIENDS OR RELATIVES?: MORE THAN THREE TIMES A WEEK

## 2025-03-13 ASSESSMENT — PATIENT HEALTH QUESTIONNAIRE - PHQ9
SUM OF ALL RESPONSES TO PHQ QUESTIONS 1-9: 6
10. IF YOU CHECKED OFF ANY PROBLEMS, HOW DIFFICULT HAVE THESE PROBLEMS MADE IT FOR YOU TO DO YOUR WORK, TAKE CARE OF THINGS AT HOME, OR GET ALONG WITH OTHER PEOPLE: SOMEWHAT DIFFICULT
SUM OF ALL RESPONSES TO PHQ QUESTIONS 1-9: 6

## 2025-03-13 ASSESSMENT — PAIN SCALES - GENERAL: PAINLEVEL_OUTOF10: MODERATE PAIN (6)

## 2025-03-13 NOTE — PATIENT INSTRUCTIONS
Patient Education   Preventive Care Advice   This is general advice given by our system to help you stay healthy. However, your care team may have specific advice just for you. Please talk to your care team about your preventive care needs.  Nutrition  Eat 5 or more servings of fruits and vegetables each day.  Try wheat bread, brown rice and whole grain pasta (instead of white bread, rice, and pasta).  Get enough calcium and vitamin D. Check the label on foods and aim for 100% of the RDA (recommended daily allowance).  Lifestyle  Exercise at least 150 minutes each week  (30 minutes a day, 5 days a week).  Do muscle strengthening activities 2 days a week. These help control your weight and prevent disease.  No smoking.  Wear sunscreen to prevent skin cancer.  Have a dental exam and cleaning every 6 months.  Yearly exams  See your health care team every year to talk about:  Any changes in your health.  Any medicines your care team has prescribed.  Preventive care, family planning, and ways to prevent chronic diseases.  Shots (vaccines)   HPV shots (up to age 26), if you've never had them before.  Hepatitis B shots (up to age 59), if you've never had them before.  COVID-19 shot: Get this shot when it's due.  Flu shot: Get a flu shot every year.  Tetanus shot: Get a tetanus shot every 10 years.  Pneumococcal, hepatitis A, and RSV shots: Ask your care team if you need these based on your risk.  Shingles shot (for age 50 and up)  General health tests  Diabetes screening:  Starting at age 35, Get screened for diabetes at least every 3 years.  If you are younger than age 35, ask your care team if you should be screened for diabetes.  Cholesterol test: At age 39, start having a cholesterol test every 5 years, or more often if advised.  Bone density scan (DEXA): At age 50, ask your care team if you should have this scan for osteoporosis (brittle bones).  Hepatitis C: Get tested at least once in your life.  STIs (sexually  transmitted infections)  Before age 24: Ask your care team if you should be screened for STIs.  After age 24: Get screened for STIs if you're at risk. You are at risk for STIs (including HIV) if:  You are sexually active with more than one person.  You don't use condoms every time.  You or a partner was diagnosed with a sexually transmitted infection.  If you are at risk for HIV, ask about PrEP medicine to prevent HIV.  Get tested for HIV at least once in your life, whether you are at risk for HIV or not.  Cancer screening tests  Cervical cancer screening: If you have a cervix, begin getting regular cervical cancer screening tests starting at age 21.  Breast cancer scan (mammogram): If you've ever had breasts, begin having regular mammograms starting at age 40. This is a scan to check for breast cancer.  Colon cancer screening: It is important to start screening for colon cancer at age 45.  Have a colonoscopy test every 10 years (or more often if you're at risk) Or, ask your provider about stool tests like a FIT test every year or Cologuard test every 3 years.  To learn more about your testing options, visit:   .  For help making a decision, visit:   https://bit.ly/yh21230.  Prostate cancer screening test: If you have a prostate, ask your care team if a prostate cancer screening test (PSA) at age 55 is right for you.  Lung cancer screening: If you are a current or former smoker ages 50 to 80, ask your care team if ongoing lung cancer screenings are right for you.  For informational purposes only. Not to replace the advice of your health care provider. Copyright   2023 [facility]. All rights reserved. Clinically reviewed by the [facility]. [employer] [id number] - REV 01/24.  Learning About Depression Screening  What is depression screening?  Depression screening is a way to see if you have depression symptoms. It may be done by a doctor or counselor. It's often part of a routine  "checkup. That's because your mental health is just as important as your physical health.  Depression is a mental health condition that affects how you feel, think, and act. You may:  Have less energy.  Lose interest in your daily activities.  Feel sad and grouchy for a long time.  Depression is very common. It affects people of all ages.  Many things can lead to depression. Some people become depressed after they have a stroke or find out they have a major illness like cancer or heart disease. The death of a loved one or a breakup may lead to depression. It can run in families. Most experts believe that a combination of inherited genes and stressful life events can cause it.  What happens during screening?  You may be asked to fill out a form about your depression symptoms. You and the doctor will discuss your answers. The doctor may ask you more questions to learn more about how you think, act, and feel.  What happens after screening?  If you have symptoms of depression, your doctor will talk to you about your options.  Doctors usually treat depression with medicines or counseling. Often, combining the two works best. Many people don't get help because they think that they'll get over the depression on their own. But people with depression may not get better unless they get treatment.  The cause of depression is not well understood. There may be many factors involved. But if you have depression, it's not your fault.  A serious symptom of depression is thinking about death or suicide. If you or someone you care about talks about this or about feeling hopeless, get help right away.  It's important to know that depression can be treated. Medicine, counseling, and self-care may help.  Where can you learn more?  Go to https://www.[employer].net/patiented  Enter T185 in the search box to learn more about \"Learning About Depression Screening.\"  Current as of: July 31, 2024  Content Version: 14.3    2024 [employer].   Care instructions adapted under license by your healthcare professional. If you have questions about a medical condition or this instruction, always ask your healthcare professional. [employer] disclaims any warranty or liability for your use of this information.

## 2025-03-13 NOTE — PROGRESS NOTES
Answers submitted by the patient for this visit:  Patient Health Questionnaire (Submitted on 3/13/2025)  If you checked off any problems, how difficult have these problems made it for you to do your work, take care of things at home, or get along with other people?: Somewhat difficult  PHQ9 TOTAL SCORE: 6  Preventive Care Visit  [facility]  Leno Luis MD, Family Medicine  Mar 13, 2025      Assessment & Plan     Encounter for Medicare annual wellness exam  Routine wellness issues reviewed.  Follow-up for an annual wellness visit would be recommended in 1 year.  - REVIEW OF HEALTH MAINTENANCE PROTOCOL ORDERS    Cervical cancer screening  Cervical cancer screening was discussed and done today.  - HPV and Gynecologic Cytology Panel - Recommended Age 30 - 65 Years  - Gynecologic Cytology (PAP)    Major depressive disorder, recurrent episode, moderate (H)  Mood is stable at this time.  Bupropion appeared to need a refill and that was provided today.  Patient uses fluoxetine tablets because she has had intolerable burning in the esophagus related to use of capsules in the past.  - buPROPion (WELLBUTRIN XL) 300 MG 24 hr tablet; Take 1 tablet (300 mg) by mouth every morning.    Essential hypertension  Blood pressure was under good control today.  No changes were made to current medications and laboratory testing will be done as noted below.  - amLODIPine (NORVASC) 5 MG tablet; Take 1 tablet (5 mg) by mouth daily.  - CBC with platelets and differential; Future  - Comprehensive metabolic panel (BMP + Alb, Alk Phos, ALT, AST, Total. Bili, TP); Future  - CBC with platelets and differential  - Comprehensive metabolic panel (BMP + Alb, Alk Phos, ALT, AST, Total. Bili, TP)    Chronic migraine without aura without status migrainosus, not intractable  Migraine is doing well.  Sumatriptan works quite well.  Refill was provided today.  - SUMAtriptan (IMITREX) 50 MG tablet; Take 1  "tablet (50 mg) by mouth at onset of headache for migraine. May repeat once after two hours for persistent headache    Bipolar affective disorder, remission status unspecified (H)  Mood is stable without evidence of bruna or depression at this time.    Special screening for malignant neoplasms, colon  Colon cancer screening options were discussed today and order was placed for Cologuard.  - COLOGUARD(EXACT SCIENCES); Future    Needs flu shot  Flu shot was provided.  Hepatitis A and hepatitis B vaccine were discussed and patient was interested in pursuing these but it appears that she needs to get them through the pharmacy based on her benefits so she was referred to the pharmacy for these.  - INFLUENZA VACCINE TRIVALENT(FLUBLOK)    Gastric bypass status for obesity  Laboratory testing was done as noted below due to history of gastric bypass surgery and need to monitor long-term nutritional status and vitamin levels.  - Vitamin B12; Future  - Vitamin D Deficiency; Future  - Ferritin; Future  - CBC with platelets and differential; Future  - Comprehensive metabolic panel (BMP + Alb, Alk Phos, ALT, AST, Total. Bili, TP); Future  - Vitamin B12  - Vitamin D Deficiency  - Ferritin  - CBC with platelets and differential  - Comprehensive metabolic panel (BMP + Alb, Alk Phos, ALT, AST, Total. Bili, TP)    Vitamin D deficiency  As above.  - Vitamin D Deficiency; Future  - Vitamin D Deficiency    Patient has been advised of split billing requirements and indicates understanding: Yes    The longitudinal plan of care for the diagnosis(es)/condition(s) as documented were addressed during this visit. Due to the added complexity in care, I will continue to support Kareen in the subsequent management and with ongoing continuity of care.    BMI  Estimated body mass index is 25.61 kg/m  as calculated from the following:    Height as of this encounter: 1.702 m (5' 7\").    Weight as of this encounter: 74.2 kg (163 lb 8 oz). "       Counseling  Appropriate preventive services were addressed with this patient via screening, questionnaire, or discussion as appropriate for fall prevention, nutrition, physical activity, Tobacco-use cessation, social engagement, weight loss and cognition.  Checklist reviewing preventive services available has been given to the patient.  Reviewed patient's diet, addressing concerns and/or questions.   She is at risk for lack of exercise and has been provided with information to increase physical activity for the benefit of her well-being.   The patient was instructed to see the dentist every 6 months.   The patient's PHQ-9 score is consistent with mild depression. She was provided with information regarding depression.       FUTURE APPOINTMENTS:       - Follow-up for annual visit or as needed    Cristal Mathur is a 59 year old, presenting for the following:  Wellness Visit        3/13/2025     7:47 AM   Additional Questions   Roomed by Jinny LEE         Patient is here today for an annual wellness visit.  Generally she is feeling pretty good today and did not really have any specific concerns at this time.  She may need refills on a few medications.  She would like to have appropriate laboratory testing done today.    Advance Care Planning  Patient does not have a Health Care Directive: Patient states has Advance Directive and will bring in a copy to clinic.      3/13/2025   General Health   How would you rate your overall physical health? (!) FAIR   Feel stress (tense, anxious, or unable to sleep) Only a little   (!) STRESS CONCERN      3/13/2025   Nutrition   Diet: Regular (no restrictions)         3/13/2025   Exercise   Days per week of moderate/strenous exercise 1 day   Average minutes spent exercising at this level 10 min   (!) EXERCISE CONCERN      3/13/2025   Social Factors   Frequency of gathering with friends or relatives More than three times a week   Worry food won't last until get  money to buy more No   Food not last or not have enough money for food? No   Do you have housing? (Housing is defined as stable permanent housing and does not include staying ouside in a car, in a tent, in an abandoned building, in an overnight shelter, or couch-surfing.) Yes   Are you worried about losing your housing? No   Lack of transportation? No   Unable to get utilities (heat,electricity)? No         3/13/2025   Fall Risk   Fallen 2 or more times in the past year? No   Trouble with walking or balance? No          3/13/2025   Activities of Daily Living- Home Safety   Needs help with the following daily activites None of the above   Safety concerns in the home None of the above         3/13/2025   Dental   Dentist two times every year? (!) NO         3/13/2025   Hearing Screening   Hearing concerns? None of the above         3/13/2025   Driving Risk Screening   Patient/family members have concerns about driving No         3/13/2025   General Alertness/Fatigue Screening   Have you been more tired than usual lately? No         3/13/2025   Urinary Incontinence Screening   Bothered by leaking urine in past 6 months No         Today's PHQ-9 Score:       3/13/2025     7:31 AM   PHQ-9 SCORE   PHQ-9 Total Score MyChart 6 (Mild depression)   PHQ-9 Total Score 6        Patient-reported         3/13/2025   Substance Use   Alcohol more than 3/day or more than 7/wk No   Do you have a current opioid prescription? No   How severe/bad is pain from 1 to 10? 5/10   Do you use any other substances recreationally? No     Social History     Tobacco Use    Smoking status: Former     Current packs/day: 0.00     Average packs/day: 1 pack/day for 37.0 years (37.0 ttl pk-yrs)     Types: Cigarettes     Start date: 1976     Quit date: 2013     Years since quittin.4    Smokeless tobacco: Current    Tobacco comments:     Vape e-cig   Vaping Use    Vaping status: Every Day    Substances: Nicotine, THC, CBD   Substance Use  Topics    Alcohol use: Not Currently    Drug use: Yes     Types: Marijuana     Comment: Marijuana - 3 times a week           2/6/2025   LAST FHS-7 RESULTS   1st degree relative breast or ovarian cancer No   Any relative bilateral breast cancer No   Any male have breast cancer No   Any ONE woman have BOTH breast AND ovarian cancer No   Any woman with breast cancer before 50yrs No   2 or more relatives with breast AND/OR ovarian cancer No   2 or more relatives with breast AND/OR bowel cancer No        Mammogram Screening - Mammogram every 1-2 years updated in Health Maintenance based on mutual decision making      History of abnormal Pap smear: No - age 30- 64 PAP with HPV every 5 years recommended        Latest Ref Rng & Units 3/13/2025     9:09 AM 10/24/2019     3:18 PM 10/24/2019     2:55 PM   PAP / HPV   PAP (Historical)   NIL     HPV 16 DNA Negative Negative   Negative    HPV 18 DNA Negative Negative   Negative    Other HR HPV Negative Negative   Negative      ASCVD Risk   The 10-year ASCVD risk score (Martínez MICHELLE, et al., 2019) is: 1.9%    Values used to calculate the score:      Age: 59 years      Sex: Female      Is Non- : No      Diabetic: No      Tobacco smoker: No      Systolic Blood Pressure: 117 mmHg      Is BP treated: Yes      HDL Cholesterol: 88 mg/dL      Total Cholesterol: 206 mg/dL            Reviewed and updated as needed this visit by Provider   Tobacco  Allergies  Meds  Problems  Med Hx  Surg Hx  Fam Hx  Soc   Hx Sexual Activity          Patient Active Problem List   Diagnosis    Fibromyalgia    Gastric bypass status for obesity    CARDIOVASCULAR SCREENING; LDL GOAL LESS THAN 130    Anxiety    Vitamin D deficiency disease    Vitamin B12 deficiency disease    Insomnia    Gastroesophageal reflux disease without esophagitis    Chronic migraine without aura without status migrainosus, not intractable    Major depressive disorder, recurrent episode, moderate (H)     History of kidney stones    Tobacco use disorder    Bipolar affective disorder, remission status unspecified (H)    Epidural hematoma (H)     Past Surgical History:   Procedure Laterality Date    ABDOMINOPLASTY  2013    [facility] AFF SURGICAL PATHOLOGY  2007    Redman's neuroma  - Right Foot    DISCECTOMY LUMBAR POSTERIOR MICROSCOPIC ONE LEVEL Left 2/15/2022    Procedure: Minimally Invasive Left Lumbar 5 to Sacral 1 microdiscectomy;  Surgeon: Eugenio Cash MD;  Location: [facility] OR    DISCECTOMY LUMBAR POSTERIOR MICROSCOPIC TWO LEVELS Right 11/15/2022    Procedure: Minimally Invasive Right Lumbar 4 to Lumbar 5 and Lumbar 5 to Sacral 1 microdiscectomies;  Surgeon: Eugenio Cash MD;  Location: [facility] OR    DISCECTOMY LUMBAR POSTERIOR MICROSCOPIC TWO LEVELS Right 2022    Procedure: MICRODISCECTOMY, SPINE, LUMBAR, 2 LEVELS, POSTERIOR APPROACH;  Surgeon: Eugenio Cash MD;  Location: [facility] OR    LITHOTRIPSY      SURGICAL PATHOLOGY EXAM      Lipoma Removal on her back    ZZC GASTRIC BYPASS,OBESE<100CM DAYAN-EN-Y  3-25-09    [facility] REMOVAL OF KIDNEY STONE      With kidney tents x 5 to 6 procedures.       Social History     Tobacco Use    Smoking status: Former     Current packs/day: 0.00     Average packs/day: 1 pack/day for 37.0 years (37.0 ttl pk-yrs)     Types: Cigarettes     Start date: 1976     Quit date: 2013     Years since quittin.4    Smokeless tobacco: Current    Tobacco comments:     Vape e-cig   Substance Use Topics    Alcohol use: Not Currently     Family History   Problem Relation Age of Onset    Asthma Mother     Hypertension Mother     C.A.D. Mother     Genitourinary Problems Mother         kidney failure   age 80    Chronic Obstructive Pulmonary Disease Mother     Diabetes Father     Hypertension Father     C.A.D. Father     Chronic Obstructive Pulmonary Disease Father     Dementia Father     Other - See Comments Sister         no contact    Genitourinary  "Problems Sister         kidney stones    Other - See Comments Sister         no contact    Other - See Comments Brother         no contact    Obesity Daughter         gastric sleeve    Cerebrovascular Disease No family hx of     Breast Cancer No family hx of     Cancer - colorectal No family hx of     Prostate Cancer No family hx of          Current Outpatient Medications   Medication Sig Dispense Refill    albuterol (PROAIR HFA/PROVENTIL HFA/VENTOLIN HFA) 108 (90 Base) MCG/ACT inhaler INHALE 2 PUFFS INTO THE LUNGS EVERY 4 HOURS AS NEEDED FOR SHORTNESS OF BREATH OR WHEEZING 18 g 2    amLODIPine (NORVASC) 5 MG tablet Take 1 tablet (5 mg) by mouth daily. 90 tablet 3    buPROPion (WELLBUTRIN XL) 300 MG 24 hr tablet Take 1 tablet (300 mg) by mouth every morning. 90 tablet 3    cyclobenzaprine (FLEXERIL) 10 MG tablet Take 1 tablet (10 mg) by mouth 3 times daily as needed for muscle spasms. 60 tablet 11    fluticasone (FLONASE) 50 MCG/ACT nasal spray SPRAY 1 SPRAY INTO EACH NOSTRIL EVERY DAY 48 mL 0    lamoTRIgine (LAMICTAL) 100 MG tablet Take 2 tablets (200 mg) by mouth daily 180 tablet 3    medical cannabis (Patient's own supply) See Admin Instructions (The purpose of this order is to document that the patient reports taking medical cannabis.  This is not a prescription, and is not used to certify that the patient has a qualifying medical condition.)      SUMAtriptan (IMITREX) 50 MG tablet Take 1 tablet (50 mg) by mouth at onset of headache for migraine. May repeat once after two hours for persistent headache 10 tablet 3    B-D SYRINGE LUER-BLAYNE 3 ML MISC 1 SYRINGE EVERY 30 DAYS 25 each 1    BD HYPODERMIC NEEDLE 25G X 1-1/2\" MISC USE 1 EVERY 30 DAYS 3 each 32    FLUoxetine 20 MG tablet Take 2 tablets (40 mg) by mouth daily 180 tablet 3    order for DME Equipment being ordered: Splint right thumb 1 Units 0    pantoprazole (PROTONIX) 40 MG EC tablet Take 1 tablet (40 mg) by mouth daily (Patient not taking: Reported on " "3/13/2025) 90 tablet 1    pilocarpine (SALAGEN) 5 MG tablet TAKE 1 TABLET BY MOUTH THREE TIMES A  tablet 3    Syringe/Needle, Disp, (BD LUER-BLAYNE SYRINGE) 25G X 1-1/2\" 3 ML MISC 1 Syringe every 30 days 12 each 1     Allergies   Allergen Reactions    Lurasidone Diarrhea     Diarrhea    Morphine Rash     Current providers sharing in care for this patient include:  Patient Care Team:  Leno Luis MD as PCP - General (Family Medicine)  Jerry Espinoza PA-C as Physician Assistant (Gastroenterology)  Jose Wells MD as MD (Ophthalmology)  Leno Luis MD as Assigned PCP  Richard Waldrop CNP as Assigned Neuroscience Provider  Maria Quinonez OD (Optometry)  Maria Quinonez OD as Assigned Surgical Provider    The following health maintenance items are reviewed in Epic and correct as of today:  Health Maintenance   Topic Date Due    HEPATITIS A IMMUNIZATION (1 of 2 - Risk 2-dose series) Never done    HEPATITIS B IMMUNIZATION (1 of 3 - 19+ 3-dose series) Never done    COLORECTAL CANCER SCREENING  05/12/2024    COVID-19 Vaccine (4 - 2024-25 season) 09/01/2024    BMP  06/13/2025    LUNG CANCER SCREENING  06/27/2025    MAMMO SCREENING  02/06/2026    MEDICARE ANNUAL WELLNESS VISIT  03/13/2026    ANNUAL REVIEW OF HM ORDERS  03/13/2026    GLUCOSE  06/13/2027    DTAP/TDAP/TD IMMUNIZATION (3 - Td or Tdap) 08/04/2027    LIPID  10/09/2028    ADVANCE CARE PLANNING  10/09/2028    HPV TEST  03/13/2030    PAP  03/13/2030    HIV SCREENING  Completed    MIGRAINE ACTION PLAN  Completed    INFLUENZA VACCINE  Completed    Pneumococcal Vaccine: 50+ Years  Completed    ZOSTER IMMUNIZATION  Completed    HPV IMMUNIZATION  Aged Out    MENINGITIS IMMUNIZATION  Aged Out         Review of Systems  Constitutional, HEENT, cardiovascular, pulmonary, GI, , musculoskeletal, neuro, skin, endocrine and psych systems are negative, except as otherwise noted.     Objective    Exam  /78 (BP Location: " "Right arm, Patient Position: Sitting, Cuff Size: Adult Regular)   Pulse 78   Temp 97.7  F (36.5  C) (Temporal)   Resp 18   Ht 1.702 m (5' 7\")   Wt 74.2 kg (163 lb 8 oz)   LMP  (LMP Unknown)   SpO2 99%   BMI 25.61 kg/m     Estimated body mass index is 25.61 kg/m  as calculated from the following:    Height as of this encounter: 1.702 m (5' 7\").    Weight as of this encounter: 74.2 kg (163 lb 8 oz).    Physical Exam  GENERAL: alert and no distress  EYES: Eyes grossly normal to inspection, PERRL and conjunctivae and sclerae normal  HENT: ear canals and TM's normal, nose and mouth without ulcers or lesions  NECK: no adenopathy, no asymmetry, masses, or scars  RESP: lungs clear to auscultation - no rales, rhonchi or wheezes  BREAST: normal without masses, tenderness or nipple discharge and no palpable axillary masses or adenopathy  CV: regular rate and rhythm, normal S1 S2, no S3 or S4, no murmur, click or rub, no peripheral edema  ABDOMEN: soft, nontender, no hepatosplenomegaly, no masses and bowel sounds normal   (female): normal female external genitalia, normal urethral meatus, normal vaginal mucosa, normal-appearing cervix  MS: no gross musculoskeletal defects noted, no edema  SKIN: no suspicious lesions or rashes  NEURO: Normal strength and tone, mentation intact and speech normal  PSYCH: mentation appears normal, affect normal/bright        3/13/2025   Mini Cog   Clock Draw Score 2 Normal   3 Item Recall 2 objects recalled   Mini Cog Total Score 4              Signed Electronically by: Leno Luis MD    "

## 2025-03-14 LAB
HPV HR 12 DNA CVX QL NAA+PROBE: NEGATIVE
HPV16 DNA CVX QL NAA+PROBE: NEGATIVE
HPV18 DNA CVX QL NAA+PROBE: NEGATIVE
HUMAN PAPILLOMA VIRUS FINAL DIAGNOSIS: NORMAL

## 2025-03-16 LAB
FERRITIN SERPL-MCNC: 16 NG/ML (ref 11–328)
VIT B12 SERPL-MCNC: 382 PG/ML (ref 232–1245)
VIT D+METAB SERPL-MCNC: 28 NG/ML (ref 20–50)

## 2025-03-17 LAB
ALBUMIN SERPL BCG-MCNC: 4.2 G/DL (ref 3.5–5.2)
ALP SERPL-CCNC: 115 U/L (ref 40–150)
ALT SERPL W P-5'-P-CCNC: 12 U/L (ref 0–50)
ANION GAP SERPL CALCULATED.3IONS-SCNC: 11 MMOL/L (ref 7–15)
AST SERPL W P-5'-P-CCNC: 24 U/L (ref 0–45)
BILIRUB SERPL-MCNC: 0.3 MG/DL
BUN SERPL-MCNC: 12.8 MG/DL (ref 6–20)
CALCIUM SERPL-MCNC: 9.4 MG/DL (ref 8.8–10.4)
CHLORIDE SERPL-SCNC: 103 MMOL/L (ref 98–107)
CREAT SERPL-MCNC: 0.89 MG/DL (ref 0.51–0.95)
EGFRCR SERPLBLD CKD-EPI 2021: 75 ML/MIN/1.73M2
GLUCOSE SERPL-MCNC: 71 MG/DL (ref 70–99)
HCO3 SERPL-SCNC: 25 MMOL/L (ref 22–29)
POTASSIUM SERPL-SCNC: 4.5 MMOL/L (ref 3.4–5.3)
PROT SERPL-MCNC: 6.7 G/DL (ref 6.4–8.3)
SODIUM SERPL-SCNC: 139 MMOL/L (ref 135–145)

## 2025-03-19 LAB
BKR AP ASSOCIATED HPV REPORT: NORMAL
BKR LAB AP GYN ADEQUACY: NORMAL
BKR LAB AP GYN INTERPRETATION: NORMAL
BKR LAB AP PREVIOUS ABNORMAL: NORMAL
PATH REPORT.COMMENTS IMP SPEC: NORMAL
PATH REPORT.COMMENTS IMP SPEC: NORMAL
PATH REPORT.RELEVANT HX SPEC: NORMAL

## 2025-03-20 DIAGNOSIS — K21.9 LPRD (LARYNGOPHARYNGEAL REFLUX DISEASE): ICD-10-CM

## 2025-03-20 RX ORDER — PANTOPRAZOLE SODIUM 40 MG/1
40 TABLET, DELAYED RELEASE ORAL DAILY
Qty: 90 TABLET | Refills: 0 | Status: SHIPPED | OUTPATIENT
Start: 2025-03-20

## 2025-03-20 NOTE — PROGRESS NOTES
Refill request for pantoprazole received from [facility].  Last appointment 6/26/24.  Refill sent as requested. Jocelynn Cantrell RN

## 2025-06-01 DIAGNOSIS — J30.1 ALLERGIC RHINITIS DUE TO POLLEN, UNSPECIFIED SEASONALITY: ICD-10-CM

## 2025-06-02 RX ORDER — FLUTICASONE PROPIONATE 50 MCG
SPRAY, SUSPENSION (ML) NASAL
Qty: 16 G | Refills: 0 | Status: SHIPPED | OUTPATIENT
Start: 2025-06-02

## 2025-06-21 DIAGNOSIS — F41.0 PANIC ATTACK: ICD-10-CM

## 2025-06-21 DIAGNOSIS — J30.1 ALLERGIC RHINITIS DUE TO POLLEN, UNSPECIFIED SEASONALITY: ICD-10-CM

## 2025-06-21 DIAGNOSIS — F31.9 BIPOLAR AFFECTIVE DISORDER, REMISSION STATUS UNSPECIFIED (H): ICD-10-CM

## 2025-06-21 DIAGNOSIS — F33.1 MAJOR DEPRESSIVE DISORDER, RECURRENT EPISODE, MODERATE (H): ICD-10-CM

## 2025-06-22 RX ORDER — LAMOTRIGINE 100 MG/1
200 TABLET ORAL DAILY
Qty: 180 TABLET | Refills: 3 | Status: SHIPPED | OUTPATIENT
Start: 2025-06-22

## 2025-06-22 RX ORDER — FLUTICASONE PROPIONATE 50 MCG
SPRAY, SUSPENSION (ML) NASAL
Qty: 16 ML | Refills: 3 | Status: SHIPPED | OUTPATIENT
Start: 2025-06-22

## 2025-06-23 RX ORDER — FLUOXETINE 20 MG/1
40 TABLET, FILM COATED ORAL DAILY
Qty: 180 TABLET | Refills: 3 | Status: SHIPPED | OUTPATIENT
Start: 2025-06-23

## 2025-07-05 DIAGNOSIS — G43.709 CHRONIC MIGRAINE WITHOUT AURA WITHOUT STATUS MIGRAINOSUS, NOT INTRACTABLE: ICD-10-CM

## 2025-07-06 RX ORDER — SUMATRIPTAN 50 MG/1
50 TABLET, FILM COATED ORAL
Qty: 10 TABLET | Refills: 3 | Status: SHIPPED | OUTPATIENT
Start: 2025-07-06

## 2025-07-18 NOTE — TELEPHONE ENCOUNTER
Routing refill request to provider for review/approval because:  Drug not on the [facility] refill protocol refill request     Lyudmila Crabtree RN  [facility]         
: Yes

## 2025-07-29 ENCOUNTER — TELEPHONE (OUTPATIENT)
Dept: FAMILY MEDICINE | Facility: CLINIC | Age: 60
End: 2025-07-29
Payer: COMMERCIAL

## 2025-07-29 DIAGNOSIS — J43.2 CENTRILOBULAR EMPHYSEMA (H): ICD-10-CM

## 2025-07-29 DIAGNOSIS — J43.2 CENTRILOBULAR EMPHYSEMA (H): Primary | ICD-10-CM

## 2025-07-29 RX ORDER — ALBUTEROL SULFATE 90 UG/1
2 INHALANT RESPIRATORY (INHALATION) EVERY 4 HOURS PRN
Qty: 18 G | Refills: 2 | Status: SHIPPED | OUTPATIENT
Start: 2025-07-29

## 2025-07-29 RX ORDER — ALBUTEROL SULFATE 0.83 MG/ML
2.5 SOLUTION RESPIRATORY (INHALATION) EVERY 6 HOURS PRN
Qty: 90 ML | Refills: 0 | Status: SHIPPED | OUTPATIENT
Start: 2025-07-29

## 2025-07-29 NOTE — TELEPHONE ENCOUNTER
Called pt and relayed order was put in.    Thanks!  Shravan JOHNSON RN   [facility]

## 2025-07-29 NOTE — TELEPHONE ENCOUNTER
Order placed for neb supplies.    If she goes to [facility] I think they can see the order in [facility] and give her the supplies.    Leno Luis MD

## 2025-07-29 NOTE — TELEPHONE ENCOUNTER
Pt calling requesting Albuterol nebulizer solution to be ordered. Pt stating that she has had this before. She has the machine at home. Medication is not currently on her active medication list (appears pt was prescribed in the hospital in 2022). Pt advised that it may be best to be seen in the clinic if her inhaler is not managing her symptoms. Pt states she just needs it to clear the phlegm that keeps getting caught in her throat that she thinks is from the air conditioner. Pt would like triage to ask provider first before she schedules an appointment to be seen. Pt is NOT having SOB and is not in respiratory distress. Please advise.     Inhaler in pended in a previous encounter. Thanks    Kath Zavala RN  [facility]

## 2025-07-29 NOTE — TELEPHONE ENCOUNTER
I have sent some nebulizer solution to the pharmacy as well as a refill on the albuterol inhaler.    She only needs to use one medication at a time.    If increasing shortness of breath or other symptoms, should be seen.    Please call to let her know.    Leno Luis MD

## 2025-07-29 NOTE — TELEPHONE ENCOUNTER
Pt has questions about whether an appointment is needed for nebulizer solution to be prescribed     Informed pt appt would be needed and pt got frustrated saying that I dont know and insisting that she speak with a nurse. Transferred to triage

## 2025-07-29 NOTE — TELEPHONE ENCOUNTER
Called pt and relayed message. Also needs supply for nebulizer supplies.    Thanks!  Shravan JOHNSON RN   [facility]

## 2025-07-29 NOTE — TELEPHONE ENCOUNTER
I cannot send in the supplies to the pharmacy electronically.    May need to call in a verbal order to the pharmacy if they have them, like mask and tubing.    Leno Luis MD

## 2025-07-30 ENCOUNTER — DOCUMENTATION ONLY (OUTPATIENT)
Dept: FAMILY MEDICINE | Facility: CLINIC | Age: 60
End: 2025-07-30
Payer: COMMERCIAL

## 2025-07-30 DIAGNOSIS — J43.2 CENTRILOBULAR EMPHYSEMA (H): Primary | ICD-10-CM

## 2025-07-30 NOTE — TELEPHONE ENCOUNTER
Pt informed by [facility] Home medical equipment - Face to face needed for medicare coverage. Scheduled for virtual visit w/ PCP for tomorrow

## 2025-07-31 ENCOUNTER — VIRTUAL VISIT (OUTPATIENT)
Dept: FAMILY MEDICINE | Facility: CLINIC | Age: 60
End: 2025-07-31
Payer: COMMERCIAL

## 2025-07-31 DIAGNOSIS — J44.1 CHRONIC OBSTRUCTIVE PULMONARY DISEASE WITH ACUTE EXACERBATION (H): Primary | ICD-10-CM

## 2025-07-31 RX ORDER — PREDNISONE 20 MG/1
20 TABLET ORAL 2 TIMES DAILY
Qty: 10 TABLET | Refills: 0 | Status: SHIPPED | OUTPATIENT
Start: 2025-07-31 | End: 2025-08-05

## 2025-07-31 RX ORDER — AZITHROMYCIN 250 MG/1
TABLET, FILM COATED ORAL
Qty: 6 TABLET | Refills: 0 | Status: SHIPPED | OUTPATIENT
Start: 2025-07-31 | End: 2025-08-05

## 2025-07-31 ASSESSMENT — PATIENT HEALTH QUESTIONNAIRE - PHQ9
SUM OF ALL RESPONSES TO PHQ QUESTIONS 1-9: 7
SUM OF ALL RESPONSES TO PHQ QUESTIONS 1-9: 7
10. IF YOU CHECKED OFF ANY PROBLEMS, HOW DIFFICULT HAVE THESE PROBLEMS MADE IT FOR YOU TO DO YOUR WORK, TAKE CARE OF THINGS AT HOME, OR GET ALONG WITH OTHER PEOPLE: SOMEWHAT DIFFICULT

## 2025-07-31 NOTE — PROGRESS NOTES
Kareen is a 60 year old who is being evaluated via a billable video visit.    How would you like to obtain your AVS? MyChart  If the video visit is dropped, the invitation should be resent by: Text to cell phone: [phone]  Will anyone else be joining your video visit? No      Assessment & Plan     Chronic obstructive pulmonary disease with acute exacerbation (H)  I believe the patient's current symptoms represent an aggravation of her centrilobular emphysema and chronic obstructive pulmonary disease, likely related to intense air pollution at this time.  I have issued prescriptions for a Z-Mckay and a 5-day course of prednisone.  I also feel that she would benefit from albuterol delivered by nebulizer treatment as she is currently using an albuterol inhaler without significant benefit.  Indications for follow-up were reviewed.  - azithromycin (ZITHROMAX) 250 MG tablet; Take 2 tablets (500 mg) by mouth daily for 1 day, THEN 1 tablet (250 mg) daily for 4 days.  - predniSONE (DELTASONE) 20 MG tablet; Take 1 tablet (20 mg) by mouth 2 times daily for 5 days.  - Nebulizer and Supplies Order for DME - ONLY FOR DME    The longitudinal plan of care for the diagnosis(es)/condition(s) as documented were addressed during this visit. Due to the added complexity in care, I will continue to support Kareen in the subsequent management and with ongoing continuity of care.        Subjective   Kareen is a 60 year old, presenting for the following health issues:  Copd Exacerbation      7/31/2025     1:18 PM   Additional Questions   Roomed by Janelle     Video Start Time: 1132    History of Present Illness       Reason for visit:  My emphysema  Symptom onset:  1-2 weeks ago  Symptoms include:  Getting the flem up  Symptom intensity:  Moderate  Symptom progression:  Staying the same  Had these symptoms before:  Yes  Has tried/received treatment for these symptoms:  Yes  Previous treatment was successful:  Yes  Prior treatment  description:  Inhaler  What makes it worse:  The weather  What makes it better:  Yes, when I get the phlegm out of me and I don t have to try to keep clearing my throat   She is taking medications regularly.          Patient is seen today via video visit for concerns regarding a cough and throat clearing.  Symptoms started about 4 days ago.  No fever.  Cough is minimally productive.  She feels like she needs to clear some phlegm out of her chest.  She may feel some wheezing.  She has used an albuterol inhaler but has not experienced significant relief with this.  She does have a history of tobacco use, COPD, and emphysema demonstrated by imaging.      Review of Systems  Constitutional, HEENT, cardiovascular, pulmonary, gi and gu systems are negative, except as otherwise noted.      Objective           Vitals:  No vitals were obtained today due to virtual visit.    Physical Exam   GENERAL: alert and no distress  EYES: Eyes grossly normal to inspection.  No discharge or erythema, or obvious scleral/conjunctival abnormalities.  RESP: No audible wheeze, cough, or visible cyanosis.    SKIN: Visible skin clear. No significant rash, abnormal pigmentation or lesions.  NEURO: Cranial nerves grossly intact.  Mentation and speech appropriate for age.  PSYCH: Appropriate affect, tone, and pace of words          Video-Visit Details    Type of service:  Video Visit   Video End Time:1138  Originating Location (pt. Location): Home    Distant Location (provider location):  On-site  Platform used for Video Visit: Karel  Signed Electronically by: Leno Luis MD

## (undated) DEVICE — DRAPE COVER C-ARM SEAMLESS SNAP-KAP 03-KP26 LATEX FREE

## (undated) DEVICE — SPONGE SURGIFOAM 50

## (undated) DEVICE — SU VICRYL 0 CT-2 CR 8X18" J727D

## (undated) DEVICE — GLOVE BIOGEL PI MICRO SZ 7.5 48575

## (undated) DEVICE — DRAPE MICROSCOPE LEICA 54X150" AR8033650

## (undated) DEVICE — NDL SPINAL 18GA 3.5" 405184

## (undated) DEVICE — GLOVE BIOGEL PI MICRO INDICATOR UNDERGLOVE SZ 8.0 48980

## (undated) DEVICE — POSITIONER PT PRONESAFE HEAD REST W/DERMAPROX INSERT 40599

## (undated) DEVICE — PACK SPINE SM CUSTOM SNE15SSFSK

## (undated) DEVICE — RX SURGIFLO HEMOSTATIC MATRIX W/THROMBIN 8ML 2994

## (undated) DEVICE — ESU ELEC BLADE 2.75" COATED/INSULATED E1455

## (undated) DEVICE — TUBING SUCTION SOFT 20'X3/16" 0036570

## (undated) DEVICE — ESU PENCIL W/HOLSTER E2350H

## (undated) DEVICE — SOL WATER IRRIG 1000ML BOTTLE 2F7114

## (undated) DEVICE — ESU ELEC BLADE 6" COATED/INSULATED E1455-6

## (undated) DEVICE — DRAPE MAYO STAND 23X54 8337

## (undated) DEVICE — ADH LIQUID MASTISOL TOPICAL VIAL 2-3ML 0523-48

## (undated) DEVICE — LINEN TOWEL PACK X5 5464

## (undated) DEVICE — SYR EAR BULB 3OZ 0035830

## (undated) DEVICE — ESU GROUND PAD UNIVERSAL W/O CORD

## (undated) DEVICE — GLOVE PROTEXIS BLUE W/NEU-THERA 8.0  2D73EB80

## (undated) DEVICE — SU MONOCRYL 4-0 PS-2 18" UND Y496G

## (undated) DEVICE — SU VICRYL 2-0 CT-2 CR 8X18" J726D

## (undated) DEVICE — DRSG KERLIX FLUFFS X5

## (undated) DEVICE — GLOVE PROTEXIS W/NEU-THERA 7.5  2D73TE75

## (undated) DEVICE — DRAPE SHEET REV FOLD 3/4 9349

## (undated) DEVICE — TOOL DISSECT MIDAS MR8 14CM MATCH HEAD 3MM MR8-14MH30

## (undated) RX ORDER — EPHEDRINE SULFATE 50 MG/ML
INJECTION, SOLUTION INTRAMUSCULAR; INTRAVENOUS; SUBCUTANEOUS
Status: DISPENSED
Start: 2022-11-15

## (undated) RX ORDER — DEXMEDETOMIDINE HYDROCHLORIDE 4 UG/ML
INJECTION, SOLUTION INTRAVENOUS
Status: DISPENSED
Start: 2022-11-15

## (undated) RX ORDER — PROPOFOL 10 MG/ML
INJECTION, EMULSION INTRAVENOUS
Status: DISPENSED
Start: 2022-11-18

## (undated) RX ORDER — GLYCOPYRROLATE 0.2 MG/ML
INJECTION, SOLUTION INTRAMUSCULAR; INTRAVENOUS
Status: DISPENSED
Start: 2022-11-18

## (undated) RX ORDER — LIDOCAINE HYDROCHLORIDE 20 MG/ML
INJECTION, SOLUTION EPIDURAL; INFILTRATION; INTRACAUDAL; PERINEURAL
Status: DISPENSED
Start: 2022-11-18

## (undated) RX ORDER — FENTANYL CITRATE 50 UG/ML
INJECTION, SOLUTION INTRAMUSCULAR; INTRAVENOUS
Status: DISPENSED
Start: 2022-11-15

## (undated) RX ORDER — KETAMINE HCL IN NACL, ISO-OSM 100MG/10ML
SYRINGE (ML) INJECTION
Status: DISPENSED
Start: 2022-02-15

## (undated) RX ORDER — NEOSTIGMINE METHYLSULFATE 1 MG/ML
VIAL (ML) INJECTION
Status: DISPENSED
Start: 2022-02-15

## (undated) RX ORDER — DEXAMETHASONE SODIUM PHOSPHATE 4 MG/ML
INJECTION, SOLUTION INTRA-ARTICULAR; INTRALESIONAL; INTRAMUSCULAR; INTRAVENOUS; SOFT TISSUE
Status: DISPENSED
Start: 2022-11-15

## (undated) RX ORDER — HYDROMORPHONE HYDROCHLORIDE 1 MG/ML
INJECTION, SOLUTION INTRAMUSCULAR; INTRAVENOUS; SUBCUTANEOUS
Status: DISPENSED
Start: 2022-11-15

## (undated) RX ORDER — FENTANYL CITRATE 50 UG/ML
INJECTION, SOLUTION INTRAMUSCULAR; INTRAVENOUS
Status: DISPENSED
Start: 2022-02-15

## (undated) RX ORDER — GLYCOPYRROLATE 0.2 MG/ML
INJECTION, SOLUTION INTRAMUSCULAR; INTRAVENOUS
Status: DISPENSED
Start: 2022-02-15

## (undated) RX ORDER — FENTANYL CITRATE 50 UG/ML
INJECTION, SOLUTION INTRAMUSCULAR; INTRAVENOUS
Status: DISPENSED
Start: 2022-11-18

## (undated) RX ORDER — OXYCODONE HYDROCHLORIDE 5 MG/1
TABLET ORAL
Status: DISPENSED
Start: 2022-02-15

## (undated) RX ORDER — PROPOFOL 10 MG/ML
INJECTION, EMULSION INTRAVENOUS
Status: DISPENSED
Start: 2022-11-15

## (undated) RX ORDER — HYDROMORPHONE HYDROCHLORIDE 1 MG/ML
INJECTION, SOLUTION INTRAMUSCULAR; INTRAVENOUS; SUBCUTANEOUS
Status: DISPENSED
Start: 2022-02-15

## (undated) RX ORDER — ONDANSETRON 2 MG/ML
INJECTION INTRAMUSCULAR; INTRAVENOUS
Status: DISPENSED
Start: 2022-11-15

## (undated) RX ORDER — CEFAZOLIN SODIUM/WATER 2 G/20 ML
SYRINGE (ML) INTRAVENOUS
Status: DISPENSED
Start: 2022-11-15

## (undated) RX ORDER — LIDOCAINE HYDROCHLORIDE 20 MG/ML
INJECTION, SOLUTION EPIDURAL; INFILTRATION; INTRACAUDAL; PERINEURAL
Status: DISPENSED
Start: 2022-02-15

## (undated) RX ORDER — METHYLPREDNISOLONE ACETATE 40 MG/ML
INJECTION, SUSPENSION INTRA-ARTICULAR; INTRALESIONAL; INTRAMUSCULAR; SOFT TISSUE
Status: DISPENSED
Start: 2022-11-15

## (undated) RX ORDER — NEOSTIGMINE METHYLSULFATE 1 MG/ML
VIAL (ML) INJECTION
Status: DISPENSED
Start: 2022-11-18

## (undated) RX ORDER — OXYCODONE HYDROCHLORIDE 5 MG/1
TABLET ORAL
Status: DISPENSED
Start: 2022-11-15

## (undated) RX ORDER — ALBUTEROL SULFATE 90 UG/1
AEROSOL, METERED RESPIRATORY (INHALATION)
Status: DISPENSED
Start: 2022-11-18

## (undated) RX ORDER — METHYLPREDNISOLONE ACETATE 40 MG/ML
INJECTION, SUSPENSION INTRA-ARTICULAR; INTRALESIONAL; INTRAMUSCULAR; SOFT TISSUE
Status: DISPENSED
Start: 2022-11-18

## (undated) RX ORDER — DEXMEDETOMIDINE HYDROCHLORIDE 4 UG/ML
INJECTION, SOLUTION INTRAVENOUS
Status: DISPENSED
Start: 2022-11-18

## (undated) RX ORDER — BUPIVACAINE HYDROCHLORIDE AND EPINEPHRINE 5; 5 MG/ML; UG/ML
INJECTION, SOLUTION EPIDURAL; INTRACAUDAL; PERINEURAL
Status: DISPENSED
Start: 2022-11-15

## (undated) RX ORDER — PROPOFOL 10 MG/ML
INJECTION, EMULSION INTRAVENOUS
Status: DISPENSED
Start: 2022-02-15

## (undated) RX ORDER — LIDOCAINE HYDROCHLORIDE 20 MG/ML
INJECTION, SOLUTION EPIDURAL; INFILTRATION; INTRACAUDAL; PERINEURAL
Status: DISPENSED
Start: 2022-11-15

## (undated) RX ORDER — ONDANSETRON 2 MG/ML
INJECTION INTRAMUSCULAR; INTRAVENOUS
Status: DISPENSED
Start: 2022-11-18

## (undated) RX ORDER — CEFAZOLIN SODIUM/WATER 2 G/20 ML
SYRINGE (ML) INTRAVENOUS
Status: DISPENSED
Start: 2022-11-18